# Patient Record
Sex: FEMALE | Race: WHITE | Employment: OTHER | URBAN - METROPOLITAN AREA
[De-identification: names, ages, dates, MRNs, and addresses within clinical notes are randomized per-mention and may not be internally consistent; named-entity substitution may affect disease eponyms.]

---

## 2018-04-03 ENCOUNTER — OFFICE VISIT (OUTPATIENT)
Dept: INTERNAL MEDICINE | Age: 73
End: 2018-04-03
Payer: MEDICARE

## 2018-04-03 VITALS
WEIGHT: 93.8 LBS | SYSTOLIC BLOOD PRESSURE: 128 MMHG | HEART RATE: 88 BPM | RESPIRATION RATE: 16 BRPM | HEIGHT: 60 IN | TEMPERATURE: 98.6 F | BODY MASS INDEX: 18.42 KG/M2 | DIASTOLIC BLOOD PRESSURE: 60 MMHG

## 2018-04-03 DIAGNOSIS — J44.1 CHRONIC OBSTRUCTIVE PULMONARY DISEASE WITH ACUTE EXACERBATION (HCC): ICD-10-CM

## 2018-04-03 DIAGNOSIS — Z12.39 BREAST CANCER SCREENING: Primary | ICD-10-CM

## 2018-04-03 PROCEDURE — 1123F ACP DISCUSS/DSCN MKR DOCD: CPT | Performed by: INTERNAL MEDICINE

## 2018-04-03 PROCEDURE — 99213 OFFICE O/P EST LOW 20 MIN: CPT | Performed by: INTERNAL MEDICINE

## 2018-04-03 PROCEDURE — G8419 CALC BMI OUT NRM PARAM NOF/U: HCPCS | Performed by: INTERNAL MEDICINE

## 2018-04-03 PROCEDURE — 3023F SPIROM DOC REV: CPT | Performed by: INTERNAL MEDICINE

## 2018-04-03 PROCEDURE — G8427 DOCREV CUR MEDS BY ELIG CLIN: HCPCS | Performed by: INTERNAL MEDICINE

## 2018-04-03 PROCEDURE — G8926 SPIRO NO PERF OR DOC: HCPCS | Performed by: INTERNAL MEDICINE

## 2018-04-03 PROCEDURE — 3014F SCREEN MAMMO DOC REV: CPT | Performed by: INTERNAL MEDICINE

## 2018-04-03 PROCEDURE — 1090F PRES/ABSN URINE INCON ASSESS: CPT | Performed by: INTERNAL MEDICINE

## 2018-04-03 PROCEDURE — 3017F COLORECTAL CA SCREEN DOC REV: CPT | Performed by: INTERNAL MEDICINE

## 2018-04-03 PROCEDURE — G8399 PT W/DXA RESULTS DOCUMENT: HCPCS | Performed by: INTERNAL MEDICINE

## 2018-04-03 PROCEDURE — 4040F PNEUMOC VAC/ADMIN/RCVD: CPT | Performed by: INTERNAL MEDICINE

## 2018-04-03 PROCEDURE — 4004F PT TOBACCO SCREEN RCVD TLK: CPT | Performed by: INTERNAL MEDICINE

## 2018-04-03 RX ORDER — ALBUTEROL SULFATE 90 UG/1
2 AEROSOL, METERED RESPIRATORY (INHALATION) EVERY 6 HOURS PRN
Qty: 1 INHALER | Refills: 5 | Status: ON HOLD | OUTPATIENT
Start: 2018-04-03 | End: 2018-06-20 | Stop reason: HOSPADM

## 2018-04-03 RX ORDER — OMEPRAZOLE 20 MG/1
20 CAPSULE, DELAYED RELEASE ORAL 2 TIMES DAILY
Qty: 60 CAPSULE | Refills: 3 | Status: ON HOLD | OUTPATIENT
Start: 2018-04-03 | End: 2018-06-20 | Stop reason: HOSPADM

## 2018-04-03 RX ORDER — BUDESONIDE AND FORMOTEROL FUMARATE DIHYDRATE 160; 4.5 UG/1; UG/1
2 AEROSOL RESPIRATORY (INHALATION) 2 TIMES DAILY
Qty: 1 INHALER | Refills: 5 | Status: ON HOLD | OUTPATIENT
Start: 2018-04-03 | End: 2018-06-20 | Stop reason: HOSPADM

## 2018-04-03 ASSESSMENT — ENCOUNTER SYMPTOMS
COUGH: 0
SHORTNESS OF BREATH: 0
DIARRHEA: 0
NAUSEA: 0
SORE THROAT: 0
ABDOMINAL PAIN: 1
VOMITING: 0
WHEEZING: 0
BACK PAIN: 0

## 2018-04-16 ENCOUNTER — HOSPITAL ENCOUNTER (OUTPATIENT)
Dept: GENERAL RADIOLOGY | Age: 73
Discharge: HOME | End: 2018-04-18
Payer: MEDICARE

## 2018-04-16 DIAGNOSIS — Z12.39 BREAST CANCER SCREENING: ICD-10-CM

## 2018-04-16 PROCEDURE — 77063 BREAST TOMOSYNTHESIS BI: CPT

## 2018-04-17 ENCOUNTER — TELEPHONE (OUTPATIENT)
Dept: GENERAL RADIOLOGY | Age: 73
End: 2018-04-17

## 2018-04-17 ENCOUNTER — TELEPHONE (OUTPATIENT)
Dept: INTERNAL MEDICINE | Age: 73
End: 2018-04-17

## 2018-04-17 DIAGNOSIS — R92.8 MAMMOGRAM ABNORMAL: Primary | ICD-10-CM

## 2018-04-19 ENCOUNTER — TELEPHONE (OUTPATIENT)
Dept: INTERNAL MEDICINE | Age: 73
End: 2018-04-19

## 2018-04-23 ENCOUNTER — HOSPITAL ENCOUNTER (OUTPATIENT)
Dept: GENERAL RADIOLOGY | Age: 73
Discharge: HOME | End: 2018-04-25
Payer: MEDICARE

## 2018-04-23 DIAGNOSIS — R92.8 MAMMOGRAM ABNORMAL: ICD-10-CM

## 2018-04-23 PROCEDURE — G0279 TOMOSYNTHESIS, MAMMO: HCPCS

## 2018-05-17 ENCOUNTER — OFFICE VISIT (OUTPATIENT)
Dept: PULMONOLOGY | Age: 73
End: 2018-05-17
Payer: MEDICARE

## 2018-05-17 VITALS
OXYGEN SATURATION: 95 % | HEIGHT: 62 IN | WEIGHT: 90.8 LBS | SYSTOLIC BLOOD PRESSURE: 122 MMHG | DIASTOLIC BLOOD PRESSURE: 56 MMHG | TEMPERATURE: 97.4 F | RESPIRATION RATE: 22 BRPM | BODY MASS INDEX: 16.71 KG/M2 | HEART RATE: 80 BPM

## 2018-05-17 DIAGNOSIS — J42 CHRONIC BRONCHITIS, UNSPECIFIED CHRONIC BRONCHITIS TYPE (HCC): Primary | Chronic | ICD-10-CM

## 2018-05-17 DIAGNOSIS — J96.01 ACUTE RESPIRATORY FAILURE WITH HYPOXIA (HCC): ICD-10-CM

## 2018-05-17 LAB
DLCO %PRED: NORMAL
DLCO PRE: NORMAL
FEF 25-75%-POST: 0.48
FEF 25-75%-PRE: 0.44
FEV1-POST: 1.03
FEV1-PRE: 1.02
FEV1/FVC-POST: 53
FEV1/FVC-PRE: 56
FVC-POST: 1.94
FVC-PRE: 1.81
MEP: NORMAL
MIP: NORMAL
TLC %PRED: NORMAL
TLC PRE: NORMAL

## 2018-05-17 PROCEDURE — G8418 CALC BMI BLW LOW PARAM F/U: HCPCS | Performed by: INTERNAL MEDICINE

## 2018-05-17 PROCEDURE — G8427 DOCREV CUR MEDS BY ELIG CLIN: HCPCS | Performed by: INTERNAL MEDICINE

## 2018-05-17 PROCEDURE — 4040F PNEUMOC VAC/ADMIN/RCVD: CPT | Performed by: INTERNAL MEDICINE

## 2018-05-17 PROCEDURE — 4004F PT TOBACCO SCREEN RCVD TLK: CPT | Performed by: INTERNAL MEDICINE

## 2018-05-17 PROCEDURE — 99205 OFFICE O/P NEW HI 60 MIN: CPT | Performed by: INTERNAL MEDICINE

## 2018-05-17 PROCEDURE — 99203 OFFICE O/P NEW LOW 30 MIN: CPT | Performed by: INTERNAL MEDICINE

## 2018-05-17 PROCEDURE — 1090F PRES/ABSN URINE INCON ASSESS: CPT | Performed by: INTERNAL MEDICINE

## 2018-05-17 PROCEDURE — 94060 EVALUATION OF WHEEZING: CPT | Performed by: INTERNAL MEDICINE

## 2018-05-17 PROCEDURE — 1123F ACP DISCUSS/DSCN MKR DOCD: CPT | Performed by: INTERNAL MEDICINE

## 2018-05-17 PROCEDURE — G8399 PT W/DXA RESULTS DOCUMENT: HCPCS | Performed by: INTERNAL MEDICINE

## 2018-05-17 PROCEDURE — 3017F COLORECTAL CA SCREEN DOC REV: CPT | Performed by: INTERNAL MEDICINE

## 2018-05-17 PROCEDURE — 3023F SPIROM DOC REV: CPT | Performed by: INTERNAL MEDICINE

## 2018-05-17 PROCEDURE — G8925 SPIR FEV1/FVC>=60% & NO COPD: HCPCS | Performed by: INTERNAL MEDICINE

## 2018-05-17 ASSESSMENT — PULMONARY FUNCTION TESTS
FEV1_PRE: 1.02
FEV1/FVC_POST: 53
FEV1/FVC_PRE: 56
FEV1_POST: 1.03
FVC_PRE: 1.81
FVC_POST: 1.94

## 2018-06-07 ENCOUNTER — APPOINTMENT (OUTPATIENT)
Dept: GENERAL RADIOLOGY | Age: 73
DRG: 190 | End: 2018-06-07
Payer: MEDICARE

## 2018-06-07 ENCOUNTER — HOSPITAL ENCOUNTER (EMERGENCY)
Age: 73
Discharge: HOME | DRG: 190 | End: 2018-06-07
Attending: EMERGENCY MEDICINE
Payer: MEDICARE

## 2018-06-07 VITALS
HEART RATE: 87 BPM | SYSTOLIC BLOOD PRESSURE: 105 MMHG | RESPIRATION RATE: 17 BRPM | DIASTOLIC BLOOD PRESSURE: 54 MMHG | HEIGHT: 60 IN | WEIGHT: 90 LBS | OXYGEN SATURATION: 94 % | TEMPERATURE: 97.8 F | BODY MASS INDEX: 17.67 KG/M2

## 2018-06-07 DIAGNOSIS — J44.1 COPD EXACERBATION (HCC): Primary | ICD-10-CM

## 2018-06-07 LAB
ALBUMIN SERPL-MCNC: 4.3 G/DL (ref 3.5–5.2)
ALP BLD-CCNC: 53 U/L (ref 35–104)
ALT SERPL-CCNC: 10 U/L (ref 0–32)
ANION GAP SERPL CALCULATED.3IONS-SCNC: 11 MMOL/L (ref 7–16)
AST SERPL-CCNC: 19 U/L (ref 0–31)
BASOPHILS ABSOLUTE: 0.03 E9/L (ref 0–0.2)
BASOPHILS RELATIVE PERCENT: 0.6 % (ref 0–2)
BILIRUB SERPL-MCNC: 0.3 MG/DL (ref 0–1.2)
BUN BLDV-MCNC: 13 MG/DL (ref 8–23)
CALCIUM SERPL-MCNC: 8.8 MG/DL (ref 8.6–10.2)
CHLORIDE BLD-SCNC: 98 MMOL/L (ref 98–107)
CO2: 28 MMOL/L (ref 22–29)
CREAT SERPL-MCNC: 0.7 MG/DL (ref 0.5–1)
EOSINOPHILS ABSOLUTE: 0.03 E9/L (ref 0.05–0.5)
EOSINOPHILS RELATIVE PERCENT: 0.6 % (ref 0–6)
FILM ARRAY ADENOVIRUS: ABNORMAL
FILM ARRAY BORDETELLA PERTUSSIS: ABNORMAL
FILM ARRAY CHLAMYDOPHILIA PNEUMONIAE: ABNORMAL
FILM ARRAY CORONAVIRUS 229E: ABNORMAL
FILM ARRAY CORONAVIRUS HKU1: ABNORMAL
FILM ARRAY CORONAVIRUS NL63: ABNORMAL
FILM ARRAY CORONAVIRUS OC43: ABNORMAL
FILM ARRAY INFLUENZA A VIRUS 09H1: ABNORMAL
FILM ARRAY INFLUENZA A VIRUS H1: ABNORMAL
FILM ARRAY INFLUENZA A VIRUS H3: ABNORMAL
FILM ARRAY INFLUENZA A VIRUS: ABNORMAL
FILM ARRAY INFLUENZA B: ABNORMAL
FILM ARRAY METAPNEUMOVIRUS: ABNORMAL
FILM ARRAY MYCOPLASMA PNEUMONIAE: ABNORMAL
FILM ARRAY PARAINFLUENZA VIRUS 1: ABNORMAL
FILM ARRAY PARAINFLUENZA VIRUS 2: ABNORMAL
FILM ARRAY PARAINFLUENZA VIRUS 4: ABNORMAL
FILM ARRAY RESPIRATORY SYNCITIAL VIRUS: ABNORMAL
FILM ARRAY RHINOVIRUS/ENTEROVIRUS: ABNORMAL
GFR AFRICAN AMERICAN: >60
GFR NON-AFRICAN AMERICAN: >60 ML/MIN/1.73
GLUCOSE BLD-MCNC: 96 MG/DL (ref 74–109)
HCT VFR BLD CALC: 38.8 % (ref 34–48)
HEMOGLOBIN: 12.6 G/DL (ref 11.5–15.5)
IMMATURE GRANULOCYTES #: 0.02 E9/L
IMMATURE GRANULOCYTES %: 0.4 % (ref 0–5)
LACTIC ACID: 1 MMOL/L (ref 0.5–2.2)
LYMPHOCYTES ABSOLUTE: 1.2 E9/L (ref 1.5–4)
LYMPHOCYTES RELATIVE PERCENT: 23.4 % (ref 20–42)
MCH RBC QN AUTO: 30.9 PG (ref 26–35)
MCHC RBC AUTO-ENTMCNC: 32.5 % (ref 32–34.5)
MCV RBC AUTO: 95.1 FL (ref 80–99.9)
MONOCYTES ABSOLUTE: 0.65 E9/L (ref 0.1–0.95)
MONOCYTES RELATIVE PERCENT: 12.7 % (ref 2–12)
NEUTROPHILS ABSOLUTE: 3.19 E9/L (ref 1.8–7.3)
NEUTROPHILS RELATIVE PERCENT: 62.3 % (ref 43–80)
ORGANISM: ABNORMAL
PDW BLD-RTO: 13.6 FL (ref 11.5–15)
PLATELET # BLD: 201 E9/L (ref 130–450)
PMV BLD AUTO: 8.9 FL (ref 7–12)
POTASSIUM SERPL-SCNC: 4.4 MMOL/L (ref 3.5–5)
PRO-BNP: 323 PG/ML (ref 0–125)
RBC # BLD: 4.08 E12/L (ref 3.5–5.5)
SODIUM BLD-SCNC: 137 MMOL/L (ref 132–146)
TOTAL PROTEIN: 6.9 G/DL (ref 6.4–8.3)
TROPONIN: <0.01 NG/ML (ref 0–0.03)
WBC # BLD: 5.1 E9/L (ref 4.5–11.5)

## 2018-06-07 PROCEDURE — 80053 COMPREHEN METABOLIC PANEL: CPT

## 2018-06-07 PROCEDURE — 87502 INFLUENZA DNA AMP PROBE: CPT

## 2018-06-07 PROCEDURE — 87486 CHLMYD PNEUM DNA AMP PROBE: CPT

## 2018-06-07 PROCEDURE — 87040 BLOOD CULTURE FOR BACTERIA: CPT

## 2018-06-07 PROCEDURE — 6360000002 HC RX W HCPCS: Performed by: EMERGENCY MEDICINE

## 2018-06-07 PROCEDURE — 6370000000 HC RX 637 (ALT 250 FOR IP): Performed by: EMERGENCY MEDICINE

## 2018-06-07 PROCEDURE — 84484 ASSAY OF TROPONIN QUANT: CPT

## 2018-06-07 PROCEDURE — 2580000003 HC RX 258: Performed by: EMERGENCY MEDICINE

## 2018-06-07 PROCEDURE — 71045 X-RAY EXAM CHEST 1 VIEW: CPT

## 2018-06-07 PROCEDURE — 94664 DEMO&/EVAL PT USE INHALER: CPT

## 2018-06-07 PROCEDURE — 87503 INFLUENZA DNA AMP PROB ADDL: CPT

## 2018-06-07 PROCEDURE — 96374 THER/PROPH/DIAG INJ IV PUSH: CPT

## 2018-06-07 PROCEDURE — 85025 COMPLETE CBC W/AUTO DIFF WBC: CPT

## 2018-06-07 PROCEDURE — 87581 M.PNEUMON DNA AMP PROBE: CPT

## 2018-06-07 PROCEDURE — 99285 EMERGENCY DEPT VISIT HI MDM: CPT

## 2018-06-07 PROCEDURE — 87798 DETECT AGENT NOS DNA AMP: CPT

## 2018-06-07 PROCEDURE — 36415 COLL VENOUS BLD VENIPUNCTURE: CPT

## 2018-06-07 PROCEDURE — 83605 ASSAY OF LACTIC ACID: CPT

## 2018-06-07 PROCEDURE — 93005 ELECTROCARDIOGRAM TRACING: CPT | Performed by: EMERGENCY MEDICINE

## 2018-06-07 PROCEDURE — 94640 AIRWAY INHALATION TREATMENT: CPT

## 2018-06-07 PROCEDURE — 83880 ASSAY OF NATRIURETIC PEPTIDE: CPT

## 2018-06-07 RX ORDER — DOXYCYCLINE 100 MG/1
100 CAPSULE ORAL 2 TIMES DAILY
Qty: 14 CAPSULE | Refills: 0 | Status: ON HOLD | OUTPATIENT
Start: 2018-06-07 | End: 2018-06-20 | Stop reason: HOSPADM

## 2018-06-07 RX ORDER — 0.9 % SODIUM CHLORIDE 0.9 %
1000 INTRAVENOUS SOLUTION INTRAVENOUS ONCE
Status: COMPLETED | OUTPATIENT
Start: 2018-06-07 | End: 2018-06-07

## 2018-06-07 RX ORDER — PREDNISONE 20 MG/1
40 TABLET ORAL DAILY
Qty: 10 TABLET | Refills: 0 | Status: ON HOLD | OUTPATIENT
Start: 2018-06-07 | End: 2018-06-20 | Stop reason: HOSPADM

## 2018-06-07 RX ORDER — METHYLPREDNISOLONE SODIUM SUCCINATE 125 MG/2ML
125 INJECTION, POWDER, LYOPHILIZED, FOR SOLUTION INTRAMUSCULAR; INTRAVENOUS ONCE
Status: COMPLETED | OUTPATIENT
Start: 2018-06-07 | End: 2018-06-07

## 2018-06-07 RX ORDER — IPRATROPIUM BROMIDE AND ALBUTEROL SULFATE 2.5; .5 MG/3ML; MG/3ML
1 SOLUTION RESPIRATORY (INHALATION)
Status: COMPLETED | OUTPATIENT
Start: 2018-06-07 | End: 2018-06-07

## 2018-06-07 RX ADMIN — METHYLPREDNISOLONE SODIUM SUCCINATE 125 MG: 125 INJECTION, POWDER, FOR SOLUTION INTRAMUSCULAR; INTRAVENOUS at 08:22

## 2018-06-07 RX ADMIN — IPRATROPIUM BROMIDE AND ALBUTEROL SULFATE 1 AMPULE: 2.5; .5 SOLUTION RESPIRATORY (INHALATION) at 08:45

## 2018-06-07 RX ADMIN — IPRATROPIUM BROMIDE AND ALBUTEROL SULFATE 1 AMPULE: 2.5; .5 SOLUTION RESPIRATORY (INHALATION) at 08:35

## 2018-06-07 RX ADMIN — SODIUM CHLORIDE 1000 ML: 9 INJECTION, SOLUTION INTRAVENOUS at 08:22

## 2018-06-07 RX ADMIN — IPRATROPIUM BROMIDE AND ALBUTEROL SULFATE 1 AMPULE: 2.5; .5 SOLUTION RESPIRATORY (INHALATION) at 09:00

## 2018-06-07 ASSESSMENT — PAIN DESCRIPTION - PROGRESSION: CLINICAL_PROGRESSION: GRADUALLY IMPROVING

## 2018-06-07 ASSESSMENT — PAIN DESCRIPTION - FREQUENCY: FREQUENCY: INTERMITTENT

## 2018-06-07 ASSESSMENT — PAIN SCALES - GENERAL: PAINLEVEL_OUTOF10: 1

## 2018-06-07 ASSESSMENT — PAIN DESCRIPTION - PAIN TYPE: TYPE: ACUTE PAIN

## 2018-06-07 ASSESSMENT — PAIN DESCRIPTION - DESCRIPTORS: DESCRIPTORS: ACHING

## 2018-06-07 ASSESSMENT — PAIN DESCRIPTION - LOCATION: LOCATION: GENERALIZED

## 2018-06-07 NOTE — ED PROVIDER NOTES
High Blood Pressure in her mother. The patients home medications have been reviewed. Allergies: Patient has no known allergies.     -------------------------------------------------- RESULTS -------------------------------------------------  All laboratory and radiology results have been personally reviewed by myself   LABS:  Results for orders placed or performed during the hospital encounter of 06/07/18   Respiratory Panel, Film Array   Result Value Ref Range    Film Array Adenovirus       Result: Not Detected  *  *  Normal Range: Not Detected      Film Array Coronavirus HKU1       Result: Not Detected  *  *  Normal Range: Not Detected      Film Array Conoravirus NL63       Result: Not Detected  *  *  Normal Range: Not Detected      Film Array Coronavirus 229E       Result: Not Detected  *  *  Normal Range: Not Detected      Film Array Coronavirus OC43       Result: Not Detected  *  *  Normal Range: Not Detected      Film Array Influenza A Virus       Result: Not Detected  *  *  Normal Range: Not Detected      Film Array Influenza A Virus H1       Result: Not Detected  *  *  Normal Range: Not Detected      Film Array Influenza A Virus 09H1       Result: Not Detected  *  *  Normal Range: Not Detected      Film Array Influenza A Virus H3       Result: Not Detected  *  *  Normal Range: Not Detected      Film Array Influenza B       Result: Not Detected  *  *  Normal Range: Not Detected      Film Array Metapneumovirus       Result: Not Detected  *  *  Normal Range: Not Detected      Film Array Parainfluenza Virus 1       Result: Not Detected  *  *  Normal Range: Not Detected      Film Array Parainfluenza Virus 2       Result: Not Detected  *  *  Normal Range: Not Detected      Film Array Parainfluenza Virus 4       Result: Not Detected  *  *  Normal Range: Not Detected      Film Array Respiratory Syncitial Virus       Result: Not Detected  *  *  Normal Range: Not Detected      Film Array Rhinovirus/Enterovirus DECISION MAKING----------------------  Medications   0.9 % sodium chloride bolus (0 mLs Intravenous Stopped 6/7/18 0926)   methylPREDNISolone sodium (SOLU-MEDROL) injection 125 mg (125 mg Intravenous Given 6/7/18 0822)   ipratropium-albuterol (DUONEB) nebulizer solution 1 ampule (1 ampule Inhalation Given 6/7/18 0900)       Medical Decision Making:    Patient presented the ED for shortness of breath, congestion and rhinorrhea with a history of COPD. Labs and imaging showed no evidence of pneumonia or leukocytosis, respiratory. Does show she is positive for influenza virus. Reevaluation, patient improved with breathing treatment and Solu-Medrol in the ED. Patient arrived to the ED with SPO2 of 84% on room air and during reevaluation, patient was adamant about going home today. Discussed the risks and benefits of being admitted to the hospital, with patient able to ambulate in the ED without difficulty, SPO2 being 94% on room air during ambulation, I am comfortable discharging the patient to follow up with her PCP noted that she does have home oxygen at home if needed. Patient is agreeable with plan to be discharged with prescription for doxycycline and prednisone to follow up with her PCP this week, is aware of the signs and symptoms would return the ED for evaluation. Re-Evaluation:  Time: 1120  Re-evaluation. Patients symptoms are improving with breathing treatments in the ED  Repeat physical examination is significantly improved    Counseling: The emergency provider has spoken with the patient and daughter and discussed todays results, in addition to providing specific details for the plan of care and counseling regarding the diagnosis and prognosis.   Questions are answered at this time and they are agreeable with the plan.      --------------------------------- IMPRESSION AND DISPOSITION ---------------------------------    IMPRESSION  1. COPD exacerbation ([facility]        DISPOSITION  Disposition: Discharge

## 2018-06-08 LAB
EKG ATRIAL RATE: 87 BPM
EKG P AXIS: 86 DEGREES
EKG P-R INTERVAL: 154 MS
EKG Q-T INTERVAL: 364 MS
EKG QRS DURATION: 74 MS
EKG QTC CALCULATION (BAZETT): 438 MS
EKG R AXIS: -61 DEGREES
EKG T AXIS: 54 DEGREES
EKG VENTRICULAR RATE: 87 BPM

## 2018-06-10 ENCOUNTER — HOSPITAL ENCOUNTER (INPATIENT)
Age: 73
LOS: 10 days | Discharge: NURSING, REHAB OR LONG-TERM CARE | DRG: 190 | End: 2018-06-20
Attending: EMERGENCY MEDICINE | Admitting: INTERNAL MEDICINE
Payer: MEDICARE

## 2018-06-10 ENCOUNTER — APPOINTMENT (OUTPATIENT)
Dept: GENERAL RADIOLOGY | Age: 73
DRG: 190 | End: 2018-06-10
Payer: MEDICARE

## 2018-06-10 DIAGNOSIS — J44.1 ACUTE EXACERBATION OF CHRONIC OBSTRUCTIVE PULMONARY DISEASE (COPD) (HCC): Primary | ICD-10-CM

## 2018-06-10 DIAGNOSIS — E43 SEVERE PROTEIN-CALORIE MALNUTRITION (HCC): ICD-10-CM

## 2018-06-10 LAB
AADO2: 168.3 MMHG
AADO2: 194.3 MMHG
ALBUMIN SERPL-MCNC: 4.3 G/DL (ref 3.5–5.2)
ALP BLD-CCNC: 61 U/L (ref 35–104)
ALT SERPL-CCNC: 16 U/L (ref 0–32)
ANION GAP SERPL CALCULATED.3IONS-SCNC: 11 MMOL/L (ref 7–16)
AST SERPL-CCNC: 23 U/L (ref 0–31)
B.E.: 1.3 MMOL/L (ref -3–3)
B.E.: 3.6 MMOL/L (ref -3–3)
BASOPHILS ABSOLUTE: 0.04 E9/L (ref 0–0.2)
BASOPHILS RELATIVE PERCENT: 0.3 % (ref 0–2)
BILIRUB SERPL-MCNC: 0.6 MG/DL (ref 0–1.2)
BUN BLDV-MCNC: 16 MG/DL (ref 8–23)
CALCIUM SERPL-MCNC: 9.4 MG/DL (ref 8.6–10.2)
CHLORIDE BLD-SCNC: 96 MMOL/L (ref 98–107)
CO2: 33 MMOL/L (ref 22–29)
COHB: 0.1 % (ref 0–1.5)
COHB: 0.3 % (ref 0–1.5)
COMMENT: ABNORMAL
CREAT SERPL-MCNC: 0.4 MG/DL (ref 0.5–1)
CRITICAL: ABNORMAL
CRITICAL: ABNORMAL
DATE ANALYZED: ABNORMAL
DATE ANALYZED: ABNORMAL
DATE OF COLLECTION: ABNORMAL
DATE OF COLLECTION: ABNORMAL
EOSINOPHILS ABSOLUTE: 0 E9/L (ref 0.05–0.5)
EOSINOPHILS RELATIVE PERCENT: 0 % (ref 0–6)
FIO2: 50 %
FIO2: 50 %
GFR AFRICAN AMERICAN: >60
GFR NON-AFRICAN AMERICAN: >60 ML/MIN/1.73
GLUCOSE BLD-MCNC: 155 MG/DL (ref 74–109)
HCO3: 27.9 MMOL/L (ref 22–26)
HCO3: 31.3 MMOL/L (ref 22–26)
HCT VFR BLD CALC: 39.2 % (ref 34–48)
HEMOGLOBIN: 13.1 G/DL (ref 11.5–15.5)
HHB: 2.4 % (ref 0–5)
HHB: 3.2 % (ref 0–5)
IMMATURE GRANULOCYTES #: 0.06 E9/L
IMMATURE GRANULOCYTES %: 0.4 % (ref 0–5)
LAB: 9558
LAB: ABNORMAL
LACTIC ACID: 1.6 MMOL/L (ref 0.5–2.2)
LYMPHOCYTES ABSOLUTE: 4.4 E9/L (ref 1.5–4)
LYMPHOCYTES RELATIVE PERCENT: 31.5 % (ref 20–42)
Lab: 1528
Lab: 1748
MAGNESIUM: 2.1 MG/DL (ref 1.6–2.6)
MCH RBC QN AUTO: 31.6 PG (ref 26–35)
MCHC RBC AUTO-ENTMCNC: 33.4 % (ref 32–34.5)
MCV RBC AUTO: 94.5 FL (ref 80–99.9)
METHB: 0.3 % (ref 0–1.5)
METHB: 0.3 % (ref 0–1.5)
MODE: ABNORMAL
MODE: ABNORMAL
MONOCYTES ABSOLUTE: 1.97 E9/L (ref 0.1–0.95)
MONOCYTES RELATIVE PERCENT: 14.1 % (ref 2–12)
NEUTROPHILS ABSOLUTE: 7.49 E9/L (ref 1.8–7.3)
NEUTROPHILS RELATIVE PERCENT: 53.7 % (ref 43–80)
O2 CONTENT: 17.8 ML/DL
O2 CONTENT: 18.4 ML/DL
O2 SATURATION: 96.8 % (ref 92–98.5)
O2 SATURATION: 97.6 % (ref 92–98.5)
O2HB: 96.4 % (ref 94–97)
O2HB: 97 % (ref 94–97)
OPERATOR ID: 2593
OPERATOR ID: 7874
PATIENT TEMP: 37 C
PATIENT TEMP: 37 C
PCO2: 52.7 MMHG (ref 35–45)
PCO2: 62 MMHG (ref 35–45)
PDW BLD-RTO: 13.1 FL (ref 11.5–15)
PFO2: 1.81 MMHG/%
PFO2: 2.12 MMHG/%
PH BLOOD GAS: 7.32 (ref 7.35–7.45)
PH BLOOD GAS: 7.34 (ref 7.35–7.45)
PLATELET # BLD: 253 E9/L (ref 130–450)
PMV BLD AUTO: 8.8 FL (ref 7–12)
PO2: 106 MMHG (ref 60–100)
PO2: 90.4 MMHG (ref 60–100)
POTASSIUM SERPL-SCNC: 3.9 MMOL/L (ref 3.5–5)
PRO-BNP: 270 PG/ML (ref 0–125)
PROCALCITONIN: 0.06 NG/ML (ref 0–0.08)
RBC # BLD: 4.15 E12/L (ref 3.5–5.5)
RBC # BLD: NORMAL 10*6/UL
RI(T): 159 %
RI(T): 2.15
SODIUM BLD-SCNC: 140 MMOL/L (ref 132–146)
SOURCE, BLOOD GAS: ABNORMAL
SOURCE, BLOOD GAS: ABNORMAL
THB: 13.1 G/DL (ref 11.5–16.5)
THB: 13.4 G/DL (ref 11.5–16.5)
TIME ANALYZED: 1529
TIME ANALYZED: 1753
TOTAL PROTEIN: 7.4 G/DL (ref 6.4–8.3)
WBC # BLD: 14 E9/L (ref 4.5–11.5)

## 2018-06-10 PROCEDURE — 71045 X-RAY EXAM CHEST 1 VIEW: CPT

## 2018-06-10 PROCEDURE — 82805 BLOOD GASES W/O2 SATURATION: CPT

## 2018-06-10 PROCEDURE — 83735 ASSAY OF MAGNESIUM: CPT

## 2018-06-10 PROCEDURE — 94660 CPAP INITIATION&MGMT: CPT

## 2018-06-10 PROCEDURE — 6370000000 HC RX 637 (ALT 250 FOR IP): Performed by: STUDENT IN AN ORGANIZED HEALTH CARE EDUCATION/TRAINING PROGRAM

## 2018-06-10 PROCEDURE — 83605 ASSAY OF LACTIC ACID: CPT

## 2018-06-10 PROCEDURE — 6360000002 HC RX W HCPCS: Performed by: STUDENT IN AN ORGANIZED HEALTH CARE EDUCATION/TRAINING PROGRAM

## 2018-06-10 PROCEDURE — 83880 ASSAY OF NATRIURETIC PEPTIDE: CPT

## 2018-06-10 PROCEDURE — 87581 M.PNEUMON DNA AMP PROBE: CPT

## 2018-06-10 PROCEDURE — 87798 DETECT AGENT NOS DNA AMP: CPT

## 2018-06-10 PROCEDURE — 36415 COLL VENOUS BLD VENIPUNCTURE: CPT

## 2018-06-10 PROCEDURE — 80053 COMPREHEN METABOLIC PANEL: CPT

## 2018-06-10 PROCEDURE — 6370000000 HC RX 637 (ALT 250 FOR IP): Performed by: EMERGENCY MEDICINE

## 2018-06-10 PROCEDURE — 85025 COMPLETE CBC W/AUTO DIFF WBC: CPT

## 2018-06-10 PROCEDURE — 87503 INFLUENZA DNA AMP PROB ADDL: CPT

## 2018-06-10 PROCEDURE — 2580000003 HC RX 258: Performed by: STUDENT IN AN ORGANIZED HEALTH CARE EDUCATION/TRAINING PROGRAM

## 2018-06-10 PROCEDURE — 87486 CHLMYD PNEUM DNA AMP PROBE: CPT

## 2018-06-10 PROCEDURE — 2500000003 HC RX 250 WO HCPCS: Performed by: STUDENT IN AN ORGANIZED HEALTH CARE EDUCATION/TRAINING PROGRAM

## 2018-06-10 PROCEDURE — 94664 DEMO&/EVAL PT USE INHALER: CPT

## 2018-06-10 PROCEDURE — 84145 PROCALCITONIN (PCT): CPT

## 2018-06-10 PROCEDURE — 94640 AIRWAY INHALATION TREATMENT: CPT

## 2018-06-10 PROCEDURE — 87502 INFLUENZA DNA AMP PROBE: CPT

## 2018-06-10 PROCEDURE — 99285 EMERGENCY DEPT VISIT HI MDM: CPT

## 2018-06-10 PROCEDURE — 93005 ELECTROCARDIOGRAM TRACING: CPT | Performed by: EMERGENCY MEDICINE

## 2018-06-10 PROCEDURE — 2060000000 HC ICU INTERMEDIATE R&B

## 2018-06-10 RX ORDER — NICOTINE 21 MG/24HR
1 PATCH, TRANSDERMAL 24 HOURS TRANSDERMAL DAILY
Status: DISCONTINUED | OUTPATIENT
Start: 2018-06-10 | End: 2018-06-20 | Stop reason: HOSPADM

## 2018-06-10 RX ORDER — IPRATROPIUM BROMIDE AND ALBUTEROL SULFATE 2.5; .5 MG/3ML; MG/3ML
1 SOLUTION RESPIRATORY (INHALATION)
Status: DISCONTINUED | OUTPATIENT
Start: 2018-06-10 | End: 2018-06-20 | Stop reason: HOSPADM

## 2018-06-10 RX ORDER — METHYLPREDNISOLONE SODIUM SUCCINATE 40 MG/ML
40 INJECTION, POWDER, LYOPHILIZED, FOR SOLUTION INTRAMUSCULAR; INTRAVENOUS EVERY 12 HOURS
Status: DISCONTINUED | OUTPATIENT
Start: 2018-06-10 | End: 2018-06-10 | Stop reason: SDUPTHER

## 2018-06-10 RX ORDER — IPRATROPIUM BROMIDE AND ALBUTEROL SULFATE 2.5; .5 MG/3ML; MG/3ML
3 SOLUTION RESPIRATORY (INHALATION) ONCE
Status: COMPLETED | OUTPATIENT
Start: 2018-06-10 | End: 2018-06-10

## 2018-06-10 RX ORDER — BUDESONIDE 0.5 MG/2ML
500 INHALANT ORAL 2 TIMES DAILY
Status: DISCONTINUED | OUTPATIENT
Start: 2018-06-10 | End: 2018-06-20 | Stop reason: HOSPADM

## 2018-06-10 RX ORDER — METHYLPREDNISOLONE SODIUM SUCCINATE 40 MG/ML
40 INJECTION, POWDER, LYOPHILIZED, FOR SOLUTION INTRAMUSCULAR; INTRAVENOUS DAILY
Status: DISCONTINUED | OUTPATIENT
Start: 2018-06-10 | End: 2018-06-10

## 2018-06-10 RX ORDER — FORMOTEROL FUMARATE 20 UG/2ML
20 SOLUTION RESPIRATORY (INHALATION) 2 TIMES DAILY
Status: DISCONTINUED | OUTPATIENT
Start: 2018-06-10 | End: 2018-06-20 | Stop reason: HOSPADM

## 2018-06-10 RX ORDER — METHYLPREDNISOLONE SODIUM SUCCINATE 40 MG/ML
40 INJECTION, POWDER, LYOPHILIZED, FOR SOLUTION INTRAMUSCULAR; INTRAVENOUS EVERY 12 HOURS
Status: DISCONTINUED | OUTPATIENT
Start: 2018-06-11 | End: 2018-06-12

## 2018-06-10 RX ADMIN — DOXYCYCLINE 100 MG: 100 INJECTION, POWDER, LYOPHILIZED, FOR SOLUTION INTRAVENOUS at 20:01

## 2018-06-10 RX ADMIN — BUDESONIDE 500 MCG: 0.5 SUSPENSION RESPIRATORY (INHALATION) at 21:24

## 2018-06-10 RX ADMIN — IPRATROPIUM BROMIDE AND ALBUTEROL SULFATE 3 AMPULE: 2.5; .5 SOLUTION RESPIRATORY (INHALATION) at 13:20

## 2018-06-10 RX ADMIN — ENOXAPARIN SODIUM 40 MG: 40 INJECTION SUBCUTANEOUS at 19:37

## 2018-06-10 RX ADMIN — FORMOTEROL FUMARATE DIHYDRATE 20 MCG: 20 SOLUTION RESPIRATORY (INHALATION) at 21:24

## 2018-06-10 RX ADMIN — Medication 40 MG: at 17:30

## 2018-06-10 ASSESSMENT — PAIN SCALES - GENERAL: PAINLEVEL_OUTOF10: 0

## 2018-06-10 NOTE — H&P
[address] [facility]  Department of Internal Medicine  Internal Medicine Residency Program  Resident Progress  Note      Patient:  Gerardo Peraza 81 y.o. female MRN: [id number]     Date of Service: 6/10/2018    Allergy: Patient has no known allergies. CC: F/u: SOB  Subjective       72F with PMH sig for COPD who recently went to the ED on 6/7/18 for evaluation of COPD exacerbation who arrives again for the same complaint. She was not admitted but directly discharged to home from the ED with doxycycline and prednisone. On 6/8/18, one day later, she reports SOB that has worsened over the past few days, and alerted her to come to the ED again. She states she was taking  Upon arrival, she was afebrile and hypertensive she was placed on Bipap and has been ranging 93-99% sat. She was afebrile with leukocytosis and elevated CO2. pH of 7.3, PCO2 of 62, PO2 of 106, and HCO3 of 31.3. CXRAY neg for consolidation. ROS: no CP, N,V,F,C  Objective     Physical Exam:  · Vitals: /60   Pulse 101   Temp 97.4 °F (36.3 °C) (Axillary)   Resp 20   Ht 5' (1.524 m)   Wt 90 lb (40.8 kg)   SpO2 93%   BMI 17.58 kg/m²     · I & O - 24hr: No intake/output data recorded. · General Appearance: alert, appears stated age and cooperative  · HEENT:  Head: Normocephalic, no lesions, without obvious abnormality. · Neck: no carotid bruit and no JVD  · Lung: clear to auscultation bilaterally  · Heart: regular rate and rhythm, S1, S2 normal, no murmur, click, rub or gallop  · Abdomen: soft, non-tender; bowel sounds normal; no masses,  no organomegaly  · Extremities:  extremities normal, atraumatic, no cyanosis or edema  · Musculokeletal: No joint swelling, no muscle tenderness. ROM normal in all joints of extremities.    · Neurologic: Mental status: Alert, oriented, thought content appropriate    Pertinent/ New Labs and Imaging Studies     CBC:   Lab Results   Component Value Date    WBC 14.0 06/10/2018    RBC 4.15 06/10/2018

## 2018-06-10 NOTE — PROGRESS NOTES
Date: 6/10/2018    Time: 6:55 PM    Patient Placed On BIPAP/CPAP/ Non-Invasive Ventilation? No    If no must comment. Facial area red/color change? No           If YES are Blister/Lesion present? No   If yes must notify nursing staff  BIPAP/CPAP skin barrier?   Yes    Skin barrier type:mepilex       Comments:        Patience Burger

## 2018-06-11 LAB
AADO2: 128.3 MMHG
AADO2: 161.7 MMHG
ALBUMIN SERPL-MCNC: 3.8 G/DL (ref 3.5–5.2)
ALP BLD-CCNC: 61 U/L (ref 35–104)
ALT SERPL-CCNC: 18 U/L (ref 0–32)
ANION GAP SERPL CALCULATED.3IONS-SCNC: 12 MMOL/L (ref 7–16)
AST SERPL-CCNC: 19 U/L (ref 0–31)
B.E.: 4.1 MMOL/L (ref -3–3)
B.E.: 4.2 MMOL/L (ref -3–3)
BASOPHILS ABSOLUTE: 0.01 E9/L (ref 0–0.2)
BASOPHILS RELATIVE PERCENT: 0.1 % (ref 0–2)
BILIRUB SERPL-MCNC: 0.4 MG/DL (ref 0–1.2)
BUN BLDV-MCNC: 22 MG/DL (ref 8–23)
CALCIUM SERPL-MCNC: 9 MG/DL (ref 8.6–10.2)
CHLORIDE BLD-SCNC: 93 MMOL/L (ref 98–107)
CO2: 32 MMOL/L (ref 22–29)
COHB: 0.3 % (ref 0–1.5)
COHB: 0.3 % (ref 0–1.5)
CREAT SERPL-MCNC: 0.4 MG/DL (ref 0.5–1)
CRITICAL: ABNORMAL
CRITICAL: ABNORMAL
DATE ANALYZED: ABNORMAL
DATE ANALYZED: ABNORMAL
DATE OF COLLECTION: ABNORMAL
DATE OF COLLECTION: ABNORMAL
EOSINOPHILS ABSOLUTE: 0 E9/L (ref 0.05–0.5)
EOSINOPHILS RELATIVE PERCENT: 0 % (ref 0–6)
FILM ARRAY ADENOVIRUS: ABNORMAL
FILM ARRAY BORDETELLA PERTUSSIS: ABNORMAL
FILM ARRAY CHLAMYDOPHILIA PNEUMONIAE: ABNORMAL
FILM ARRAY CORONAVIRUS 229E: ABNORMAL
FILM ARRAY CORONAVIRUS HKU1: ABNORMAL
FILM ARRAY CORONAVIRUS NL63: ABNORMAL
FILM ARRAY CORONAVIRUS OC43: ABNORMAL
FILM ARRAY INFLUENZA A VIRUS 09H1: ABNORMAL
FILM ARRAY INFLUENZA A VIRUS H1: ABNORMAL
FILM ARRAY INFLUENZA A VIRUS H3: ABNORMAL
FILM ARRAY INFLUENZA A VIRUS: ABNORMAL
FILM ARRAY INFLUENZA B: ABNORMAL
FILM ARRAY METAPNEUMOVIRUS: ABNORMAL
FILM ARRAY MYCOPLASMA PNEUMONIAE: ABNORMAL
FILM ARRAY PARAINFLUENZA VIRUS 1: ABNORMAL
FILM ARRAY PARAINFLUENZA VIRUS 2: ABNORMAL
FILM ARRAY PARAINFLUENZA VIRUS 4: ABNORMAL
FILM ARRAY RESPIRATORY SYNCITIAL VIRUS: ABNORMAL
FILM ARRAY RHINOVIRUS/ENTEROVIRUS: ABNORMAL
FIO2: 50 %
FIO2: 50 %
GFR AFRICAN AMERICAN: >60
GFR NON-AFRICAN AMERICAN: >60 ML/MIN/1.73
GLUCOSE BLD-MCNC: 124 MG/DL (ref 74–109)
HCO3: 30.3 MMOL/L (ref 22–26)
HCO3: 31.2 MMOL/L (ref 22–26)
HCT VFR BLD CALC: 36.9 % (ref 34–48)
HEMOGLOBIN: 12.1 G/DL (ref 11.5–15.5)
HHB: 0.8 % (ref 0–5)
HHB: 1.4 % (ref 0–5)
IMMATURE GRANULOCYTES #: 0.03 E9/L
IMMATURE GRANULOCYTES %: 0.3 % (ref 0–5)
LAB: 9558
LAB: 9558
LYMPHOCYTES ABSOLUTE: 1.13 E9/L (ref 1.5–4)
LYMPHOCYTES RELATIVE PERCENT: 10.7 % (ref 20–42)
Lab: 117
Lab: 511
MAGNESIUM: 2.2 MG/DL (ref 1.6–2.6)
MCH RBC QN AUTO: 30.8 PG (ref 26–35)
MCHC RBC AUTO-ENTMCNC: 32.8 % (ref 32–34.5)
MCV RBC AUTO: 93.9 FL (ref 80–99.9)
METHB: 0.3 % (ref 0–1.5)
METHB: 0.3 % (ref 0–1.5)
MODE: ABNORMAL
MODE: ABNORMAL
MONOCYTES ABSOLUTE: 0.55 E9/L (ref 0.1–0.95)
MONOCYTES RELATIVE PERCENT: 5.2 % (ref 2–12)
NEUTROPHILS ABSOLUTE: 8.81 E9/L (ref 1.8–7.3)
NEUTROPHILS RELATIVE PERCENT: 83.7 % (ref 43–80)
O2 CONTENT: 17.6 ML/DL
O2 CONTENT: 19.3 ML/DL
O2 SATURATION: 98.6 % (ref 92–98.5)
O2 SATURATION: 99.2 % (ref 92–98.5)
O2HB: 98 % (ref 94–97)
O2HB: 98.6 % (ref 94–97)
OPERATOR ID: ABNORMAL
OPERATOR ID: ABNORMAL
ORGANISM: ABNORMAL
PATIENT TEMP: 37 C
PATIENT TEMP: 37 C
PCO2: 52 MMHG (ref 35–45)
PCO2: 57.6 MMHG (ref 35–45)
PDW BLD-RTO: 12.9 FL (ref 11.5–15)
PEEP/CPAP: 6 CMH?O
PEEP/CPAP: 6 CMH?O
PFO2: 2.48 MMHG/%
PFO2: 3.02 MMHG/%
PH BLOOD GAS: 7.35 (ref 7.35–7.45)
PH BLOOD GAS: 7.38 (ref 7.35–7.45)
PIP: 12 CMH?O
PIP: 12 CMH?O
PLATELET # BLD: 240 E9/L (ref 130–450)
PMV BLD AUTO: 9.1 FL (ref 7–12)
PO2: 123.8 MMHG (ref 60–100)
PO2: 150.9 MMHG (ref 60–100)
POTASSIUM REFLEX MAGNESIUM: 4.5 MMOL/L (ref 3.5–5)
RBC # BLD: 3.93 E12/L (ref 3.5–5.5)
RI(T): 0.85
RI(T): 1.31
SODIUM BLD-SCNC: 137 MMOL/L (ref 132–146)
SOURCE, BLOOD GAS: ABNORMAL
SOURCE, BLOOD GAS: ABNORMAL
THB: 12.6 G/DL (ref 11.5–16.5)
THB: 13.7 G/DL (ref 11.5–16.5)
TIME ANALYZED: 117
TIME ANALYZED: 512
TOTAL PROTEIN: 6.4 G/DL (ref 6.4–8.3)
WBC # BLD: 10.5 E9/L (ref 4.5–11.5)

## 2018-06-11 PROCEDURE — 36415 COLL VENOUS BLD VENIPUNCTURE: CPT

## 2018-06-11 PROCEDURE — 2700000000 HC OXYGEN THERAPY PER DAY

## 2018-06-11 PROCEDURE — 94660 CPAP INITIATION&MGMT: CPT

## 2018-06-11 PROCEDURE — 2060000000 HC ICU INTERMEDIATE R&B

## 2018-06-11 PROCEDURE — 6370000000 HC RX 637 (ALT 250 FOR IP): Performed by: INTERNAL MEDICINE

## 2018-06-11 PROCEDURE — 2500000003 HC RX 250 WO HCPCS: Performed by: STUDENT IN AN ORGANIZED HEALTH CARE EDUCATION/TRAINING PROGRAM

## 2018-06-11 PROCEDURE — 94640 AIRWAY INHALATION TREATMENT: CPT

## 2018-06-11 PROCEDURE — 83735 ASSAY OF MAGNESIUM: CPT

## 2018-06-11 PROCEDURE — 6360000002 HC RX W HCPCS: Performed by: STUDENT IN AN ORGANIZED HEALTH CARE EDUCATION/TRAINING PROGRAM

## 2018-06-11 PROCEDURE — 99222 1ST HOSP IP/OBS MODERATE 55: CPT | Performed by: INTERNAL MEDICINE

## 2018-06-11 PROCEDURE — 80053 COMPREHEN METABOLIC PANEL: CPT

## 2018-06-11 PROCEDURE — 85025 COMPLETE CBC W/AUTO DIFF WBC: CPT

## 2018-06-11 PROCEDURE — 82805 BLOOD GASES W/O2 SATURATION: CPT

## 2018-06-11 PROCEDURE — 2580000003 HC RX 258: Performed by: STUDENT IN AN ORGANIZED HEALTH CARE EDUCATION/TRAINING PROGRAM

## 2018-06-11 PROCEDURE — 6370000000 HC RX 637 (ALT 250 FOR IP): Performed by: STUDENT IN AN ORGANIZED HEALTH CARE EDUCATION/TRAINING PROGRAM

## 2018-06-11 RX ORDER — GUAIFENESIN 400 MG/1
400 TABLET ORAL 4 TIMES DAILY PRN
Status: DISCONTINUED | OUTPATIENT
Start: 2018-06-11 | End: 2018-06-12

## 2018-06-11 RX ADMIN — IPRATROPIUM BROMIDE AND ALBUTEROL SULFATE 1 AMPULE: 2.5; .5 SOLUTION RESPIRATORY (INHALATION) at 16:28

## 2018-06-11 RX ADMIN — IPRATROPIUM BROMIDE AND ALBUTEROL SULFATE 1 AMPULE: 2.5; .5 SOLUTION RESPIRATORY (INHALATION) at 11:54

## 2018-06-11 RX ADMIN — DOXYCYCLINE 100 MG: 100 INJECTION, POWDER, LYOPHILIZED, FOR SOLUTION INTRAVENOUS at 11:39

## 2018-06-11 RX ADMIN — BUDESONIDE 500 MCG: 0.5 SUSPENSION RESPIRATORY (INHALATION) at 21:01

## 2018-06-11 RX ADMIN — METHYLPREDNISOLONE SODIUM SUCCINATE 40 MG: 40 INJECTION, POWDER, FOR SOLUTION INTRAMUSCULAR; INTRAVENOUS at 17:32

## 2018-06-11 RX ADMIN — BUDESONIDE 500 MCG: 0.5 SUSPENSION RESPIRATORY (INHALATION) at 11:54

## 2018-06-11 RX ADMIN — FORMOTEROL FUMARATE DIHYDRATE 20 MCG: 20 SOLUTION RESPIRATORY (INHALATION) at 21:01

## 2018-06-11 RX ADMIN — ENOXAPARIN SODIUM 40 MG: 40 INJECTION SUBCUTANEOUS at 09:00

## 2018-06-11 RX ADMIN — METHYLPREDNISOLONE SODIUM SUCCINATE 40 MG: 40 INJECTION, POWDER, FOR SOLUTION INTRAMUSCULAR; INTRAVENOUS at 05:11

## 2018-06-11 ASSESSMENT — PAIN SCALES - GENERAL
PAINLEVEL_OUTOF10: 0

## 2018-06-11 NOTE — PROGRESS NOTES
[facility]  Internal Medicine Residency Program  Progress Note - House Team 2    Patient:  Raeann Curry 81 y.o. female MRN: [id number]     Date of Service: 6/11/2018     CC: SOB  Overnight events: none    Subjective     Pt seen and examined at bedside, last night on BiPap, today on NC saturating fine, but unable to complete full sentences, it is her baseline. mucinex added for her cough    Objective     Physical Exam:  · Vitals: /64   Pulse 110   Temp 97.7 °F (36.5 °C) (Temporal)   Resp 23   Ht 5' (1.524 m)   Wt 91 lb 6 oz (41.4 kg)   SpO2 95%   BMI 17.85 kg/m²     · I & O - 24hr: No intake/output data recorded. · General Appearance: alert, appears stated age and cooperative  · HEENT:  Head: Normocephalic, no lesions, without obvious abnormality. · Neck: no adenopathy, no carotid bruit, no JVD, supple, symmetrical, trachea midline and thyroid not enlarged, symmetric, no tenderness/mass/nodules  · Lung: clear to auscultation bilaterally and wheezes bilaterally  · Heart: regular rate and rhythm, S1, S2 normal, no murmur, click, rub or gallop  · Abdomen: soft, non-tender; bowel sounds normal; no masses,  no organomegaly  · Extremities:  extremities normal, atraumatic, no cyanosis or edema  · Musculokeletal: No joint swelling, no muscle tenderness. ROM normal in all joints of extremities.    · Neurologic: Mental status: Alert, oriented, thought content appropriate  Subject  Pertinent Labs & Imaging Studies   ena  CBC:   Lab Results   Component Value Date    WBC 10.5 06/11/2018    RBC 3.93 06/11/2018    HGB 12.1 06/11/2018    HCT 36.9 06/11/2018    MCV 93.9 06/11/2018    MCH 30.8 06/11/2018    MCHC 32.8 06/11/2018    RDW 12.9 06/11/2018     06/11/2018    MPV 9.1 06/11/2018     CBC with Differential:    Lab Results   Component Value Date    WBC 10.5 06/11/2018    RBC 3.93 06/11/2018    HGB 12.1 06/11/2018    HCT 36.9 06/11/2018     06/11/2018    MCV 93.9 06/11/2018    MCH 04/29/2012     Calcium:    Lab Results   Component Value Date    CALCIUM 9.0 06/11/2018     Ionized Calcium:  No results found for: IONCA  Magnesium:    Lab Results   Component Value Date    MG 2.2 06/11/2018       Resident's Assessment and Plan     COPD exacerbation, in setting of severe stage      -FVC 1.81,  68% of predicted;  FEV1 1.02, 49% of predicted  -continue bipap and NC  -cont breathing treatments, solu-medrol  -dc doxycycline  - start mucinex  -Monitor SPO2     Smoking cessation  -nicotine patch  -smoking cessation     PT/OT evaluation:  DVT prophylaxis/ GI prophylaxis:   Disposition: home +/- home health / Lena Zaldivar / Dominic Long / Karyle Greet MD, PGY-1  Attending physician: Dr. Court Rascon

## 2018-06-11 NOTE — PROGRESS NOTES
06/11/2018    SEGSPCT 72 04/30/2012    LYMPHOPCT 10.7 06/11/2018    MONOPCT 5.2 06/11/2018    BASOPCT 0.1 06/11/2018    MONOSABS 0.55 06/11/2018    LYMPHSABS 1.13 06/11/2018    EOSABS 0.00 06/11/2018    BASOSABS 0.01 06/11/2018     CMP:    Lab Results   Component Value Date     06/11/2018    K 4.5 06/11/2018    CL 93 06/11/2018    CO2 32 06/11/2018    BUN 22 06/11/2018    CREATININE 0.4 06/11/2018    GFRAA >60 06/11/2018    LABGLOM >60 06/11/2018    GLUCOSE 124 06/11/2018    GLUCOSE 172 04/30/2012    PROT 6.4 06/11/2018    LABALBU 3.8 06/11/2018    LABALBU 4.5 04/29/2012    CALCIUM 9.0 06/11/2018    BILITOT 0.4 06/11/2018    ALKPHOS 61 06/11/2018    AST 19 06/11/2018    ALT 18 06/11/2018     Albumin:    Lab Results   Component Value Date    LABALBU 3.8 06/11/2018    LABALBU 4.5 04/29/2012     Calcium:    Lab Results   Component Value Date    CALCIUM 9.0 06/11/2018     Magnesium:    Lab Results   Component Value Date    MG 2.2 06/11/2018     LDH:  No results found for: LDH  Uric Acid:  No results found for: LABURIC, URICACID  Troponin:    Lab Results   Component Value Date    TROPONINI <0.01 06/07/2018     HgBA1c:  No results found for: LABA1C  Microalbumen/Creatinine ratio:  No components found for: RUCREAT  TSH:  No results found for: TSH  TIBC:    Lab Results   Component Value Date    TIBC 323 10/23/2012     AMYLASE:    Lab Results   Component Value Date    AMYLASE 25 04/29/2012     LIPASE:    Lab Results   Component Value Date    LIPASE 27 04/29/2012     Fibrinogen Level:  No components found for: FIB  Urine Toxicology:  No components found for: IAMMENTA, IBARBIT, IBENZO, ICOCAINE, IMARTHC, IOPIATES, IPHENCYC  24 Hour Urine for Protein:  No components found for: Dorethea Shaper, [id number], UTV3    Resident's Assessment and Plan     Deandre Perea is a 81 y.o. female    COPD exacerbation, in setting of severe stage      -FVC 1.81,  68% of predicted;  FEV1 1.02, 49% of predicted  - continue bipap   -Continue

## 2018-06-12 LAB
BLOOD CULTURE, ROUTINE: NORMAL
CULTURE, BLOOD 2: NORMAL

## 2018-06-12 PROCEDURE — 94660 CPAP INITIATION&MGMT: CPT

## 2018-06-12 PROCEDURE — 97165 OT EVAL LOW COMPLEX 30 MIN: CPT

## 2018-06-12 PROCEDURE — 2700000000 HC OXYGEN THERAPY PER DAY

## 2018-06-12 PROCEDURE — 6360000002 HC RX W HCPCS: Performed by: STUDENT IN AN ORGANIZED HEALTH CARE EDUCATION/TRAINING PROGRAM

## 2018-06-12 PROCEDURE — 6360000002 HC RX W HCPCS: Performed by: INTERNAL MEDICINE

## 2018-06-12 PROCEDURE — 97530 THERAPEUTIC ACTIVITIES: CPT

## 2018-06-12 PROCEDURE — G8979 MOBILITY GOAL STATUS: HCPCS

## 2018-06-12 PROCEDURE — 2580000003 HC RX 258: Performed by: INTERNAL MEDICINE

## 2018-06-12 PROCEDURE — G8988 SELF CARE GOAL STATUS: HCPCS

## 2018-06-12 PROCEDURE — 2060000000 HC ICU INTERMEDIATE R&B

## 2018-06-12 PROCEDURE — 6370000000 HC RX 637 (ALT 250 FOR IP): Performed by: INTERNAL MEDICINE

## 2018-06-12 PROCEDURE — G8987 SELF CARE CURRENT STATUS: HCPCS

## 2018-06-12 PROCEDURE — G8978 MOBILITY CURRENT STATUS: HCPCS

## 2018-06-12 PROCEDURE — 6370000000 HC RX 637 (ALT 250 FOR IP): Performed by: STUDENT IN AN ORGANIZED HEALTH CARE EDUCATION/TRAINING PROGRAM

## 2018-06-12 PROCEDURE — 99232 SBSQ HOSP IP/OBS MODERATE 35: CPT | Performed by: INTERNAL MEDICINE

## 2018-06-12 PROCEDURE — 97162 PT EVAL MOD COMPLEX 30 MIN: CPT

## 2018-06-12 PROCEDURE — 94640 AIRWAY INHALATION TREATMENT: CPT

## 2018-06-12 RX ORDER — METHYLPREDNISOLONE SODIUM SUCCINATE 40 MG/ML
40 INJECTION, POWDER, LYOPHILIZED, FOR SOLUTION INTRAMUSCULAR; INTRAVENOUS EVERY 8 HOURS
Status: DISCONTINUED | OUTPATIENT
Start: 2018-06-12 | End: 2018-06-13

## 2018-06-12 RX ORDER — SODIUM CHLORIDE 0.9 % (FLUSH) 0.9 %
10 SYRINGE (ML) INJECTION PRN
Status: DISCONTINUED | OUTPATIENT
Start: 2018-06-12 | End: 2018-06-20 | Stop reason: HOSPADM

## 2018-06-12 RX ORDER — SODIUM CHLORIDE 0.9 % (FLUSH) 0.9 %
10 SYRINGE (ML) INJECTION EVERY 12 HOURS SCHEDULED
Status: DISCONTINUED | OUTPATIENT
Start: 2018-06-12 | End: 2018-06-20 | Stop reason: HOSPADM

## 2018-06-12 RX ORDER — BUMETANIDE 0.25 MG/ML
1 INJECTION, SOLUTION INTRAMUSCULAR; INTRAVENOUS ONCE
Status: DISCONTINUED | OUTPATIENT
Start: 2018-06-12 | End: 2018-06-12

## 2018-06-12 RX ORDER — GUAIFENESIN 400 MG/1
400 TABLET ORAL 2 TIMES DAILY
Status: DISCONTINUED | OUTPATIENT
Start: 2018-06-12 | End: 2018-06-20

## 2018-06-12 RX ADMIN — Medication 40 MG: at 21:30

## 2018-06-12 RX ADMIN — BUDESONIDE 500 MCG: 0.5 SUSPENSION RESPIRATORY (INHALATION) at 11:55

## 2018-06-12 RX ADMIN — FORMOTEROL FUMARATE DIHYDRATE 20 MCG: 20 SOLUTION RESPIRATORY (INHALATION) at 11:54

## 2018-06-12 RX ADMIN — Medication 40 MG: at 14:00

## 2018-06-12 RX ADMIN — Medication 10 ML: at 21:31

## 2018-06-12 RX ADMIN — IPRATROPIUM BROMIDE AND ALBUTEROL SULFATE 1 AMPULE: 2.5; .5 SOLUTION RESPIRATORY (INHALATION) at 16:28

## 2018-06-12 RX ADMIN — ENOXAPARIN SODIUM 40 MG: 40 INJECTION SUBCUTANEOUS at 08:33

## 2018-06-12 RX ADMIN — IPRATROPIUM BROMIDE AND ALBUTEROL SULFATE 1 AMPULE: 2.5; .5 SOLUTION RESPIRATORY (INHALATION) at 11:55

## 2018-06-12 RX ADMIN — BUDESONIDE 500 MCG: 0.5 SUSPENSION RESPIRATORY (INHALATION) at 20:34

## 2018-06-12 RX ADMIN — METHYLPREDNISOLONE SODIUM SUCCINATE 40 MG: 40 INJECTION, POWDER, FOR SOLUTION INTRAMUSCULAR; INTRAVENOUS at 04:56

## 2018-06-12 RX ADMIN — GUAIFENESIN 400 MG: 400 TABLET ORAL at 21:31

## 2018-06-12 RX ADMIN — FORMOTEROL FUMARATE DIHYDRATE 20 MCG: 20 SOLUTION RESPIRATORY (INHALATION) at 20:34

## 2018-06-12 RX ADMIN — GUAIFENESIN 400 MG: 400 TABLET ORAL at 10:29

## 2018-06-12 ASSESSMENT — PAIN SCALES - GENERAL
PAINLEVEL_OUTOF10: 0

## 2018-06-12 NOTE — PROGRESS NOTES
during ADL tasks     LUE 4-/5 WFL poor  and WFL FMC/dexterity noted during ADL tasks   Sensation: intact  Tone: WFL  Edema:none noted    Functional Assessment:   Initial Eval Status  Comments   Feeding  setup    Grooming  Setup sitting with increased ;time    Upper Body Dressing SBA     Lower Body Dressing SBA sock donning  Mod A     Bathing n/t    Toileting  Min A     Bed Mobility  Supine to Sit: min A  Sit to Supine:SBA    Functional Transfers Sit to stand Min A hand held    Functional Mobility Due to poor endurance scooted to [facility] would not walk      Sit balance: SBA  Stand balance: min A  Endurance/Activity tolerance: poor 6L O2 95% with SOB noted after min exertion                              Comments: Upon arrival pt supine in bed. At end of session pt supine with HOB elevated and  with all devices within reach, all lines and tubes intact. Nursing notified. Treatment: OT evaluation, education on benefits of mobility and need to sit upright for increased opportunity for lung expansion, bed mobility, LE dressing task, sit to stand task x1 and declined to perform functional mobility, OT for functional assessment of  ADL, Functional Transfer/Mobility Training, Equipment Needs, Energy Conservation Techniques, Pt/Family Education., OT role and POC reviewed. Patient  demo fair+ understanding of functional limitations and safety.       Assessment of current deficits   Functional mobility [x]  ADLs [x] Strength [x]  Cognition []  Functional transfers  [x] IADLs [x] Safety Awareness [x]  Endurance [x]  Fine Motor Coordination [] Balance [x] Vision/perception [] Sensation []   Gross Motor Coordination [x] ROM []       Eval Complexity: low  Profile and History- low  Assessment of Occupational Performance and Identification of Deficits- mod  Clinical Decision Making- low    Treatment frequency: OT 2-4x     Plan of Care:  ADL retraining [x]   Equipment needs [x]   Neuromuscular re-education []  Energy Conservation

## 2018-06-12 NOTE — PLAN OF CARE
Problem: Falls - Risk of:  Goal: Will remain free from falls  Will remain free from falls   Outcome: Met This Shift    Goal: Absence of physical injury  Absence of physical injury   Outcome: Met This Shift      Problem: Breathing Pattern - Ineffective:  Goal: Ability to achieve and maintain a regular respiratory rate will improve  Ability to achieve and maintain a regular respiratory rate will improve  Outcome: Met This Shift

## 2018-06-12 NOTE — PROGRESS NOTES
Physical Therapy  Initial Assessment     Name: Cordelia Mann  :   MRN: [id number]    Date of Service: 2018    Evaluating PT:  Celestina Clark, PT [id number]    Room #:  3090/2269-B  Diagnosis:  Acute respiratory failure  PMHx:        Diagnosis Date    Abnormal Pap smear     Patient states she had laser surgery    COPD (chronic obstructive pulmonary disease) (HCC)     Emphysema of lung ([address]     GERD (gastroesophageal reflux disease)     History of stomach ulcers     Hyperlipidemia     DIET    Osteoarthritis     Renal calculi     APX 40 YEARS AGO     Precautions:  Falls, O2, debilitated, skin integrity  Equipment Needs: To be determined    Pt lives with daughter in a [place] style home with 4 stairs to enter and 1 rail. Bed is on 1 floor and bath is on 1 floor. Pt ambulated with Ind PTA no device. Equipment owned: none     Initial Evaluation  Date: 18 Treatment Short Term/ Long Term   Goals   AM-PAC 6 Clicks 84/66     Was pt agreeable to Eval/treatment? yes     Does pt have pain? no     Bed Mobility  Rolling: Min A  Supine to sit: Min A  Sit to supine: SBA  Scooting: Min A  Rolling: Ind  Supine to sit: Ind  Sit to supine: Ind  Scooting: Ind   Transfers Sit to stand: Min A  Stand to sit: Min A  Stand pivot: NT  Sit to stand: SBA  Stand to sit: SBA  Stand pivot: SBA   Ambulation    NT patient unwilling to attempt stating she was to fatigued. 25 feet with  fww if needed SBA   Stair negotiation: ascended and descended  NT   4 steps with Min A rail 1   ROM BUE:  See OT note  BLE:  WFL     Strength BUE:  See OT note  BLE:  4-/5  4/5   Balance Sitting EOB:  SBA  Dynamic Standing:  Min A  Sitting EOB:  Ind  Dynamic Standing:  SBA     Pt is A & O x 3  Sensation:  Pt denies numbness and tingling to extremities  Edema:  none    Patient education  Pt educated on need for mobility and to sit upright.      Patient response to education:   Pt verbalized understanding Pt demonstrated skill Pt requires

## 2018-06-13 PROBLEM — E43 SEVERE PROTEIN-CALORIE MALNUTRITION (HCC): Status: ACTIVE | Noted: 2018-06-13

## 2018-06-13 LAB
EKG ATRIAL RATE: 94 BPM
EKG P AXIS: 88 DEGREES
EKG P-R INTERVAL: 170 MS
EKG Q-T INTERVAL: 326 MS
EKG QRS DURATION: 82 MS
EKG QTC CALCULATION (BAZETT): 407 MS
EKG R AXIS: -42 DEGREES
EKG T AXIS: 73 DEGREES
EKG VENTRICULAR RATE: 94 BPM

## 2018-06-13 PROCEDURE — 2580000003 HC RX 258: Performed by: INTERNAL MEDICINE

## 2018-06-13 PROCEDURE — 6370000000 HC RX 637 (ALT 250 FOR IP): Performed by: STUDENT IN AN ORGANIZED HEALTH CARE EDUCATION/TRAINING PROGRAM

## 2018-06-13 PROCEDURE — 94660 CPAP INITIATION&MGMT: CPT

## 2018-06-13 PROCEDURE — 6360000002 HC RX W HCPCS: Performed by: INTERNAL MEDICINE

## 2018-06-13 PROCEDURE — 92610 EVALUATE SWALLOWING FUNCTION: CPT

## 2018-06-13 PROCEDURE — 2700000000 HC OXYGEN THERAPY PER DAY

## 2018-06-13 PROCEDURE — 94640 AIRWAY INHALATION TREATMENT: CPT

## 2018-06-13 PROCEDURE — 6370000000 HC RX 637 (ALT 250 FOR IP): Performed by: INTERNAL MEDICINE

## 2018-06-13 PROCEDURE — 99232 SBSQ HOSP IP/OBS MODERATE 35: CPT | Performed by: INTERNAL MEDICINE

## 2018-06-13 PROCEDURE — 2060000000 HC ICU INTERMEDIATE R&B

## 2018-06-13 PROCEDURE — 6360000002 HC RX W HCPCS: Performed by: STUDENT IN AN ORGANIZED HEALTH CARE EDUCATION/TRAINING PROGRAM

## 2018-06-13 RX ORDER — KETOTIFEN FUMARATE 0.35 MG/ML
1 SOLUTION/ DROPS OPHTHALMIC 2 TIMES DAILY
Status: DISCONTINUED | OUTPATIENT
Start: 2018-06-13 | End: 2018-06-20

## 2018-06-13 RX ORDER — METHYLPREDNISOLONE SODIUM SUCCINATE 40 MG/ML
40 INJECTION, POWDER, LYOPHILIZED, FOR SOLUTION INTRAMUSCULAR; INTRAVENOUS EVERY 12 HOURS
Status: DISCONTINUED | OUTPATIENT
Start: 2018-06-14 | End: 2018-06-14

## 2018-06-13 RX ADMIN — BUDESONIDE 500 MCG: 0.5 SUSPENSION RESPIRATORY (INHALATION) at 11:48

## 2018-06-13 RX ADMIN — GUAIFENESIN 400 MG: 400 TABLET ORAL at 09:47

## 2018-06-13 RX ADMIN — Medication 10 ML: at 09:48

## 2018-06-13 RX ADMIN — GUAIFENESIN 400 MG: 400 TABLET ORAL at 22:03

## 2018-06-13 RX ADMIN — KETOTIFEN FUMARATE 1 DROP: 0.25 SOLUTION OPHTHALMIC at 22:03

## 2018-06-13 RX ADMIN — Medication 10 ML: at 22:04

## 2018-06-13 RX ADMIN — Medication 40 MG: at 05:21

## 2018-06-13 RX ADMIN — Medication 40 MG: at 13:37

## 2018-06-13 RX ADMIN — ENOXAPARIN SODIUM 40 MG: 40 INJECTION SUBCUTANEOUS at 09:47

## 2018-06-13 RX ADMIN — Medication 10 ML: at 05:22

## 2018-06-13 RX ADMIN — FORMOTEROL FUMARATE DIHYDRATE 20 MCG: 20 SOLUTION RESPIRATORY (INHALATION) at 11:45

## 2018-06-13 ASSESSMENT — ENCOUNTER SYMPTOMS
ABDOMINAL DISTENTION: 0
EYE PAIN: 0
EYE REDNESS: 0
SHORTNESS OF BREATH: 1
WHEEZING: 0
EYE DISCHARGE: 0
DIARRHEA: 0
COUGH: 1
HEMOPTYSIS: 0
SINUS PRESSURE: 0
NAUSEA: 0
SORE THROAT: 0
VOMITING: 0
BACK PAIN: 0

## 2018-06-13 ASSESSMENT — PAIN SCALES - GENERAL
PAINLEVEL_OUTOF10: 0

## 2018-06-13 NOTE — PROGRESS NOTES
dentition  []  decreased lingual control  []  vertical munch  []  other)     []  Delayed A-P transit due to ([]  decreased initiation   [] reduced lingual strength   []  cognitive function )    []  Oral residuals []  right buccal cavity  []  left buccal cavity []  sub lingually   []  on tongue base   []  throughout oral cavity     []  on superior tongue       []  on palate               []  on velum              []  anterior sulcus    Comments:      Pharyngeal Stage:  []  Normal   [x]  Functional      []  Abnormal     [x]  No signs of aspiration were noted during this evaluation however, silent aspiration cannot be ruled out at bedside. If silent aspiration is suspected, a Videofluoroscopic Study of Swallowing (MBS) is recommended and requires a physician order.    []  Throat clearing present after presentation of ([]  thin  []  nectar  []  honey  []  pureed  []  solid)    []  Immediate wet cough was noted after presentation of ([]  thin  []  nectar  []  honey  []  pureed  []  Solid)    []  Latent wet cough was noted after presentation of ([]  thin  []  nectar  []  honey  []  pureed  []  solid)    []  Wet respirations were noted after presentation of ([]  thin  []  nectar  []  honey  []  pureed  []  solid)    []  Wet/gurgly vocal quality was noted after presentation of ([]  thin  []  nectar  []  honey  []  pureed  []  Solid)    [] Multiple swallows were noted after presentation of ([]  thin  []  nectar  []  honey  []  pureed  [] solid)    []  Consistent O2  desaturation after the swallow    []  Eye watering/flushing of skin    []  Congested cough throughout evaluation    [] Delayed initiation of the pharyngeal swallow noted    []  Absent swallow                    [] The Speech Language Pathologist (SLP) completed education with the patient regarding results of evaluation.  Explained that Speech Pathology intervention is not warranted at this time      []  Prognosis for improvements is   []  This plan will be re-evaluated and revised in 1 week  if warranted. []  Patient stated goals:   []  Treatment goals discussed with []  patient/  []  family. []  The []  patient/ []  family understand the diagnosis, prognosis and plan of care. [x]The admitting diagnosis and active problem list, as listed below have been reviewed prior to initiation of this evaluation.      ADMITTING DIAGNOSIS: Acute respiratory failure with hypoxia ([facility] [J96.01]     ACTIVE PROBLEM LIST:   Patient Active Problem List   Diagnosis    Acute respiratory failure with hypoxia (HCC)    COPD exacerbation (HCC)    Dyslipidemia, goal LDL below 130    Heartburn    Dysphagia    At risk for colon cancer    Parainfluenza    Severe protein-calorie malnutrition ([facility]

## 2018-06-13 NOTE — PROGRESS NOTES
[facility]  Internal Medicine Residency Program  Progress Note - House Team 2    Patient:  Ubaldo Jacobs 81 y.o. female MRN: [id number]     Date of Service: 6/13/2018     CC: COPD exacerbation   Overnight events: no acute events overnight     Subjective     Patient breathing better this morning, off of BiPAP on 8L nasal cannula. Doing well. Able to eat. Says she has problems swallowing food and drinking her water. This has been chronic complaint that started with food and now includes fluids. She does better with warm liquids. Complains of eye pain. Objective     Physical Exam:  · Vitals: /60   Pulse 86   Temp 97.6 °F (36.4 °C) (Temporal)   Resp 14   Ht 5' (1.524 m)   Wt 80 lb 1.6 oz (36.3 kg)   SpO2 99%   BMI 15.64 kg/m²     · I & O - 24hr: No intake/output data recorded. · General Appearance: alert, appears stated age, cooperative and Patient on BiPAP   · HEENT:  Head: Normocephalic, no lesions, without obvious abnormality. · Head: Normal, normocephalic, atraumatic. · Neck: no adenopathy, no carotid bruit, no JVD, supple, symmetrical, trachea midline and thyroid not enlarged, symmetric, no tenderness/mass/nodules  · Lung: Wheezing noted on exam, Right posterior chest in the lower lung fields. · Heart: regular rate and rhythm, S1, S2 normal, no murmur, click, rub or gallop  · Abdomen: soft, non-tender; bowel sounds normal; no masses,  no organomegaly  · Extremities:  extremities normal, atraumatic, no cyanosis or edema  · Musculokeletal: No joint swelling, no muscle tenderness. ROM normal in all joints of extremities.    Neurologic: Mental status: Alert, oriented, thought content appropriateSubject  Pertinent Labs & Imaging Studies   ena  CBC with Differential:    Lab Results   Component Value Date    WBC 10.5 06/11/2018    RBC 3.93 06/11/2018    HGB 12.1 06/11/2018    HCT 36.9 06/11/2018     06/11/2018    MCV 93.9 06/11/2018    MCH 30.8 06/11/2018    MCHC 32.8 06/11/2018    RDW 12.9 06/11/2018    SEGSPCT 72 04/30/2012    LYMPHOPCT 10.7 06/11/2018    MONOPCT 5.2 06/11/2018    BASOPCT 0.1 06/11/2018    MONOSABS 0.55 06/11/2018    LYMPHSABS 1.13 06/11/2018    EOSABS 0.00 06/11/2018    BASOSABS 0.01 06/11/2018     CMP:    Lab Results   Component Value Date     06/11/2018    K 4.5 06/11/2018    CL 93 06/11/2018    CO2 32 06/11/2018    BUN 22 06/11/2018    CREATININE 0.4 06/11/2018    GFRAA >60 06/11/2018    LABGLOM >60 06/11/2018    GLUCOSE 124 06/11/2018    GLUCOSE 172 04/30/2012    PROT 6.4 06/11/2018    LABALBU 3.8 06/11/2018    LABALBU 4.5 04/29/2012    CALCIUM 9.0 06/11/2018    BILITOT 0.4 06/11/2018    ALKPHOS 61 06/11/2018    AST 19 06/11/2018    ALT 18 06/11/2018     Albumin:    Lab Results   Component Value Date    LABALBU 3.8 06/11/2018    LABALBU 4.5 04/29/2012     Calcium:    Lab Results   Component Value Date    CALCIUM 9.0 06/11/2018     Magnesium:    Lab Results   Component Value Date    MG 2.2 06/11/2018     LDH:  No results found for: LDH  Uric Acid:  No results found for: LABURIC, URICACID  Troponin:    Lab Results   Component Value Date    TROPONINI <0.01 06/07/2018     HgBA1c:  No results found for: LABA1C  Microalbumen/Creatinine ratio:  No components found for: RUCREAT  TSH:  No results found for: TSH  TIBC:    Lab Results   Component Value Date    TIBC 323 10/23/2012     AMYLASE:    Lab Results   Component Value Date    AMYLASE 25 04/29/2012     LIPASE:    Lab Results   Component Value Date    LIPASE 27 04/29/2012     Fibrinogen Level:  No components found for: FIB  Urine Toxicology:  No components found for: IAMMENTA, IBARBIT, IBENZO, ICOCAINE, IMARTHC, IOPIATES, IPHENCYC  24 Hour Urine for Protein:  No components found for: Floyde Dills, [id number], UTV3    Resident's Assessment and Plan     Lanae Bosworth is a 81 y.o. female    COPD exacerbation  - 2/2 Parainfluenza virus   - Off BiPAP, On 8L Nasal Cannula   - Continue breathing treatments (pulmicort, formoterol, douneb)   - Solu-medrol 40 mg q8h bc patient breath sounds were worse on exam   - Guaifenesin 400mg BID   - Monitor SPO2    Dysphagia  - EGD/Colonoscopy 10/17/2017 by Dr. Cheryl Carballo (gastritis, hiatal hernia)         Smoking cessation  -nicotine patch  -smoking cessation     PT/OT evaluation:  DVT prophylaxis/ GI prophylaxis:   Disposition: home +/- home health / SNF / Primo Ferrara / Clarence Russell x Rosie Rivas MS3  Attending physician: Dr. szymanski

## 2018-06-13 NOTE — PROGRESS NOTES
Imaging studies:     Radiology  Date  Result    CXR  6/10  Hyperinflated lungs without gross focal consolidation.                            Resident's Assessment and Plan     Acute on chronic hypoxic respiratory failure  - 2/2 COPD exacerbation due to parainfluenza infection  - Cont BiPAP prn and NC  - Tapering Solumedrol 40 mg to q12hr from q8hr  - Cont breathing treatment with Duoneb, Pulmicort, Peforomist  - Mucinex      Tobacco abuse  - 25 pack year history  - Nicotin patch  - Smoking cessation      Malnutrition  - Consulted dietitian   - Started Ensure BID    PT/OT evaluation: [facility] 16/24 ([facility])  DVT prophylaxis/ GI prophylaxis: Lovenox/diet  Disposition: Cont current care    Mireya Mcmanus MD , PGY-1  Attending physician: Dr. Martin Agrawal

## 2018-06-14 PROCEDURE — 6370000000 HC RX 637 (ALT 250 FOR IP): Performed by: STUDENT IN AN ORGANIZED HEALTH CARE EDUCATION/TRAINING PROGRAM

## 2018-06-14 PROCEDURE — 6370000000 HC RX 637 (ALT 250 FOR IP): Performed by: INTERNAL MEDICINE

## 2018-06-14 PROCEDURE — 94660 CPAP INITIATION&MGMT: CPT

## 2018-06-14 PROCEDURE — 2700000000 HC OXYGEN THERAPY PER DAY

## 2018-06-14 PROCEDURE — 94640 AIRWAY INHALATION TREATMENT: CPT

## 2018-06-14 PROCEDURE — 6360000002 HC RX W HCPCS: Performed by: STUDENT IN AN ORGANIZED HEALTH CARE EDUCATION/TRAINING PROGRAM

## 2018-06-14 PROCEDURE — 6360000002 HC RX W HCPCS: Performed by: INTERNAL MEDICINE

## 2018-06-14 PROCEDURE — 2580000003 HC RX 258: Performed by: INTERNAL MEDICINE

## 2018-06-14 PROCEDURE — 99232 SBSQ HOSP IP/OBS MODERATE 35: CPT | Performed by: INTERNAL MEDICINE

## 2018-06-14 PROCEDURE — 2060000000 HC ICU INTERMEDIATE R&B

## 2018-06-14 RX ORDER — PREDNISONE 20 MG/1
40 TABLET ORAL
Status: DISCONTINUED | OUTPATIENT
Start: 2018-06-14 | End: 2018-06-15

## 2018-06-14 RX ORDER — LACTOSE-REDUCED FOOD
1 LIQUID (ML) ORAL
Status: DISCONTINUED | OUTPATIENT
Start: 2018-06-14 | End: 2018-06-14 | Stop reason: CLARIF

## 2018-06-14 RX ORDER — METHYLPREDNISOLONE SODIUM SUCCINATE 40 MG/ML
40 INJECTION, POWDER, LYOPHILIZED, FOR SOLUTION INTRAMUSCULAR; INTRAVENOUS DAILY
Status: DISCONTINUED | OUTPATIENT
Start: 2018-06-15 | End: 2018-06-14

## 2018-06-14 RX ADMIN — BUDESONIDE 500 MCG: 0.5 SUSPENSION RESPIRATORY (INHALATION) at 20:56

## 2018-06-14 RX ADMIN — PREDNISONE 40 MG: 20 TABLET ORAL at 11:38

## 2018-06-14 RX ADMIN — ENOXAPARIN SODIUM 40 MG: 40 INJECTION SUBCUTANEOUS at 09:28

## 2018-06-14 RX ADMIN — Medication 10 ML: at 21:23

## 2018-06-14 RX ADMIN — FORMOTEROL FUMARATE DIHYDRATE 20 MCG: 20 SOLUTION RESPIRATORY (INHALATION) at 20:56

## 2018-06-14 RX ADMIN — METHYLPREDNISOLONE SODIUM SUCCINATE 40 MG: 40 INJECTION, POWDER, FOR SOLUTION INTRAMUSCULAR; INTRAVENOUS at 01:21

## 2018-06-14 RX ADMIN — FORMOTEROL FUMARATE DIHYDRATE 20 MCG: 20 SOLUTION RESPIRATORY (INHALATION) at 09:09

## 2018-06-14 RX ADMIN — Medication 10 ML: at 09:28

## 2018-06-14 RX ADMIN — KETOTIFEN FUMARATE 1 DROP: 0.25 SOLUTION OPHTHALMIC at 21:23

## 2018-06-14 RX ADMIN — BUDESONIDE 500 MCG: 0.5 SUSPENSION RESPIRATORY (INHALATION) at 09:09

## 2018-06-14 RX ADMIN — IPRATROPIUM BROMIDE AND ALBUTEROL SULFATE 1 AMPULE: 2.5; .5 SOLUTION RESPIRATORY (INHALATION) at 13:31

## 2018-06-14 RX ADMIN — GUAIFENESIN 400 MG: 400 TABLET ORAL at 21:23

## 2018-06-14 RX ADMIN — GUAIFENESIN 400 MG: 400 TABLET ORAL at 09:27

## 2018-06-14 RX ADMIN — KETOTIFEN FUMARATE 1 DROP: 0.25 SOLUTION OPHTHALMIC at 09:28

## 2018-06-14 RX ADMIN — IPRATROPIUM BROMIDE AND ALBUTEROL SULFATE 1 AMPULE: 2.5; .5 SOLUTION RESPIRATORY (INHALATION) at 16:50

## 2018-06-14 RX ADMIN — Medication 10 ML: at 01:21

## 2018-06-14 ASSESSMENT — PAIN SCALES - GENERAL
PAINLEVEL_OUTOF10: 0

## 2018-06-14 NOTE — ED PROVIDER NOTES
74-year-old female patient with COPD exacerbation that is worse over the last several days. She was seen several days or same complaint and recommended to be admitted to the hospital but she refused that time. Split on steroid taper and sent home. States her last several days or he is calm worse. She called EMS today because she felt she is unable to breathe. She was placed on CPAP and given 125 mg Solu-Medrol and also one DuoNeb. On presentation, the patient there was intact. She is breathing with difficulty in his conversational dyspnea to 5 words between breaths. She is audibly wheezing. The history is provided by the patient. Shortness of Breath   Severity:  Severe  Onset quality:  Gradual  Duration:  2 days  Timing:  Constant  Progression:  Worsening  Chronicity:  Recurrent  Relieved by:  Nothing  Worsened by:  Exertion and movement  Ineffective treatments:  Oxygen and rest  Associated symptoms: cough    Associated symptoms: no chest pain, no ear pain, no fever, no headaches, no hemoptysis, no rash, no sore throat, no vomiting and no wheezing    Risk factors comment:  H/o COPD      Review of Systems   Constitutional: Negative for chills and fever. HENT: Negative for ear pain, sinus pressure and sore throat. Eyes: Negative for pain, discharge and redness. Respiratory: Positive for cough and shortness of breath. Negative for hemoptysis and wheezing. Cardiovascular: Negative for chest pain. Gastrointestinal: Negative for abdominal distention, diarrhea, nausea and vomiting. Genitourinary: Negative for dysuria and frequency. Musculoskeletal: Negative for arthralgias and back pain. Skin: Negative for rash and wound. Neurological: Negative for weakness and headaches. Hematological: Negative for adenopathy. All other systems reviewed and are negative. Physical Exam   Constitutional: She is oriented to person, place, and time. She appears well-developed and well-nourished.    HENT: Syncitial Virus       Result: Not Detected  *  *  Normal Range: Not Detected      Film Array Rhinovirus/Enterovirus       Result: Not Detected  *  *  Normal Range: Not Detected      Film Array Bordetella Pertusis       Result: Not Detected  *  *  Normal Range: Not Detected      Film Array Chlamydophilia Pneumoniae       Result: Not Detected  *  *  Normal Range: Not Detected      Film Array Mycoplasma Pneumoniae       Result: Not Detected  *  *  Normal Range: Not Detected      Organism FILM ARR ParaInfluenza 3 Virus Detected (A)    CBC Auto Differential   Result Value Ref Range    WBC 14.0 (H) 4.5 - 11.5 E9/L    RBC 4.15 3.50 - 5.50 E12/L    Hemoglobin 13.1 11.5 - 15.5 g/dL    Hematocrit 39.2 34.0 - 48.0 %    MCV 94.5 80.0 - 99.9 fL    MCH 31.6 26.0 - 35.0 pg    MCHC 33.4 32.0 - 34.5 %    RDW 13.1 11.5 - 15.0 fL    Platelets 743 572 - 377 E9/L    MPV 8.8 7.0 - 12.0 fL    Neutrophils % 53.7 43.0 - 80.0 %    Immature Granulocytes % 0.4 0.0 - 5.0 %    Lymphocytes % 31.5 20.0 - 42.0 %    Monocytes % 14.1 (H) 2.0 - 12.0 %    Eosinophils % 0.0 0.0 - 6.0 %    Basophils % 0.3 0.0 - 2.0 %    Neutrophils # 7.49 (H) 1.80 - 7.30 E9/L    Immature Granulocytes # 0.06 E9/L    Lymphocytes # 4.40 (H) 1.50 - 4.00 E9/L    Monocytes # 1.97 (H) 0.10 - 0.95 E9/L    Eosinophils # 0.00 (L) 0.05 - 0.50 E9/L    Basophils # 0.04 0.00 - 0.20 E9/L    RBC Morphology Normal    Comprehensive Metabolic Panel   Result Value Ref Range    Sodium 140 132 - 146 mmol/L    Potassium 3.9 3.5 - 5.0 mmol/L    Chloride 96 (L) 98 - 107 mmol/L    CO2 33 (H) 22 - 29 mmol/L    Anion Gap 11 7 - 16 mmol/L    Glucose 155 (H) 74 - 109 mg/dL    BUN 16 8 - 23 mg/dL    CREATININE 0.4 (L) 0.5 - 1.0 mg/dL    GFR Non-African American >60 >=60 mL/min/1.73    GFR African American >60     Calcium 9.4 8.6 - 10.2 mg/dL    Total Protein 7.4 6.4 - 8.3 g/dL    Alb 4.3 3.5 - 5.2 g/dL    Total Bilirubin 0.6 0.0 - 1.2 mg/dL    Alkaline Phosphatase 61 35 - 104 U/L    ALT 16 0 - 32 U/L AST 23 0 - 31 U/L   Lactic Acid, Plasma   Result Value Ref Range    Lactic Acid 1.6 0.5 - 2.2 mmol/L   BLOOD GAS, ARTERIAL   Result Value Ref Range    Date Analyzed [id number]     Time Analyzed 1529     Source: Blood Arterial     pH, Blood Gas 7.321 (L) 7.350 - 7.450    PCO2 62.0 (H) 35.0 - 45.0 mmHg    PO2 106.0 (H) 60.0 - 100.0 mmHg    HCO3 31.3 (H) 22.0 - 26.0 mmol/L    B.E. 3.6 (H) -3.0 - 3.0 mmol/L    O2 Sat 97.6 92.0 - 98.5 %    PO2/FIO2 2.12 mmHg/%    AaDO2 168.3 mmHg    RI(T) 159 %    O2Hb 97.0 94.0 - 97.0 %    COHb 0.3 0.0 - 1.5 %    MetHb 0.3 0.0 - 1.5 %    O2 Content 18.4 mL/dL    HHb 2.4 0.0 - 5.0 %    tHb (est) 13.4 11.5 - 16.5 g/dL    Mode BIPAP     FIO2 50.0 %    Comment bipap 12/6 50%     Date Of Collection [id number]     Time Collected 1528     Pt Temp 37.0 C     ID [id number]     Lab [id number]     Critical(s) Notified . No Critical Values    Brain Natriuretic Peptide   Result Value Ref Range    Pro- (H) 0 - 125 pg/mL   Procalcitonin   Result Value Ref Range    Procalcitonin 0.06 0.00 - 0.08 ng/mL   Magnesium   Result Value Ref Range    Magnesium 2.1 1.6 - 2.6 mg/dL   Blood Gas, Arterial   Result Value Ref Range    Date Analyzed [id number]     Time Analyzed 1753     Source: Blood Arterial     pH, Blood Gas 7.342 (L) 7.350 - 7.450    PCO2 52.7 (H) 35.0 - 45.0 mmHg    PO2 90.4 60.0 - 100.0 mmHg    HCO3 27.9 (H) 22.0 - 26.0 mmol/L    B.E. 1.3 -3.0 - 3.0 mmol/L    O2 Sat 96.8 92.0 - 98.5 %    PO2/FIO2 1.81 mmHg/%    AaDO2 194.3 mmHg    RI(T) 2.15     O2Hb 96.4 94.0 - 97.0 %    COHb 0.1 0.0 - 1.5 %    MetHb 0.3 0.0 - 1.5 %    O2 Content 17.8 mL/dL    HHb 3.2 0.0 - 5.0 %    tHb (est) 13.1 11.5 - 16.5 g/dL    Mode BIPAP 12/6     FIO2 50.0 %    Date Of Collection [id number]     Time Collected 1748     Pt Temp 37.0 C     ID [id number]     Lab [id number]     Critical(s) Notified .  No Critical Values    Comprehensive Metabolic Panel w/ Reflex to MG   Result Value Ref Range    Sodium 137 132 - 146 mmol/L

## 2018-06-14 NOTE — PLAN OF CARE
Problem: Falls - Risk of:  Goal: Will remain free from falls  Will remain free from falls    Outcome: Met This Shift    Goal: Absence of physical injury  Absence of physical injury    Outcome: Met This Shift

## 2018-06-14 NOTE — PROGRESS NOTES
[facility]  Internal Medicine Residency Program  Progress Note - House Team 2    Patient:  Kathie Jones 72 y.o. female MRN: [id number]     Date of Service: 6/14/2018     CC: COPD exacerbation   Overnight events: no acute events overnight     Subjective     Patient breathing well this morning, off of BiPAP on 8L nasal cannula. Doing well. Able to eat. Says she has problems swallowing food and drinking her water. She has ho of dysphagia for which she sees dr. Amor Irby. No N/V, abdominal pain, chest pain, SOB, eye pain, or rash per patient. Ready for DC. Objective     Physical Exam:  · Vitals: /60   Pulse 82   Temp 98.2 °F (36.8 °C) (Temporal)   Resp 16   Ht 5' (1.524 m)   Wt 85 lb 6.4 oz (38.7 kg)   SpO2 98%   BMI 16.68 kg/m²     I & O - 24hr: I/O this shift:  In: -   · Out: 200 [Urine:200]   · General Appearance: alert, appears stated age, cooperative and Patient on BiPAP   · HEENT:  Head: Normocephalic, no lesions, without obvious abnormality. · Head: Normal, normocephalic, atraumatic. · Neck: no adenopathy, no carotid bruit, no JVD, supple, symmetrical, trachea midline and thyroid not enlarged, symmetric, no tenderness/mass/nodules  · Lung: Improved air flow in both lungs with intermitten exp wheezing in R posterior lung  · Heart: regular rate and rhythm, S1, S2 normal, no murmur, click, rub or gallop  · Abdomen: soft, non-tender; bowel sounds normal; no masses,  no organomegaly  · Extremities:  extremities normal, atraumatic, no cyanosis or edema  · Musculokeletal: No joint swelling, no muscle tenderness. ROM normal in all joints of extremities.    Neurologic: Mental status: Alert, oriented, thought content appropriateSubject  Pertinent Labs & Imaging Studies   ena  CBC with Differential:    Lab Results   Component Value Date    WBC 10.5 06/11/2018    RBC 3.93 06/11/2018    HGB 12.1 06/11/2018    HCT 36.9 06/11/2018     06/11/2018    MCV 93.9 06/11/2018    MCH 30.8 breathing treatments (pulmicort, formoterol, douneb)   - Prednisone 40mg qd  - Guaifenesin 400mg BID   - Monitor SPO2  - DC     Viral Conjunctivitis    -  STOP Zaditor (ketotifen) opthalmic solution ggts     Dysphagia  - EGD/Colonoscopy 10/17/2017 by Dr. Ej Peter (gastritis, hiatal hernia)      Malnutrition  - Protein supplementation    Smoking cessation  -nicotine patch  -smoking cessation     PT/OT evaluation:  DVT prophylaxis/ GI prophylaxis:   Disposition: home +/- home health / SNF / Leigha [facility] / Enedina Chua [facility]  Attending physician: Dr. szymanski

## 2018-06-14 NOTE — PROGRESS NOTES
[facility]  Internal Medicine Residency Program  Progress Note  - House Team 2    Patient:  Yuko Gómez 72 y.o. female MRN: [id number]     Date of Service: 6/14/2018     CC: SOB    Subjective       Pt seen and examined at bedside, no acute event overnight, COPD improving, switched to solumedrol 40 mg daily. On NC but demand decreased to 6L. Objective     Physical Exam:  · Vitals: BP (!) 124/52   Pulse 85   Temp 97.6 °F (36.4 °C) (Temporal)   Resp 18   Ht 5' (1.524 m)   Wt 85 lb 6.4 oz (38.7 kg)   SpO2 98%   BMI 16.68 kg/m²     I & O - 24hr: I/O this shift:  In: 240 [P.O.:240]  · Out: -    General Appearance:    Alert, cooperative, tired and fragile appearing   HEENT:    NC/AT, no lesions, without obvious abnormality. Neck:   Supple, no adenopathy   Resp:     Labored breathing, diminished breath sounds b/l, mild wheezing b/l   Heart:    RRR, S1 and S2 normal, no murmur, rub or gallop. Abdomen:     Soft, non tender, BS + all four quadrants, no masses, no organomegaly   Extremities:   Atraumatic, no cyanosis or edema   Pulses:  Radial and pedal pulses are intact bilaterally   Neurologic:   AAOx3, No focal motor deficit       Pertinent Labs & Imaging Studies     CBC:   No results for input(s): WBC, HGB, HCT, MCV, PLT in the last 72 hours. BMP:    No results for input(s): NA, K, CL, CO2, BUN, CREATININE, GLUCOSE in the last 72 hours. LIVER PROFILE:   No results for input(s): AST, ALT, LIPASE, BILIDIR, BILITOT, ALKPHOS in the last 72 hours. Invalid input(s): AMYLASE,  ALB    Fasting Lipid Panel:    Lab Results   Component Value Date    CHOL 231 10/11/2013    TRIG 86 10/11/2013    HDL 81.0 10/11/2013       Cardiac Enzymes:    Lab Results   Component Value Date    TROPONINI <0.01 06/07/2018    TROPONINI <0.01 06/05/2017       Imaging studies:     Radiology  Date  Result    CXR  6/10  Hyperinflated lungs without gross focal consolidation.                            Resident's Assessment and Plan     Acute on chronic hypoxic respiratory failure  - 2/2 COPD exacerbation due to parainfluenza infection  - Cont BiPAP prn and NC  - Tapering Solumedrol to 40mg oral daily  - Cont breathing treatment with Duoneb, Pulmicort, Peforomist  - Mucinex   - can be discharged to [facility]     Tobacco abuse  - Nicotine patch     Protein caloric Malnutrition  - Protein supplementation    PT/OT evaluation: [facility] 16/24 ([facility])  DVT prophylaxis/ GI prophylaxis: Lovenox/diet  Disposition: Cont current care    Ellen Rangel MD , PGY-1  Attending physician: Dr. Abhijit Bowens

## 2018-06-14 NOTE — PROGRESS NOTES
[address] [facility]  Internal Medicine Residency    Attending Physician Statement  I have discussed the case, including pertinent history and exam findings with the resident and the team.  I have seen and examined the patient and the key elements of the encounter have been performed by me. I agree with the assessment, plan and orders as documented by the resident. AP:   1. COPD exacerbation due to parainfluenza- improving: switch to prednisone 40 mg daily  2. Viral conjunctivitis  3.  Frailty, Protein caloric malnutrition- Ensure supplements    Quan Gautam MD

## 2018-06-15 ENCOUNTER — APPOINTMENT (OUTPATIENT)
Dept: GENERAL RADIOLOGY | Age: 73
DRG: 190 | End: 2018-06-15
Payer: MEDICARE

## 2018-06-15 LAB
ANION GAP SERPL CALCULATED.3IONS-SCNC: 12 MMOL/L (ref 7–16)
B.E.: 13.8 MMOL/L (ref -3–3)
BUN BLDV-MCNC: 39 MG/DL (ref 8–23)
CALCIUM SERPL-MCNC: 9.8 MG/DL (ref 8.6–10.2)
CHLORIDE BLD-SCNC: 94 MMOL/L (ref 98–107)
CO2: 40 MMOL/L (ref 22–29)
COHB: 0.3 % (ref 0–1.5)
CREAT SERPL-MCNC: 0.5 MG/DL (ref 0.5–1)
CRITICAL: ABNORMAL
DATE ANALYZED: ABNORMAL
DATE OF COLLECTION: ABNORMAL
GFR AFRICAN AMERICAN: >60
GFR NON-AFRICAN AMERICAN: >60 ML/MIN/1.73
GLUCOSE BLD-MCNC: 134 MG/DL (ref 74–109)
HCO3: 40.7 MMOL/L (ref 22–26)
HCT VFR BLD CALC: 45.1 % (ref 34–48)
HEMOGLOBIN: 14 G/DL (ref 11.5–15.5)
HHB: 8.2 % (ref 0–5)
LAB: 9558
Lab: 1029
MCH RBC QN AUTO: 30.8 PG (ref 26–35)
MCHC RBC AUTO-ENTMCNC: 31 % (ref 32–34.5)
MCV RBC AUTO: 99.1 FL (ref 80–99.9)
METHB: 0.3 % (ref 0–1.5)
MODE: ABNORMAL
O2 CONTENT: 17.8 ML/DL
O2 SATURATION: 91.8 % (ref 92–98.5)
O2HB: 91.2 % (ref 94–97)
OPERATOR ID: 1893
PATIENT TEMP: 37 C
PCO2: 60.7 MMHG (ref 35–45)
PDW BLD-RTO: 13 FL (ref 11.5–15)
PH BLOOD GAS: 7.44 (ref 7.35–7.45)
PHOSPHORUS: 2.9 MG/DL (ref 2.5–4.5)
PLATELET # BLD: 407 E9/L (ref 130–450)
PMV BLD AUTO: 9.2 FL (ref 7–12)
PO2: 58.9 MMHG (ref 60–100)
POTASSIUM SERPL-SCNC: 4.5 MMOL/L (ref 3.5–5)
PRO-BNP: 4999 PG/ML (ref 0–125)
PROCALCITONIN: 0.06 NG/ML (ref 0–0.08)
RBC # BLD: 4.55 E12/L (ref 3.5–5.5)
SODIUM BLD-SCNC: 146 MMOL/L (ref 132–146)
SOURCE, BLOOD GAS: ABNORMAL
THB: 13.9 G/DL (ref 11.5–16.5)
TIME ANALYZED: 1036
TSH SERPL DL<=0.05 MIU/L-ACNC: 1.58 UIU/ML (ref 0.27–4.2)
WBC # BLD: 16.3 E9/L (ref 4.5–11.5)

## 2018-06-15 PROCEDURE — 71045 X-RAY EXAM CHEST 1 VIEW: CPT

## 2018-06-15 PROCEDURE — 6370000000 HC RX 637 (ALT 250 FOR IP): Performed by: INTERNAL MEDICINE

## 2018-06-15 PROCEDURE — 2700000000 HC OXYGEN THERAPY PER DAY

## 2018-06-15 PROCEDURE — 36415 COLL VENOUS BLD VENIPUNCTURE: CPT

## 2018-06-15 PROCEDURE — 2580000003 HC RX 258: Performed by: INTERNAL MEDICINE

## 2018-06-15 PROCEDURE — 83880 ASSAY OF NATRIURETIC PEPTIDE: CPT

## 2018-06-15 PROCEDURE — 80048 BASIC METABOLIC PNL TOTAL CA: CPT

## 2018-06-15 PROCEDURE — 99291 CRITICAL CARE FIRST HOUR: CPT | Performed by: INTERNAL MEDICINE

## 2018-06-15 PROCEDURE — 6360000002 HC RX W HCPCS: Performed by: INTERNAL MEDICINE

## 2018-06-15 PROCEDURE — 85027 COMPLETE CBC AUTOMATED: CPT

## 2018-06-15 PROCEDURE — 94640 AIRWAY INHALATION TREATMENT: CPT

## 2018-06-15 PROCEDURE — 84100 ASSAY OF PHOSPHORUS: CPT

## 2018-06-15 PROCEDURE — 36600 WITHDRAWAL OF ARTERIAL BLOOD: CPT

## 2018-06-15 PROCEDURE — 99233 SBSQ HOSP IP/OBS HIGH 50: CPT | Performed by: INTERNAL MEDICINE

## 2018-06-15 PROCEDURE — 84443 ASSAY THYROID STIM HORMONE: CPT

## 2018-06-15 PROCEDURE — 94660 CPAP INITIATION&MGMT: CPT

## 2018-06-15 PROCEDURE — 6370000000 HC RX 637 (ALT 250 FOR IP): Performed by: STUDENT IN AN ORGANIZED HEALTH CARE EDUCATION/TRAINING PROGRAM

## 2018-06-15 PROCEDURE — 84145 PROCALCITONIN (PCT): CPT

## 2018-06-15 PROCEDURE — 2060000000 HC ICU INTERMEDIATE R&B

## 2018-06-15 PROCEDURE — 82805 BLOOD GASES W/O2 SATURATION: CPT

## 2018-06-15 RX ORDER — ACETAZOLAMIDE 250 MG/1
250 TABLET ORAL 3 TIMES DAILY
Status: DISCONTINUED | OUTPATIENT
Start: 2018-06-15 | End: 2018-06-16

## 2018-06-15 RX ORDER — METHYLPREDNISOLONE SODIUM SUCCINATE 40 MG/ML
20 INJECTION, POWDER, LYOPHILIZED, FOR SOLUTION INTRAMUSCULAR; INTRAVENOUS EVERY 6 HOURS
Status: DISCONTINUED | OUTPATIENT
Start: 2018-06-15 | End: 2018-06-16

## 2018-06-15 RX ORDER — SENNA AND DOCUSATE SODIUM 50; 8.6 MG/1; MG/1
2 TABLET, FILM COATED ORAL DAILY PRN
Status: DISCONTINUED | OUTPATIENT
Start: 2018-06-15 | End: 2018-06-20 | Stop reason: HOSPADM

## 2018-06-15 RX ORDER — METHYLPREDNISOLONE SODIUM SUCCINATE 40 MG/ML
40 INJECTION, POWDER, LYOPHILIZED, FOR SOLUTION INTRAMUSCULAR; INTRAVENOUS EVERY 12 HOURS
Status: DISCONTINUED | OUTPATIENT
Start: 2018-06-15 | End: 2018-06-15

## 2018-06-15 RX ADMIN — IPRATROPIUM BROMIDE AND ALBUTEROL SULFATE 1 AMPULE: 2.5; .5 SOLUTION RESPIRATORY (INHALATION) at 16:57

## 2018-06-15 RX ADMIN — ACETAZOLAMIDE 250 MG: 250 TABLET ORAL at 14:31

## 2018-06-15 RX ADMIN — PREDNISONE 40 MG: 20 TABLET ORAL at 12:00

## 2018-06-15 RX ADMIN — METHYLPREDNISOLONE SODIUM SUCCINATE 20 MG: 40 INJECTION, POWDER, FOR SOLUTION INTRAMUSCULAR; INTRAVENOUS at 20:00

## 2018-06-15 RX ADMIN — DOCUSATE SODIUM AND SENNOSIDES 2 TABLET: 8.6; 5 TABLET, FILM COATED ORAL at 14:15

## 2018-06-15 RX ADMIN — FORMOTEROL FUMARATE DIHYDRATE 20 MCG: 20 SOLUTION RESPIRATORY (INHALATION) at 21:00

## 2018-06-15 RX ADMIN — FORMOTEROL FUMARATE DIHYDRATE 20 MCG: 20 SOLUTION RESPIRATORY (INHALATION) at 10:32

## 2018-06-15 RX ADMIN — Medication 10 ML: at 09:45

## 2018-06-15 RX ADMIN — GUAIFENESIN 400 MG: 400 TABLET ORAL at 09:45

## 2018-06-15 RX ADMIN — BUDESONIDE 500 MCG: 0.5 SUSPENSION RESPIRATORY (INHALATION) at 21:00

## 2018-06-15 RX ADMIN — Medication 10 ML: at 20:00

## 2018-06-15 RX ADMIN — ROFLUMILAST 500 MCG: 500 TABLET ORAL at 17:04

## 2018-06-15 RX ADMIN — METHYLPREDNISOLONE SODIUM SUCCINATE 20 MG: 40 INJECTION, POWDER, FOR SOLUTION INTRAMUSCULAR; INTRAVENOUS at 14:15

## 2018-06-15 RX ADMIN — KETOTIFEN FUMARATE 1 DROP: 0.25 SOLUTION OPHTHALMIC at 20:00

## 2018-06-15 RX ADMIN — BUDESONIDE 500 MCG: 0.5 SUSPENSION RESPIRATORY (INHALATION) at 10:32

## 2018-06-15 RX ADMIN — KETOTIFEN FUMARATE 1 DROP: 0.25 SOLUTION OPHTHALMIC at 09:45

## 2018-06-15 RX ADMIN — ACETAZOLAMIDE 250 MG: 250 TABLET ORAL at 20:00

## 2018-06-15 RX ADMIN — IPRATROPIUM BROMIDE AND ALBUTEROL SULFATE 1 AMPULE: 2.5; .5 SOLUTION RESPIRATORY (INHALATION) at 14:05

## 2018-06-15 RX ADMIN — GUAIFENESIN 400 MG: 400 TABLET ORAL at 20:00

## 2018-06-15 ASSESSMENT — PAIN SCALES - GENERAL
PAINLEVEL_OUTOF10: 0

## 2018-06-15 NOTE — PROGRESS NOTES
Pulmonary [address] [facility]    Department of Internal Medicine  Division of Pulmonary, Critical Care & Sleep Medicine    SUBJECTIVE:      Dicussed with attending med team  Pursed lip breathing  Classic Netter pink puffer  HCO3 > 40  Chronic compensated Hypercapnia  No labs for days  WBC 16 K   SOB worsening  Tolerating current oxygen therapy   Personally checked the monitor & laboratory tests & studies. OBJECTIVE:  Vitals:    06/15/18 0015 06/15/18 0630 06/15/18 0824 06/15/18 1145   BP: 117/70  109/68    Pulse: 97  109    Resp: 20  20    Temp: 97.6 °F (36.4 °C)  97.6 °F (36.4 °C)    TempSrc: Temporal  Temporal    SpO2: 99%  90%    Weight:  84 lb (38.1 kg)  81 lb 6.4 oz (36.9 kg)   Height:         Constitutional: Alert,   EENT: EOMI SON. MMM. No icterus. Slight thrush. Neck: No thyromegaly. No elevated JVP. Trachea was midline. Respiratory: Symmetrical.  Bronchial. Ahn sign   Cardiovascular: Regular, RVH. Pulses:  Equal bilaterally. Abdomen: Soft without organomegaly. No rebound, rigidity. No guarding. Lymphatic: No lymphadenopathy. Musculoskeletal: Without weakness  Extremities:  No lower extremity edema. Reflexes appear adequate. Skin:  Warm and dry. No skin rashes. Neurological/Psychiatric: No acute psychosis. Cranial nerves II-XII are intact. DATA:    Monitor Strips:  Reviewed & discusses with technical team. No changes noted.     RADIOLOGY:  Films were read/reviewed/discussed with radiology shows advance COPD      CBC:   Lab Results   Component Value Date    WBC 16.3 06/15/2018    RBC 4.55 06/15/2018    HGB 14.0 06/15/2018    HCT 45.1 06/15/2018    MCV 99.1 06/15/2018    MCH 30.8 06/15/2018    MCHC 31.0 06/15/2018    RDW 13.0 06/15/2018     06/15/2018    MPV 9.2 06/15/2018     CMP:    Lab Results   Component Value Date     06/15/2018    K 4.5 06/15/2018    K 4.5 06/11/2018    CL 94 06/15/2018    CO2 40 06/15/2018    BUN 39 06/15/2018    CREATININE 0.5 06/15/2018    GFRAA >60 06/15/2018    LABGLOM >60 06/15/2018    GLUCOSE 134 06/15/2018    GLUCOSE 172 04/30/2012    PROT 6.4 06/11/2018    LABALBU 3.8 06/11/2018    LABALBU 4.5 04/29/2012    CALCIUM 9.8 06/15/2018    BILITOT 0.4 06/11/2018    ALKPHOS 61 06/11/2018    AST 19 06/11/2018    ALT 18 06/11/2018     Phosphorus:  No results found for: PHOS  TSH:  No results found for: TSH      CLINICAL ASSESMENT:  1. Advance COPD  2. Emphysema  3. Acute decompensated COPD  4. AECOPD  5. Cachexia      PLAN: If needed the case was discussed with the care team  1. Darnell Armstrong, why did she decompensate in hospital -should always consider new infection and RH failure. Also worsening HCO3 leading to increased WOB. 2. No echo done - would consider may need theodur  3. Add Daliresp  4. Check procalcitionin and CXR rule out HCAP  5. Change to IV steroids  6. Stop the Ensure at it is loaded with Carbs and increase RQ   7.  Check Phosphate and TSH       Dominique Hoffmann DO, MPH, [place] HernandezGos[place]  Professor of Medicine

## 2018-06-15 NOTE — PROGRESS NOTES
[facility]  Internal Medicine Residency Program  Progress Note  - House Team 2    Patient:  Jenny Tolbert 72 y.o. female MRN: [id number]     Date of Service: 6/15/2018     CC: SOB    Subjective       Pt seen and examined at bedside, seems to have increased work of breathing compared to yesterday. Was switched back to IV steroids, pulmonary consulted. Objective     Physical Exam:  · Vitals: /68   Pulse 109   Temp 97.6 °F (36.4 °C) (Temporal)   Resp 20   Ht 5' (1.524 m)   Wt 81 lb 6.4 oz (36.9 kg)   SpO2 90%   BMI 15.90 kg/m²     · I & O - 24hr: No intake/output data recorded. General Appearance:    Alert, cooperative, tired and fragile appearing   HEENT:    NC/AT, no lesions, without obvious abnormality. Neck:   Supple, no adenopathy   Resp:     Labored breathing, diminished breath sounds b/l, mild wheezing b/l   Heart:    RRR, S1 and S2 normal, no murmur, rub or gallop. Abdomen:     Soft, non tender, BS + all four quadrants, no masses, no organomegaly   Extremities:   Atraumatic, no cyanosis or edema   Pulses:  Radial and pedal pulses are intact bilaterally   Neurologic:   AAOx3, No focal motor deficit       Pertinent Labs & Imaging Studies     CBC:   Recent Labs      06/15/18   1054   WBC  16.3*   HGB  14.0   HCT  45.1   MCV  99.1   PLT  407       BMP:    Recent Labs      06/15/18   1054   NA  146   K  4.5   CL  94*   CO2  40*   BUN  39*   CREATININE  0.5   GLUCOSE  134*       LIVER PROFILE:   No results for input(s): AST, ALT, LIPASE, BILIDIR, BILITOT, ALKPHOS in the last 72 hours. Invalid input(s):   AMYLASE,  ALB    Fasting Lipid Panel:    Lab Results   Component Value Date    CHOL 231 10/11/2013    TRIG 86 10/11/2013    HDL 81.0 10/11/2013       Cardiac Enzymes:    Lab Results   Component Value Date    TROPONINI <0.01 06/07/2018    TROPONINI <0.01 06/05/2017       Imaging studies:     Radiology  Date  Result    CXR  6/10  Hyperinflated lungs without gross focal consolidation.                            Resident's Assessment and Plan     Acute on chronic hypoxic respiratory failure  - 2/2 COPD exacerbation due to parainfluenza infection  - Cont BiPAP prn and NC  Per pulm:  - Add Daliresp  - Check procalcitionin and CXR rule out HCAP  - Change to IV steroids  - Check Phosphate and TSH      Tobacco abuse  - Nicotine patch     Protein caloric Malnutrition  - Protein supplementation    PT/OT evaluation: [facility] 16/24 ([facility])  DVT prophylaxis/ GI prophylaxis: Lovenox/diet  Disposition: Cont current care    Ellen Rangel MD , PGY-1  Attending physician: Dr. Harmony Marquis

## 2018-06-15 NOTE — PROCEDURES
ABG drawn x 2 from Left Radial and then Left Brachial. Patient had Normal Kris's Test.  Patient was on 8 liters/min via high flow nasal cannula  at time of puncture. Pressure held for 3. No bleeding or bruising noted at puncture site. Patient tolerated procedure well.       Performed by Evelia Damian

## 2018-06-15 NOTE — PROGRESS NOTES
Case management: dileep Mares would like to speak with you regarding the discharge plan for Thao Conway, [phone].

## 2018-06-15 NOTE — PROGRESS NOTES
06/11/2018    MCH 30.8 06/11/2018    MCHC 32.8 06/11/2018    RDW 12.9 06/11/2018    SEGSPCT 72 04/30/2012    LYMPHOPCT 10.7 06/11/2018    MONOPCT 5.2 06/11/2018    BASOPCT 0.1 06/11/2018    MONOSABS 0.55 06/11/2018    LYMPHSABS 1.13 06/11/2018    EOSABS 0.00 06/11/2018    BASOSABS 0.01 06/11/2018     CMP:    Lab Results   Component Value Date     06/11/2018    K 4.5 06/11/2018    CL 93 06/11/2018    CO2 32 06/11/2018    BUN 22 06/11/2018    CREATININE 0.4 06/11/2018    GFRAA >60 06/11/2018    LABGLOM >60 06/11/2018    GLUCOSE 124 06/11/2018    GLUCOSE 172 04/30/2012    PROT 6.4 06/11/2018    LABALBU 3.8 06/11/2018    LABALBU 4.5 04/29/2012    CALCIUM 9.0 06/11/2018    BILITOT 0.4 06/11/2018    ALKPHOS 61 06/11/2018    AST 19 06/11/2018    ALT 18 06/11/2018     Albumin:    Lab Results   Component Value Date    LABALBU 3.8 06/11/2018    LABALBU 4.5 04/29/2012     Calcium:    Lab Results   Component Value Date    CALCIUM 9.0 06/11/2018     Magnesium:    Lab Results   Component Value Date    MG 2.2 06/11/2018     LDH:  No results found for: LDH  Uric Acid:  No results found for: LABURIC, URICACID  Troponin:    Lab Results   Component Value Date    TROPONINI <0.01 06/07/2018     HgBA1c:  No results found for: LABA1C  Microalbumen/Creatinine ratio:  No components found for: RUCREAT  TSH:  No results found for: TSH  TIBC:    Lab Results   Component Value Date    TIBC 323 10/23/2012     AMYLASE:    Lab Results   Component Value Date    AMYLASE 25 04/29/2012     LIPASE:    Lab Results   Component Value Date    LIPASE 27 04/29/2012     Fibrinogen Level:  No components found for: FIB  Urine Toxicology:  No components found for: IAMMENTA, IBARBIT, IBENZO, ICOCAINE, IMARTHC, IOPIATES, IPHENCYC  24 Hour Urine for Protein:  No components found for: Jadyn Coleman, [id number], UTV3    Resident's Assessment and Plan     Gene Feliz is a 72 y.o. female    COPD exacerbation  - 2/2 Parainfluenza virus   - PRN BiPAP, 8L Nasal

## 2018-06-15 NOTE — PROGRESS NOTES
Nutrition Assessment    Type and Reason for Visit: Reassess    Nutrition Recommendations: Continue current diet/ONS, Encourage PO intake as able    Malnutrition Assessment:  · Malnutrition Status: Meets the criteria for severe malnutrition  · Context: Chronic illness  · Findings of the 6 clinical characteristics of malnutrition (Minimum of 2 out of 6 clinical characteristics is required to make the diagnosis of moderate or severe Protein Calorie Malnutrition based on AND/ASPEN Guidelines):  1. Energy Intake-Less than or equal to 75%, greater than or equal to 1 month    2. Weight Loss-10% loss or greater,  (in 2 months)  3. Fat Loss-Moderate subcutaneous fat loss, Orbital  4. Muscle Loss-Moderate muscle mass loss, Clavicles (pectoralis and deltoids), Temples (temporalis muscle)  5. Fluid Accumulation-No significant fluid accumulation    6.   Strength-Not measured    Nutrition Diagnosis:   · Problem: Severe malnutrition, in context of chronic illness  · Etiology: related to Insufficient energy/nutrient consumption     Signs and symptoms:  as evidenced by Moderate loss of subcutaneous fat, Moderate muscle loss, Intake 25-50%, Diet history of poor intake, Weight loss (13% wt loss x2 months)    Nutrition Assessment:  · Nutrition-Focused Physical Findings: pt sleepy, abd WDL, no noted edema, fluids WNL, frail  · Wound Type: None  · Current Nutrition Therapies:  · Oral Diet Orders: General   · Oral Diet intake: 26-50% (average per doc flow)  · Oral Nutrition Supplement (ONS) Orders: Standard High Calorie Oral Supplement (BID)  · ONS intake: % (per pt)  · Anthropometric Measures:  · Ht: 5' (152.4 cm)   · Current Body Wt: 81 lb (36.7 kg) (bed scale 6/15)  · Admission Body Wt: 91 lb 6 oz (41.4 kg) (6/11 question accuracy)  · Usual Body Wt: 93 lb 12.8 oz (42.5 kg) (4/3/18 per EMR office visit)  · % Weight Change: wt stable since last assessment, 13% wt loss x2 months PTA    · Ideal Body Wt: 100 lb (45.4 kg), %

## 2018-06-16 PROCEDURE — 6360000002 HC RX W HCPCS: Performed by: STUDENT IN AN ORGANIZED HEALTH CARE EDUCATION/TRAINING PROGRAM

## 2018-06-16 PROCEDURE — 6370000000 HC RX 637 (ALT 250 FOR IP): Performed by: INTERNAL MEDICINE

## 2018-06-16 PROCEDURE — 2060000000 HC ICU INTERMEDIATE R&B

## 2018-06-16 PROCEDURE — 2700000000 HC OXYGEN THERAPY PER DAY

## 2018-06-16 PROCEDURE — 94640 AIRWAY INHALATION TREATMENT: CPT

## 2018-06-16 PROCEDURE — 6360000002 HC RX W HCPCS: Performed by: INTERNAL MEDICINE

## 2018-06-16 PROCEDURE — 2580000003 HC RX 258: Performed by: INTERNAL MEDICINE

## 2018-06-16 PROCEDURE — 6370000000 HC RX 637 (ALT 250 FOR IP): Performed by: STUDENT IN AN ORGANIZED HEALTH CARE EDUCATION/TRAINING PROGRAM

## 2018-06-16 PROCEDURE — 94660 CPAP INITIATION&MGMT: CPT

## 2018-06-16 PROCEDURE — 99231 SBSQ HOSP IP/OBS SF/LOW 25: CPT | Performed by: INTERNAL MEDICINE

## 2018-06-16 PROCEDURE — 99233 SBSQ HOSP IP/OBS HIGH 50: CPT | Performed by: INTERNAL MEDICINE

## 2018-06-16 RX ORDER — DRONABINOL 2.5 MG/1
2.5 CAPSULE ORAL 2 TIMES DAILY
Status: DISCONTINUED | OUTPATIENT
Start: 2018-06-16 | End: 2018-06-20 | Stop reason: HOSPADM

## 2018-06-16 RX ORDER — METHYLPREDNISOLONE SODIUM SUCCINATE 40 MG/ML
20 INJECTION, POWDER, LYOPHILIZED, FOR SOLUTION INTRAMUSCULAR; INTRAVENOUS EVERY 8 HOURS
Status: DISCONTINUED | OUTPATIENT
Start: 2018-06-16 | End: 2018-06-17

## 2018-06-16 RX ORDER — PANTOPRAZOLE SODIUM 40 MG/1
40 TABLET, DELAYED RELEASE ORAL
Status: DISCONTINUED | OUTPATIENT
Start: 2018-06-17 | End: 2018-06-20 | Stop reason: HOSPADM

## 2018-06-16 RX ORDER — BISACODYL 10 MG
10 SUPPOSITORY, RECTAL RECTAL DAILY PRN
Status: DISCONTINUED | OUTPATIENT
Start: 2018-06-16 | End: 2018-06-20 | Stop reason: HOSPADM

## 2018-06-16 RX ORDER — ACETAZOLAMIDE 250 MG/1
250 TABLET ORAL 4 TIMES DAILY
Status: DISPENSED | OUTPATIENT
Start: 2018-06-16 | End: 2018-06-18

## 2018-06-16 RX ADMIN — KETOTIFEN FUMARATE 1 DROP: 0.25 SOLUTION OPHTHALMIC at 09:31

## 2018-06-16 RX ADMIN — KETOTIFEN FUMARATE 1 DROP: 0.25 SOLUTION OPHTHALMIC at 21:38

## 2018-06-16 RX ADMIN — ACETAZOLAMIDE 250 MG: 250 TABLET ORAL at 09:30

## 2018-06-16 RX ADMIN — ENOXAPARIN SODIUM 40 MG: 40 INJECTION SUBCUTANEOUS at 09:31

## 2018-06-16 RX ADMIN — FORMOTEROL FUMARATE DIHYDRATE 20 MCG: 20 SOLUTION RESPIRATORY (INHALATION) at 19:30

## 2018-06-16 RX ADMIN — METHYLPREDNISOLONE SODIUM SUCCINATE 20 MG: 40 INJECTION, POWDER, FOR SOLUTION INTRAMUSCULAR; INTRAVENOUS at 09:30

## 2018-06-16 RX ADMIN — ROFLUMILAST 500 MCG: 500 TABLET ORAL at 09:30

## 2018-06-16 RX ADMIN — IPRATROPIUM BROMIDE AND ALBUTEROL SULFATE 1 AMPULE: 2.5; .5 SOLUTION RESPIRATORY (INHALATION) at 13:29

## 2018-06-16 RX ADMIN — DRONABINOL 2.5 MG: 2.5 CAPSULE ORAL at 21:38

## 2018-06-16 RX ADMIN — Medication 10 ML: at 13:15

## 2018-06-16 RX ADMIN — GUAIFENESIN 400 MG: 400 TABLET ORAL at 21:38

## 2018-06-16 RX ADMIN — METHYLPREDNISOLONE SODIUM SUCCINATE 20 MG: 40 INJECTION, POWDER, FOR SOLUTION INTRAMUSCULAR; INTRAVENOUS at 02:12

## 2018-06-16 RX ADMIN — BUDESONIDE 500 MCG: 0.5 SUSPENSION RESPIRATORY (INHALATION) at 09:11

## 2018-06-16 RX ADMIN — GUAIFENESIN 400 MG: 400 TABLET ORAL at 09:30

## 2018-06-16 RX ADMIN — BUDESONIDE 500 MCG: 0.5 SUSPENSION RESPIRATORY (INHALATION) at 19:34

## 2018-06-16 RX ADMIN — METHYLPREDNISOLONE SODIUM SUCCINATE 20 MG: 40 INJECTION, POWDER, FOR SOLUTION INTRAMUSCULAR; INTRAVENOUS at 13:14

## 2018-06-16 RX ADMIN — ACETAZOLAMIDE 250 MG: 250 TABLET ORAL at 13:15

## 2018-06-16 RX ADMIN — Medication 10 ML: at 09:31

## 2018-06-16 RX ADMIN — Medication 10 ML: at 21:38

## 2018-06-16 RX ADMIN — ACETAZOLAMIDE 250 MG: 250 TABLET ORAL at 21:38

## 2018-06-16 RX ADMIN — FORMOTEROL FUMARATE DIHYDRATE 20 MCG: 20 SOLUTION RESPIRATORY (INHALATION) at 09:11

## 2018-06-16 RX ADMIN — METHYLPREDNISOLONE SODIUM SUCCINATE 20 MG: 40 INJECTION, POWDER, FOR SOLUTION INTRAMUSCULAR; INTRAVENOUS at 21:38

## 2018-06-16 RX ADMIN — BISACODYL 10 MG: 10 SUPPOSITORY RECTAL at 06:04

## 2018-06-16 ASSESSMENT — PAIN SCALES - GENERAL
PAINLEVEL_OUTOF10: 0

## 2018-06-16 NOTE — PROGRESS NOTES
Patient is refusing bipap tonight - attempted to educate patient and she became frustrated and tearful.

## 2018-06-16 NOTE — PROGRESS NOTES
Resident's Assessment and Plan     Acute on chronic hypoxic respiratory failure  - Initially 2/2 COPD exacerbation due to parainfluenza infection, was improving and then worsening respiratory status, possibly 2/2 decompensated right HF from COPD  - CXR showed mild vascular congestion with interstitial edema with minimal infiltrates  - Elevated ProBNP 5000 from 270  - Pt refused BiPAP, currently on 8 L  - Pulmonary following added diamox 250 mg q6hr    - Tapering steroid  - Cont roflumilast  - F/u echo to r/o underlying right HF     Tobacco abuse  - Nicotine patch    Odynophagia  - Likely from dry mouth but possible thrush from steroid use  - Empirical nystatin     Marasmus  - Likely COPD cachexia   - Protein supplementation  - Dietician following, recommend cont general diet  - Added appetite stimulant, dronabinol    PT/OT evaluation: [facility]  DVT prophylaxis/ GI prophylaxis: Lovenox/Protonix  Disposition: Cont current care    Angie Rice MD , PGY-1  Attending physician: Dr. Daisha Chávez

## 2018-06-16 NOTE — PROGRESS NOTES
Pulmonary [address] [facility]    Department of Internal Medicine  Division of Pulmonary, Critical Care & Sleep Medicine    SUBJECTIVE:      Dicussed with med team  HCO3 > 40  Chronic compensated Hypercapnia  WBC 16 K stable but procal is negative  Tolerating current oxygen therapy   Personally checked the monitor & laboratory tests & studies. OBJECTIVE:  Vitals:    06/16/18 0345 06/16/18 0502 06/16/18 0730 06/16/18 1142   BP: 118/60  90/60 109/67   Pulse: 100  94 94   Resp: 22  20 20   Temp: 98.2 °F (36.8 °C)  97.2 °F (36.2 °C) 98.1 °F (36.7 °C)   TempSrc: Temporal  Temporal Oral   SpO2:   98%    Weight:  76 lb 2 oz (34.5 kg)     Height:         Constitutional: Alert,   EENT: EOMI SON. MMM. No icterus. Slight thrush. Neck: No thyromegaly. No elevated JVP. Trachea was midline. Respiratory: Symmetrical.  Bronchial. Ahn sign   Cardiovascular: Regular, RVH. Pulses:  Equal bilaterally. Abdomen: Soft without organomegaly. No rebound, rigidity. No guarding. Lymphatic: No lymphadenopathy. Musculoskeletal: Without weakness  Extremities:  No lower extremity edema. Reflexes appear adequate. Skin:  Warm and dry. No skin rashes. Neurological/Psychiatric: No acute psychosis. Cranial nerves II-XII are intact. DATA:    Monitor Strips:  Reviewed & discusses with technical team. No changes noted.     RADIOLOGY:  Films were read/reviewed/discussed with radiology shows advance COPD      CBC:   Lab Results   Component Value Date    WBC 16.3 06/15/2018    RBC 4.55 06/15/2018    HGB 14.0 06/15/2018    HCT 45.1 06/15/2018    MCV 99.1 06/15/2018    MCH 30.8 06/15/2018    MCHC 31.0 06/15/2018    RDW 13.0 06/15/2018     06/15/2018    MPV 9.2 06/15/2018     CMP:    Lab Results   Component Value Date     06/15/2018    K 4.5 06/15/2018    K 4.5 06/11/2018    CL 94 06/15/2018    CO2 40 06/15/2018    BUN 39 06/15/2018    CREATININE 0.5 06/15/2018    GFRAA >60 06/15/2018    LABGLOM >60 06/15/2018    GLUCOSE 134 06/15/2018    GLUCOSE 172 04/30/2012    PROT 6.4 06/11/2018    LABALBU 3.8 06/11/2018    LABALBU 4.5 04/29/2012    CALCIUM 9.8 06/15/2018    BILITOT 0.4 06/11/2018    ALKPHOS 61 06/11/2018    AST 19 06/11/2018    ALT 18 06/11/2018     Phosphorus:    Lab Results   Component Value Date    PHOS 2.9 06/15/2018     TSH:    Lab Results   Component Value Date    TSH 1.580 06/15/2018         CLINICAL ASSESMENT:  1. Advance COPD  2. RHF noted with decompensation  3. Emphysema  4. Acute decompensated COPD  5. AECOPD  6. Cachexia      PLAN: If needed the case was discussed with the care team  1. Check echo done - would consider may need theodur  2. Add Daliresp  3. Check procalcitionin and CXR rule out HCAP - stop antibitoics  4. Change to IV steroids wean slowly   5. LTAC needed  6. Stop the Ensure at it is loaded with Carbs and increase RQ   7. Check Phosphate and TSH = normal  8.  NIV on and off      James Nuñez DO, MPH, Karly Beckman  Professor of Medicine

## 2018-06-17 PROCEDURE — 6360000002 HC RX W HCPCS: Performed by: STUDENT IN AN ORGANIZED HEALTH CARE EDUCATION/TRAINING PROGRAM

## 2018-06-17 PROCEDURE — 2060000000 HC ICU INTERMEDIATE R&B

## 2018-06-17 PROCEDURE — 6360000002 HC RX W HCPCS: Performed by: INTERNAL MEDICINE

## 2018-06-17 PROCEDURE — 6370000000 HC RX 637 (ALT 250 FOR IP): Performed by: INTERNAL MEDICINE

## 2018-06-17 PROCEDURE — 94660 CPAP INITIATION&MGMT: CPT

## 2018-06-17 PROCEDURE — 2580000003 HC RX 258: Performed by: INTERNAL MEDICINE

## 2018-06-17 PROCEDURE — 2700000000 HC OXYGEN THERAPY PER DAY

## 2018-06-17 PROCEDURE — 6370000000 HC RX 637 (ALT 250 FOR IP): Performed by: STUDENT IN AN ORGANIZED HEALTH CARE EDUCATION/TRAINING PROGRAM

## 2018-06-17 PROCEDURE — 99232 SBSQ HOSP IP/OBS MODERATE 35: CPT | Performed by: INTERNAL MEDICINE

## 2018-06-17 PROCEDURE — 94640 AIRWAY INHALATION TREATMENT: CPT

## 2018-06-17 RX ORDER — METHYLPREDNISOLONE SODIUM SUCCINATE 40 MG/ML
20 INJECTION, POWDER, LYOPHILIZED, FOR SOLUTION INTRAMUSCULAR; INTRAVENOUS EVERY 12 HOURS
Status: DISCONTINUED | OUTPATIENT
Start: 2018-06-18 | End: 2018-06-20

## 2018-06-17 RX ADMIN — Medication 10 ML: at 20:00

## 2018-06-17 RX ADMIN — ROFLUMILAST 500 MCG: 500 TABLET ORAL at 08:36

## 2018-06-17 RX ADMIN — BUDESONIDE 500 MCG: 0.5 SUSPENSION RESPIRATORY (INHALATION) at 22:51

## 2018-06-17 RX ADMIN — FORMOTEROL FUMARATE DIHYDRATE 20 MCG: 20 SOLUTION RESPIRATORY (INHALATION) at 22:51

## 2018-06-17 RX ADMIN — GUAIFENESIN 400 MG: 400 TABLET ORAL at 08:35

## 2018-06-17 RX ADMIN — BUDESONIDE 500 MCG: 0.5 SUSPENSION RESPIRATORY (INHALATION) at 11:08

## 2018-06-17 RX ADMIN — ACETAZOLAMIDE 250 MG: 250 TABLET ORAL at 20:00

## 2018-06-17 RX ADMIN — IPRATROPIUM BROMIDE AND ALBUTEROL SULFATE 1 AMPULE: 2.5; .5 SOLUTION RESPIRATORY (INHALATION) at 11:08

## 2018-06-17 RX ADMIN — IPRATROPIUM BROMIDE AND ALBUTEROL SULFATE 1 AMPULE: 2.5; .5 SOLUTION RESPIRATORY (INHALATION) at 16:16

## 2018-06-17 RX ADMIN — ACETAZOLAMIDE 250 MG: 250 TABLET ORAL at 12:05

## 2018-06-17 RX ADMIN — NYSTATIN 500000 UNITS: 100000 SUSPENSION ORAL at 19:59

## 2018-06-17 RX ADMIN — ENOXAPARIN SODIUM 40 MG: 40 INJECTION SUBCUTANEOUS at 08:35

## 2018-06-17 RX ADMIN — PANTOPRAZOLE SODIUM 40 MG: 40 TABLET, DELAYED RELEASE ORAL at 06:17

## 2018-06-17 RX ADMIN — DRONABINOL 2.5 MG: 2.5 CAPSULE ORAL at 08:36

## 2018-06-17 RX ADMIN — METHYLPREDNISOLONE SODIUM SUCCINATE 20 MG: 40 INJECTION, POWDER, FOR SOLUTION INTRAMUSCULAR; INTRAVENOUS at 06:17

## 2018-06-17 RX ADMIN — GUAIFENESIN 400 MG: 400 TABLET ORAL at 20:00

## 2018-06-17 RX ADMIN — NYSTATIN 500000 UNITS: 100000 SUSPENSION ORAL at 12:05

## 2018-06-17 RX ADMIN — METHYLPREDNISOLONE SODIUM SUCCINATE 20 MG: 40 INJECTION, POWDER, FOR SOLUTION INTRAMUSCULAR; INTRAVENOUS at 12:05

## 2018-06-17 RX ADMIN — ACETAZOLAMIDE 250 MG: 250 TABLET ORAL at 08:36

## 2018-06-17 RX ADMIN — Medication 10 ML: at 08:35

## 2018-06-17 RX ADMIN — KETOTIFEN FUMARATE 1 DROP: 0.25 SOLUTION OPHTHALMIC at 20:00

## 2018-06-17 RX ADMIN — KETOTIFEN FUMARATE 1 DROP: 0.25 SOLUTION OPHTHALMIC at 08:35

## 2018-06-17 RX ADMIN — IPRATROPIUM BROMIDE AND ALBUTEROL SULFATE 1 AMPULE: 2.5; .5 SOLUTION RESPIRATORY (INHALATION) at 22:51

## 2018-06-17 RX ADMIN — DRONABINOL 2.5 MG: 2.5 CAPSULE ORAL at 20:00

## 2018-06-17 ASSESSMENT — PAIN SCALES - GENERAL
PAINLEVEL_OUTOF10: 0

## 2018-06-17 ASSESSMENT — PAIN DESCRIPTION - PROGRESSION: CLINICAL_PROGRESSION: GRADUALLY IMPROVING

## 2018-06-17 NOTE — PROGRESS NOTES
GLUCOSE 134 06/15/2018    GLUCOSE 172 04/30/2012    PROT 6.4 06/11/2018    LABALBU 3.8 06/11/2018    LABALBU 4.5 04/29/2012    CALCIUM 9.8 06/15/2018    BILITOT 0.4 06/11/2018    ALKPHOS 61 06/11/2018    AST 19 06/11/2018    ALT 18 06/11/2018     Phosphorus:    Lab Results   Component Value Date    PHOS 2.9 06/15/2018     TSH:    Lab Results   Component Value Date    TSH 1.580 06/15/2018         CLINICAL ASSESMENT:  1. Advance COPD  2. RHF noted with decompensation  3. Emphysema  4. Acute decompensated COPD  5. AECOPD  6. Cachexia      PLAN: If needed the case was discussed with the care team  1. Check echo done - would consider may need theodur  2. Continue daliresp  3. Check procalcitionin and CXR rule out HCAP - stop antibitoics  4. Change to IV steroids wean slowly Q12  5. LTAC needed  6. Stop the Ensure at it is loaded with Carbs and increase RQ   7. Check Phosphate and TSH = normal  8. NIV on and off  9.  PT Rory Shields DO, MPH, Meseret Catalan  Professor of Medicine

## 2018-06-17 NOTE — PROGRESS NOTES
Patient refusing to wear bipap any longer. Stating she cannot sleep and it makes her anxious. 8L high flow reapplied.  Patient sat in high 90s

## 2018-06-18 ENCOUNTER — APPOINTMENT (OUTPATIENT)
Dept: ULTRASOUND IMAGING | Age: 73
DRG: 190 | End: 2018-06-18
Payer: MEDICARE

## 2018-06-18 LAB
ANION GAP SERPL CALCULATED.3IONS-SCNC: 15 MMOL/L (ref 7–16)
BUN BLDV-MCNC: 61 MG/DL (ref 8–23)
CALCIUM SERPL-MCNC: 10.3 MG/DL (ref 8.6–10.2)
CHLORIDE BLD-SCNC: 108 MMOL/L (ref 98–107)
CO2: 29 MMOL/L (ref 22–29)
CREAT SERPL-MCNC: 0.6 MG/DL (ref 0.5–1)
GFR AFRICAN AMERICAN: >60
GFR NON-AFRICAN AMERICAN: >60 ML/MIN/1.73
GLUCOSE BLD-MCNC: 147 MG/DL (ref 74–109)
LV EF: 53 %
LVEF MODALITY: NORMAL
POTASSIUM SERPL-SCNC: 3.3 MMOL/L (ref 3.5–5)
SODIUM BLD-SCNC: 152 MMOL/L (ref 132–146)

## 2018-06-18 PROCEDURE — 36415 COLL VENOUS BLD VENIPUNCTURE: CPT

## 2018-06-18 PROCEDURE — 94660 CPAP INITIATION&MGMT: CPT

## 2018-06-18 PROCEDURE — 6370000000 HC RX 637 (ALT 250 FOR IP): Performed by: INTERNAL MEDICINE

## 2018-06-18 PROCEDURE — 2580000003 HC RX 258: Performed by: INTERNAL MEDICINE

## 2018-06-18 PROCEDURE — 93306 TTE W/DOPPLER COMPLETE: CPT

## 2018-06-18 PROCEDURE — 94640 AIRWAY INHALATION TREATMENT: CPT

## 2018-06-18 PROCEDURE — 6360000002 HC RX W HCPCS: Performed by: INTERNAL MEDICINE

## 2018-06-18 PROCEDURE — 6370000000 HC RX 637 (ALT 250 FOR IP): Performed by: STUDENT IN AN ORGANIZED HEALTH CARE EDUCATION/TRAINING PROGRAM

## 2018-06-18 PROCEDURE — 99232 SBSQ HOSP IP/OBS MODERATE 35: CPT | Performed by: INTERNAL MEDICINE

## 2018-06-18 PROCEDURE — 76775 US EXAM ABDO BACK WALL LIM: CPT

## 2018-06-18 PROCEDURE — 6360000002 HC RX W HCPCS: Performed by: STUDENT IN AN ORGANIZED HEALTH CARE EDUCATION/TRAINING PROGRAM

## 2018-06-18 PROCEDURE — 2060000000 HC ICU INTERMEDIATE R&B

## 2018-06-18 PROCEDURE — 2700000000 HC OXYGEN THERAPY PER DAY

## 2018-06-18 PROCEDURE — 80048 BASIC METABOLIC PNL TOTAL CA: CPT

## 2018-06-18 RX ORDER — POTASSIUM CHLORIDE 20 MEQ/1
40 TABLET, EXTENDED RELEASE ORAL ONCE
Status: COMPLETED | OUTPATIENT
Start: 2018-06-18 | End: 2018-06-18

## 2018-06-18 RX ORDER — SODIUM CHLORIDE 450 MG/100ML
INJECTION, SOLUTION INTRAVENOUS CONTINUOUS
Status: DISCONTINUED | OUTPATIENT
Start: 2018-06-18 | End: 2018-06-19

## 2018-06-18 RX ORDER — FUROSEMIDE 10 MG/ML
10 INJECTION INTRAMUSCULAR; INTRAVENOUS ONCE
Status: COMPLETED | OUTPATIENT
Start: 2018-06-18 | End: 2018-06-18

## 2018-06-18 RX ADMIN — Medication 20 MG: at 00:15

## 2018-06-18 RX ADMIN — DRONABINOL 2.5 MG: 2.5 CAPSULE ORAL at 22:25

## 2018-06-18 RX ADMIN — Medication 10 ML: at 23:41

## 2018-06-18 RX ADMIN — PANTOPRAZOLE SODIUM 40 MG: 40 TABLET, DELAYED RELEASE ORAL at 05:23

## 2018-06-18 RX ADMIN — FUROSEMIDE 10 MG: 10 INJECTION, SOLUTION INTRAVENOUS at 17:01

## 2018-06-18 RX ADMIN — Medication 10 ML: at 22:25

## 2018-06-18 RX ADMIN — KETOTIFEN FUMARATE 1 DROP: 0.25 SOLUTION OPHTHALMIC at 08:12

## 2018-06-18 RX ADMIN — ENOXAPARIN SODIUM 40 MG: 40 INJECTION SUBCUTANEOUS at 08:13

## 2018-06-18 RX ADMIN — Medication 10 ML: at 08:12

## 2018-06-18 RX ADMIN — POTASSIUM CHLORIDE 40 MEQ: 20 TABLET, EXTENDED RELEASE ORAL at 22:24

## 2018-06-18 RX ADMIN — IPRATROPIUM BROMIDE AND ALBUTEROL SULFATE 1 AMPULE: 2.5; .5 SOLUTION RESPIRATORY (INHALATION) at 13:36

## 2018-06-18 RX ADMIN — SODIUM CHLORIDE: 4.5 INJECTION, SOLUTION INTRAVENOUS at 23:41

## 2018-06-18 RX ADMIN — GUAIFENESIN 400 MG: 400 TABLET ORAL at 08:11

## 2018-06-18 RX ADMIN — ACETAZOLAMIDE 250 MG: 250 TABLET ORAL at 08:11

## 2018-06-18 RX ADMIN — FORMOTEROL FUMARATE DIHYDRATE 20 MCG: 20 SOLUTION RESPIRATORY (INHALATION) at 08:52

## 2018-06-18 RX ADMIN — BUDESONIDE 500 MCG: 0.5 SUSPENSION RESPIRATORY (INHALATION) at 08:52

## 2018-06-18 RX ADMIN — KETOTIFEN FUMARATE 1 DROP: 0.25 SOLUTION OPHTHALMIC at 22:25

## 2018-06-18 RX ADMIN — ROFLUMILAST 500 MCG: 500 TABLET ORAL at 08:11

## 2018-06-18 RX ADMIN — NYSTATIN 500000 UNITS: 100000 SUSPENSION ORAL at 08:13

## 2018-06-18 RX ADMIN — DRONABINOL 2.5 MG: 2.5 CAPSULE ORAL at 08:11

## 2018-06-18 RX ADMIN — NYSTATIN 500000 UNITS: 100000 SUSPENSION ORAL at 22:26

## 2018-06-18 RX ADMIN — Medication 20 MG: at 12:56

## 2018-06-18 RX ADMIN — GUAIFENESIN 400 MG: 400 TABLET ORAL at 22:24

## 2018-06-18 ASSESSMENT — PAIN DESCRIPTION - PROGRESSION
CLINICAL_PROGRESSION: GRADUALLY IMPROVING
CLINICAL_PROGRESSION: GRADUALLY IMPROVING

## 2018-06-18 ASSESSMENT — PAIN SCALES - GENERAL
PAINLEVEL_OUTOF10: 0

## 2018-06-18 NOTE — PROGRESS NOTES
[facility]  Internal Medicine Residency Program  Progress Note  - House Team 2    Patient:  Yecenia Blount 72 y.o. female MRN: [id number]     Date of Service: 6/18/2018     CC: SOB    Subjective       Patient was seen and examined in AM. Patient appeared obtunded and sleepy, but answered questions appropriately. AAO X3.     Objective     Physical Exam:  · Vitals: /78   Pulse 118   Temp 98.7 °F (37.1 °C) (Temporal)   Resp 16   Ht 5' (1.524 m)   Wt 74 lb 5 oz (33.7 kg)   SpO2 97%   BMI 14.51 kg/m²     · I & O - 24hr: No intake/output data recorded. General Appearance:    Alert, cooperative, fragile, cachetic   HEENT:    NC/AT, no lesions, without obvious abnormality. Neck:   Supple, no adenopathy, no JVD   Resp:     Labored breathing, diminished breath sounds b/l   Heart:    RRR, S1 and S2 normal, no murmur, rub or gallop. Abdomen:     Soft, non tender, BS + all four quadrants, no masses, no organomegaly   Extremities:   Atraumatic, no cyanosis or edema   Pulses:  Radial and pedal pulses are intact bilaterally   Neurologic:   AAOx3, No focal motor deficit       Pertinent Labs & Imaging Studies     CBC:   No results for input(s): WBC, HGB, HCT, MCV, PLT in the last 72 hours.     BMP:    Recent Labs      06/18/18   1522   NA  152*   K  3.3*   CL  108*   CO2  29   BUN  61*   CREATININE  0.6   GLUCOSE  147*     Fasting Lipid Panel:    Lab Results   Component Value Date    CHOL 231 10/11/2013    TRIG 86 10/11/2013    HDL 81.0 10/11/2013       Cardiac Enzymes:    Lab Results   Component Value Date    TROPONINI <0.01 06/07/2018    TROPONINI <0.01 06/05/2017       TSH:    Lab Results   Component Value Date    TSH 1.580 06/15/2018       Imaging studies:     Radiology  Date  Result    CXR  6/15  mild vascular congestion with interstitial edema with minimal infiltrates                           Resident's Assessment and Plan     Acute on chronic hypoxic respiratory failure  - Initially 2/2 COPD exacerbation due to parainfluenza infection, was improving and then worsening respiratory status, possibly 2/2 decompensated right HF from COPD  - CXR showed mild vascular congestion with interstitial edema with minimal infiltrates  - Elevated ProBNP 5000 from 270  - Pt refused BiPAP, currently on 8 L  - Pulmonary following added diamox 250 mg q6hr    - Tapering steroid  - Cont roflumilast  - F/u echo to r/o underlying right HF  - 10 mg lasix today, evaluate if respond to diuretics     Tobacco abuse  - Nicotine patch    Odynophagia  - Likely from dry mouth but possible thrush from steroid use  - Empirical nystatin     Marasmus  - Likely COPD cachexia   - Protein supplementation  - Dietician following, recommend cont general diet  - Added appetite stimulant, dronabinol      PT/OT evaluation: DIMA  DVT prophylaxis/ GI prophylaxis: Lovenox/Protonix  Disposition: Cont current care    Code status: full code, patient appeared end stage COPD. I discussed with patient regarding code status, she refused discussing right now. We will meet with patient and family together once family present.      Chiquita Giordano MD , PGY-1  Attending physician: Dr. Frieda Mares

## 2018-06-18 NOTE — PROGRESS NOTES
Paged [address] regarding patients US and being NPO. Stated they would take her in a bit due to STATS in front of her.

## 2018-06-18 NOTE — PROGRESS NOTES
[facility]  Internal Medicine Residency    Attending Physician Statement  I have discussed the case, including pertinent history and exam findings with the resident and the team.  I have seen and examined the patient and the key elements of the encounter have been performed by me. I agree with the assessment, plan and orders as documented by the resident. S: States she is feeling fatigued. Still short of breath. O: Blood pressure 124/82, pulse 110, temperature 98.8 °F (37.1 °C), temperature source Oral, resp. rate 18, height 5' (1.524 m), weight 74 lb 5 oz (33.7 kg), SpO2 97 %. AP:  Frequency of looser stools: monitor closely  Acute hypoxic and hypercarbic respiratory failure due to Severe COPD with exacerbation due to parainfluenza- improved then worsened again likely due to fluid overload: off steroids;  Lasix 10mg x 1; echo pending    Joanna Martínez MD

## 2018-06-19 LAB
ANION GAP SERPL CALCULATED.3IONS-SCNC: 10 MMOL/L (ref 7–16)
ANION GAP SERPL CALCULATED.3IONS-SCNC: 15 MMOL/L (ref 7–16)
BUN BLDV-MCNC: 50 MG/DL (ref 8–23)
BUN BLDV-MCNC: 70 MG/DL (ref 8–23)
C DIFFICILE TOXIN, EIA: NORMAL
CALCIUM SERPL-MCNC: 10.3 MG/DL (ref 8.6–10.2)
CALCIUM SERPL-MCNC: 9.3 MG/DL (ref 8.6–10.2)
CHLORIDE BLD-SCNC: 107 MMOL/L (ref 98–107)
CHLORIDE BLD-SCNC: 108 MMOL/L (ref 98–107)
CO2: 29 MMOL/L (ref 22–29)
CO2: 30 MMOL/L (ref 22–29)
CREAT SERPL-MCNC: 0.5 MG/DL (ref 0.5–1)
CREAT SERPL-MCNC: 0.6 MG/DL (ref 0.5–1)
GFR AFRICAN AMERICAN: >60
GFR AFRICAN AMERICAN: >60
GFR NON-AFRICAN AMERICAN: >60 ML/MIN/1.73
GFR NON-AFRICAN AMERICAN: >60 ML/MIN/1.73
GLUCOSE BLD-MCNC: 146 MG/DL (ref 74–109)
GLUCOSE BLD-MCNC: 186 MG/DL (ref 74–109)
HCT VFR BLD CALC: 46 % (ref 34–48)
HEMOGLOBIN: 15.1 G/DL (ref 11.5–15.5)
MAGNESIUM: 2.6 MG/DL (ref 1.6–2.6)
MCH RBC QN AUTO: 30.3 PG (ref 26–35)
MCHC RBC AUTO-ENTMCNC: 32.8 % (ref 32–34.5)
MCV RBC AUTO: 92.4 FL (ref 80–99.9)
PDW BLD-RTO: 13.2 FL (ref 11.5–15)
PHOSPHORUS: 3.4 MG/DL (ref 2.5–4.5)
PLATELET # BLD: 423 E9/L (ref 130–450)
PMV BLD AUTO: 9.8 FL (ref 7–12)
POTASSIUM SERPL-SCNC: 3.5 MMOL/L (ref 3.5–5)
POTASSIUM SERPL-SCNC: 3.6 MMOL/L (ref 3.5–5)
PROCALCITONIN: 0.07 NG/ML (ref 0–0.08)
RBC # BLD: 4.98 E12/L (ref 3.5–5.5)
SODIUM BLD-SCNC: 148 MMOL/L (ref 132–146)
SODIUM BLD-SCNC: 151 MMOL/L (ref 132–146)
WBC # BLD: 25.6 E9/L (ref 4.5–11.5)

## 2018-06-19 PROCEDURE — 84145 PROCALCITONIN (PCT): CPT

## 2018-06-19 PROCEDURE — 2060000000 HC ICU INTERMEDIATE R&B

## 2018-06-19 PROCEDURE — 84100 ASSAY OF PHOSPHORUS: CPT

## 2018-06-19 PROCEDURE — 6370000000 HC RX 637 (ALT 250 FOR IP): Performed by: INTERNAL MEDICINE

## 2018-06-19 PROCEDURE — 6360000002 HC RX W HCPCS: Performed by: STUDENT IN AN ORGANIZED HEALTH CARE EDUCATION/TRAINING PROGRAM

## 2018-06-19 PROCEDURE — 6360000002 HC RX W HCPCS: Performed by: INTERNAL MEDICINE

## 2018-06-19 PROCEDURE — 83735 ASSAY OF MAGNESIUM: CPT

## 2018-06-19 PROCEDURE — 87324 CLOSTRIDIUM AG IA: CPT

## 2018-06-19 PROCEDURE — 6370000000 HC RX 637 (ALT 250 FOR IP): Performed by: STUDENT IN AN ORGANIZED HEALTH CARE EDUCATION/TRAINING PROGRAM

## 2018-06-19 PROCEDURE — 94660 CPAP INITIATION&MGMT: CPT

## 2018-06-19 PROCEDURE — 2580000003 HC RX 258: Performed by: INTERNAL MEDICINE

## 2018-06-19 PROCEDURE — 94761 N-INVAS EAR/PLS OXIMETRY MLT: CPT

## 2018-06-19 PROCEDURE — 99233 SBSQ HOSP IP/OBS HIGH 50: CPT | Performed by: INTERNAL MEDICINE

## 2018-06-19 PROCEDURE — 99223 1ST HOSP IP/OBS HIGH 75: CPT | Performed by: CLINICAL NURSE SPECIALIST

## 2018-06-19 PROCEDURE — 2700000000 HC OXYGEN THERAPY PER DAY

## 2018-06-19 PROCEDURE — 36415 COLL VENOUS BLD VENIPUNCTURE: CPT

## 2018-06-19 PROCEDURE — 80048 BASIC METABOLIC PNL TOTAL CA: CPT

## 2018-06-19 PROCEDURE — 94640 AIRWAY INHALATION TREATMENT: CPT

## 2018-06-19 PROCEDURE — 85027 COMPLETE CBC AUTOMATED: CPT

## 2018-06-19 RX ORDER — DEXTROSE AND SODIUM CHLORIDE 5; .45 G/100ML; G/100ML
INJECTION, SOLUTION INTRAVENOUS CONTINUOUS
Status: ACTIVE | OUTPATIENT
Start: 2018-06-19 | End: 2018-06-19

## 2018-06-19 RX ORDER — SODIUM CHLORIDE 450 MG/100ML
INJECTION, SOLUTION INTRAVENOUS CONTINUOUS
Status: ACTIVE | OUTPATIENT
Start: 2018-06-19 | End: 2018-06-19

## 2018-06-19 RX ORDER — BACITRACIN 500 [USP'U]/G
OINTMENT TOPICAL 4 TIMES DAILY PRN
Status: DISCONTINUED | OUTPATIENT
Start: 2018-06-19 | End: 2018-06-20 | Stop reason: HOSPADM

## 2018-06-19 RX ORDER — DEXTROSE AND SODIUM CHLORIDE 5; .45 G/100ML; G/100ML
INJECTION, SOLUTION INTRAVENOUS CONTINUOUS
Status: ACTIVE | OUTPATIENT
Start: 2018-06-19 | End: 2018-06-20

## 2018-06-19 RX ADMIN — FORMOTEROL FUMARATE DIHYDRATE 20 MCG: 20 SOLUTION RESPIRATORY (INHALATION) at 13:17

## 2018-06-19 RX ADMIN — BUDESONIDE 500 MCG: 0.5 SUSPENSION RESPIRATORY (INHALATION) at 20:43

## 2018-06-19 RX ADMIN — GUAIFENESIN 400 MG: 400 TABLET ORAL at 22:40

## 2018-06-19 RX ADMIN — BUDESONIDE 500 MCG: 0.5 SUSPENSION RESPIRATORY (INHALATION) at 13:18

## 2018-06-19 RX ADMIN — ROFLUMILAST 500 MCG: 500 TABLET ORAL at 09:27

## 2018-06-19 RX ADMIN — DRONABINOL 2.5 MG: 2.5 CAPSULE ORAL at 09:28

## 2018-06-19 RX ADMIN — Medication 20 MG: at 12:16

## 2018-06-19 RX ADMIN — Medication 20 MG: at 03:42

## 2018-06-19 RX ADMIN — GUAIFENESIN 400 MG: 400 TABLET ORAL at 09:28

## 2018-06-19 RX ADMIN — DRONABINOL 2.5 MG: 2.5 CAPSULE ORAL at 21:38

## 2018-06-19 RX ADMIN — KETOTIFEN FUMARATE 1 DROP: 0.25 SOLUTION OPHTHALMIC at 09:36

## 2018-06-19 RX ADMIN — ENOXAPARIN SODIUM 40 MG: 40 INJECTION SUBCUTANEOUS at 09:28

## 2018-06-19 RX ADMIN — KETOTIFEN FUMARATE 1 DROP: 0.25 SOLUTION OPHTHALMIC at 21:38

## 2018-06-19 RX ADMIN — Medication 10 ML: at 21:38

## 2018-06-19 RX ADMIN — DEXTROSE AND SODIUM CHLORIDE: 5; 450 INJECTION, SOLUTION INTRAVENOUS at 21:52

## 2018-06-19 RX ADMIN — DEXTROSE AND SODIUM CHLORIDE: 5; 450 INJECTION, SOLUTION INTRAVENOUS at 10:58

## 2018-06-19 RX ADMIN — IPRATROPIUM BROMIDE AND ALBUTEROL SULFATE 1 AMPULE: 2.5; .5 SOLUTION RESPIRATORY (INHALATION) at 13:18

## 2018-06-19 RX ADMIN — FORMOTEROL FUMARATE DIHYDRATE 20 MCG: 20 SOLUTION RESPIRATORY (INHALATION) at 20:43

## 2018-06-19 RX ADMIN — IPRATROPIUM BROMIDE AND ALBUTEROL SULFATE 1 AMPULE: 2.5; .5 SOLUTION RESPIRATORY (INHALATION) at 16:49

## 2018-06-19 ASSESSMENT — PAIN SCALES - GENERAL
PAINLEVEL_OUTOF10: 0

## 2018-06-19 NOTE — PLAN OF CARE
Met with family and patient in patient room this pm. I explained patient's current diseases and prognosis. I also explained the options of code status and what they mean in details. Family stated that they will think about this and discuss this between family members. Patient remains on Full code at this moment. Patient's POA, her grandson Dale Ortiz ([phone]) stated that he would like patient to be discharged to [facility]. All questions were answered.      Radha Stringer MD

## 2018-06-19 NOTE — PROGRESS NOTES
Change: continued wt loss during adm, CBW reflects 6% wt loss x4 days     · Ideal Body Wt: 100 lb (45.4 kg), % Ideal Body 76%  · BMI Classification: BMI <18.5 Underweight  · Comparative Standards (Estimated Nutrition Needs):  · Estimated Daily Total Kcal:  ( x 1.4 SF)  · Estimated Daily Protein (g): 50-60    Estimated Intake vs Estimated Needs: Intake Less Than Needs    Nutrition Risk Level: High    Nutrition Interventions:   Continued Inpatient Monitoring, Education Initiated, Coordination of Care (Reviewed ONS options/preferences)    Nutrition Evaluation:   · Evaluation: Goals set   · Goals: Consume >50% meals/ONS    · Monitoring: Meal Intake, Supplement Intake, Diet Tolerance, Mental Status/Confusion, Weight, Comparative Standards, Pertinent Labs    See Adult Nutrition Doc Flowsheet for more detail.      Electronically signed by Clayton Wayne RD, JOYCELYN on 6/19/18 at 10:05 AM    Contact Number: [phone]

## 2018-06-19 NOTE — CARE COORDINATION
Social Discharge Planning:  SW followed-up with patient's grandson [place] on [facility] choice, prefers [place]. SW made referral to John adkins from [place], patient accepted,SW will await room and time, bedside nurse informed.    Electronically signed by CHAYA Hawley on 6/19/2018 at 9:21 AM

## 2018-06-19 NOTE — PROGRESS NOTES
[facility]  Internal Medicine Residency Program  Progress Note  - House Team 2    Patient:  Hank Card 72 y.o. female MRN: [id number]     Date of Service: 6/19/2018     CC: SOB    Subjective       Patient was seen and examined in AM. Patient appeared obtunded and sleepy, but answered questions appropriately. She remained on 8 liters O2. Objective     Physical Exam:  · Vitals: BP (!) 122/101   Pulse 122   Temp 97.8 °F (36.6 °C) (Axillary)   Resp 18   Ht 5' (1.524 m)   Wt 76 lb 4.8 oz (34.6 kg)   SpO2 96%   BMI 14.90 kg/m²     · I & O - 24hr: No intake/output data recorded. General Appearance:    Alert, cooperative, fragile, cachetic   HEENT:    NC/AT, no lesions, without obvious abnormality. Neck:   Supple, no adenopathy, no JVD   Resp:     Labored breathing, diminished breath sounds b/l   Heart:    RRR, S1 and S2 normal, no murmur, rub or gallop.     Abdomen:     Soft, non tender, BS + all four quadrants, no masses, no organomegaly   Extremities:   Atraumatic, no cyanosis or edema   Pulses:  Radial and pedal pulses are intact bilaterally   Neurologic:   AAOx3, No focal motor deficit       Pertinent Labs & Imaging Studies     CBC:   Recent Labs      06/19/18   0747   WBC  25.6*   HGB  15.1   HCT  46.0   MCV  92.4   PLT  423       BMP:    Recent Labs      06/18/18   1522  06/19/18   0747   NA  152*  151*   K  3.3*  3.6   CL  108*  107   CO2  29  29   BUN  61*  70*   CREATININE  0.6  0.6   GLUCOSE  147*  146*     Fasting Lipid Panel:    Lab Results   Component Value Date    CHOL 231 10/11/2013    TRIG 86 10/11/2013    HDL 81.0 10/11/2013       Cardiac Enzymes:    Lab Results   Component Value Date    TROPONINI <0.01 06/07/2018    TROPONINI <0.01 06/05/2017       TSH:    Lab Results   Component Value Date    TSH 1.580 06/15/2018       Imaging studies:     Radiology  Date  Result    CXR  6/15  mild vascular congestion with interstitial edema with minimal infiltrates

## 2018-06-19 NOTE — PROGRESS NOTES
Patient has been tachycardiac, heart rate went up to 166 and sustained for 5-10 min, currently 130. Physician notified. Awaiting response.

## 2018-06-19 NOTE — PROGRESS NOTES
Date: 6/18/2018    Time: 10:45 PM    Patient Placed On BIPAP/CPAP/ Non-Invasive Ventilation? No    If no must comment. Facial area red/color change? No           If YES are Blister/Lesion present? No   If yes must notify nursing staff  BIPAP/CPAP skin barrier?   No    Skin barrier type:none       Comments: Patient refuses bipap this pm        Mary Hernandez

## 2018-06-19 NOTE — CONSULTS
Palliative Medicine   Consult Note    Provider: Maria Victoria Villegas RN, CNS, [facility] Day: 10    Referring Provider: Dr. Asuncion Solano    Reason for Consult:  ____Advanced Care Planning  _x___Assist with goals of care  _x___Psychosocial support  ____Symptom Management    Recommendations:  1. Psychosocial support of patient and family: no family present; daughter Steven Medina [phone]; Samantha Alaniz @ [phone]  2. Assist with goals of care: continue current care; nebulizers; steroids  3. Advance Care Planning: none known; offered to assist with AD; pt states \"what are trying to do kill me?\"; would want daughter to make decisions if needed  4. Anticipatory Guidance: Pt with hx of COPD; now with worsening SOB; + parainfluenza; emaciated-on Marinol still with poor appetite  5. Code Status: full (refused to discuss)  The PM outpatient clinic for symptom management was discussed. An attempt to coordinate an outpatient visit after discharge will be made, if the patient is agreeable. Chief complaint: COPD exacerbation    HPI:   Paco Avalos is a 72 y.o. female with significant past medical history of COPD who presented with   COPD exacerbation that has worsened over the last several days. She was seen several days or same complaint and recommended to be admitted to the hospital but she refused that time; was placed on steroid taper and sent home. Pt called EMS because she felt she is unable to breathe. She was placed on CPAP and given 125 mg Solu-Medrol and also one DuoNeb.  Workup noted parainfluenza; was started on mucinex. ProBNP 4999. Pulmonology was consulted. Last seen in pulmonology clinic on 5/17/18 by Dr. Cristopher David. Palliative Medicine has been consulted to assist pt/family with establishing goals of care; code status discussion; family support. Currently Ms Amor Storm is on oxygen per n/c 8L; SPO2 96%. BUN/creat 70/0.6.  Pt continues on inhalers; nebulizers and is tapering down on steroids; takes Marinol

## 2018-06-19 NOTE — PLAN OF CARE
Problem: Falls - Risk of:  Goal: Absence of physical injury  Absence of physical injury    Outcome: Met This Shift

## 2018-06-20 VITALS
BODY MASS INDEX: 15.79 KG/M2 | SYSTOLIC BLOOD PRESSURE: 99 MMHG | HEART RATE: 121 BPM | OXYGEN SATURATION: 98 % | RESPIRATION RATE: 20 BRPM | TEMPERATURE: 97.5 F | WEIGHT: 80.44 LBS | DIASTOLIC BLOOD PRESSURE: 58 MMHG | HEIGHT: 60 IN

## 2018-06-20 LAB
ANION GAP SERPL CALCULATED.3IONS-SCNC: 10 MMOL/L (ref 7–16)
ANION GAP SERPL CALCULATED.3IONS-SCNC: 15 MMOL/L (ref 7–16)
BASOPHILS ABSOLUTE: 0.04 E9/L (ref 0–0.2)
BASOPHILS RELATIVE PERCENT: 0.2 % (ref 0–2)
BUN BLDV-MCNC: 39 MG/DL (ref 8–23)
BUN BLDV-MCNC: 44 MG/DL (ref 8–23)
CALCIUM SERPL-MCNC: 9.3 MG/DL (ref 8.6–10.2)
CALCIUM SERPL-MCNC: 9.6 MG/DL (ref 8.6–10.2)
CHLORIDE BLD-SCNC: 106 MMOL/L (ref 98–107)
CHLORIDE BLD-SCNC: 108 MMOL/L (ref 98–107)
CO2: 26 MMOL/L (ref 22–29)
CO2: 30 MMOL/L (ref 22–29)
CREAT SERPL-MCNC: 0.5 MG/DL (ref 0.5–1)
CREAT SERPL-MCNC: 0.5 MG/DL (ref 0.5–1)
EOSINOPHILS ABSOLUTE: 0 E9/L (ref 0.05–0.5)
EOSINOPHILS RELATIVE PERCENT: 0 % (ref 0–6)
GFR AFRICAN AMERICAN: >60
GFR AFRICAN AMERICAN: >60
GFR NON-AFRICAN AMERICAN: >60 ML/MIN/1.73
GFR NON-AFRICAN AMERICAN: >60 ML/MIN/1.73
GLUCOSE BLD-MCNC: 144 MG/DL (ref 74–109)
GLUCOSE BLD-MCNC: 232 MG/DL (ref 74–109)
HCT VFR BLD CALC: 40.9 % (ref 34–48)
HEMOGLOBIN: 13.6 G/DL (ref 11.5–15.5)
IMMATURE GRANULOCYTES #: 0.23 E9/L
IMMATURE GRANULOCYTES %: 1 % (ref 0–5)
LYMPHOCYTES ABSOLUTE: 0.67 E9/L (ref 1.5–4)
LYMPHOCYTES RELATIVE PERCENT: 2.9 % (ref 20–42)
MCH RBC QN AUTO: 30.6 PG (ref 26–35)
MCHC RBC AUTO-ENTMCNC: 33.3 % (ref 32–34.5)
MCV RBC AUTO: 91.9 FL (ref 80–99.9)
MONOCYTES ABSOLUTE: 0.83 E9/L (ref 0.1–0.95)
MONOCYTES RELATIVE PERCENT: 3.6 % (ref 2–12)
NEUTROPHILS ABSOLUTE: 21.61 E9/L (ref 1.8–7.3)
NEUTROPHILS RELATIVE PERCENT: 92.3 % (ref 43–80)
PDW BLD-RTO: 13.1 FL (ref 11.5–15)
PLATELET # BLD: 369 E9/L (ref 130–450)
PMV BLD AUTO: 9.6 FL (ref 7–12)
POTASSIUM SERPL-SCNC: 4 MMOL/L (ref 3.5–5)
POTASSIUM SERPL-SCNC: 4.1 MMOL/L (ref 3.5–5)
RBC # BLD: 4.45 E12/L (ref 3.5–5.5)
SODIUM BLD-SCNC: 146 MMOL/L (ref 132–146)
SODIUM BLD-SCNC: 149 MMOL/L (ref 132–146)
WBC # BLD: 23.4 E9/L (ref 4.5–11.5)

## 2018-06-20 PROCEDURE — 97530 THERAPEUTIC ACTIVITIES: CPT

## 2018-06-20 PROCEDURE — 6370000000 HC RX 637 (ALT 250 FOR IP): Performed by: INTERNAL MEDICINE

## 2018-06-20 PROCEDURE — 2700000000 HC OXYGEN THERAPY PER DAY

## 2018-06-20 PROCEDURE — 6360000002 HC RX W HCPCS: Performed by: INTERNAL MEDICINE

## 2018-06-20 PROCEDURE — 99233 SBSQ HOSP IP/OBS HIGH 50: CPT | Performed by: INTERNAL MEDICINE

## 2018-06-20 PROCEDURE — 99232 SBSQ HOSP IP/OBS MODERATE 35: CPT | Performed by: CLINICAL NURSE SPECIALIST

## 2018-06-20 PROCEDURE — 36415 COLL VENOUS BLD VENIPUNCTURE: CPT

## 2018-06-20 PROCEDURE — 85025 COMPLETE CBC W/AUTO DIFF WBC: CPT

## 2018-06-20 PROCEDURE — 94660 CPAP INITIATION&MGMT: CPT

## 2018-06-20 PROCEDURE — 97110 THERAPEUTIC EXERCISES: CPT

## 2018-06-20 PROCEDURE — 6370000000 HC RX 637 (ALT 250 FOR IP): Performed by: STUDENT IN AN ORGANIZED HEALTH CARE EDUCATION/TRAINING PROGRAM

## 2018-06-20 PROCEDURE — 80048 BASIC METABOLIC PNL TOTAL CA: CPT

## 2018-06-20 PROCEDURE — 6360000002 HC RX W HCPCS: Performed by: STUDENT IN AN ORGANIZED HEALTH CARE EDUCATION/TRAINING PROGRAM

## 2018-06-20 PROCEDURE — 2580000003 HC RX 258: Performed by: INTERNAL MEDICINE

## 2018-06-20 PROCEDURE — 94640 AIRWAY INHALATION TREATMENT: CPT

## 2018-06-20 RX ORDER — PREDNISONE 20 MG/1
40 TABLET ORAL DAILY
Qty: 7 TABLET | Refills: 0 | Status: ON HOLD | OUTPATIENT
Start: 2018-06-21 | End: 2018-07-25 | Stop reason: HOSPADM

## 2018-06-20 RX ORDER — DEXTROSE AND SODIUM CHLORIDE 5; .45 G/100ML; G/100ML
INJECTION, SOLUTION INTRAVENOUS CONTINUOUS
Status: DISCONTINUED | OUTPATIENT
Start: 2018-06-20 | End: 2018-06-20 | Stop reason: HOSPADM

## 2018-06-20 RX ORDER — NICOTINE 21 MG/24HR
1 PATCH, TRANSDERMAL 24 HOURS TRANSDERMAL DAILY
Qty: 30 PATCH | Refills: 0 | Status: ON HOLD | OUTPATIENT
Start: 2018-06-21 | End: 2022-10-27 | Stop reason: HOSPADM

## 2018-06-20 RX ORDER — FORMOTEROL FUMARATE 20 UG/2ML
20 SOLUTION RESPIRATORY (INHALATION) 2 TIMES DAILY
Qty: 20 ML | Refills: 0 | Status: ON HOLD | OUTPATIENT
Start: 2018-06-20 | End: 2018-07-25 | Stop reason: HOSPADM

## 2018-06-20 RX ORDER — IPRATROPIUM BROMIDE AND ALBUTEROL SULFATE 2.5; .5 MG/3ML; MG/3ML
3 SOLUTION RESPIRATORY (INHALATION)
Qty: 360 ML | Refills: 0 | Status: ON HOLD | OUTPATIENT
Start: 2018-06-20 | End: 2018-07-25 | Stop reason: HOSPADM

## 2018-06-20 RX ORDER — PANTOPRAZOLE SODIUM 40 MG/1
40 TABLET, DELAYED RELEASE ORAL
Qty: 30 TABLET | Refills: 0 | Status: ON HOLD | OUTPATIENT
Start: 2018-06-21 | End: 2018-07-25 | Stop reason: HOSPADM

## 2018-06-20 RX ORDER — PREDNISONE 20 MG/1
40 TABLET ORAL DAILY
Status: DISCONTINUED | OUTPATIENT
Start: 2018-06-21 | End: 2018-06-20 | Stop reason: HOSPADM

## 2018-06-20 RX ORDER — PREDNISONE 20 MG/1
40 TABLET ORAL DAILY
Status: DISCONTINUED | OUTPATIENT
Start: 2018-06-20 | End: 2018-06-20

## 2018-06-20 RX ORDER — BACITRACIN 500 [USP'U]/G
OINTMENT TOPICAL
Qty: 1 TUBE | Refills: 0 | Status: ON HOLD | OUTPATIENT
Start: 2018-06-20 | End: 2018-07-25 | Stop reason: HOSPADM

## 2018-06-20 RX ORDER — BUDESONIDE 0.5 MG/2ML
500 INHALANT ORAL 2 TIMES DAILY
Qty: 60 AMPULE | Refills: 0 | Status: ON HOLD | OUTPATIENT
Start: 2018-06-20 | End: 2018-07-25 | Stop reason: HOSPADM

## 2018-06-20 RX ORDER — PREDNISONE 10 MG/1
10 TABLET ORAL DAILY
Qty: 3 TABLET | Refills: 0 | Status: SHIPPED | OUTPATIENT
Start: 2018-06-26 | End: 2018-06-29

## 2018-06-20 RX ORDER — DRONABINOL 2.5 MG/1
2.5 CAPSULE ORAL 2 TIMES DAILY
Qty: 60 CAPSULE | Refills: 0 | Status: ON HOLD | OUTPATIENT
Start: 2018-06-20 | End: 2018-07-25 | Stop reason: HOSPADM

## 2018-06-20 RX ADMIN — BUDESONIDE 500 MCG: 0.5 SUSPENSION RESPIRATORY (INHALATION) at 08:56

## 2018-06-20 RX ADMIN — FORMOTEROL FUMARATE DIHYDRATE 20 MCG: 20 SOLUTION RESPIRATORY (INHALATION) at 08:56

## 2018-06-20 RX ADMIN — Medication 20 MG: at 12:12

## 2018-06-20 RX ADMIN — ENOXAPARIN SODIUM 40 MG: 40 INJECTION SUBCUTANEOUS at 09:15

## 2018-06-20 RX ADMIN — DRONABINOL 2.5 MG: 2.5 CAPSULE ORAL at 09:15

## 2018-06-20 RX ADMIN — ROFLUMILAST 500 MCG: 500 TABLET ORAL at 09:15

## 2018-06-20 RX ADMIN — Medication 20 MG: at 01:00

## 2018-06-20 RX ADMIN — DEXTROSE AND SODIUM CHLORIDE: 5; 450 INJECTION, SOLUTION INTRAVENOUS at 12:12

## 2018-06-20 RX ADMIN — IPRATROPIUM BROMIDE AND ALBUTEROL SULFATE 1 AMPULE: 2.5; .5 SOLUTION RESPIRATORY (INHALATION) at 13:06

## 2018-06-20 RX ADMIN — GUAIFENESIN 400 MG: 400 TABLET ORAL at 09:15

## 2018-06-20 RX ADMIN — IPRATROPIUM BROMIDE AND ALBUTEROL SULFATE 1 AMPULE: 2.5; .5 SOLUTION RESPIRATORY (INHALATION) at 17:44

## 2018-06-20 RX ADMIN — KETOTIFEN FUMARATE 1 DROP: 0.25 SOLUTION OPHTHALMIC at 09:24

## 2018-06-20 RX ADMIN — Medication 10 ML: at 09:16

## 2018-06-20 ASSESSMENT — PAIN SCALES - GENERAL: PAINLEVEL_OUTOF10: 0

## 2018-06-20 NOTE — PROGRESS NOTES
problem solving to good during ADL's  STG #7   Increase functional activity tolerance to fair with good follow through of EC and pacing  in ADL tasks     Functional Assessment:    Initial Eval Status  6/20 tx   Feeding  setup Set-up    Grooming  Setup sitting with increased ;time SBA   Simulated grooming task at EOB    Upper Body Dressing SBA  SBA     Lower Body Dressing SBA sock donning  Mod A  Max A (socks)    Bathing n/t Mod A    Toileting  Min A   dependent   Bed Mobility  Supine to Sit: min A  Sit to Supine:SBA Min A (rolling)   Mod A (supine<>sit)  Cuing on technique, posture and safety. Functional Transfers Sit to stand Refused (min A for Sit to stand during evaluation)   Functional Mobility Due to poor endurance scooted to [facility] would not walk NT       Sit balance: SBA  Stand balance: NT  Endurance/Activity tolerance: poor    Comments: Upon arrival pt supine in bed and agreeable to OT Session. Therapist facilitated bed mobility (rolling, supine<>sit) and sitting tolerance task at EOB (10 minutes; addressing posture, balance and safety - incorporating light reaching) - skilled monitoring of O2 and HR (continuous pulse ox) - cuing on sequencing, posture and safety. Therapist facilitated simulated grooming and dressing task - cuing on technique and posture. At end of session supine in bed, all lines and tubes intact, call light within reach. · Pt has made fair progress towards set goals.    · Continue with current plan of care    Time in: 1545  Time out:1403  Total Tx Time: 18 minutes    Clem Del Cid OTR/L #[id number]

## 2018-06-20 NOTE — PROGRESS NOTES
We also talked about consider coming to PM outpatient clinic. She comes for Pulmonology clinic. Follow up call will be made to pt if offer after discharge. Physical Exam:  Vitals:    BP (!) 99/58   Pulse 121   Temp 97.5 °F (36.4 °C) (Oral)   Resp 20   Ht 5' (1.524 m)   Wt 80 lb 7 oz (36.5 kg)   SpO2 95%   BMI 15.71 kg/m²      Gen: emaciated; alert, awake, following commands, less SOB today  HEENT: Pupils equal and round; reactive to light  Neck: Supple  Lungs: CTA bilaterally but diminished;  no audible rhonchi or wheezes noted; hs oxygen on at present  Heart: NSR;  RRR, no murmur, rub, or gallop noted during exam  Abd: Soft, non tender, non distended, BS+; + diarrhea  Ext: Moving all extremities, no edema, pulses present  Skin:  Warm and dry  Neuro: Alert, oriented x 3; following commands       ALLERGIES:Patient has no known allergies.      nystatin  5 mL Oral 4x Daily    methylPREDNISolone  20 mg Intravenous Q12H    pantoprazole  40 mg Oral QAM AC    dronabinol  2.5 mg Oral BID    Roflumilast  500 mcg Oral Daily    ketotifen  1 drop Both Eyes BID    guaiFENesin  400 mg Oral BID    sodium chloride flush  10 mL Intravenous 2 times per day    ipratropium-albuterol  1 ampule Inhalation Q4H WA    budesonide  500 mcg Nebulization BID    formoterol  20 mcg Nebulization BID    enoxaparin  40 mg Subcutaneous Daily    nicotine  1 patch Transdermal Daily       zinc oxide, bisacodyl, sennosides-docusate sodium, sodium chloride flush     dextrose 5 % and 0.45 % NaCl 125 mL/hr at 06/20/18 1212         Labs     CBC:   Lab Results   Component Value Date    WBC 23.4 06/20/2018    RBC 4.45 06/20/2018    HGB 13.6 06/20/2018    HCT 40.9 06/20/2018     06/20/2018    MCV 91.9 06/20/2018     BMP:    Lab Results   Component Value Date     06/20/2018    K 4.0 06/20/2018    K 4.5 06/11/2018     06/20/2018    CO2 26 06/20/2018    BUN 44 06/20/2018    CREATININE 0.5 06/20/2018    GLUCOSE 144 06/20/2018    GLUCOSE 172 04/30/2012    CALCIUM 9.6 06/20/2018     PT/INR:    Lab Results   Component Value Date    PROTIME 11.6 04/29/2012    INR 1.2 04/29/2012       Notable Cultures:     Specimen: Rectum from Stool 3 Updated: 06/19/18 1357    C difficile Toxin, EIA --    Result: C Difficile Toxins A and B not detected   *   *   Normal Range: Not detected    Narrative:           Impression:  Active Problems:    Acute respiratory failure with hypoxia (HCC)    Parainfluenza    Severe protein-calorie malnutrition (HCC)    Encounter for palliative care  Resolved Problems:    * No resolved hospital problems. *        Assessment/Plan :   Phoebe Gasca a 72 y. o.female with significant past medical history of COPD  who presented with SOB; COPD exacerbation. Workup noted + parainfluenza; elevated white count and elevated ProBNP of 4,999. Pt was started on steroids; mucinex;   Continue full code - would not discuss  No advance directives- would not discuss  Plan for [facility]. The [facility] outpatient clinic for symptom management was discussed. An attempt to coordinate an outpatient visit after discharge will be made, if the patient is agreeable. 6/20 Feeling better; still with diarrhea; C diff negative. Plan for d/c later today to [facility]. Wants to maintain full code. Wants grand son as [de-identified] - does not have AD in place. Will attempt to assist. Pt willing to f/u with PM outpatient clinic. Larry Love  MSN, APRN-CNS, [facility] 6/20/2018 12:42 PM    Time in minutes spent: 25 min    More than 50% of this interaction was related to counseling or information given r/t disease process; plan of care; and code status. Thank you for allowing us to work with you in the care of this patient.       Patient assessment and plan discussed with Palliative Medicine Physician-Dr. Benson

## 2018-06-20 NOTE — CARE COORDINATION
Spoke with John adkins from [facility]. Patient can be admitted at 15:30 to bed number [id number]. Charge nurse Ibrahima Alatorre notified.      Quinton Guerrier.

## 2018-06-20 NOTE — PROGRESS NOTES
[facility]  Internal Medicine Residency Program  Progress Note  - House Team 2    Patient:  Eliu Blanc 72 y.o. female MRN: [id number]     Date of Service: 6/20/2018     CC: SOB    Subjective       Patient was seen and examined in AM. Patient appeared more alert and energetic. She still had diarrhea, but mentally she significantly improved. She was on 6 liters O2 this morning with SaO2 95%. Updated H&P:   Patient is 82 yo F with PMH of  COPD on 6 liters O2, tobacco abuse, malnutrition presented to ER with cc of SOB on 6/10/18. She was admitted for management of acute on chronic hypoxic hypercapnic respiratory failure due to COPD exacerbation. She was later found + for parainfluenza. She was managed by steroid, breathing treatment and Roflumilast. Pulmonologist was consulted. Given her proBNP > 5 k, ECHO was done, which showed normal hear function with EF 50-55%, without pulmonary hypertension. Dietician was consulted to manage her nutrition. She developed diarrhea during hospitalization, c diff was negative, fiber was given to stablize her bowel movement. She also had hypernatremia which was mostly likely due to dehydration, D5 1/2 NS was given to correct her sodium level. On hospital days 10, her mentation improved significantly, O2 was weaning down to 6 liter with SaO2 95%. She will be discharged to [facility]. Objective     Physical Exam:  · Vitals: BP (!) 99/58   Pulse 121   Temp 97.5 °F (36.4 °C) (Oral)   Resp 20   Ht 5' (1.524 m)   Wt 80 lb 7 oz (36.5 kg)   SpO2 95%   BMI 15.71 kg/m²     · I & O - 24hr: No intake/output data recorded. General Appearance:    Alert, cooperative, appears more awake, more interactive   HEENT:    NC/AT, no lesions, without obvious abnormality. Neck:   Supple, no adenopathy, no JVD   Resp:     Labored breathing, diminished breath sounds b/l   Heart:    RRR, S1 and S2 normal, no murmur, rub or gallop.     Abdomen:     Soft, non tender, BS + all four quadrants, no masses, no organomegaly   Extremities:   Atraumatic, no cyanosis or edema   Pulses:  Radial and pedal pulses are intact bilaterally   Neurologic:   AAOx3, No focal motor deficit       Pertinent Labs & Imaging Studies     CBC:   Recent Labs      06/19/18   0747  06/20/18   0512   WBC  25.6*  23.4*   HGB  15.1  13.6   HCT  46.0  40.9   MCV  92.4  91.9   PLT  423  369       BMP:    Recent Labs      06/19/18   0747  06/19/18   1703  06/20/18   0422   NA  151*  148*  149*   K  3.6  3.5  4.0   CL  107  108*  108*   CO2  29  30*  26   BUN  70*  50*  44*   CREATININE  0.6  0.5  0.5   GLUCOSE  146*  186*  144*     Fasting Lipid Panel:    Lab Results   Component Value Date    CHOL 231 10/11/2013    TRIG 86 10/11/2013    HDL 81.0 10/11/2013       Cardiac Enzymes:    Lab Results   Component Value Date    TROPONINI <0.01 06/07/2018    TROPONINI <0.01 06/05/2017       TSH:    Lab Results   Component Value Date    TSH 1.580 06/15/2018       Imaging studies:     Radiology  Date  Result    CXR  6/15  mild vascular congestion with interstitial edema with minimal infiltrates                           ECHO, 6/18/2018  Summary   Ejection fraction is visually estimated at 50-55%.   No regional wall motion abnormalities seen.   Normal right ventricle structure and function.   Physiologic and/or trace mitral regurgitation is present.   Physiologic and/or trace tricuspid regurgitation.     Resident's Assessment and Plan     Acute on chronic hypoxic respiratory failure  - Initially 2/2 COPD exacerbation due to parainfluenza infection, was improving and then worsening respiratory status, possibly 2/2 decompensated right HF from COPD  - CXR showed mild vascular congestion with interstitial edema with minimal infiltrates  - Elevated ProBNP 5000 from 270, ECHO was unremarkable with EF 50-55%   - Pt refused BiPAP, currently on 8 L, weaning down with goal of SaO2 88-92%  - Pulmonary following added diamox 250 mg q6hr    - Tapering steroid  - Cont roflumilast    Diarrhea  - C diff negative  - Monitor vital signs    Hypernatremia  -  Likely from dehydration  -  D5 1/2  cc/hr for 8 hrs, check BMP afterwards    Leukocytosis  -   procaclitonin negative, no signs of infection, no c diff, no fever  -   Monitor off antibiotics for now, possible from steroid    Tobacco abuse  - Nicotine patch    Odynophagia  - Likely from dry mouth but possible thrush from steroid use  - Empirical nystatin, EGD was done in the past, showed hiatal hernia    Malnutrition  - Likely COPD cachexia   - Protein supplementation  - Dietician following, recommend cont general diet  - Added appetite stimulant, dronabinol      PT/OT evaluation: DIMA  DVT prophylaxis/ GI prophylaxis: Lovenox/Protonix  Disposition: Cont current care    Patient is improving. Will check BMP this pm, if improves, patient will be discharged to [facility].      Amy Gil MD , PGY-1  Attending physician: Dr. Armando Irwin

## 2018-06-20 NOTE — PROGRESS NOTES
Physical Therapy  Treatment Note    Name: Raeann Curry  :   MRN: [id number]    Date of Service: 2018    Evaluating PT:  Елена Quinn, PT [id number]    Room #:  9652/6659-D  Diagnosis:  Acute respiratory failure  PMHx:        Diagnosis Date    Abnormal Pap smear     Patient states she had laser surgery    COPD (chronic obstructive pulmonary disease) (HCC)     Emphysema of lung ([address]     GERD (gastroesophageal reflux disease)     History of stomach ulcers     Hyperlipidemia     DIET    Osteoarthritis     Renal calculi     APX 40 YEARS AGO     Precautions:  Falls, O2, debilitated, skin integrity, contact isolation  Equipment Needs: To be determined    Pt lives with daughter in a townhouse style home with 4 stairs to enter and 1 rail. Bed is on 1 floor and bath is on 1 floor. Pt ambulated with Ind PTA no device. Equipment owned: none     Initial Evaluation  Date: 18 Treatment  Date: 18 Short Term/ Long Term   Goals   AM-PAC 6 Clicks 10/10 80/69    Was pt agreeable to Eval/treatment? yes Yes    Does pt have pain? no No c/o pain    Bed Mobility  Rolling: Min A  Supine to sit: Min A  Sit to supine: SBA  Scooting: Min A Rolling: Min A  Supine to sit: Mod A  Sit to supine: SBA  Scooting: Min A Rolling: Ind  Supine to sit: Ind  Sit to supine: Ind  Scooting: Ind   Transfers Sit to stand: Min A  Stand to sit: Min A  Stand pivot: NT NT, pt was unwilling citing fatigue Sit to stand: SBA  Stand to sit: SBA  Stand pivot: SBA   Ambulation    NT patient unwilling to attempt stating she was to fatigued.   NT 25 feet with  fww if needed SBA   Stair negotiation: ascended and descended  NT  NT 4 steps with Min A rail 1   ROM BUE:  See OT note  BLE:  WFL BLE: WNL    Strength BUE:  See OT note  BLE:  4-/5 BLE: 4-/5 4/5   Balance Sitting EOB:  SBA  Dynamic Standing:  Min A Sitting EOB: SBA  Dynamic standing: NT Sitting EOB:  Ind  Dynamic Standing:  SBA     Pt is alert and oriented x4    Pt performed

## 2018-06-22 LAB
EKG ATRIAL RATE: 97 BPM
EKG P AXIS: 80 DEGREES
EKG P-R INTERVAL: 134 MS
EKG Q-T INTERVAL: 346 MS
EKG QRS DURATION: 80 MS
EKG QTC CALCULATION (BAZETT): 434 MS
EKG R AXIS: -65 DEGREES
EKG T AXIS: 68 DEGREES
EKG VENTRICULAR RATE: 95 BPM

## 2018-06-27 ENCOUNTER — TELEPHONE (OUTPATIENT)
Dept: PALLATIVE CARE | Age: 73
End: 2018-06-27

## 2018-07-06 LAB
EKG ATRIAL RATE: 92 BPM
EKG P AXIS: 96 DEGREES
EKG P-R INTERVAL: 150 MS
EKG Q-T INTERVAL: 364 MS
EKG QRS DURATION: 80 MS
EKG QTC CALCULATION (BAZETT): 450 MS
EKG R AXIS: -55 DEGREES
EKG T AXIS: 78 DEGREES
EKG VENTRICULAR RATE: 92 BPM

## 2018-07-12 ENCOUNTER — HOSPITAL ENCOUNTER (INPATIENT)
Age: 73
LOS: 13 days | Discharge: HOME | DRG: 073 | End: 2018-07-25
Attending: PHYSICAL MEDICINE & REHABILITATION | Admitting: PHYSICAL MEDICINE & REHABILITATION
Payer: MEDICARE

## 2018-07-12 PROBLEM — G62.81 CRITICAL ILLNESS POLYNEUROPATHY (HCC): Status: ACTIVE | Noted: 2018-07-12

## 2018-07-12 PROCEDURE — 6370000000 HC RX 637 (ALT 250 FOR IP)

## 2018-07-12 PROCEDURE — 1280000000 HC REHAB R&B

## 2018-07-12 PROCEDURE — 6370000000 HC RX 637 (ALT 250 FOR IP): Performed by: PHYSICAL MEDICINE & REHABILITATION

## 2018-07-12 RX ORDER — GUAIFENESIN 400 MG/1
400 TABLET ORAL 3 TIMES DAILY
Status: DISCONTINUED | OUTPATIENT
Start: 2018-07-12 | End: 2018-07-12 | Stop reason: ALTCHOICE

## 2018-07-12 RX ORDER — ACETAMINOPHEN 325 MG/1
650 TABLET ORAL EVERY 6 HOURS PRN
Status: DISCONTINUED | OUTPATIENT
Start: 2018-07-12 | End: 2018-07-12 | Stop reason: SDUPTHER

## 2018-07-12 RX ORDER — GUAIFENESIN 400 MG/1
400 TABLET ORAL 2 TIMES DAILY
Status: DISCONTINUED | OUTPATIENT
Start: 2018-07-12 | End: 2018-07-25 | Stop reason: HOSPADM

## 2018-07-12 RX ORDER — PREDNISONE 20 MG/1
40 TABLET ORAL DAILY
Status: COMPLETED | OUTPATIENT
Start: 2018-07-15 | End: 2018-07-17

## 2018-07-12 RX ORDER — SENNA PLUS 8.6 MG/1
1 TABLET ORAL 2 TIMES DAILY
Status: DISCONTINUED | OUTPATIENT
Start: 2018-07-12 | End: 2018-07-16

## 2018-07-12 RX ORDER — PETROLATUM 42 G/100G
OINTMENT TOPICAL 2 TIMES DAILY
Status: DISCONTINUED | OUTPATIENT
Start: 2018-07-12 | End: 2018-07-25 | Stop reason: HOSPADM

## 2018-07-12 RX ORDER — PREDNISONE 20 MG/1
20 TABLET ORAL DAILY
Status: COMPLETED | OUTPATIENT
Start: 2018-07-21 | End: 2018-07-23

## 2018-07-12 RX ORDER — PREDNISONE 10 MG/1
10 TABLET ORAL DAILY
Status: DISCONTINUED | OUTPATIENT
Start: 2018-07-24 | End: 2018-07-25 | Stop reason: HOSPADM

## 2018-07-12 RX ORDER — PANTOPRAZOLE SODIUM 40 MG/1
40 TABLET, DELAYED RELEASE ORAL
Status: DISCONTINUED | OUTPATIENT
Start: 2018-07-13 | End: 2018-07-25 | Stop reason: HOSPADM

## 2018-07-12 RX ORDER — ACETAMINOPHEN 325 MG/1
650 TABLET ORAL EVERY 4 HOURS PRN
Status: DISCONTINUED | OUTPATIENT
Start: 2018-07-12 | End: 2018-07-25 | Stop reason: HOSPADM

## 2018-07-12 RX ORDER — NICOTINE 21 MG/24HR
1 PATCH, TRANSDERMAL 24 HOURS TRANSDERMAL DAILY
Status: DISCONTINUED | OUTPATIENT
Start: 2018-07-12 | End: 2018-07-25 | Stop reason: HOSPADM

## 2018-07-12 RX ORDER — KETOTIFEN FUMARATE 0.35 MG/ML
1 SOLUTION/ DROPS OPHTHALMIC 2 TIMES DAILY PRN
Status: DISCONTINUED | OUTPATIENT
Start: 2018-07-12 | End: 2018-07-25 | Stop reason: HOSPADM

## 2018-07-12 RX ORDER — PREDNISONE 20 MG/1
60 TABLET ORAL DAILY
Status: COMPLETED | OUTPATIENT
Start: 2018-07-12 | End: 2018-07-14

## 2018-07-12 RX ORDER — DRONABINOL 2.5 MG/1
2.5 CAPSULE ORAL 2 TIMES DAILY WITH MEALS
Status: DISCONTINUED | OUTPATIENT
Start: 2018-07-12 | End: 2018-07-25 | Stop reason: HOSPADM

## 2018-07-12 RX ADMIN — GUAIFENESIN 400 MG: 400 TABLET ORAL at 20:52

## 2018-07-12 RX ADMIN — PETROLATUM: 42 OINTMENT TOPICAL at 20:54

## 2018-07-12 RX ADMIN — PREDNISONE 60 MG: 20 TABLET ORAL at 19:29

## 2018-07-12 RX ADMIN — MOMETASONE FUROATE AND FORMOTEROL FUMARATE DIHYDRATE 2 PUFF: 200; 5 AEROSOL RESPIRATORY (INHALATION) at 20:51

## 2018-07-12 ASSESSMENT — PAIN SCALES - GENERAL
PAINLEVEL_OUTOF10: 0

## 2018-07-12 NOTE — LETTER
DATE: 7/18/2018        Art Xiao available in your area  [address] [facility]  [place], [address]  [phone]  Activities include: ceramics, aerobics, walkers club, and fitness center. [address]  [place], [address] [place] [address] Rd  [phone] opportunities, programs and prescription assistance   [facility]  [address], [facility] [address]  [phone], line dancing, chair yoga,   dance exercise classes, painting, gardening groups and more. [facility]  [address] [facility]  [facility], [place]  [phone]  Socialization opportunities, exercise and wellness classes, crafts, and computer classes. [facility]  [address], [address]  [phone]  Senior group meets on Mondays and Wednesdays from [facility]. Activities include: discussions, crafts, guest speakers, cards and films. [address] [place] [address]  [facility], [place]  [phone]  Activities such as cards, bowling and occasional bus trips. [facility]  [address] [facility], [address]  [phone]  Weekly programs including painting, dancing, exercise, computer classes,   crafts and theater. [address] [facility] 30 [facility],[address], [address]  [phone]  Meetings 9AM-2PM on Thursdays to socialize and play cards. [facility]  [address] [facility]  [phone]  Exercise equipment, water exercise classes,  indoor and outdoor pool. [employer] [facility]  [address] [facility] [address]  [phone]  Activities include: bus trips, fitness programs, arts, crafts, dinners and card games. [facility], [address]  [address]  [place], [address]  [phone] Water exercise programs, different exercise equipment, indoor pool. [address] at the . [address] [facility]. [address]  [phone]  Free health screenings, health education, health information on community resources, fitness classes & fitness center. [address] [facility]  [address]  [phone]  8AM-4PM daily with noon meal.  Activities include arts, crafts, knitting and yann [facility]  [address]  [phone]  Low level fitness classes and warm water arthritis classes. [facility]  [address], [address] [facility]  [phone]  Group meets at 12PM on Mondays, Wednesdays and Fridays. Activities include bingo, lunch and special programs.

## 2018-07-13 LAB
ANION GAP SERPL CALCULATED.3IONS-SCNC: 12 MMOL/L (ref 7–16)
BASOPHILS ABSOLUTE: 0.01 E9/L (ref 0–0.2)
BASOPHILS RELATIVE PERCENT: 0.1 % (ref 0–2)
BUN BLDV-MCNC: 21 MG/DL (ref 8–23)
CALCIUM SERPL-MCNC: 8.6 MG/DL (ref 8.6–10.2)
CHLORIDE BLD-SCNC: 99 MMOL/L (ref 98–107)
CO2: 28 MMOL/L (ref 22–29)
CREAT SERPL-MCNC: 0.4 MG/DL (ref 0.5–1)
EOSINOPHILS ABSOLUTE: 0 E9/L (ref 0.05–0.5)
EOSINOPHILS RELATIVE PERCENT: 0 % (ref 0–6)
GFR AFRICAN AMERICAN: >60
GFR NON-AFRICAN AMERICAN: >60 ML/MIN/1.73
GLUCOSE BLD-MCNC: 124 MG/DL (ref 74–109)
HCT VFR BLD CALC: 29.8 % (ref 34–48)
HEMOGLOBIN: 9.9 G/DL (ref 11.5–15.5)
IMMATURE GRANULOCYTES #: 0.11 E9/L
IMMATURE GRANULOCYTES %: 1.1 % (ref 0–5)
LYMPHOCYTES ABSOLUTE: 0.93 E9/L (ref 1.5–4)
LYMPHOCYTES RELATIVE PERCENT: 9.5 % (ref 20–42)
MCH RBC QN AUTO: 31.1 PG (ref 26–35)
MCHC RBC AUTO-ENTMCNC: 33.2 % (ref 32–34.5)
MCV RBC AUTO: 93.7 FL (ref 80–99.9)
MONOCYTES ABSOLUTE: 0.33 E9/L (ref 0.1–0.95)
MONOCYTES RELATIVE PERCENT: 3.4 % (ref 2–12)
NEUTROPHILS ABSOLUTE: 8.43 E9/L (ref 1.8–7.3)
NEUTROPHILS RELATIVE PERCENT: 85.9 % (ref 43–80)
PDW BLD-RTO: 14.4 FL (ref 11.5–15)
PLATELET # BLD: 341 E9/L (ref 130–450)
PMV BLD AUTO: 8.7 FL (ref 7–12)
POTASSIUM REFLEX MAGNESIUM: 4.9 MMOL/L (ref 3.5–5)
RBC # BLD: 3.18 E12/L (ref 3.5–5.5)
SODIUM BLD-SCNC: 139 MMOL/L (ref 132–146)
WBC # BLD: 9.8 E9/L (ref 4.5–11.5)

## 2018-07-13 PROCEDURE — 97166 OT EVAL MOD COMPLEX 45 MIN: CPT

## 2018-07-13 PROCEDURE — 92523 SPEECH SOUND LANG COMPREHEN: CPT

## 2018-07-13 PROCEDURE — 97162 PT EVAL MOD COMPLEX 30 MIN: CPT

## 2018-07-13 PROCEDURE — 97530 THERAPEUTIC ACTIVITIES: CPT

## 2018-07-13 PROCEDURE — 2700000000 HC OXYGEN THERAPY PER DAY

## 2018-07-13 PROCEDURE — 85025 COMPLETE CBC W/AUTO DIFF WBC: CPT

## 2018-07-13 PROCEDURE — 97110 THERAPEUTIC EXERCISES: CPT

## 2018-07-13 PROCEDURE — 6370000000 HC RX 637 (ALT 250 FOR IP): Performed by: PHYSICAL MEDICINE & REHABILITATION

## 2018-07-13 PROCEDURE — 97535 SELF CARE MNGMENT TRAINING: CPT

## 2018-07-13 PROCEDURE — 97112 NEUROMUSCULAR REEDUCATION: CPT

## 2018-07-13 PROCEDURE — 1280000000 HC REHAB R&B

## 2018-07-13 PROCEDURE — 80048 BASIC METABOLIC PNL TOTAL CA: CPT

## 2018-07-13 PROCEDURE — 36415 COLL VENOUS BLD VENIPUNCTURE: CPT

## 2018-07-13 RX ADMIN — GUAIFENESIN 400 MG: 400 TABLET ORAL at 21:48

## 2018-07-13 RX ADMIN — ACETAMINOPHEN 650 MG: 325 TABLET, FILM COATED ORAL at 21:48

## 2018-07-13 RX ADMIN — PREDNISONE 60 MG: 20 TABLET ORAL at 08:19

## 2018-07-13 RX ADMIN — MOMETASONE FUROATE AND FORMOTEROL FUMARATE DIHYDRATE 2 PUFF: 200; 5 AEROSOL RESPIRATORY (INHALATION) at 08:20

## 2018-07-13 RX ADMIN — PETROLATUM: 42 OINTMENT TOPICAL at 08:30

## 2018-07-13 RX ADMIN — MOMETASONE FUROATE AND FORMOTEROL FUMARATE DIHYDRATE 2 PUFF: 200; 5 AEROSOL RESPIRATORY (INHALATION) at 21:49

## 2018-07-13 RX ADMIN — GUAIFENESIN 400 MG: 400 TABLET ORAL at 08:19

## 2018-07-13 RX ADMIN — PANTOPRAZOLE SODIUM 40 MG: 40 TABLET, DELAYED RELEASE ORAL at 06:59

## 2018-07-13 RX ADMIN — PETROLATUM: 42 OINTMENT TOPICAL at 21:00

## 2018-07-13 ASSESSMENT — PAIN SCALES - GENERAL
PAINLEVEL_OUTOF10: 5
PAINLEVEL_OUTOF10: 0
PAINLEVEL_OUTOF10: 0

## 2018-07-13 ASSESSMENT — PAIN DESCRIPTION - PAIN TYPE: TYPE: CHRONIC PAIN

## 2018-07-13 ASSESSMENT — PAIN DESCRIPTION - LOCATION: LOCATION: HEAD;FACE

## 2018-07-13 ASSESSMENT — PAIN DESCRIPTION - FREQUENCY: FREQUENCY: INTERMITTENT

## 2018-07-13 ASSESSMENT — PAIN DESCRIPTION - DESCRIPTORS: DESCRIPTORS: ACHING;DULL;HEADACHE;DISCOMFORT

## 2018-07-13 ASSESSMENT — PAIN DESCRIPTION - ORIENTATION: ORIENTATION: ANTERIOR

## 2018-07-13 NOTE — PROGRESS NOTES
Occupational Therapy   Occupational Therapy Initial Assessment  Date: 2018   Patient Name: Alba Ma  MRN: [id number]     : 1945    Date of Service: 2018    Discharge Recommendations:  Home with assist PRN         Patient Diagnosis(es): critical illness polyneuropathy    has a past medical history of Abnormal Pap smear; COPD (chronic obstructive pulmonary disease) ([facility] Emphysema of lung ([facility] Emphysema/COPD ([facility] GERD (gastroesophageal reflux disease); History of stomach ulcers; Hyperlipidemia; Osteoarthritis; and Renal calculi.   has a past surgical history that includes Endometrial biopsy; Tonsillectomy; Upper gastrointestinal endoscopy (10/17/2017); Colonoscopy (10/17/2017); Appendectomy; Cataract removal with implant (Bilateral, dec 2017, 2018); and Wrist surgery (Left, ).            Restrictions  Restrictions/Precautions  Restrictions/Precautions: Fall Risk    Subjective   General  Chart Reviewed: Yes  Family / Caregiver Present: No  Referring Practitioner: Dr Julius Coleman  Diagnosis: critical illness polyneuropathy   Pain Assessment  Patient Currently in Pain: Denies  Pain Assessment: Respiratory Rate >10  Pain Level: 0     Social/Functional History  Social/Functional History  Lives With: Family (Dtr & 2 grandkids (25 & 23 yrs old))  Type of Home: House  Home Layout: Two level, Laundry in basement, Bed/Bath upstairs (pt reports family will do laundry )  Home Access: Stairs to enter with rails  Bathroom Shower/Tub: Tub/Shower unit (pt reports she takes tub baths )  Bathroom Toilet: Standard  ADL Assistance: Independent  Homemaking Assistance: Independent  Ambulation Assistance: Independent  Transfer Assistance: Independent  Active : Yes  Mode of Transportation: Car  Occupation: Retired  Additional Comments: pt reports her dtr had been assisting another son with his young children; plan at discharge is for dtr to stay home and care for pt as needed        Objective   Vision: [address], [place], [address]

## 2018-07-13 NOTE — PROGRESS NOTES
Physical Therapy    Facility/Department: [address] REHAB  Treatment Note     NAME: Dennis Martinez  : 1945  MRN: [id number]    Date of Service: 2018    Evaluating Therapist: Quincy Samuels DPT    ROOM: [id number]-  DIAGNOSIS: Critical Illness Polyneuropathy   PMH: To ED on 7/3/18 for SOB. Dx with Acute COPD exacerbation. Respiratory panel positive for parainfluenza virus. PMH includes COPD, GERD, Viral conjunctivitis, hx. Stomach ulcers, OA, HLD, renal calculi, protein caloric malnutrition, dysphagia  PRECAUTIONS: falls, monitor o2,      Pt lives with daughter in a townhouse style home with 4 stairs to enter and 1 rail (per chart). Per pt, 6 steps rose mary HR in back vs 1 step No HR in front to enter. 12 steps 1 HR to second floor however  Bed is on 1 floor and bath is on 1 floor if needed. Pt ambulated with Ind PTA no device. Equipment owned: none. Home o2 3 Liters HS. Initial Evaluation  18 AM      PM Short Term Goals Long Term Goals    Was pt agreeable to Eval/treatment? yes See eval Yes      Does pt have pain?  No pain  None      Bed Mobility  Rolling: sba  Supine to sit: sba  Sit to supine: sba  Scooting: sba  Rolling SBA  Sit to supine SBA  Supine to sit SBA    Scooting to edge of bed SBA sup Independent      Transfers Sit to stand: cgA  Stand to sit: cgA  Stand pivot: Nadir with ww  Sit to stand CGA  Stand to sit CGA sba Mod I with AAD vs no AD   Ambulation    75 feet with sba with ww  75 feet x2 with ww with  feet with sup with AAD >300 feet with mod I with  AAD vs no AD   Walking 10 feet on uneven surface 10' feet with ww with sba       Wheel Chair Mobility NA       Car Transfers Nadir   sup Mod I   Stair negotiation: ascended and descended  4 steps with rose mary rail with Nadir   8 steps with one rail with sba >12 steps with one rail with mod I   Curb Step:   ascended and descended 7.5 inch step with ww and Nadir   7.5 inch step with AAD and sba 7.5 inch step with AAD vs NO AD and mod I   Picking up

## 2018-07-13 NOTE — PLAN OF CARE
Problem: Nutrition  Goal: Optimal nutrition therapy  Outcome: Ongoing  Nutrition Problem: Severe malnutrition, in context of chronic illness  Intervention: Food and/or Nutrient Delivery: Continue current diet, Start ONS  Nutritional Goals: consume 50% or more of most meals/ONS

## 2018-07-13 NOTE — PROGRESS NOTES
Speech Language Pathology   ASSESSMENT OF SPEECH, LANGUAGE, & COGNITION    [x] The admitting diagnosis and active problem list as listed below have been reviewed prior to the initiation of this evaluation. Critical illness polyneuropathy ([facility] [G62.81]     Patient Active Problem List   Diagnosis    Acute respiratory failure with hypoxia (HCC)    Acute exacerbation of chronic obstructive pulmonary disease (COPD) ([facility]    Dyslipidemia, goal LDL below 130    Heartburn    Dysphagia    At risk for colon cancer    Parainfluenza    [facility]-cal[facility]    Encounter for palliative care    Critical illness polyneuropathy ([facility]       SPEECH PATHOLOGY DIAGNOSIS:    Functional cognitive linguistic skills for all assessed areas. Self-awareness of cognitive linguistic deficits:   [] No awareness   [] Limited awareness (minimal appreciation without specificity)   [] Situational awareness (recognition of problem in context, in real time)   [x] Predictive awareness (able to predict problem, impact of implications)    THERAPY RECOMMENDATIONS:     Speech Pathology intervention is not recommended at this time.                      OBJECTIVE ASSESSMENT:     Mental status:      Alert          x Responsive          x Cooperative          x   Uncooperative               Aphasic               Confused        Impulsive       Unresponsive              EVALUATION  RESULTS SHORT TERM   FIM GOAL LONG TERM   FIM GOAL     RECEPTIVE   LANGUAGE     AUDITORY/SPOKEN LANGUAGE COMPREHENSION   INTACT       INDEPENDENT     Reliable response to   yes/no questions  Abstract in nature      Accurate response to   Wh- questions Complex queries      Follow commands   3 step      Single object identification   Intact      Understand conversational speech Yes      WRITTEN COMPREHENSION   DNT      Identify simple written symbols/letters       Understand written   language       Functional reading             EXPRESSIVE

## 2018-07-13 NOTE — H&P
oriented. Sensation is intact. NEUROMUSCULAR:  4/5 strength throughout. PROBLEM LIST:  1. Critical illness polyneuropathy. 2.  Pulmonary debility. 3.  Severe COPD. 4.  Nicotine addiction. 5.  Malnutrition. Now the individualized overall plan of care, I think the patient is a good  candidate for further rehab. She should be able to progress well with her  functioning. Reconsult Dr. Valdo Hoang and Dr. Syeda Hernández to follow her. She is  a little bit anxious. There may be some component of steroid myopathy as  well, but we were on a gradual wean of the steroids. PT will be working on ambulation transfers and advanced transfers an hour  and a half, five to seven days a week with goals of modified independent. OT will be working on upper extremity strengthening, functioning, ADL  skills and basic homemaking an hour and a half, five to seven days a week  with goals of modified independent. She will have 24-hour-a-day,  seven-day-a-week rehab nursing to address bowel and bladder issues,  carryover from therapy and safety. Overall length of stay will probably be about two weeks with the patient  returning home at the end of that time.         Britany Madrigal MD    D: 07/13/2018 8:17:09       T: 07/13/2018 8:18:05     [email or web address]  Job#: [id number]     Doc#: [id number]    CC:

## 2018-07-13 NOTE — PROGRESS NOTES
Date Family Teach Completed TBA       Is additional Family Teaching Needed? Y or N Y       Hindering Progress endurance       PT recommended ELOS 10-14 days       Team's Discharge Plan        Therapist at Team Meeting          Pt is alert and Oriented x4  Sensation: Intact to LT, however reporting tingling/numbness to bilateral feet distally  Edema: none noted  Coordination: Intact. Slight tremor noted which pt reports is baseline. Endurance: limited, 3 rest breaks during mobility for pursed lip breathing  Posture: kyphotic    Skin was inspected: bruising bilateral UE noted. Chair alarm: n/a     ASSESSMENT  Pt displays functional ability as noted in the objective portion of this evaluation. Comments:  Pt supine in bed, agreeable to session. Pt pleasant and cooperative throughout session. Pt reporting fatigue from not sleeping and prolonged bedrest during hospitalization. SpO2 on 2 liters at rest 93 percent HR 88 with /40. No complaints of lightheadedness throughout session. During mobility, pt requiring rest breaks for pursed lip breathing and reporting SOB at times. Pt initially requiring Nadir for sts transfers with cues for hand placement, however after initial cueing, pt carryover hand placement and progressing to cg/sba. Pt ambulated with forward flexed posture and NBOS using ww for energy conservation. During ambulation, pt requiring 3 rest breaks for pursed lip breathing. SpO2 on 2 liters with ambulation 95 percent HR 91 with c/o SOB. During stair negotiation, recip pattern using bilateral HR with SpO2 91 percent HR 92 on 2 liters. Pt very motivated to progress with Independent level and return home with family. Pt presents with decreased LE strength limiting transfers, requiring increased assistance for transfers/ambulation/stair negotiation, and deficits with balance requiring ww for balance and energy conservation at this time.  Pt will benefit from skilled PT to increase above deficits and progress to LRAD vs NO AD pending improvement and return home at mod I/Independent level with family. Patient education  Pt educated on role of PT, hand placement during transfers, pursed lip breathing, call bell, safety with mobility     Patient response to education:   Pt verbalized understanding Pt demonstrated skill Pt requires further education in this area   yes yes Reinforcement      Rehab potential is Good for reaching above PT goals. Pts/ family goals   1. To get stronger and back to Independent     Patient and or family understand(s) diagnosis, prognosis, and plan of care. PLAN  PT care will be provided in accordance with the objectives noted above. Whenever appropriate, clear delegation orders will be provided for nursing staff. Exercises and functional mobility practice will be used as well as appropriate assistive devices or modalities to obtain goals. Patient and family education will also be administered as needed. Frequency of treatments will be 2x/day M-F and 1x/day Sat or Sun x  10-14 days.     Time in: 0830  Time out: [address], DPJUAN J   [id number]

## 2018-07-13 NOTE — CARE COORDINATION
Social Work Assessment Summary    PCP: Dr. Drew Barron     Patient Resides: Pt lives with Summer Urena, daughter (39), 2 grandchildren ages 25 and 23. Pt has 4 cats    Home Architecture : Pt lives in a [address] [place] [address] [place]. The main front entrance has 1 step, 0 rails. The main back entrance has 6-7 steps and 2 rails. 2nd floor has 1 + full flight, 1 rail. Basement has full flight and 1 rail. Bedroom/bath is on 2nd floor with a tub/shower with doors. Will you return to your own home? yes        Primary Caregiver: Summer Urena, daughter (39) does not work outside of the home. Gee is both healthy and drives. dileep Baptitse (25) work at [employer] full time. He is both healthy and drives. Level of Function PTA : Pt was independent in all areas before admission. Employment: None, homemaker    Receives SSD No Reason: n/a     DME Pta  : Alvemela Hartman provides Oxygen Liter Flow. tyrone Flynn is unsure of liter flow but \"think it's 4-5. \"     Community Agency Involvement PTA : None    Do they have a medical profile: No    Family Teaching: TBS    Strength: Family    Preference: \"Get strong so I can go home and take care of myself. \"      NAME RELATION HOME # WORK # CELL #   Summer Urena Daughter [phone]     Pam solanoson   [phone]            Height: 5'  Weight: 82 lbs    Laura Barnhart  7/13/2018

## 2018-07-13 NOTE — PROGRESS NOTES
Nutrition Therapies:  · Oral Diet Orders: General   · Oral Diet intake: 26-50% (per patient report- no intakes documented this admit d/t recent transfer to unit )  · Oral Nutrition Supplement (ONS) Orders: None  · Anthropometric Measures:  · Ht: 5' (152.4 cm)   · Current Body Wt: 82 lb (37.2 kg) (bedscale 7/12 )  · Admission Body Wt: 82 lb (37.2 kg) (7/12 first actual taken )  · Usual Body Wt: 93 lb (42.2 kg) (4/3/2018 @ OV per EMR )  · % Weight Change: 11.8% x 3 months per EMR ,     · Ideal Body Wt: 100 lb (45.4 kg), % Ideal Body 82%   · BMI Classification: BMI <18.5 Underweight  · Comparative Standards (Estimated Nutrition Needs):  · Estimated Daily Total Kcal: 6641-0004 (846 x 1.3 )  · Estimated Daily Protein (g): 55-65    Estimated Intake vs Estimated Needs: Intake Less Than Needs    Nutrition Risk Level: High    Nutrition Interventions:   Continue current diet, Start ONS  Education Completed, Continued Inpatient Monitoring, Coordination of Care (reviewed importance of protein needs for healing, weight gain and ONS options )    Nutrition Evaluation:   · Evaluation: Goals set   · Goals: consume 50% or more of most meals/ONS     · Monitoring: Meal Intake, Supplement Intake, Diet Tolerance, Skin Integrity, Fluid Balance, Weight, Comparative Standards, Pertinent Labs, Chewing/Swallowing, Patient/Family Education    See Adult Nutrition Doc Flowsheet for more detail.      Electronically signed by Kavin Simmonds, RD, JOYCELYN on 7/13/18 at 12:05 PM    Contact Number: [phone]

## 2018-07-14 PROCEDURE — 97530 THERAPEUTIC ACTIVITIES: CPT

## 2018-07-14 PROCEDURE — 1280000000 HC REHAB R&B

## 2018-07-14 PROCEDURE — 97535 SELF CARE MNGMENT TRAINING: CPT

## 2018-07-14 PROCEDURE — 6370000000 HC RX 637 (ALT 250 FOR IP): Performed by: PHYSICAL MEDICINE & REHABILITATION

## 2018-07-14 PROCEDURE — 2700000000 HC OXYGEN THERAPY PER DAY

## 2018-07-14 RX ADMIN — GUAIFENESIN 400 MG: 400 TABLET ORAL at 22:09

## 2018-07-14 RX ADMIN — SENNOSIDES 8.6 MG: 8.6 TABLET, FILM COATED ORAL at 22:12

## 2018-07-14 RX ADMIN — MOMETASONE FUROATE AND FORMOTEROL FUMARATE DIHYDRATE 2 PUFF: 200; 5 AEROSOL RESPIRATORY (INHALATION) at 08:57

## 2018-07-14 RX ADMIN — MOMETASONE FUROATE AND FORMOTEROL FUMARATE DIHYDRATE 2 PUFF: 200; 5 AEROSOL RESPIRATORY (INHALATION) at 22:10

## 2018-07-14 RX ADMIN — PETROLATUM: 42 OINTMENT TOPICAL at 09:00

## 2018-07-14 RX ADMIN — PANTOPRAZOLE SODIUM 40 MG: 40 TABLET, DELAYED RELEASE ORAL at 06:45

## 2018-07-14 RX ADMIN — PETROLATUM: 42 OINTMENT TOPICAL at 22:11

## 2018-07-14 RX ADMIN — GUAIFENESIN 400 MG: 400 TABLET ORAL at 08:58

## 2018-07-14 RX ADMIN — PREDNISONE 60 MG: 20 TABLET ORAL at 08:58

## 2018-07-14 NOTE — PROGRESS NOTES
Occupational Therapy  OCCUPATIONAL THERAPY DAILY NOTE    Date:2018  Patient Name: Paco Avalos  MRN: [id number]  : 1945  Room: [address] [place] [address]-A     Patient Active Problem List   Diagnosis    Acute respiratory failure with hypoxia (HCC)    [facility] exacerbation of [facility] pulmonary disease (COPD) [facility] [address] [facility]    Dyslipidemia, goal LDL below 130    Heartburn    Dysphagia    At risk for colon cancer    Parainfluenza    Severe protein-calorie malnutrition ([facility] [address] [facility]    Encounter for palliative care    Critical illness polyneuropathy ([facility] [address]       Precautions: falls    Functional Assessment:   Date Status AE  Comments   Feeding 18  indep     Grooming 18 Supervision      Bathing 18 Minimal Assist      UB Dressing 18 Supervision      LB Dressing 18 SBA  To don pants while seated EOB   Homemaking 18         Functional Transfers / Balance:   Date Status DME  Comments   Sit Balance 18 Supervision   EOB, extended tub bench   Stand Balance 18 SBA  w/w   [x] Tub  [] Shower   Transfer 18  CGA Grab bar, shower chair    Commode   Transfer 18 Min A Grab bar    Functional   Mobility 18 SBA w/w In therapy apartment including over carpet. Other: bed    Recliners and sofa 18  SBA    SBA  Supine to sit <>/  SBA     Functional Exercises / Activity:       Sensory / Neuromuscular Re-Education:      Cognitive Skills:   Status Comments   Problem   Solving fair     Memory fair     Sequencing fair     Safety fair       Visual Perception:    Education:    [] Family teach completed on:    Pain Level: 0/10    Additional Notes: Pt's O2 remained over 94% throughout session on 2L. HR ranged from 76 to 138 with min activity, BP 93/62      Patient has made fair  progress during treatment sessions toward set goals. Therapy emphasis to obtain goals:to increase indep and safety to return home with her daughter and family. [x] Continue with current OT Plan of care.   []

## 2018-07-15 PROCEDURE — 6370000000 HC RX 637 (ALT 250 FOR IP): Performed by: PHYSICAL MEDICINE & REHABILITATION

## 2018-07-15 PROCEDURE — 97530 THERAPEUTIC ACTIVITIES: CPT

## 2018-07-15 PROCEDURE — 6360000002 HC RX W HCPCS: Performed by: PHYSICAL MEDICINE & REHABILITATION

## 2018-07-15 PROCEDURE — 2700000000 HC OXYGEN THERAPY PER DAY

## 2018-07-15 PROCEDURE — 1280000000 HC REHAB R&B

## 2018-07-15 RX ADMIN — DRONABINOL 2.5 MG: 2.5 CAPSULE ORAL at 16:45

## 2018-07-15 RX ADMIN — SENNOSIDES 8.6 MG: 8.6 TABLET, FILM COATED ORAL at 21:18

## 2018-07-15 RX ADMIN — PREDNISONE 40 MG: 20 TABLET ORAL at 10:21

## 2018-07-15 RX ADMIN — PETROLATUM: 42 OINTMENT TOPICAL at 10:23

## 2018-07-15 RX ADMIN — GUAIFENESIN 400 MG: 400 TABLET ORAL at 10:21

## 2018-07-15 RX ADMIN — PETROLATUM: 42 OINTMENT TOPICAL at 21:18

## 2018-07-15 RX ADMIN — MOMETASONE FUROATE AND FORMOTEROL FUMARATE DIHYDRATE 2 PUFF: 200; 5 AEROSOL RESPIRATORY (INHALATION) at 10:22

## 2018-07-15 RX ADMIN — PANTOPRAZOLE SODIUM 40 MG: 40 TABLET, DELAYED RELEASE ORAL at 06:08

## 2018-07-15 RX ADMIN — GUAIFENESIN 400 MG: 400 TABLET ORAL at 21:18

## 2018-07-15 RX ADMIN — MOMETASONE FUROATE AND FORMOTEROL FUMARATE DIHYDRATE 2 PUFF: 200; 5 AEROSOL RESPIRATORY (INHALATION) at 21:18

## 2018-07-15 RX ADMIN — SENNOSIDES 8.6 MG: 8.6 TABLET, FILM COATED ORAL at 10:21

## 2018-07-15 ASSESSMENT — PAIN SCALES - GENERAL: PAINLEVEL_OUTOF10: 0

## 2018-07-15 NOTE — PROGRESS NOTES
inch step with ww and Nadir NT    7.5 inch step with AAD and sba 7.5 inch step with AAD vs NO AD and mod I   Picking up object off the floor NT (fearful of losing balance) NT          BLE ROM WNL           BLE Strength 3+/5 bilateral LE           Balance  Sitting: sup  Standing: Nadir with ww           Date Family Teach Completed TBA           Is additional Family Teaching Needed? Y or N Y           Hindering Progress endurance           PT recommended ELOS 10-14 days           Team's Discharge Plan             Therapist at Team Meeting                 Additional Comments: pt mildly unsteady w/ pivot transfers this am, cued for safety. Pt rested frequently d/t increased SOB w / exertion. Pt on 2 L/min O2, SPO2 fluctuated between 88-93 % during session. Pt is aware of proper breathing technique and performs as needed. Time in: 855  Time out: 925      Continue with physical therapy current plan of care.     Joshua Hard PTA [id number]

## 2018-07-16 PROCEDURE — 97535 SELF CARE MNGMENT TRAINING: CPT

## 2018-07-16 PROCEDURE — 97530 THERAPEUTIC ACTIVITIES: CPT

## 2018-07-16 PROCEDURE — 6360000002 HC RX W HCPCS: Performed by: PHYSICAL MEDICINE & REHABILITATION

## 2018-07-16 PROCEDURE — 6370000000 HC RX 637 (ALT 250 FOR IP): Performed by: PHYSICAL MEDICINE & REHABILITATION

## 2018-07-16 PROCEDURE — 1280000000 HC REHAB R&B

## 2018-07-16 PROCEDURE — 97110 THERAPEUTIC EXERCISES: CPT

## 2018-07-16 RX ADMIN — PANTOPRAZOLE SODIUM 40 MG: 40 TABLET, DELAYED RELEASE ORAL at 06:42

## 2018-07-16 RX ADMIN — DRONABINOL 2.5 MG: 2.5 CAPSULE ORAL at 08:36

## 2018-07-16 RX ADMIN — MOMETASONE FUROATE AND FORMOTEROL FUMARATE DIHYDRATE 2 PUFF: 200; 5 AEROSOL RESPIRATORY (INHALATION) at 22:07

## 2018-07-16 RX ADMIN — GUAIFENESIN 400 MG: 400 TABLET ORAL at 08:30

## 2018-07-16 RX ADMIN — GUAIFENESIN 400 MG: 400 TABLET ORAL at 22:07

## 2018-07-16 RX ADMIN — PREDNISONE 40 MG: 20 TABLET ORAL at 08:30

## 2018-07-16 RX ADMIN — MOMETASONE FUROATE AND FORMOTEROL FUMARATE DIHYDRATE 2 PUFF: 200; 5 AEROSOL RESPIRATORY (INHALATION) at 08:30

## 2018-07-16 RX ADMIN — PETROLATUM: 42 OINTMENT TOPICAL at 22:07

## 2018-07-16 RX ADMIN — DRONABINOL 2.5 MG: 2.5 CAPSULE ORAL at 17:24

## 2018-07-16 ASSESSMENT — PAIN SCALES - GENERAL
PAINLEVEL_OUTOF10: 0
PAINLEVEL_OUTOF10: 0

## 2018-07-16 NOTE — PATIENT CARE CONFERENCE
[address] [place] [address]  ACUTE REHABILITATION  TEAM CONFERENCE NOTE/PATIENT PLAN OF CARE    Date: 2018  Admission date: 2018  Patient Name: Gualberto Orellana        MRN: [id number]    : 1945  (77 y.o.)  Gender: female   Rehab diagnosis/surgery with date:  Critical illness polyneuropathy  Impairment Group Code:  3.3      MEDICAL/FUNCTIONAL HISTORY/STATUS:  Admitted from [facility] following a bout of respiratory failure with parainfluenza infection. She had been on the ventilator. Getting strength and endurance back. Consultations/Labs/X-rays: 18 H&H 9.9/29.8      MEDICATION UPDATE:  Deltasone continues to be decreased in steps, now at 40mg daily.       NURSING FIMS:    Bowel:   Current level: Supervision   Short term bowel goal:  Independent   Long term bowel goal: Independent     Bladder:   Current level: Modified Independent   Short term bladder goal: Modified Independent   Long term bladder goal: Independent     Toilet Hygiene:   Current level : Supervision   Short term Toilet hygiene goal: Modified Independent   Long term toilet hygiene goal:  Modified Independent     Skin integrity: bruising on abomen, coccyx redness, scab on nose  Pain: head and facial pain ranges 5-0, tylenol    NUTRITION    Diet  General diet taking % of trays,   Liquid consistency   thin    SOCIAL INFORMATION:  Lives with: daughter, Zackary Black and 2 teenaged grandchildren, 4 cats  Prior community services:  none  Home Architecture:  2 story townhouse with 1 entry step and no rails, bed and bath 2nd floor full flight  Prior Level of function:  independent  DME:  O2 provided by Virgilio Mcburney / PATIENT EDUCATION:   To be scheduled    PHYSICAL THERAPY    Bed mobility:   Current level: Stand by Assist   Short term bed mobility goal: Supervision   Long term bed mobility goal: Independent     Chair/bed transfers:  Current level: Stand by Assist   Short term Chair/bed transfers goal: Stand by Assist Long term Chair/bed transfers goal: Modified Independent       Ambulation:   Current level: 150' wheeled walker Supervision   Short term ambulation goal: 80' Appropriate assistive device Supervision   Long term ambulation goal: >300' Appropriate assistive device Modified Independent     Car transfers:   Current level: Stand by Assist   Short term car transfers goal: Supervision   Long term car transfers goal:Modified Independent     Stairs:   Current level : 4 steps with 2 rails Contact Guard assistance   Short term stairs goal: 8 steps with 1 rail Stand by Assist   Long term stairs goal: 12 steps with 1 rail Modified Independent     OCCUPATIONAL THERAPY:    Tub/shower:   Current level: Minimum assistance   Short term tub/shower goal: Contact Guard assistance   Long term tub/shower goal: Stand by Assist       Feeding:  Current level: Modified Independent   Short term feeding goal: Modified Independent   Long term feeding goal: Modified Independent     Grooming:   Current level: Modified Independent   Short term grooming goal: Modified Independent   Long term grooming goal: Modified Independent     Bathing:  Current level: Supervision   Short term bathing goal: Modified Independent   Long term bathing goal: /Modified Independent     Homemaking:   Current level: Supervision   Short term homemaking goal: Modified Independent   Long term homemaking goal: Modified Independent     Upper body dressing:  Current level: Supervision   Short term upper body dressing goal: Independent   Long term upper body dressing goal: Independent     Lower body dressing:  Current level: Supervision   Short term lower body dressing goal: Modified Independent   Long term lower body dressing goal: Modified Independent     Toilet transfer:   Current level: Contact Guard assistance   Short term toilet transfer goal: Contact Guard assistance   Long term toilet transfer goal: Modified Independent     Other comments: O2 dropped with gait to 80's during therapy yesterday, O2 applied. Social interaction:  Independent     Safety awareness: good    Patient/family's personal goals: get strong to take care of self  Factors supporting goal achievement:  Good gains, motivated  Factors hindering goal achievement:  Fear of falling and shortness of breath    Discharge Plan   Estimated Length of Stay: 7/25/18            Destination: home  Services at Discharge: to be determined  Equipment at Discharge: to be determined      INTERDISCIPLINARY TEAM/PHYSICIAN RECOMMENDATION AND/OR REVISIONS OF PLAN OF CARE:  Improve patient's functional independence with mobility, transfers, ADL\"s. I approve the established interdisciplinary plan of care as documented within the medical record of Alba Ma.  Please see team conference signature page for those in attendance.     Electronically signed by Adina Steiner RN on 7/16/2018 at 2:40 PM

## 2018-07-16 NOTE — PLAN OF CARE
Problem: Falls - Risk of:  Goal: Will remain free from falls  Will remain free from falls   Outcome: Met This Shift    Goal: Absence of physical injury  Absence of physical injury   Outcome: Met This Shift      Problem: Infection:  Goal: Will remain free from infection  Will remain free from infection   Outcome: Met This Shift      Problem: Safety:  Goal: Free from accidental physical injury  Free from accidental physical injury   Outcome: Met This Shift    Goal: Free from intentional harm  Free from intentional harm   Outcome: Met This Shift      Problem: Daily Care:  Goal: Daily care needs are met  Daily care needs are met   Outcome: Met This Shift      Problem: Pain:  Goal: Patient's pain/discomfort is manageable  Patient's pain/discomfort is manageable   Outcome: Met This Shift    Goal: Pain level will decrease  Pain level will decrease   Outcome: Met This Shift    Goal: Control of acute pain  Control of acute pain   Outcome: Met This Shift    Goal: Control of chronic pain  Control of chronic pain   Outcome: Met This Shift      Problem: Skin Integrity:  Goal: Skin integrity will stabilize  Skin integrity will stabilize   Outcome: Met This Shift      Problem: Discharge Planning:  Goal: Patients continuum of care needs are met  Patients continuum of care needs are met   Outcome: Met This Shift      Problem: Skin Integrity:  Goal: Will show no infection signs and symptoms  Will show no infection signs and symptoms   Outcome: Met This Shift    Goal: Absence of new skin breakdown  Absence of new skin breakdown   Outcome: Met This Shift

## 2018-07-16 NOTE — PROGRESS NOTES
°C), temperature source Temporal, resp. rate 18, height 5' (1.524 m), weight 82 lb 8 oz (37.4 kg), SpO2 99 %. Subjective:  Symptoms:  Stable. She reports weakness. Diet:  Adequate intake. Activity level: Impaired due to weakness. Pain:  She reports no pain. Objective:  General Appearance: In no acute distress. Vital signs: (most recent): Blood pressure (!) 109/49, pulse 65, temperature 98.8 °F (37.1 °C), temperature source Temporal, resp. rate 18, height 5' (1.524 m), weight 82 lb 8 oz (37.4 kg), SpO2 99 %. Vital signs are normal.    Output: Producing urine and producing stool. Lungs:  Normal effort and normal respiratory rate. There are rales and decreased breath sounds. Heart: Normal rate. Regular rhythm. S1 normal and S2 normal.    Abdomen: Abdomen is soft. Bowel sounds are normal.   There is no abdominal tenderness. Extremities: Normal range of motion. Neurological: Patient is alert. (4/5 str).   Initial Evaluation  7/13/18 AM      PM Short Term Goals Long Term Goals    Was pt agreeable to Eval/treatment? yes yes NT       Does pt have pain?  No pain  no c/o pain          Bed Mobility  Rolling: sba  Supine to sit: sba  Sit to supine: sba  Scooting: sba Rolling: sba  Supine to sit: sba  Sit to supine: NT  Scooting: sba   sup Independent       Transfers Sit to stand: cgA  Stand to sit: cgA  Stand pivot: Nadir with ww Sit to stand: cgA  Stand to sit: cgA  Stand pivot: CGA/min  with ww for safety    sba Mod I with AAD vs no AD   Ambulation    75 feet with sba with ww  150 ft, 40 ft , 60 ft w/ww SBA   150 feet with sup with AAD >300 feet with mod I with  AAD vs no AD   Walking 10 feet on uneven surface 10' feet with ww with sba NT          Wheel Chair Mobility NA  NT         Car Transfers Nadir SBA into car  Min A out of car    sup Mod I   Stair negotiation: ascended and descended  4 steps with rose mary rail with Nadir  4 steps w/ 2 rails CGA   8 steps with one rail with sba

## 2018-07-17 PROCEDURE — 6370000000 HC RX 637 (ALT 250 FOR IP): Performed by: PHYSICAL MEDICINE & REHABILITATION

## 2018-07-17 PROCEDURE — 97530 THERAPEUTIC ACTIVITIES: CPT

## 2018-07-17 PROCEDURE — 97110 THERAPEUTIC EXERCISES: CPT

## 2018-07-17 PROCEDURE — 1280000000 HC REHAB R&B

## 2018-07-17 PROCEDURE — 6360000002 HC RX W HCPCS: Performed by: PHYSICAL MEDICINE & REHABILITATION

## 2018-07-17 PROCEDURE — 97112 NEUROMUSCULAR REEDUCATION: CPT

## 2018-07-17 PROCEDURE — 2700000000 HC OXYGEN THERAPY PER DAY

## 2018-07-17 PROCEDURE — 97535 SELF CARE MNGMENT TRAINING: CPT

## 2018-07-17 RX ORDER — SENNA PLUS 8.6 MG/1
1 TABLET ORAL 2 TIMES DAILY
Status: DISCONTINUED | OUTPATIENT
Start: 2018-07-17 | End: 2018-07-25 | Stop reason: HOSPADM

## 2018-07-17 RX ADMIN — ACETAMINOPHEN 650 MG: 325 TABLET, FILM COATED ORAL at 08:15

## 2018-07-17 RX ADMIN — PETROLATUM: 42 OINTMENT TOPICAL at 08:18

## 2018-07-17 RX ADMIN — GUAIFENESIN 400 MG: 400 TABLET ORAL at 08:17

## 2018-07-17 RX ADMIN — SENNOSIDES 8.6 MG: 8.6 TABLET, FILM COATED ORAL at 21:06

## 2018-07-17 RX ADMIN — GUAIFENESIN 400 MG: 400 TABLET ORAL at 21:06

## 2018-07-17 RX ADMIN — DRONABINOL 2.5 MG: 2.5 CAPSULE ORAL at 08:15

## 2018-07-17 RX ADMIN — DRONABINOL 2.5 MG: 2.5 CAPSULE ORAL at 16:52

## 2018-07-17 RX ADMIN — MOMETASONE FUROATE AND FORMOTEROL FUMARATE DIHYDRATE 2 PUFF: 200; 5 AEROSOL RESPIRATORY (INHALATION) at 08:15

## 2018-07-17 RX ADMIN — PETROLATUM: 42 OINTMENT TOPICAL at 21:07

## 2018-07-17 RX ADMIN — PANTOPRAZOLE SODIUM 40 MG: 40 TABLET, DELAYED RELEASE ORAL at 07:11

## 2018-07-17 RX ADMIN — MOMETASONE FUROATE AND FORMOTEROL FUMARATE DIHYDRATE 2 PUFF: 200; 5 AEROSOL RESPIRATORY (INHALATION) at 21:06

## 2018-07-17 RX ADMIN — PREDNISONE 40 MG: 20 TABLET ORAL at 08:17

## 2018-07-17 ASSESSMENT — PAIN DESCRIPTION - FREQUENCY: FREQUENCY: INTERMITTENT

## 2018-07-17 ASSESSMENT — PAIN SCALES - GENERAL
PAINLEVEL_OUTOF10: 5
PAINLEVEL_OUTOF10: 0
PAINLEVEL_OUTOF10: 0
PAINLEVEL_OUTOF10: 2

## 2018-07-17 ASSESSMENT — PAIN DESCRIPTION - DESCRIPTORS: DESCRIPTORS: ACHING;DISCOMFORT

## 2018-07-17 ASSESSMENT — PAIN DESCRIPTION - LOCATION: LOCATION: HEAD;LEG

## 2018-07-17 ASSESSMENT — PAIN DESCRIPTION - PAIN TYPE: TYPE: CHRONIC PAIN

## 2018-07-17 NOTE — PROGRESS NOTES
Physical Therapy  Facility/Department: [address] REHAB  Daily Treatment Note  NAME: Paco Avalos  : 1945  MRN: [id number]    Date of Service: 2018  Evaluating Therapist: To Lion DPT     ROOM: [id number]  DIAGNOSIS: Critical Illness Polyneuropathy   PMH: To ED on 7/3/18 for SOB. Dx with Acute COPD exacerbation. Respiratory panel positive for parainfluenza virus. PMH includes COPD, GERD, Viral conjunctivitis, hx. Stomach ulcers, OA, HLD, renal calculi, protein caloric malnutrition, dysphagia  PRECAUTIONS: falls, monitor o2,       Pt lives with daughter in a townhouse style home with 4 stairs to enter and 1 rail (per chart). Per pt, 6 steps rose mary HR in back vs 1 step No HR in front to enter. 12 steps 1 HR to second floor however  Bed is on 1 floor and bath is on 1 floor if needed.  Pt ambulated with Ind PTA no device.  Equipment owned: none. Home o2 3 Liters HS.       Initial Evaluation  18 AM      PM Short Term Goals Long Term Goals    Was pt agreeable to Eval/treatment? yes yes yes       Does pt have pain?  No pain  pt initially c/o B leg pain /weakness No c/o       Bed Mobility  Rolling: sba  Supine to sit: sba  Sit to supine: sba  Scooting: sba Rolling: sba  Supine to sit: supervision  Sit to supine: Supervision  Scooting: sba Supine to sit Supervision  Sit to supine Sup sup Independent       Transfers Sit to stand: cgA  Stand to sit: cgA  Stand pivot: Nadir with ww Sit to stand: SBA  Stand to sit: SBA  Stand pivot: SBA Sit to stand SBA  Stand to sit SBA  Stand pivot SBA sba Mod I with AAD vs no AD   Ambulation    75 feet with sba with ww  75 feet x2 reps with ww SBA 75 feet x1 and 150 feet x1 with ww SBA and 2 liters O2 150 feet with sup with AAD >300 feet with mod I with  AAD vs no AD   Walking 10 feet on uneven surface 10' feet with ww with sba NT  N/T        Wheel Chair Mobility NA  NT         Car Transfers Nadir SBA    N/T sup Mod I   Stair negotiation: ascended and descended  4 steps with rose mary

## 2018-07-17 NOTE — PLAN OF CARE
Problem: Falls - Risk of:  Goal: Will remain free from falls  Will remain free from falls   Outcome: Met This Shift      Problem: Infection:  Goal: Will remain free from infection  Will remain free from infection   Outcome: Met This Shift      Problem: Daily Care:  Goal: Daily care needs are met  Daily care needs are met   Outcome: Met This Shift      Problem: Pain:  Goal: Patient's pain/discomfort is manageable  Patient's pain/discomfort is manageable   Outcome: Met This Shift      Problem: Skin Integrity:  Goal: Skin integrity will stabilize  Skin integrity will stabilize   Outcome: Met This Shift      Problem: Skin Integrity:  Goal: Will show no infection signs and symptoms  Will show no infection signs and symptoms   Outcome: Met This Shift

## 2018-07-17 NOTE — PROGRESS NOTES
Occupational Therapy  OCCUPATIONAL THERAPY DAILY NOTE    Date:2018  Patient Name: Gee Gauthier  MRN: [id number]  : 1945  Room: [address] [place], [place] [address]-A     Patient Active Problem List   Diagnosis    Acute respiratory failure with hypoxia (HCC)    Acute exacerbation of chronic obstructive pulmonary disease (COPD) ([facility] [address]    Dyslipidemia, goal LDL below 130    Heartburn    Dysphagia    At risk for colon cancer    Parainfluenza    Severe protein-calorie malnutrition ([facility] [address]    Encounter for palliative care    Critical illness polyneuropathy ([facility] [address]       Precautions: falls    Functional Assessment:   Date Status AE  Comments   Feeding 18  indep     Grooming 18 Mod I      Bathing 18 SBA HH shower, shower bench    UB Dressing 18 s/u     LB Dressing 18 SBA     Homemaking 1718 Remote sup w/w Pt stands supported at the table folding clothes with remote sup - 5mins and 6mins. PM:  Pt instructed in safe adaptive techs using the w/w for item ret/transport to make toast/coffee and to wash dishes - SBA. Functional Transfers / Balance:   Date Status DME  Comments   Sit Balance 18 Mod I   All surfaces   Stand Balance 18 SBA  w/w   [x] Tub  [] Shower   Transfer 18 SBA Grab bar, shower chair On/off shower bench with SBA  Pt steps in/out of the tub using safety rails with close sup. Commode   Transfer 18 Min A Grab bar Min A sit to stand from lower surface   Functional   Mobility 18 SBA w/w Household distances   Other: bed,kitchen chair, sofa     18 SBA      Cues to pace act     Functional Exercises / Activity:   BUE strengthening and ROM exercises complete using 2#dowel for biceps/triceps,  miguel bar 60 reps and standing to complete graded clothes pin act with good bal and david.       Sensory / Neuromuscular Re-Education:      Cognitive Skills:   Status Comments   Problem   Solving good During ADL tasks, transfers and amb   Memory good \"   Sequencing good \"   Safety good

## 2018-07-17 NOTE — CARE COORDINATION
Per team meeting- plan d/c on 7/25. Updated patient and daughter, Alex Rubalcava. LTG- Mod I/ Independent. All goals set for Mod I/ I. Patient is motivated and making gains. Patient also has oxygen at home PTA.     DME & Aftercare- TBD at team meeting next week    FT- 7/20PM with daughter, Zahida Davidsonsean [place] Intern  Saji Lo

## 2018-07-18 PROCEDURE — 6360000002 HC RX W HCPCS: Performed by: PHYSICAL MEDICINE & REHABILITATION

## 2018-07-18 PROCEDURE — 2700000000 HC OXYGEN THERAPY PER DAY

## 2018-07-18 PROCEDURE — 97110 THERAPEUTIC EXERCISES: CPT

## 2018-07-18 PROCEDURE — 97535 SELF CARE MNGMENT TRAINING: CPT

## 2018-07-18 PROCEDURE — 1280000000 HC REHAB R&B

## 2018-07-18 PROCEDURE — 97530 THERAPEUTIC ACTIVITIES: CPT

## 2018-07-18 PROCEDURE — 6370000000 HC RX 637 (ALT 250 FOR IP): Performed by: PHYSICAL MEDICINE & REHABILITATION

## 2018-07-18 RX ADMIN — SENNOSIDES 8.6 MG: 8.6 TABLET, FILM COATED ORAL at 09:14

## 2018-07-18 RX ADMIN — PREDNISONE 30 MG: 10 TABLET ORAL at 09:16

## 2018-07-18 RX ADMIN — MOMETASONE FUROATE AND FORMOTEROL FUMARATE DIHYDRATE 2 PUFF: 200; 5 AEROSOL RESPIRATORY (INHALATION) at 22:03

## 2018-07-18 RX ADMIN — ACETAMINOPHEN 650 MG: 325 TABLET, FILM COATED ORAL at 09:14

## 2018-07-18 RX ADMIN — GUAIFENESIN 400 MG: 400 TABLET ORAL at 09:13

## 2018-07-18 RX ADMIN — PETROLATUM: 42 OINTMENT TOPICAL at 22:04

## 2018-07-18 RX ADMIN — DRONABINOL 2.5 MG: 2.5 CAPSULE ORAL at 16:50

## 2018-07-18 RX ADMIN — PANTOPRAZOLE SODIUM 40 MG: 40 TABLET, DELAYED RELEASE ORAL at 06:25

## 2018-07-18 RX ADMIN — DRONABINOL 2.5 MG: 2.5 CAPSULE ORAL at 09:14

## 2018-07-18 RX ADMIN — SENNOSIDES 8.6 MG: 8.6 TABLET, FILM COATED ORAL at 22:04

## 2018-07-18 RX ADMIN — MOMETASONE FUROATE AND FORMOTEROL FUMARATE DIHYDRATE 2 PUFF: 200; 5 AEROSOL RESPIRATORY (INHALATION) at 09:15

## 2018-07-18 RX ADMIN — PETROLATUM: 42 OINTMENT TOPICAL at 09:15

## 2018-07-18 RX ADMIN — GUAIFENESIN 400 MG: 400 TABLET ORAL at 22:04

## 2018-07-18 ASSESSMENT — PAIN SCALES - GENERAL
PAINLEVEL_OUTOF10: 0
PAINLEVEL_OUTOF10: 5
PAINLEVEL_OUTOF10: 0
PAINLEVEL_OUTOF10: 0

## 2018-07-18 ASSESSMENT — PAIN DESCRIPTION - DESCRIPTORS: DESCRIPTORS: ACHING;DISCOMFORT

## 2018-07-18 ASSESSMENT — PAIN DESCRIPTION - PAIN TYPE: TYPE: ACUTE PAIN

## 2018-07-18 ASSESSMENT — PAIN DESCRIPTION - LOCATION: LOCATION: BACK

## 2018-07-18 ASSESSMENT — PAIN DESCRIPTION - FREQUENCY: FREQUENCY: INTERMITTENT

## 2018-07-18 NOTE — PROGRESS NOTES
with one rail with sba >12 steps with one rail with mod I   Curb Step:   ascended and descended 7.5 inch step with ww and Nadir NT   N/T 7.5 inch step with AAD and sba 7.5 inch step with AAD vs NO AD and mod I   Picking up object off the floor NT (fearful of losing balance) NT   N/T       BLE ROM WNL           BLE Strength 3+/5 bilateral LE           Balance  Sitting: sup  Standing: Nadir with ww Sitting Ind  Standing with ww SBA   sitting Ind  Standing with ww SBA       Date Family Teach Completed TBA N/T  N/T        Is additional Family Teaching Needed? Y or N Y yes   yes       Hindering Progress endurance Endurance decreased strength          PT recommended ELOS 10-14 days           Team's Discharge Plan             Therapist at Team Meeting               Therapeutic ex: sit <>stand x4 reps, knee bends 10x,hip flexion 2.5 # B LEs 2x10  P.m. Side stepping x4 reps, standing marching 10x, amb without A.D. 20 feet x2 reps Nadri. Additional Comments: Improved tolerance with ex and functional activities requiring less rest breaks. Pt's O2 sat on 2 liters during functional activities both a.m. And p.m. 94%. Noted mild SOB at times. Cues to keep inside ww. Time in: 8:30  Time out: 915    Time in ; 1430  Time OUt: 1515      Continue with physical therapy current plan of care.     Beny Jones [id number]

## 2018-07-18 NOTE — PROGRESS NOTES
Temporal, resp. rate 18, height 5' (1.524 m), weight 82 lb 8 oz (37.4 kg), SpO2 99 %. Subjective:  Symptoms:  Stable. She reports weakness. Diet:  Adequate intake. Activity level: Impaired due to weakness. Pain:  She reports no pain. Objective:  General Appearance: In no acute distress. Vital signs: (most recent): Blood pressure 113/63, pulse 76, temperature 97.8 °F (36.6 °C), temperature source Temporal, resp. rate 18, height 5' (1.524 m), weight 82 lb 8 oz (37.4 kg), SpO2 99 %. Vital signs are normal.    Output: Producing urine and producing stool. Lungs:  Normal effort and normal respiratory rate. There are rales and decreased breath sounds. Heart: Normal rate. Regular rhythm. S1 normal and S2 normal.    Abdomen: Abdomen is soft. Bowel sounds are normal.   There is no abdominal tenderness. Extremities: Normal range of motion. Neurological: Patient is alert. (4/5 str).   Initial Evaluation  7/13/18 AM      PM Short Term Goals Long Term Goals    Was pt agreeable to Eval/treatment? yes yes yes       Does pt have pain?  No pain  pt initially c/o B leg pain /weakness No c/o       Bed Mobility  Rolling: sba  Supine to sit: sba  Sit to supine: sba  Scooting: sba Rolling: sba  Supine to sit: supervision  Sit to supine: Supervision  Scooting: sba Supine to sit Supervision  Sit to supine Sup sup Independent       Transfers Sit to stand: cgA  Stand to sit: cgA  Stand pivot: Nadir with ww Sit to stand: SBA  Stand to sit: SBA  Stand pivot: SBA Sit to stand SBA  Stand to sit SBA  Stand pivot SBA sba Mod I with AAD vs no AD   Ambulation    75 feet with sba with ww  75 feet x2 reps with ww SBA 75 feet x1 and 150 feet x1 with ww SBA and 2 liters O2 150 feet with sup with AAD >300 feet with mod I with  AAD vs no AD   Walking 10 feet on uneven surface 10' feet with ww with sba NT  N/T        Wheel Chair Mobility NA  NT         Car Transfers Nadir SBA     N/T sup Mod I   Stair negotiation: ascended and descended  1 steps with rose mary rail with Nadir  N/T  4 steps 2 rails CGA 8 steps with one rail with sba >12 steps with one rail with mod I   Curb Step:   ascended and descended 7.5 inch step with ww and Nadir NT  N/T  7.5 inch step with AAD and sba 7.5 inch step with AAD vs NO AD and mod I   Picking up object off the floor NT (fearful of losing balance) NT  N/T        BLE ROM WNL           BLE Strength 3+/5 bilateral LE           Balance  Sitting: sup  Standing: Nadir with ww Sitting Sup  Standing with ww SBA  Sitting Sup  Standing with ww SBA        Date Family Teach Completed TBA  N/T  N/T       Is additional Family Teaching Needed?  Y or N Y yes   yes       Hindering Progress endurance Endurance weakness  weakness        PT recommended ELOS 10-14 days           Team's Discharge Plan             Therapist at Team Meeting                Therapeutic ex: sit <>stand x4 reps,standing marching 10x, LAQs 2.5# 2x10  P.m. N/T  Additional Comments:c/o B swelling in feet. No noted swelling though. Pt reports that she thinks she did too much yesterday. Encouragement given. Pt's O2 during functional activities on 2 liters 93% in a.m. And 96%    Assessment:    Condition: In stable condition. Improving. (Pulmonary debility). Plan:   Encourage ambulation. (Improving in therapy  Intermittent hypoxia  Improves with O2  Making good gains in strength).        Kajal Jennings MD  7/18/2018

## 2018-07-18 NOTE — PROGRESS NOTES
Occupational Therapy  OCCUPATIONAL THERAPY DAILY NOTE    Date:2018  Patient Name: Ubaldo Jacobs  MRN: [id number]  : 1945  Room: [address] [place] [address]A     Patient Active Problem List   Diagnosis    Acute respiratory failure with hypoxia (HCC)    Acute exacerbation of chronic obstructive pulmonary disease (COPD) ([facility] [address]    Dyslipidemia, goal LDL below 130    Heartburn    Dysphagia    At risk for colon cancer    Parainfluenza    Severe protein-calorie malnutrition ([facility] [address]    Encounter for palliative care    Critical illness polyneuropathy ([facility] [address]       Precautions: falls    Functional Assessment:   Date Status AE  Comments   Feeding 18  indep     Grooming 18 Mod I      Bathing 18 SBA HH shower, shower bench    UB Dressing 18 s/u     LB Dressing 18 SBA     Homemaking 1818 Remote sup w/w The pt obtains a cup of coffee and transports using adaptive techs with Sup. PM:  Pt makes cookies / puts them in/out of the oven, washes dishes using w/w safely and managing her 02 tubing. Functional Transfers / Balance:   Date Status DME  Comments   Sit Balance 1818 Mod I   All surfaces   Stand Balance 1818 SBA  w/w   [x] Tub  [] Shower   Transfer 18 SBA Grab bar, shower chair On/off shower bench with SBA  Pt steps in/out of the tub using safety rails with close sup. Commode   Transfer 18 Min A Grab bar Min A sit to stand from lower surface   Functional   Mobility 1818 SBA w/w Household distances   Other: bed,kitchen chair, sofa     1818 SBA      Cues to pace act     Functional Exercises / Activity:   BUE strengthening and ROM exercises complete using 2#dowel for biceps/triceps,  miguel bar 60 reps and overhead pulleys for 7mins.     Sensory / Neuromuscular Re-Education:      Cognitive Skills:   Status Comments   Problem   Solving good During ADL tasks, transfers and amb   Memory good \"   Sequencing good \"   Safety good \"     Visual Perception:    Education:  Pt was educated on safe

## 2018-07-19 PROCEDURE — 6360000002 HC RX W HCPCS: Performed by: PHYSICAL MEDICINE & REHABILITATION

## 2018-07-19 PROCEDURE — 6370000000 HC RX 637 (ALT 250 FOR IP): Performed by: PHYSICAL MEDICINE & REHABILITATION

## 2018-07-19 PROCEDURE — 97535 SELF CARE MNGMENT TRAINING: CPT

## 2018-07-19 PROCEDURE — 97530 THERAPEUTIC ACTIVITIES: CPT

## 2018-07-19 PROCEDURE — 97110 THERAPEUTIC EXERCISES: CPT

## 2018-07-19 PROCEDURE — 1280000000 HC REHAB R&B

## 2018-07-19 RX ADMIN — GUAIFENESIN 400 MG: 400 TABLET ORAL at 08:27

## 2018-07-19 RX ADMIN — PREDNISONE 30 MG: 10 TABLET ORAL at 08:27

## 2018-07-19 RX ADMIN — SENNOSIDES 8.6 MG: 8.6 TABLET, FILM COATED ORAL at 21:07

## 2018-07-19 RX ADMIN — DRONABINOL 2.5 MG: 2.5 CAPSULE ORAL at 16:41

## 2018-07-19 RX ADMIN — PETROLATUM: 42 OINTMENT TOPICAL at 21:10

## 2018-07-19 RX ADMIN — MOMETASONE FUROATE AND FORMOTEROL FUMARATE DIHYDRATE 2 PUFF: 200; 5 AEROSOL RESPIRATORY (INHALATION) at 08:24

## 2018-07-19 RX ADMIN — GUAIFENESIN 400 MG: 400 TABLET ORAL at 21:07

## 2018-07-19 RX ADMIN — PANTOPRAZOLE SODIUM 40 MG: 40 TABLET, DELAYED RELEASE ORAL at 06:10

## 2018-07-19 RX ADMIN — MOMETASONE FUROATE AND FORMOTEROL FUMARATE DIHYDRATE 2 PUFF: 200; 5 AEROSOL RESPIRATORY (INHALATION) at 21:07

## 2018-07-19 RX ADMIN — ACETAMINOPHEN 650 MG: 325 TABLET, FILM COATED ORAL at 08:27

## 2018-07-19 RX ADMIN — SENNOSIDES 8.6 MG: 8.6 TABLET, FILM COATED ORAL at 08:27

## 2018-07-19 ASSESSMENT — PAIN DESCRIPTION - PROGRESSION: CLINICAL_PROGRESSION: NOT CHANGED

## 2018-07-19 ASSESSMENT — PAIN DESCRIPTION - LOCATION: LOCATION: HEAD

## 2018-07-19 ASSESSMENT — PAIN SCALES - GENERAL
PAINLEVEL_OUTOF10: 7
PAINLEVEL_OUTOF10: 0
PAINLEVEL_OUTOF10: 0
PAINLEVEL_OUTOF10: 3
PAINLEVEL_OUTOF10: 0

## 2018-07-19 ASSESSMENT — PAIN DESCRIPTION - FREQUENCY: FREQUENCY: INTERMITTENT

## 2018-07-19 ASSESSMENT — PAIN DESCRIPTION - PAIN TYPE: TYPE: CHRONIC PAIN

## 2018-07-19 ASSESSMENT — PAIN DESCRIPTION - ONSET: ONSET: ON-GOING

## 2018-07-19 NOTE — PROGRESS NOTES
Nutrition Assessment    Type and Reason for Visit: Reassess    Nutrition Recommendations: Continue current diet and ONS as ordered. No new recommendations at this time. Will continue to monitor patient. Malnutrition Assessment:  · Malnutrition Status: Meets the criteria for severe malnutrition  · Context: Chronic illness  · Findings of the 6 clinical characteristics of malnutrition (Minimum of 2 out of 6 clinical characteristics is required to make the diagnosis of moderate or severe Protein Calorie Malnutrition based on AND/ASPEN Guidelines):  1. Energy Intake-Less than or equal to 75%, greater than or equal to 1 month    2. Weight Loss-10% loss or greater, in 3 months  3. Fat Loss-Severe subcutaneous fat loss, Orbital, Triceps  4. Muscle Loss-Severe muscle mass loss, Temples (temporalis muscle), Clavicles (pectoralis and deltoids), Thigh (quadriceps), Interosseous  5. Fluid Accumulation-No significant fluid accumulation,    6.   Strength-Not measured    Nutrition Diagnosis:   · Problem: Severe malnutrition, in context of chronic illness  · Etiology: related to Insufficient energy/nutrient consumption     Signs and symptoms:  as evidenced by Diet history of poor intake, Weight loss greater than or equal to 7.5% in 3 months, Severe loss of subcutaneous fat, Severe muscle loss    Nutrition Assessment:  · Nutrition-Focused Physical Findings: +I/O, soft abd, active bs, no edema noted   · Wound Type: None  · Current Nutrition Therapies:  · Oral Diet Orders: General   · Oral Diet intake: %  · Oral Nutrition Supplement (ONS) Orders: Standard High Calorie Oral Supplement (HS sncak )  · Anthropometric Measures:  · Ht: 5' (152.4 cm)   · Current Body Wt: 82 lb (37.2 kg) ([facility] 7/12, [facility] d/t pt oob during visit )  · Admission Body Wt: 82 lb (37.2 kg) (7/12 first actual taken )  · Usual Body Wt: 93 lb (42.2 kg) (4/3/2018 @ OV per EMR )  · % Weight Change: 11.8% x 3 months per EMR ,     · Ideal Body

## 2018-07-19 NOTE — PLAN OF CARE
Problem: Nutrition  Goal: Optimal nutrition therapy  Outcome: Ongoing  Nutrition Problem: Severe malnutrition, in context of chronic illness  Intervention: Food and/or Nutrient Delivery: Continue current diet, Continue current ONS  Nutritional Goals: consume 75% or more of most meals/ONS

## 2018-07-19 NOTE — PROGRESS NOTES
Zahida Bauman is a 72 y.o. female patient. Current Facility-Administered Medications   Medication Dose Route Frequency Provider Last Rate Last Dose    senna (SENOKOT) tablet 8.6 mg  1 tablet Oral BID Michael Mcrae MD   8.6 mg at 07/19/18 0827    mometasone-formoterol (DULERA) 200-5 MCG/ACT inhaler 2 puff  2 puff Inhalation BID Amarilys Chacon MD   2 puff at 07/19/18 0824    hydrocortisone (ANUSOL-HC) 2.5 % rectal cream   Rectal BID PRN Amarilys Chacon MD        nicotine (NICODERM CQ) 14 MG/24HR 1 patch  1 patch Transdermal Daily Amarilys Chacon MD   1 patch at 07/19/18 0827    ketotifen (ZADITOR) 0.025 % ophthalmic solution 1 drop  1 drop Both Eyes BID PRN Amarilys Chacon MD        enoxaparin (LOVENOX) injection 40 mg  40 mg Subcutaneous Daily Michael Mcrae MD        dronabinol (MARINOL) capsule 2.5 mg  2.5 mg Oral BID WC Michael A MD Clementina   2.5 mg at 07/18/18 1650    pantoprazole (PROTONIX) tablet 40 mg  40 mg Oral QAM AC Michaeltawanna Mcrae MD   40 mg at 07/19/18 0610    guaiFENesin tablet 400 mg  400 mg Oral BID Amarilys Chacon MD   400 mg at 07/19/18 0827    acetaminophen (TYLENOL) tablet 650 mg  650 mg Oral Q4H PRN Amarilys Chacon MD   650 mg at 07/19/18 0827    magnesium hydroxide (MILK OF MAGNESIA) 400 MG/5ML suspension 30 mL  30 mL Oral Daily PRN Amarilys Chacon MD        predniSONE (DELTASONE) tablet 30 mg  30 mg Oral Daily Michael Mcrae MD   30 mg at 07/19/18 0827    [START ON 7/21/2018] predniSONE (DELTASONE) tablet 20 mg  20 mg Oral Daily Amarilys Chacon MD        [START ON 7/24/2018] predniSONE (DELTASONE) tablet 10 mg  10 mg Oral Daily Amarilys Chacon MD        mineral oil-hydrophilic petrolatum (HYDROPHOR) ointment   Topical BID Welby Nageotte Schrecengost, RN         No Known Allergies  Active Problems:    [facility]  Resolved Problems:    * No resolved hospital problems.  *    Blood pressure 113/66, pulse 77, temperature 97.9 °F (36.6 °C),

## 2018-07-19 NOTE — PROGRESS NOTES
Physical Therapy  Facility/Department: [facility] [place] 5SE REHAB  Daily Treatment Note  NAME: Jenny Tolbert  : 1945  MRN: [id number]    Date of Service: 2018  Evaluating Therapist: Celestina Nixon DPT     ROOM: 61/5330-Y  DIAGNOSIS: Critical Illness Polyneuropathy   PMH: To ED on 7/3/18 for SOB. Dx with Acute COPD exacerbation. Respiratory panel positive for parainfluenza virus. PMH includes COPD, GERD, Viral conjunctivitis, hx. Stomach ulcers, OA, HLD, renal calculi, protein caloric malnutrition, dysphagia  PRECAUTIONS: falls, monitor o2,       Pt lives with daughter in a townhouse style home with 4 stairs to enter and 1 rail (per chart). Per pt, 6 steps rose mary HR in back vs 1 step No HR in front to enter. 12 steps 1 HR to second floor however  Bed is on 1 floor and bath is on 1 floor if needed.  Pt ambulated with Ind PTA no device.  Equipment owned: none. Home o2 3 Liters HS.       Initial Evaluation  18 AM      PM Short Term Goals Long Term Goals    Was pt agreeable to Eval/treatment? yes yes yes       Does pt have pain?  No pain  no c/o pain  No c/o       Bed Mobility  Rolling: sba  Supine to sit: sba  Sit to supine: sba  Scooting: sba Supine to sit Supervision  Scooting Supervision Supine to sit Supervision sup Independent       Transfers Sit to stand: cgA  Stand to sit: cgA  Stand pivot: Nadir with ww Sit to stand: Supervision  Stand to sit: Supervision  Stand pivot:SBA Sit to stand Supervision  Stand to sit Supervision sba Mod I with AAD vs no AD   Ambulation    75 feet with sba with ww  150 ft, and 75 feet x1 w/ww Supervision 150 feet x2  with ww Supervision 150 feet with sup with AAD >300 feet with mod I with  AAD vs no AD   Walking 10 feet on uneven surface 10' feet with ww with sba 10 feet x2 reps with ww Supervision 10 feet x1 rep with ww SBA        Wheel Chair Mobility NA  NT         Car Transfers Nadir SBA      sup Mod I   Stair negotiation: ascended and descended  4 steps with rose mary rail with Nadir  4 steps 2 rails CGA  N/T 8 steps with one rail with sba >12 steps with one rail with mod I   Curb Step:   ascended and descended 7.5 inch step with ww and Nadir NT   N/T 7.5 inch step with AAD and sba 7.5 inch step with AAD vs NO AD and mod I   Picking up object off the floor NT (fearful of losing balance) NT  2# CGA due to pt fearful       BLE ROM WNL           BLE Strength 3+/5 bilateral LE           Balance  Sitting: sup  Standing: Nadir with ww Sitting Ind  Standing with ww Supervision  sitting Ind  Standing with ww Supervision       Date Family Teach Completed TBA N/T  N/T        Is additional Family Teaching Needed? Y or N Y yes   yes       Hindering Progress endurance Endurance decreased strength  Endurance , decreased strength        PT recommended ELOS 10-14 days           Team's Discharge Plan             Therapist at Team Meeting               Therapeutic ex: sit <>stand x4 reps, knee bends 10x,hip flexion 2.5 # B LEs 2x10, step ups x4 reps. P.m. Side stepping x4 reps, standing marching 10x, amb without A.D. 25 feet x2 reps CGA. Pt amb with ww weave in/out of canes SB/Supervision. 2.5 # sitting hip flexion 2x10. Additional Comments: Improved tolerance with ex and functional activities requiring less rest breaks. Pt's O2 sat on 2 liters dropped to 86% after performing steps but after resting 1 minute pt's O2 increased to 93% on 2 liters. Cues to keep inside ww. P.m. Pt's O2 sat on 2 liters stayed at 96 to 97% during functional activities. Cues still to keep inside ww. Pt fearful of falling when picking up object off floor. Pt returned to bed after p.m. Tx.     Time in: 8:30  Time out: 915    Time in ; 1430  Time OUt: 1515      Continue with physical therapy current plan of care.     Mague Fuentes [id number]

## 2018-07-20 PROCEDURE — 1280000000 HC REHAB R&B

## 2018-07-20 PROCEDURE — 6370000000 HC RX 637 (ALT 250 FOR IP): Performed by: PHYSICAL MEDICINE & REHABILITATION

## 2018-07-20 PROCEDURE — 97112 NEUROMUSCULAR REEDUCATION: CPT

## 2018-07-20 PROCEDURE — 97530 THERAPEUTIC ACTIVITIES: CPT

## 2018-07-20 PROCEDURE — 6360000002 HC RX W HCPCS: Performed by: PHYSICAL MEDICINE & REHABILITATION

## 2018-07-20 RX ADMIN — PREDNISONE 30 MG: 10 TABLET ORAL at 08:26

## 2018-07-20 RX ADMIN — SENNOSIDES 8.6 MG: 8.6 TABLET, FILM COATED ORAL at 21:12

## 2018-07-20 RX ADMIN — SENNOSIDES 8.6 MG: 8.6 TABLET, FILM COATED ORAL at 08:26

## 2018-07-20 RX ADMIN — GUAIFENESIN 400 MG: 400 TABLET ORAL at 08:26

## 2018-07-20 RX ADMIN — PETROLATUM: 42 OINTMENT TOPICAL at 21:11

## 2018-07-20 RX ADMIN — ACETAMINOPHEN 650 MG: 325 TABLET, FILM COATED ORAL at 06:25

## 2018-07-20 RX ADMIN — PETROLATUM: 42 OINTMENT TOPICAL at 08:10

## 2018-07-20 RX ADMIN — GUAIFENESIN 400 MG: 400 TABLET ORAL at 21:12

## 2018-07-20 RX ADMIN — MOMETASONE FUROATE AND FORMOTEROL FUMARATE DIHYDRATE 2 PUFF: 200; 5 AEROSOL RESPIRATORY (INHALATION) at 21:11

## 2018-07-20 RX ADMIN — DRONABINOL 2.5 MG: 2.5 CAPSULE ORAL at 16:47

## 2018-07-20 RX ADMIN — MOMETASONE FUROATE AND FORMOTEROL FUMARATE DIHYDRATE 2 PUFF: 200; 5 AEROSOL RESPIRATORY (INHALATION) at 08:25

## 2018-07-20 RX ADMIN — DRONABINOL 2.5 MG: 2.5 CAPSULE ORAL at 08:26

## 2018-07-20 RX ADMIN — PANTOPRAZOLE SODIUM 40 MG: 40 TABLET, DELAYED RELEASE ORAL at 06:25

## 2018-07-20 ASSESSMENT — PAIN SCALES - GENERAL
PAINLEVEL_OUTOF10: 8
PAINLEVEL_OUTOF10: 0
PAINLEVEL_OUTOF10: 0

## 2018-07-20 NOTE — PROGRESS NOTES
Occupational Therapy  OCCUPATIONAL THERAPY DAILY NOTE    Date:2018  Patient Name: Deandre Perea  MRN: [id number]  : 1945  [facility]: [facility]-A     Patient Active Problem List   Diagnosis    Acute respiratory failure with hypoxia (HCC)    Acute exacerbation of chronic obstructive pulmonary disease (COPD) ([facility]    Dyslipidemia, goal LDL below 130    Heartburn    Dysphagia    At risk for colon cancer    Parainfluenza    Severe protein-calorie malnutrition ([facility]    Encounter for palliative care    [facility]       Precautions: falls    Functional Assessment:   Date Status AE  Comments   Feeding 18  indep     Grooming 18 Mod I      Bathing 18 Remote sup HH shower, shower bench Full shower - seated   UB Dressing 18 Mod I  Obtains own s/u at EOB   LB Dressing 18 Mod I  Clothing / socks   Homemaking 18 Remote sup w/w      Functional Transfers / Balance:   Date Status DME  Comments   Sit Balance 18 Mod I   All surfaces   Stand Balance 18 SBA  w/w   [x] Tub  [] Shower   Transfer 18 CGA Grab bar, shower chair Pt steps in/out of the tub stabilizng herself with the side wall. The pt refuses to allow her daughter to touch her for any transfers. Commode   Transfer 18 SBA 3in1    Functional   Mobility 18 SBA w/w Household distances   Other: bed,kitchen chair, sofa     18 SBA      Cues to pace act     Functional Exercises / Activity:  Pt participates in BUE strengthening/ROM to increase indep with ADL tasks, transfers and amb: Alt sit/stand - supported at the table the pt completes BUE strengthening using the 2#dowel and sh ladder.   2# ball toss   B - hand strengthening complete with hand gripper 30 reps each  Sensory / Neuromuscular Re-Education:      Cognitive Skills:   Status Comments   Problem   Solving good During ADL tasks, transfers and amb   Memory good \"   Sequencing good \"   Safety good \"     Visual

## 2018-07-20 NOTE — PROGRESS NOTES
Hank Card is a 72 y.o. female patient. Current Facility-Administered Medications   Medication Dose Route Frequency Provider Last Rate Last Dose    senna (SENOKOT) tablet 8.6 mg  1 tablet Oral BID Ha Mcgovern MD   8.6 mg at 07/19/18 2107    mometasone-formoterol (DULERA) 200-5 MCG/ACT inhaler 2 puff  2 puff Inhalation BID Ha Mcgovern MD   2 puff at 07/19/18 2107    hydrocortisone (ANUSOL-HC) 2.5 % rectal cream   Rectal BID PRN Ha Mcgovern MD        nicotine (NICODERM CQ) 14 MG/24HR 1 patch  1 patch Transdermal Daily Ha Mcgovern MD   1 patch at 07/19/18 0827    ketotifen (ZADITOR) 0.025 % ophthalmic solution 1 drop  1 drop Both Eyes BID PRN Ha Mcgovern MD        enoxaparin (LOVENOX) injection 40 mg  40 mg Subcutaneous Daily Michael SAMARIA Mcrae MD        dronabinol (MARINOL) capsule 2.5 mg  2.5 mg Oral BID WC Michael SAMARIA Mcrae MD   2.5 mg at 07/19/18 1641    pantoprazole (PROTONIX) tablet 40 mg  40 mg Oral QAM AC Michael SAMARIA Mcrae MD   40 mg at 07/20/18 5156    guaiFENesin tablet 400 mg  400 mg Oral BID Ha Mcgovern MD   400 mg at 07/19/18 2107    acetaminophen (TYLENOL) tablet 650 mg  650 mg Oral Q4H PRN Ha Mcgovern MD   650 mg at 07/20/18 8088    magnesium hydroxide (MILK OF MAGNESIA) 400 MG/5ML suspension 30 mL  30 mL Oral Daily PRN Ha Mcgovern MD        predniSONE (DELTASONE) tablet 30 mg  30 mg Oral Daily Michael A MD Clementina   30 mg at 07/19/18 0827    [START ON 7/21/2018] predniSONE (DELTASONE) tablet 20 mg  20 mg Oral Daily Ha Mcgovern MD        [START ON 7/24/2018] predniSONE (DELTASONE) tablet 10 mg  10 mg Oral Daily Ha Mcgovern MD        mineral oil-hydrophilic petrolatum (HYDROPHOR) ointment   Topical BID Fatemeh Ruiz RN         No Known Allergies  Active Problems:    Critical illness polyneuropathy ([address]  Resolved Problems:    * No resolved hospital problems.  *    Blood pressure (!) 114/54, pulse 68, temperature 98.6 °F (37 °C),

## 2018-07-20 NOTE — PLAN OF CARE
Problem: Falls - Risk of:  Goal: Will remain free from falls  Will remain free from falls   Outcome: Met This Shift      Problem: Skin Integrity:  Goal: Will show no infection signs and symptoms  Will show no infection signs and symptoms   Outcome: Met This Shift

## 2018-07-20 NOTE — PROGRESS NOTES
Physical Therapy  Facility/Department: [address] REHAB  Daily Treatment Note  NAME: Lanae Bosworth  : 1945  MRN: [id number]    Date of Service: 2018    Evaluating Therapist: Abhijit Blount DPT     YLXW: [id number]  DIAGNOSIS: Critical Illness Polyneuropathy   PMH: To ED on 7/3/18 for SOB. Dx with Acute COPD exacerbation. Respiratory panel positive for parainfluenza virus. PMH includes COPD, GERD, Viral conjunctivitis, hx. Stomach ulcers, OA, HLD, renal calculi, protein caloric malnutrition, dysphagia  PRECAUTIONS: falls, monitor o2,       Pt lives with daughter in a townhouse style home with 4 stairs to enter and 1 rail (per chart). Per pt, 6 steps rose mary HR in back vs 1 step No HR in front to enter. 12 steps 1 HR to second floor however  Bed is on 1 floor and bath is on 1 floor if needed.  Pt ambulated with Ind PTA no device.  Equipment owned: none. Home o2 3 Liters HS.       Initial Evaluation  18 AM      PM Short Term Goals Long Term Goals    Was pt agreeable to Eval/treatment? yes yes yes       Does pt have pain?  No pain  no c/o pain  No c/o       Bed Mobility  Rolling: sba  Supine to sit: sba  Sit to supine: sba  Scooting: sba Independent Supine to sit Supervision sup Independent       Transfers Sit to stand: cgA  Stand to sit: cgA  Stand pivot: Nadir with ww Sit to stand: Supervision  Stand to sit: Supervision  Stand pivot:Supervision with [employer] Sit to stand Supervision  Stand to sit Supervision sba Mod I with AAD vs no AD   Ambulation    75 feet with sba with ww  257 feet x 1 rep with [employer] with Supervision 300 feet x 1 rep with ww Supervision 150 feet with sup with AAD >300 feet with mod I with  AAD vs no AD   Walking 10 feet on uneven surface 10' feet with ww with sba NT NT       Wheel Chair Mobility NA  NT         Car Transfers Nadir SBA     NT  sup Mod I   Stair negotiation: ascended and descended  4 steps with rose mary rail with Nadir  12 steps with R HR x 1 rep and 6 steps with R HR x 1 rep     (ascending FW leading with RLE [BUE on R HR], descending laterally with R HR)  N/T 8 steps with one rail with sba >12 steps with one rail with mod I   Curb Step:   ascended and descended 7.5 inch step with ww and Nadir NT   NT 7.5 inch step with AAD and sba 7.5 inch step with AAD vs NO AD and mod I   Picking up object off the floor NT (fearful of losing balance) NT  NT       BLE ROM WNL           BLE Strength 3+/5 bilateral LE           Balance  Sitting: sup  Standing: Nadir with ww Sitting Ind  Standing with ww Supervision  sitting Ind  Standing with ww Supervision       Date Family Teach Completed TBA N/T  N/T        Is additional Family Teaching Needed?  Y or N Y yes   yes       Hindering Progress endurance Endurance decreased strength  Endurance , decreased strength        PT recommended ELOS 10-14 days           Team's Discharge Plan             Therapist at Team Meeting               Therapeutic Exercise:   AM: 7.5 inch platform step x 1 rep with [facility] with SBA  Ambulation without  feet x 1 rep with SBA  PM:  Step ups onto 6 inch step with BUE support x 5 reps L and R  Semi-tandem walking length of parallel bars x 4 reps without UE support with SBA  Ambulation without  feet x 1 rep with SBA/CGA  Ambulation FW/BW length of parallel bars without UE support x 3 reps with SBA  Toe taps onto blue airex pad without UE support x 15 reps L and R with SBA    Patient education  Pt educated on safe approach to chair with WW to minimize distance backing up    Patient response to education:   Pt verbalized understanding Pt demonstrated skill Pt requires further education in this area   yes partial yes     Additional Comments: Pt exhibits improving activity tolerance, able to ambulate further distances without significant SOB. O2 saturation remained at or above 90% on 2L O2 with all activity. Single HR stair training initiated to simulate home environment.  Pt exhibits difficulty ascending with LLE due to reported knee pain,

## 2018-07-21 PROCEDURE — 97535 SELF CARE MNGMENT TRAINING: CPT

## 2018-07-21 PROCEDURE — 6370000000 HC RX 637 (ALT 250 FOR IP): Performed by: PHYSICAL MEDICINE & REHABILITATION

## 2018-07-21 PROCEDURE — 99232 SBSQ HOSP IP/OBS MODERATE 35: CPT | Performed by: PHYSICAL MEDICINE & REHABILITATION

## 2018-07-21 PROCEDURE — 6360000002 HC RX W HCPCS: Performed by: PHYSICAL MEDICINE & REHABILITATION

## 2018-07-21 PROCEDURE — 1280000000 HC REHAB R&B

## 2018-07-21 PROCEDURE — 97110 THERAPEUTIC EXERCISES: CPT

## 2018-07-21 RX ADMIN — SENNOSIDES 8.6 MG: 8.6 TABLET, FILM COATED ORAL at 21:00

## 2018-07-21 RX ADMIN — GUAIFENESIN 400 MG: 400 TABLET ORAL at 09:21

## 2018-07-21 RX ADMIN — MOMETASONE FUROATE AND FORMOTEROL FUMARATE DIHYDRATE 2 PUFF: 200; 5 AEROSOL RESPIRATORY (INHALATION) at 09:21

## 2018-07-21 RX ADMIN — PANTOPRAZOLE SODIUM 40 MG: 40 TABLET, DELAYED RELEASE ORAL at 07:45

## 2018-07-21 RX ADMIN — DRONABINOL 2.5 MG: 2.5 CAPSULE ORAL at 17:03

## 2018-07-21 RX ADMIN — ACETAMINOPHEN 650 MG: 325 TABLET, FILM COATED ORAL at 08:15

## 2018-07-21 RX ADMIN — PETROLATUM: 42 OINTMENT TOPICAL at 08:25

## 2018-07-21 RX ADMIN — MOMETASONE FUROATE AND FORMOTEROL FUMARATE DIHYDRATE 2 PUFF: 200; 5 AEROSOL RESPIRATORY (INHALATION) at 20:59

## 2018-07-21 RX ADMIN — PETROLATUM: 42 OINTMENT TOPICAL at 21:01

## 2018-07-21 RX ADMIN — SENNOSIDES 8.6 MG: 8.6 TABLET, FILM COATED ORAL at 09:21

## 2018-07-21 RX ADMIN — PREDNISONE 20 MG: 20 TABLET ORAL at 09:22

## 2018-07-21 RX ADMIN — DRONABINOL 2.5 MG: 2.5 CAPSULE ORAL at 09:21

## 2018-07-21 RX ADMIN — GUAIFENESIN 400 MG: 400 TABLET ORAL at 20:59

## 2018-07-21 ASSESSMENT — PAIN DESCRIPTION - FREQUENCY
FREQUENCY: INTERMITTENT
FREQUENCY: INTERMITTENT

## 2018-07-21 ASSESSMENT — PAIN DESCRIPTION - LOCATION
LOCATION: BACK
LOCATION: BACK

## 2018-07-21 ASSESSMENT — PAIN SCALES - GENERAL
PAINLEVEL_OUTOF10: 0
PAINLEVEL_OUTOF10: 8
PAINLEVEL_OUTOF10: 0
PAINLEVEL_OUTOF10: 6
PAINLEVEL_OUTOF10: 8
PAINLEVEL_OUTOF10: 4

## 2018-07-21 ASSESSMENT — PAIN DESCRIPTION - DESCRIPTORS
DESCRIPTORS: ACHING;DISCOMFORT
DESCRIPTORS: ACHING;DISCOMFORT

## 2018-07-21 ASSESSMENT — PAIN DESCRIPTION - ORIENTATION: ORIENTATION: MID

## 2018-07-21 ASSESSMENT — PAIN DESCRIPTION - PAIN TYPE
TYPE: CHRONIC PAIN
TYPE: CHRONIC PAIN

## 2018-07-21 ASSESSMENT — PAIN DESCRIPTION - PROGRESSION: CLINICAL_PROGRESSION: NOT CHANGED

## 2018-07-21 ASSESSMENT — PAIN DESCRIPTION - ONSET: ONSET: ON-GOING

## 2018-07-22 PROCEDURE — 2700000000 HC OXYGEN THERAPY PER DAY

## 2018-07-22 PROCEDURE — 6370000000 HC RX 637 (ALT 250 FOR IP): Performed by: PHYSICAL MEDICINE & REHABILITATION

## 2018-07-22 PROCEDURE — 1280000000 HC REHAB R&B

## 2018-07-22 PROCEDURE — 6360000002 HC RX W HCPCS: Performed by: PHYSICAL MEDICINE & REHABILITATION

## 2018-07-22 PROCEDURE — 97530 THERAPEUTIC ACTIVITIES: CPT

## 2018-07-22 RX ADMIN — PETROLATUM: 42 OINTMENT TOPICAL at 21:00

## 2018-07-22 RX ADMIN — PETROLATUM: 42 OINTMENT TOPICAL at 08:33

## 2018-07-22 RX ADMIN — DRONABINOL 2.5 MG: 2.5 CAPSULE ORAL at 08:15

## 2018-07-22 RX ADMIN — PANTOPRAZOLE SODIUM 40 MG: 40 TABLET, DELAYED RELEASE ORAL at 07:26

## 2018-07-22 RX ADMIN — PREDNISONE 20 MG: 20 TABLET ORAL at 08:31

## 2018-07-22 RX ADMIN — MOMETASONE FUROATE AND FORMOTEROL FUMARATE DIHYDRATE 2 PUFF: 200; 5 AEROSOL RESPIRATORY (INHALATION) at 20:59

## 2018-07-22 RX ADMIN — DRONABINOL 2.5 MG: 2.5 CAPSULE ORAL at 16:48

## 2018-07-22 RX ADMIN — GUAIFENESIN 400 MG: 400 TABLET ORAL at 20:59

## 2018-07-22 RX ADMIN — MOMETASONE FUROATE AND FORMOTEROL FUMARATE DIHYDRATE 2 PUFF: 200; 5 AEROSOL RESPIRATORY (INHALATION) at 08:33

## 2018-07-22 RX ADMIN — GUAIFENESIN 400 MG: 400 TABLET ORAL at 08:31

## 2018-07-22 RX ADMIN — ACETAMINOPHEN 650 MG: 325 TABLET, FILM COATED ORAL at 08:31

## 2018-07-22 ASSESSMENT — PAIN DESCRIPTION - ORIENTATION: ORIENTATION: RIGHT;LEFT

## 2018-07-22 ASSESSMENT — PAIN SCALES - GENERAL
PAINLEVEL_OUTOF10: 0
PAINLEVEL_OUTOF10: 0
PAINLEVEL_OUTOF10: 6
PAINLEVEL_OUTOF10: 0
PAINLEVEL_OUTOF10: 0

## 2018-07-22 ASSESSMENT — PAIN DESCRIPTION - DESCRIPTORS: DESCRIPTORS: ACHING

## 2018-07-22 ASSESSMENT — PAIN DESCRIPTION - LOCATION: LOCATION: EYE;LEG

## 2018-07-22 ASSESSMENT — PAIN DESCRIPTION - PAIN TYPE: TYPE: ACUTE PAIN

## 2018-07-22 NOTE — PROGRESS NOTES
Physical Therapy  Facility/Department: [address] REHAB  Daily Treatment Note  NAME: Rafael Gong  : 1945  MRN: [id number]    Date of Service: 2018  ROOM: [facility]  DIAGNOSIS: Critical Illness Polyneuropathy   PMH: To ED on 7/3/18 for SOB. Dx with Acute COPD exacerbation. Respiratory panel positive for parainfluenza virus. PMH includes COPD, GERD, Viral conjunctivitis, hx. Stomach ulcers, OA, HLD, renal calculi, protein caloric malnutrition, dysphagia  PRECAUTIONS: falls, monitor o2,       [place] with 4 stairs to enter and 1 rail (per chart). Per pt, 6 steps rose mary HR in back vs 1 step No HR in front to enter. 12 steps 1 HR to second floor however  Bed is on 1 floor and bath is on 1 floor if needed.  Pt ambulated with Ind PTA no device.  Equipment owned: none. Home o2 3 Liters HS.       Initial Evaluation  18 AM      PM Short Term Goals Long Term Goals    Was pt agreeable to Eval/treatment? yes yes        Does pt have pain? No pain  no c/o pain         Bed Mobility  Rolling: sba  Supine to sit: sba  Sit to supine: sba  Scooting: sba Independent all aspects (hospital bed)  sup Independent       Transfers Sit to stand: cgA  Stand to sit: cgA  Stand pivot: Nadir with ww Sit to stand: Independent  Stand to sit:  Independent  Stand pivot: Modified Independent with [employer]  sba Mod I with AAD vs no AD   Ambulation    75 feet with sba with ww  400 feet x 1 rep with modified independent with [employer]  150 feet with sup with AAD >300 feet with mod I with  AAD vs no AD   Walking 10 feet on uneven surface 10' feet with ww with sba NT        Wheel Chair Mobility NA  NT        Car Transfers Nadir Modified Independent     sup Mod I   Stair negotiation: ascended and descended  4 steps with rose mary rail with Nadir  12 steps with R HR x 1 rep with SBA (lateral ascent/descent)  N/T 8 steps with one rail with sba >12 steps with one rail with mod I   Curb Step:   ascended and descended 7.5 inch step

## 2018-07-23 PROCEDURE — 97110 THERAPEUTIC EXERCISES: CPT

## 2018-07-23 PROCEDURE — 97530 THERAPEUTIC ACTIVITIES: CPT

## 2018-07-23 PROCEDURE — 97112 NEUROMUSCULAR REEDUCATION: CPT

## 2018-07-23 PROCEDURE — 2700000000 HC OXYGEN THERAPY PER DAY

## 2018-07-23 PROCEDURE — 97535 SELF CARE MNGMENT TRAINING: CPT

## 2018-07-23 PROCEDURE — 6370000000 HC RX 637 (ALT 250 FOR IP): Performed by: PHYSICAL MEDICINE & REHABILITATION

## 2018-07-23 PROCEDURE — 1280000000 HC REHAB R&B

## 2018-07-23 PROCEDURE — 6360000002 HC RX W HCPCS: Performed by: PHYSICAL MEDICINE & REHABILITATION

## 2018-07-23 RX ADMIN — PANTOPRAZOLE SODIUM 40 MG: 40 TABLET, DELAYED RELEASE ORAL at 07:13

## 2018-07-23 RX ADMIN — SENNOSIDES 8.6 MG: 8.6 TABLET, FILM COATED ORAL at 20:32

## 2018-07-23 RX ADMIN — MOMETASONE FUROATE AND FORMOTEROL FUMARATE DIHYDRATE 2 PUFF: 200; 5 AEROSOL RESPIRATORY (INHALATION) at 08:36

## 2018-07-23 RX ADMIN — PETROLATUM: 42 OINTMENT TOPICAL at 20:32

## 2018-07-23 RX ADMIN — PREDNISONE 20 MG: 20 TABLET ORAL at 08:34

## 2018-07-23 RX ADMIN — DRONABINOL 2.5 MG: 2.5 CAPSULE ORAL at 08:34

## 2018-07-23 RX ADMIN — MOMETASONE FUROATE AND FORMOTEROL FUMARATE DIHYDRATE 2 PUFF: 200; 5 AEROSOL RESPIRATORY (INHALATION) at 20:32

## 2018-07-23 RX ADMIN — ACETAMINOPHEN 650 MG: 325 TABLET, FILM COATED ORAL at 08:34

## 2018-07-23 RX ADMIN — PETROLATUM: 42 OINTMENT TOPICAL at 08:36

## 2018-07-23 RX ADMIN — DRONABINOL 2.5 MG: 2.5 CAPSULE ORAL at 17:14

## 2018-07-23 RX ADMIN — ACETAMINOPHEN 650 MG: 325 TABLET, FILM COATED ORAL at 20:32

## 2018-07-23 RX ADMIN — GUAIFENESIN 400 MG: 400 TABLET ORAL at 08:34

## 2018-07-23 RX ADMIN — GUAIFENESIN 400 MG: 400 TABLET ORAL at 20:32

## 2018-07-23 ASSESSMENT — PAIN DESCRIPTION - PAIN TYPE
TYPE: CHRONIC PAIN
TYPE: CHRONIC PAIN

## 2018-07-23 ASSESSMENT — PAIN SCALES - GENERAL
PAINLEVEL_OUTOF10: 2
PAINLEVEL_OUTOF10: 0
PAINLEVEL_OUTOF10: 6
PAINLEVEL_OUTOF10: 0
PAINLEVEL_OUTOF10: 4

## 2018-07-23 ASSESSMENT — PAIN DESCRIPTION - DESCRIPTORS
DESCRIPTORS: ACHING;PRESSURE;OTHER (COMMENT)
DESCRIPTORS: ACHING;DISCOMFORT

## 2018-07-23 ASSESSMENT — PAIN DESCRIPTION - LOCATION
LOCATION: HEAD
LOCATION: BACK

## 2018-07-23 ASSESSMENT — PAIN DESCRIPTION - FREQUENCY: FREQUENCY: INTERMITTENT

## 2018-07-23 ASSESSMENT — PAIN DESCRIPTION - ORIENTATION: ORIENTATION: ANTERIOR

## 2018-07-23 NOTE — PROGRESS NOTES
Time out Tx Time Breakdown  Variance:   First Session  [phone] [x] Individual Tx- mins30 Concurrent Tx -  [] Co-Tx -   [] Group Tx -   [] Time Missed -     Second Session [id number] [] Individual Tx-   [x] Concurrent Tx -45  [] Co-Tx -   [] Group Tx -   [] Time Missed -     Third Session  [id number] [] Individual Tx-   [x] Concurrent Tx -15  -[]Co-Tx -   [] Group Tx -   [] Time Missed -         Total Mins: [address] [facility] ARMENDARIZ/L [id number]      I have read & agree with the above status.     Peter Osuna OTR/L [id number]

## 2018-07-23 NOTE — PROGRESS NOTES
Physical Therapy  Facility/Department: [facility]SE REHAB  Weekly Team Note  NAME: Forrest Blank  : 1945  MRN: [id number]    Date of Service: 2018  Evaluating Therapist: Sidney Hall DPT     ROOM: [address] [place] [address]  DIAGNOSIS: Critical Illness Polyneuropathy   PMH: To ED on 7/3/18 for SOB. Dx with Acute COPD exacerbation. Respiratory panel positive for parainfluenza virus. PMH includes COPD, GERD, Viral conjunctivitis, hx. Stomach ulcers, OA, HLD, renal calculi, protein caloric malnutrition, dysphagia  PRECAUTIONS: falls, monitor o2,       Pt lives with daughter in a townhouse style home with 4 stairs to enter and 1 rail (per chart). Per pt, 6 steps rose mary HR in back vs 1 step No HR in front to enter. 12 steps 1 HR to second floor however  Bed is on 1 floor and bath is on 1 floor if needed.  Pt ambulated with Ind PTA no device.  Equipment owned: none. Home o2 3 Liters HS.       Initial Evaluation  18 Short Term Goals Long Term Goals    Was pt agreeable to Eval/treatment? yes yes yes       Does pt have pain?  No pain  no c/o pain  No c/o pain       Bed Mobility  Rolling: sba  Supine to sit: sba  Sit to supine: sba  Scooting: sba Rolling: sba  Supine to sit: sba  Sit to supine: SBA  Scooting: sba Rolling Ind  Supine to sit Mod Ind  Sit to supine Mod Ind  Scooting Mod ind sup Independent       Transfers Sit to stand: cgA  Stand to sit: cgA  Stand pivot: Nadir with ww Sit to stand:SBA  Stand to sit: SBA  Stand pivot: SBA  Sit to stand Mod Ind  Stand to sit Mod ind  Stand pivot mod ind sba Mod I with AAD vs no AD   Ambulation    75 feet with sba with ww  150 ft with ww  feet x1 with ww Mod ind 150 feet with sup with AAD >300 feet with mod I with  AAD vs no AD              Wheel Chair Mobility NA  NT         Car Transfers Nadir SBA    Mod ind sup Mod I   Stair negotiation: ascended and descended  4 steps with rose mary rail with Nadir  4 steps w/ 2 rails CGA  12 steps 1 rail sideways approach SBA 8 steps

## 2018-07-23 NOTE — PROGRESS NOTES
Physical Therapy  Facility/Department: [address] REHAB  Daily Treatment Note  NAME: Praful Garcia  : 1945  MRN: [id number]    Date of Service: 2018  GAWV: [id number]  DIAGNOSIS: Critical Illness Polyneuropathy   PMH: To ED on 7/3/18 for SOB. Dx with Acute COPD exacerbation. Respiratory panel positive for parainfluenza virus. PMH includes COPD, GERD, Viral conjunctivitis, hx. Stomach ulcers, OA, HLD, renal calculi, protein caloric malnutrition, dysphagia  PRECAUTIONS: falls, monitor o2,       Pt lives with daughter in a townhouse style home with 4 stairs to enter and 1 rail (per chart). Per pt, 6 steps rose mary HR in back vs 1 step No HR in front to enter. 12 steps 1 HR to second floor however  Bed is on 1 floor and bath is on 1 floor if needed.  Pt ambulated with Ind PTA no device.  Equipment owned: none. Home o2 3 Liters HS.       Initial Evaluation  18 AM      PM Short Term Goals Long Term Goals    Was pt agreeable to Eval/treatment? yes yes yes       Does pt have pain? No pain  no c/o pain  No c/o pain       Bed Mobility  Rolling: sba  Supine to sit: sba  Sit to supine: sba  Scooting: sba Independent all aspects (hospital bed) Ind sup Independent       Transfers Sit to stand: cgA  Stand to sit: cgA  Stand pivot: Nadir with ww Sit to stand: Independent  Stand to sit:  Independent  Stand pivot: Modified Independent with [employer] Sit to stand Ind  Stand to sit Ind  Stand pivot Ind sba Mod I with AAD vs no AD   Ambulation    75 feet with sba with ww  300 feet x 2 rep with modified independent with [employer] N/T due to pt c/o lightheadedness and decreased  feet with sup with AAD >300 feet with mod I with  AAD vs no AD   Walking 10 feet on uneven surface 10' feet with ww with sba NT N/T       Wheel Chair Mobility NA  NT N/T       Car Transfers Nadir Modified Independent    N/T sup Mod I   Stair negotiation: ascended and descended  4 steps with rose mary rail with Nadir  12 steps with R HR x 1 rep with SBA (lateral ascent/descent)  N/T 8 steps with one rail with sba >12 steps with one rail with mod I   Curb Step:   ascended and descended 7.5 inch step with ww and Nadir NT   NT 7.5 inch step with AAD and sba 7.5 inch step with AAD vs NO AD and mod I   Picking up object off the floor NT (fearful of losing balance) NT  NT       BLE ROM WNL           BLE Strength 3+/5 bilateral LE           Balance  Sitting: sup  Standing: Nadir with ww Sitting Ind  Standing with ww Mod Ind  sitting Ind  Standing with ww Supervision       Date Family Teach Completed TBA N/T  N/T        Is additional Family Teaching Needed?  Y or N Y yes   yes       Hindering Progress endurance Endurance decreased strength  Endurance , decreased strength        PT recommended ELOS 10-14 days           Team's Discharge Plan             Therapist at Team Meeting               Therapeutic Exercise:   AM:knee bends 10x, step up s10x, amb without A.D. 75 feet x1,amb weave in/out of canes x2 reps  P.m. LAQs 2x15 , hip flexion 2x15 3# B LEs, hip abd BTB 2x15. Patient education  Pt educated to utilize call light for assistance from nursing staff with mobility    Patient response to education:   Pt verbalized understanding Pt demonstrated skill Pt requires further education in this area   yes partial yes     Additional Comments: Pt found sitting in w/c and agreed to tx. Pt amb with ww to bathroom. Pt amb back to w/c and brought to P.T. Gym. Pt tolerated tx well. Pt on 2 literas O2 throughout tx and pt's O2 sat 98% to 100 % during functional activities. Pt given green dot for in room. P.m. Pt reports of lightheadedness this p.m. Pt's BP checked in sitting 94/52. Pt stood and pt's BP checked 88/46. No amb this p.m. AM  Time in: 8:30  Time out: 9:15  P.m. Time In : 1430 - 1515    Pt is making good progress toward established Physical Therapy goals. Continue with physical therapy current plan of care.     Sai Hutchinson [id number]

## 2018-07-24 LAB
CREAT SERPL-MCNC: 0.6 MG/DL (ref 0.5–1)
GFR AFRICAN AMERICAN: >60
GFR NON-AFRICAN AMERICAN: >60 ML/MIN/1.73

## 2018-07-24 PROCEDURE — 97110 THERAPEUTIC EXERCISES: CPT

## 2018-07-24 PROCEDURE — 82565 ASSAY OF CREATININE: CPT

## 2018-07-24 PROCEDURE — 6370000000 HC RX 637 (ALT 250 FOR IP): Performed by: PHYSICAL MEDICINE & REHABILITATION

## 2018-07-24 PROCEDURE — 1280000000 HC REHAB R&B

## 2018-07-24 PROCEDURE — 97530 THERAPEUTIC ACTIVITIES: CPT

## 2018-07-24 PROCEDURE — 97535 SELF CARE MNGMENT TRAINING: CPT

## 2018-07-24 PROCEDURE — 6360000002 HC RX W HCPCS: Performed by: PHYSICAL MEDICINE & REHABILITATION

## 2018-07-24 PROCEDURE — 97112 NEUROMUSCULAR REEDUCATION: CPT

## 2018-07-24 PROCEDURE — 36415 COLL VENOUS BLD VENIPUNCTURE: CPT

## 2018-07-24 PROCEDURE — 2700000000 HC OXYGEN THERAPY PER DAY

## 2018-07-24 RX ADMIN — PETROLATUM: 42 OINTMENT TOPICAL at 22:07

## 2018-07-24 RX ADMIN — GUAIFENESIN 400 MG: 400 TABLET ORAL at 22:06

## 2018-07-24 RX ADMIN — GUAIFENESIN 400 MG: 400 TABLET ORAL at 08:07

## 2018-07-24 RX ADMIN — PETROLATUM: 42 OINTMENT TOPICAL at 08:07

## 2018-07-24 RX ADMIN — MOMETASONE FUROATE AND FORMOTEROL FUMARATE DIHYDRATE 2 PUFF: 200; 5 AEROSOL RESPIRATORY (INHALATION) at 22:06

## 2018-07-24 RX ADMIN — PANTOPRAZOLE SODIUM 40 MG: 40 TABLET, DELAYED RELEASE ORAL at 06:53

## 2018-07-24 RX ADMIN — DRONABINOL 2.5 MG: 2.5 CAPSULE ORAL at 16:40

## 2018-07-24 RX ADMIN — PREDNISONE 10 MG: 10 TABLET ORAL at 08:07

## 2018-07-24 RX ADMIN — SENNOSIDES 8.6 MG: 8.6 TABLET, FILM COATED ORAL at 22:06

## 2018-07-24 RX ADMIN — DRONABINOL 2.5 MG: 2.5 CAPSULE ORAL at 08:07

## 2018-07-24 RX ADMIN — MOMETASONE FUROATE AND FORMOTEROL FUMARATE DIHYDRATE 2 PUFF: 200; 5 AEROSOL RESPIRATORY (INHALATION) at 08:07

## 2018-07-24 ASSESSMENT — PAIN SCALES - GENERAL
PAINLEVEL_OUTOF10: 0

## 2018-07-24 NOTE — PROGRESS NOTES
on:7/20/18 the pt's daughter Antonette Aguilar is present for family ed. Discussed pt's current level of assist to complete ADL tasks, transfers and amb. Recommendations for 3in1 for first floor s/u Taye Murali states the stairs to the 2nd floor are very steep. )  The pt states she will sleep on the couch. When the daughter suggests a hosp bed may be appropriate on the first floor the pt becomes agitated and declares she does not want one. The pt will need an indwelling tub seat and removable safety rail to step in/out ( they have a tub shower with the glass doors) and they live in an apt. Information was provided on the [facility] and copies of a 3in1, indwelling tub seat and portable tub rail. The pt's did not want her daughters suggestions, opinions and aggressively stated this. She demanded that Antonette Aguilar wash her hands after touching the safety rail in the shower and would not allow Antonette Aguilar to touch her stating Juancarlos Maria is going to make me fall. \"  Jenakennethyahaira Johnsonmps very nervously when her mother would speak. Antonette Aguilar had to take her daughter to work and was unable to stay for Physical therapy. Odette Mchugh [facility] seth was notified of this / to reschedule Physical therapy family teach and abouth the unusual dynamics between the mother and daughter. Pain Level: 0/10    Additional Notes: Pt on 2LPM cont run NC during all act. No c/o SOB. 02 98% HR 71. Patient has made fair  progress during treatment sessions toward set goals. Therapy emphasis to obtain goals:to increase indep and safety to return home with her daughter and family. [x] Continue with current OT Plan of care.   [] Prepare for Discharge     DISCHARGE RECOMMENDATIONS  Recommended DME:  Liss Burrell for first floor s/u, indwelling tub seat with back, safety rail for shower  Post Discharge Care:   []Home Independently  []Home with 24hr Care / Supervision [x]Home with Partial Supervision []Home with Home Health OT []Home with Out Pt OT []Other: ___   Comments:         Time in Time out Tx Time

## 2018-07-24 NOTE — PROGRESS NOTES
Physical Therapy  Facility/Department: [address] REHAB  Daily Treatment Note  NAME: Sravan Blackwood  : 1945  MRN: [id number]    Date of Service: 2018  ROOM: [facility]-A  DIAGNOSIS: Critical Illness Polyneuropathy   PMH: To ED on 7/3/18 for SOB. Dx with Acute COPD exacerbation. Respiratory panel positive for parainfluenza virus. PMH includes COPD, GERD, Viral conjunctivitis, hx. Stomach ulcers, OA, HLD, renal calculi, protein caloric malnutrition, dysphagia  PRECAUTIONS: falls, monitor o2,       Pt lives with daughter in a townBath style home with 4 stairs to enter and 1 rail (per chart). Per pt, 6 steps rose mary HR in back vs 1 step No HR in front to enter. 12 steps 1 HR to second floor however  Bed is on 1 floor and bath is on 1 floor if needed.  Pt ambulated with Ind PTA no device.  Equipment owned: none. Home o2 3 Liters HS.       Initial Evaluation  18 AM      PM Short Term Goals Long Term Goals    Was pt agreeable to Eval/treatment? yes yes yes       Does pt have pain? No pain  no c/o pain  No c/o pain       Bed Mobility  Rolling: sba  Supine to sit: sba  Sit to supine: sba  Scooting: sba Independent all aspects  N/T sup Independent       Transfers Sit to stand: cgA  Stand to sit: cgA  Stand pivot: Nadir with ww Sit to stand: Independent  Stand to sit:  Independent  Stand pivot: Modified Independent with [employer] Sit to stand Ind  Stand to sit Ind  Stand pivot Mod Ind with ww sba Mod I with AAD vs no AD   Ambulation    75 feet with sba with ww  300 feet x 1 rep with modified independent with [employer] 300 feet x1 with ww Mod ind 150 feet with sup with AAD >300 feet with mod I with  AAD vs no AD   Walking 10 feet on uneven surface 10' feet with ww with sba NT 10 feet x2 reps with ww Mod Ind blue mat       Wheel Chair Mobility NA  NT N/t       Car Transfers Nadir Modified Independent    N/t sup Mod I   Stair negotiation: ascended and descended  4 steps with rose mary rail with Nadir  12 steps with R HR x 1 rep with Supervision 1430  Time Out; 1515    Pt is making good progress toward established Physical Therapy goals. Continue with physical therapy current plan of care.     Kris Nicholson [id number]

## 2018-07-24 NOTE — CARE COORDINATION
Per team meeting- patient will discharge on 7/25. Updated patient and daughter, Art Armstrong at bedside. LTG- Stand by Assist/ Independent.     CARSON- SW ordered a wheeled walker, 3-in-1 commode and extended tub bench through [address] [place] [address] made a referral to [facility] for RN, EVELYN, [facility],  Intern  Mindy Melendez  7/24/2018

## 2018-07-24 NOTE — PATIENT CARE CONFERENCE
discharge          Discharge Plan   Estimated Length of Stay: 7/25            Destination: home  Services at Discharge: homecare for OT, PT, nursing and an aide  Equipment at Discharge: wheeled walker, 3 in1 commode and extended tub bench      INTERDISCIPLINARY TEAM/PHYSICIAN RECOMMENDATION AND/OR REVISIONS OF PLAN OF CARE:  ck with pulmonary for inhaler clarification, med review prior to discharge      I approve the established interdisciplinary plan of care as documented within the medical record of Sravan Blackwood.  Please see team conference signature page for those in attendance.     Electronically signed by Thiago Wing RN  on 7/24/2018 at 8:43 AM

## 2018-07-24 NOTE — PROGRESS NOTES
weakness. Pain:  She reports no pain. Objective:  General Appearance: In no acute distress. Vital signs: (most recent): Blood pressure (!) 104/48, pulse 74, temperature 97.9 °F (36.6 °C), temperature source Oral, resp. rate 16, height 5' (1.524 m), weight 82 lb 8 oz (37.4 kg), SpO2 99 %. Vital signs are normal.    Output: Producing urine and producing stool. Lungs:  Normal effort and normal respiratory rate. There are decreased breath sounds. Heart: Normal rate. Regular rhythm. S1 normal and S2 normal.    Abdomen: Abdomen is soft. Bowel sounds are normal.   There is no abdominal tenderness. Extremities: Normal range of motion. Neurological: Patient is alert. (4/5 str). Assessment:    Condition: In stable condition. Improving. (Crit illness polyneuropathy). Plan:   Encourage ambulation. (Dinorah therapy well  Weaning steroids  Recent knee buckling  We will team today  Planning family teaching).        Radha King MD  7/24/2018

## 2018-07-25 VITALS
RESPIRATION RATE: 16 BRPM | TEMPERATURE: 97.9 F | HEIGHT: 60 IN | BODY MASS INDEX: 16.2 KG/M2 | HEART RATE: 67 BPM | WEIGHT: 82.5 LBS | SYSTOLIC BLOOD PRESSURE: 108 MMHG | OXYGEN SATURATION: 96 % | DIASTOLIC BLOOD PRESSURE: 59 MMHG

## 2018-07-25 PROCEDURE — 97530 THERAPEUTIC ACTIVITIES: CPT

## 2018-07-25 PROCEDURE — 6370000000 HC RX 637 (ALT 250 FOR IP): Performed by: PHYSICAL MEDICINE & REHABILITATION

## 2018-07-25 PROCEDURE — 2700000000 HC OXYGEN THERAPY PER DAY

## 2018-07-25 PROCEDURE — 6360000002 HC RX W HCPCS: Performed by: PHYSICAL MEDICINE & REHABILITATION

## 2018-07-25 PROCEDURE — 97535 SELF CARE MNGMENT TRAINING: CPT

## 2018-07-25 RX ORDER — PREDNISONE 10 MG/1
10 TABLET ORAL DAILY
Qty: 2 TABLET | Refills: 0 | Status: SHIPPED | OUTPATIENT
Start: 2018-07-25 | End: 2018-07-27

## 2018-07-25 RX ORDER — KETOTIFEN FUMARATE 0.35 MG/ML
1 SOLUTION/ DROPS OPHTHALMIC 2 TIMES DAILY PRN
Qty: 1 BOTTLE | Refills: 0 | Status: SHIPPED | OUTPATIENT
Start: 2018-07-25 | End: 2018-08-04

## 2018-07-25 RX ORDER — NICOTINE 21 MG/24HR
1 PATCH, TRANSDERMAL 24 HOURS TRANSDERMAL DAILY
Qty: 30 PATCH | Refills: 3 | Status: SHIPPED | OUTPATIENT
Start: 2018-07-25 | End: 2022-09-17

## 2018-07-25 RX ADMIN — GUAIFENESIN 400 MG: 400 TABLET ORAL at 08:28

## 2018-07-25 RX ADMIN — PANTOPRAZOLE SODIUM 40 MG: 40 TABLET, DELAYED RELEASE ORAL at 06:55

## 2018-07-25 RX ADMIN — SENNOSIDES 8.6 MG: 8.6 TABLET, FILM COATED ORAL at 08:29

## 2018-07-25 RX ADMIN — PREDNISONE 10 MG: 10 TABLET ORAL at 08:28

## 2018-07-25 RX ADMIN — ACETAMINOPHEN 650 MG: 325 TABLET, FILM COATED ORAL at 09:12

## 2018-07-25 RX ADMIN — DRONABINOL 2.5 MG: 2.5 CAPSULE ORAL at 08:29

## 2018-07-25 RX ADMIN — PETROLATUM: 42 OINTMENT TOPICAL at 10:03

## 2018-07-25 RX ADMIN — MOMETASONE FUROATE AND FORMOTEROL FUMARATE DIHYDRATE 2 PUFF: 200; 5 AEROSOL RESPIRATORY (INHALATION) at 08:31

## 2018-07-25 ASSESSMENT — PAIN SCALES - GENERAL
PAINLEVEL_OUTOF10: 3
PAINLEVEL_OUTOF10: 0

## 2018-07-25 ASSESSMENT — PAIN DESCRIPTION - FREQUENCY: FREQUENCY: INTERMITTENT

## 2018-07-25 ASSESSMENT — PAIN DESCRIPTION - ORIENTATION: ORIENTATION: ANTERIOR

## 2018-07-25 ASSESSMENT — PAIN DESCRIPTION - PAIN TYPE: TYPE: CHRONIC PAIN

## 2018-07-25 ASSESSMENT — PAIN DESCRIPTION - LOCATION: LOCATION: HEAD

## 2018-07-25 ASSESSMENT — PAIN DESCRIPTION - DESCRIPTORS: DESCRIPTORS: DISCOMFORT

## 2018-07-25 NOTE — PLAN OF CARE
Problem: Falls - Risk of:  Goal: Will remain free from falls  Will remain free from falls   Outcome: Met This Shift      Problem: Safety:  Goal: Free from accidental physical injury  Free from accidental physical injury   Outcome: Met This Shift    Goal: Free from intentional harm  Free from intentional harm   Outcome: Completed Date Met: 07/25/18      Problem: Daily Care:  Goal: Daily care needs are met  Daily care needs are met   Outcome: Met This Shift      Problem: Pain:  Goal: Patient's pain/discomfort is manageable  Patient's pain/discomfort is manageable   Outcome: Met This Shift      Problem: Skin Integrity:  Goal: Skin integrity will stabilize  Skin integrity will stabilize   Outcome: Met This Shift      Problem: Skin Integrity:  Goal: Absence of new skin breakdown  Absence of new skin breakdown   Outcome: Met This Shift      Problem: Gas Exchange - Impaired:  Goal: Levels of oxygenation will improve  Levels of oxygenation will improve   Outcome: Met This Shift

## 2018-07-25 NOTE — PROGRESS NOTES
Occupational Therapy  OCCUPATIONAL THERAPY DAILY NOTE /DISCHARGE SUMMARY    Date:2018  Patient Name: Dennis Martinez  MRN: [id number]  : 1945  Room: [facility]-A     Patient Active Problem List   Diagnosis    Acute respiratory failure with hypoxia (HCC)    Acute exacerbation of chronic obstructive pulmonary disease (COPD) ([facility]    Dyslipidemia, goal LDL below 130    Heartburn    Dysphagia    At risk for colon cancer    Parainfluenza    Severe protein-calorie malnutrition ([facility]    Encounter for palliative care    Critical illness polyneuropathy ([facility]     Diagnosis:acute respiratory hypoxia  Precautions: falls    Functional Assessment:   Date Status AE  Comments   Feeding 18  indep     Grooming 18 Mod I      Bathing 18 Mod I HH shower, shower bench    UB Dressing 18 Mod I     LB Dressing 18 Mod I     Homemaking 18 Remote sup w/w      Functional Transfers / Balance:   Date Status DME  Comments   Sit Balance 18 Mod I   All surfaces   Stand Balance 18 Mod I  During ADL tasks   [] Tub  [x] Shower   Transfer 18 Mod I Grab bar, shower chair To step in/ sup to step out. Commode   Transfer 18 Mod I 3in1    Functional   Mobility 18 Mod I w/w Household distances   Other: bed,kitchen chair, sofa, hosp bed, recliner     18 Mod I      All surfaces     Functional Exercises / Activity:    Sensory / Neuromuscular Re-Education:      Cognitive Skills:   Status Comments   Problem   Solving good During ADL tasks, transfers and amb   Memory good \"   Sequencing good \"   Safety good \"     Visual Perception:    Education:  Pt was educated on safety during STS from w/c    [x] Family teach completed on:18 the pt's daughter Constanza Mann is present for family ed. Discussed pt's current level of assist to complete ADL tasks, transfers and amb.   Recommendations for 3in1 for first floor s/u Simon Nixon states the stairs to the 2nd floor are very steep. )  The pt states she will sleep on the couch. When the daughter suggests a hosp bed may be appropriate on the first floor the pt becomes agitated and declares she does not want one. The pt will need an indwelling tub seat and removable safety rail to step in/out ( they have a tub shower with the glass doors) and they live in an apt. Information was provided on the [facility] and copies of a 3in1, indwelling tub seat and portable tub rail. The pt's did not want her daughters suggestions, opinions and aggressively stated this. She demanded that [facility] wash her hands after touching the safety rail in the shower and would not allow [facility] to touch her stating Camilla Johansen is going to make me fall. \"  Radha Constantinoet very nervously when her mother would speak. [facility] had to take her daughter to work and was unable to stay for Physical therapy. Odin ann was notified of this / to reschedule Physical therapy family teach and abouth the unusual dynamics between the mother and daughter. Pain Level: 0/10    Additional Notes:    Patient has made fair  progress during treatment sessions toward set goals. Therapy emphasis to obtain goals:to increase indep and safety to return home with her daughter and family. [] Continue with current OT Plan of care. [x] Prepare for Discharge  OCCUPATIONAL THERAPY DISCHARGE SUMMARY    Discontinue Occupational Therapy intervention. Pt has:   [x] met all set goals. [] made good progress toward set goals. [] has made slow progress toward goals and would benefit from rehab setting change.  [] had a medical decline and therefore was transferred off the Rehab unit.     Long term goals  Time Frame for Long term goals : 7-10 days   Long term goal 1: Perform grooming, UB drsg independently  Long term goal 2: Perform bathing/LB drsg with Mod I   Long term goal 3: Perform func trf bed, chair, toilet with mod I & DME as needed   Long term goal 4: Perform func trf to tub bottom with Sup  Long term goal 5: Perform light hmkg with Mod I   Long term goals 6: Perform B UE strengthening HEP indep with written instruction     The Patient has received education on:  [x]Transfer Safety []Compensatory tech for ADLs [x]AE training [x]DME training [x]Energy Conservation [x]Home / Kitchen Safety  [x]Fall Prevention  []Other:    Family training was completed: yes    Recommendations: home with assist as needed and DME as noted    [address] ARMENDARIZ/L      DISCHARGE RECOMMENDATIONS  Recommended DME:  Prudence Al for first floor s/u, indwelling tub seat with back, safety rail for shower  Post Discharge Care:   []Home Independently  []Home with 24hr Care / Supervision [x]Home with Partial Supervision []Home with Home Health OT []Home with Out Pt OT []Other: ___   Comments:         Time in Time out Tx Time Breakdown  Variance:   First Session  [id number] 800 [x] Individual Tx-45mins  oncurrent Tx -  [] Co-Tx -   [] Group Tx -   [] Time Missed -     Second Session   [] Individual Tx-   [] Concurrent Tx -  [] Co-Tx -   [] Group Tx -   [] Time Missed -     Third Session    [] Individual Tx-   [x] Concurrent Tx -15  -[]Co-Tx -   [] Group Tx -   [] Time Missed -         Total Mins: mins45   Ira Mendes ARMENDARIZ/L [id number]    I have read & agree with the above status.     Lizzie Kramer OTR/L [id number]

## 2018-07-25 NOTE — DISCHARGE SUMMARY
[address]                   [address] [facility],  [facility]                                 DISCHARGE SUMMARY    PATIENT NAME: Cherise Sabillon                        :        1945  MED REC NO:   [id number]                            ROOM:       5505  ACCOUNT NO:   [de-identified]                           ADMIT DATE: 2018  PROVIDER:     Anatoliy Schuler MD                  DISCHARGE DATE:      INTRODUCTION:  The patient was admitted to [facility] with respiratory  failure and was found to have a parainfluenza infection. She was  transferred to [facility] on 2018. She was on BiPAP and  gradually improved. She had high-dose steroids that were gradually weaned. She was transferred to acute rehab on 2018. HOSPITAL COURSE:  On admission, the patient was felt to be a good rehab  candidate. She had generalized weakness and some distal weakness and I  felt there was significant critical illness polyneuropathy. As we saw her  recovery, I think there might have been a steroid myopathy component as  well. She did make steady gains. She improved to the point that she was  functional at a walker level and was ready for discharge on 2018. Labs, CBC and metabolic profile were unremarkable. MEDICATIONS:  1. Marinol. 2.  [facility]. 3.  Nicoderm. 4.  Protonix. 5.  Prednisone which is being completed. DIAGNOSES:  1. Critical illness polyneuropathy. 2.  Steroid myopathy. 3.  Pulmonary debility. 4.  Severe COPD.  5.  Nicotine addiction. 6.  Malnutrition.         Marlee Neil MD    D: 2018 7:56:30       T: 2018 7:58:14     [id number]  Job#: [id number]     Doc#: [id number]

## 2018-07-25 NOTE — DISCHARGE INSTR - DIET

## 2018-07-25 NOTE — PLAN OF CARE
Problem: Falls - Risk of:  Goal: Will remain free from falls  Will remain free from falls   Outcome: Completed Date Met: 07/25/18    Goal: Absence of physical injury  Absence of physical injury   Outcome: Completed Date Met: 07/25/18      Problem: Safety:  Goal: Free from accidental physical injury  Free from accidental physical injury   Outcome: Completed Date Met: 07/25/18      Problem: Daily Care:  Goal: Daily care needs are met  Daily care needs are met   Outcome: Completed Date Met: 07/25/18      Problem: Pain:  Goal: Patient's pain/discomfort is manageable  Patient's pain/discomfort is manageable   Outcome: Completed Date Met: 07/25/18    Goal: Pain level will decrease  Pain level will decrease   Outcome: Completed Date Met: 07/25/18    Goal: Control of acute pain  Control of acute pain   Outcome: Completed Date Met: 07/25/18    Goal: Control of chronic pain  Control of chronic pain   Outcome: Completed Date Met: 07/25/18      Problem: Skin Integrity:  Goal: Skin integrity will stabilize  Skin integrity will stabilize   Outcome: Completed Date Met: 07/25/18      Problem: Discharge Planning:  Goal: Patients continuum of care needs are met  Patients continuum of care needs are met   Outcome: Completed Date Met: 07/25/18      Problem: Skin Integrity:  Goal: Will show no infection signs and symptoms  Will show no infection signs and symptoms   Outcome: Completed Date Met: 07/25/18    Goal: Absence of new skin breakdown  Absence of new skin breakdown   Outcome: Completed Date Met: 07/25/18      Problem: Gas Exchange - Impaired:  Goal: Levels of oxygenation will improve  Levels of oxygenation will improve   Outcome: Completed Date Met: 07/25/18

## 2018-07-26 ENCOUNTER — CARE COORDINATION (OUTPATIENT)
Dept: CASE MANAGEMENT | Age: 73
End: 2018-07-26

## 2018-07-26 DIAGNOSIS — J96.01 ACUTE RESPIRATORY FAILURE WITH HYPOXIA (HCC): Primary | ICD-10-CM

## 2018-07-26 PROCEDURE — 1111F DSCHRG MED/CURRENT MED MERGE: CPT | Performed by: INTERNAL MEDICINE

## 2018-07-26 NOTE — CARE COORDINATION
CTC called and spoke with Janes Hunt () at [facility] clinic (PCP) who scheduled TCM/HFU visit for patient tomorrow 7/27/18 at 1 pm which patient agrees with. States her daughter will provide transportation to f/u PCP appointment. Advised if for any reason she can;t keep appointment she is to call [facility] clinic to reschedule which she verbalizes understanding. Confirmed with patient she has [facility] clinic contact number. Denies any other complaints, concerns or needs at this time. Patient is receptive for Care Transition to continue following for Care Transition.      Follow Up  Future Appointments  Date Time Provider Miguelito Soni   7/27/2018 1:00 PM Waleska Harris MD [facility]   8/16/2018 8:30 AM Domingo Marquez MD [facility]   10/22/2018 8:00 AM Chu Schwartz MD [facility] Sean Brambila APRN

## 2018-07-30 ENCOUNTER — CARE COORDINATION (OUTPATIENT)
Dept: CASE MANAGEMENT | Age: 73
End: 2018-07-30

## 2018-07-30 NOTE — CONSULTS
Consults     7/13/18    72year old lady admitted to acute rehab  With severe COPD as well as recent exacerbation due to parainfluenza  URI and  at steady state at this time  Diffuse muscle weakness due to deconditioning, steroid myopathy as well as possible ICU polyneuropathy  A&O  VS stable  Distant heart and lung sounds  Plan: Continue same COPD meds            Will follow with uyou

## 2018-08-01 ENCOUNTER — OFFICE VISIT (OUTPATIENT)
Dept: INTERNAL MEDICINE | Age: 73
End: 2018-08-01
Payer: MEDICARE

## 2018-08-01 VITALS
RESPIRATION RATE: 20 BRPM | HEIGHT: 60 IN | OXYGEN SATURATION: 98 % | DIASTOLIC BLOOD PRESSURE: 70 MMHG | HEART RATE: 100 BPM | SYSTOLIC BLOOD PRESSURE: 100 MMHG | TEMPERATURE: 98.4 F | WEIGHT: 84.2 LBS | BODY MASS INDEX: 16.53 KG/M2

## 2018-08-01 DIAGNOSIS — Z91.81 AT HIGH RISK FOR FALLS: ICD-10-CM

## 2018-08-01 DIAGNOSIS — J44.1 ACUTE EXACERBATION OF CHRONIC OBSTRUCTIVE PULMONARY DISEASE (COPD) (HCC): Primary | Chronic | ICD-10-CM

## 2018-08-01 DIAGNOSIS — G62.81 CRITICAL ILLNESS POLYNEUROPATHY (HCC): ICD-10-CM

## 2018-08-01 PROCEDURE — 99213 OFFICE O/P EST LOW 20 MIN: CPT | Performed by: STUDENT IN AN ORGANIZED HEALTH CARE EDUCATION/TRAINING PROGRAM

## 2018-08-01 PROCEDURE — 99212 OFFICE O/P EST SF 10 MIN: CPT | Performed by: STUDENT IN AN ORGANIZED HEALTH CARE EDUCATION/TRAINING PROGRAM

## 2018-08-01 NOTE — PATIENT INSTRUCTIONS
Follow-up with Pulmonary Dr as scheduled   Return in as scheduled  with your Primary Care Dr. Inocente Bray taking medications as ordered

## 2018-08-01 NOTE — PROGRESS NOTES
Post-Discharge Transitional Care Management Services      Zahida Bauman   YOB: 1945    Date of Office Visit:  8/1/2018  Date of Hospital Admission: 7/12/18  Date of Hospital Discharge: 7/25/18  Risk of hospital readmission (high >=14%.  Medium >=10%) :Readmission Risk Score: 15    Care management risk score Rising risk (score 2-5) and Complex Care (Scores >=6): 2     Non face to face  following discharge, date last encounter closed (first attempt may have been earlier): 7/26/2018 10:49 AM    Call initiated 2 business days of discharge: Yes    Patient Active Problem List   Diagnosis    Acute respiratory failure with hypoxia ([facility]    Acute exacerbation of chronic obstructive pulmonary disease (COPD) ([facility]    Dyslipidemia, goal LDL below 130    Heartburn    Dysphagia    At risk for colon cancer    Parainfluenza    Severe protein-calorie malnutrition ([facility]    Encounter for palliative care    Critical illness polyneuropathy ([facility]       No Known Allergies    Medications listed as ordered at the time of discharge from hospital   Ric Bhatt Medication Instructions BLESSING:[id number]    Printed on:08/01/18 1105   Medication Information                      acetaminophen (TYLENOL) 325 MG tablet  Take 650 mg by mouth every 6 hours as needed for Pain             ketotifen (ZADITOR) 0.025 % ophthalmic solution  Place 1 drop into both eyes 2 times daily as needed (itching, irritation)             mometasone-formoterol (DULERA) 200-5 MCG/ACT inhaler  Inhale 2 puffs into the lungs 2 times daily             nicotine (NICODERM CQ) 14 MG/24HR  Place 1 patch onto the skin daily             nicotine (NICODERM CQ) 14 MG/24HR  Place 1 patch onto the skin daily             VENTOLIN  (90 Base) MCG/ACT inhaler  Inhale 90 mcg into the lungs as needed                   Medications marked \"taking\" at this time  Outpatient Prescriptions Marked as Taking for the 8/1/18 encounter (Office Visit) with Sofia Mendes Indra Fudge, DO   Medication Sig Dispense Refill    ketotifen (ZADITOR) 0.025 % ophthalmic solution Place 1 drop into both eyes 2 times daily as needed (itching, irritation) 1 Bottle 0    mometasone-formoterol (DULERA) 200-5 MCG/ACT inhaler Inhale 2 puffs into the lungs 2 times daily 1 Inhaler 0    nicotine (NICODERM CQ) 14 MG/24HR Place 1 patch onto the skin daily 30 patch 3    VENTOLIN  (90 Base) MCG/ACT inhaler Inhale 90 mcg into the lungs as needed      nicotine (NICODERM CQ) 14 MG/24HR Place 1 patch onto the skin daily 30 patch 0    acetaminophen (TYLENOL) 325 MG tablet Take 650 mg by mouth every 6 hours as needed for Pain          Medications patient taking as of now reconciled against medications ordered at time of hospital discharge: Yes    Chief Complaint   Patient presents with    Follow[facility] from [facility]     states had flu with complications       History of Present illness - Follow up of Hospital diagnosis(es): Ankit Le was discharged 1 week ago following hospitalization for COPD exacerbation infection along with critical illness polyneuropathy. At this point she states that she is doing quite well. She states that she is doing all of her ADLs and requires use a walker occasionally. She is using 2 L of oxygen at home. She states that she has follow-up on August 16 with her pulmonologist.    Inpatient course: Discharge summary reviewed- see chart. Interval history/Current status: Continuing to improve post discharge 1 week ago. Continues to require oxygen at home during the day. Vitals:    08/01/18 1032   BP: 100/70   Pulse: 100   Resp: 20   Temp: 98.4 °F (36.9 °C)   TempSrc: Oral   SpO2: 98%   Weight: 84 lb 3.2 oz (38.2 kg)   Height: 5' (1.524 m)     Body mass index is 16.44 kg/m².    Wt Readings from Last 3 Encounters:   08/01/18 84 lb 3.2 oz (38.2 kg)   07/12/18 82 lb 8 oz (37.4 kg)   06/20/18 80 lb 7 oz (36.5 kg)     BP Readings from Last 3 Encounters:   08/01/18 100/70   07/25/18 (!) 108/59

## 2018-08-01 NOTE — PROGRESS NOTES
Patient verbalized understanding of office instructions. She will call with questions or concerns. She was given discharge instructions, and Printed AVS  All questions were fully answered.  Daughter is with mother

## 2018-08-02 ENCOUNTER — CARE COORDINATION (OUTPATIENT)
Dept: CARE COORDINATION | Age: 73
End: 2018-08-02

## 2018-08-03 ENCOUNTER — TELEPHONE (OUTPATIENT)
Dept: INTERNAL MEDICINE | Age: 73
End: 2018-08-03

## 2018-08-09 ENCOUNTER — CARE COORDINATION (OUTPATIENT)
Dept: CARE COORDINATION | Age: 73
End: 2018-08-09

## 2018-08-16 ENCOUNTER — OFFICE VISIT (OUTPATIENT)
Dept: PULMONOLOGY | Age: 73
End: 2018-08-16
Payer: MEDICARE

## 2018-08-16 VITALS
BODY MASS INDEX: 17.43 KG/M2 | HEART RATE: 84 BPM | RESPIRATION RATE: 20 BRPM | HEIGHT: 60 IN | TEMPERATURE: 98.1 F | DIASTOLIC BLOOD PRESSURE: 58 MMHG | SYSTOLIC BLOOD PRESSURE: 124 MMHG | WEIGHT: 88.8 LBS | OXYGEN SATURATION: 96 %

## 2018-08-16 DIAGNOSIS — E43 SEVERE PROTEIN-CALORIE MALNUTRITION (HCC): ICD-10-CM

## 2018-08-16 DIAGNOSIS — J44.9 COPD, SEVERE (HCC): Primary | ICD-10-CM

## 2018-08-16 PROCEDURE — G8419 CALC BMI OUT NRM PARAM NOF/U: HCPCS | Performed by: INTERNAL MEDICINE

## 2018-08-16 PROCEDURE — G8926 SPIRO NO PERF OR DOC: HCPCS | Performed by: INTERNAL MEDICINE

## 2018-08-16 PROCEDURE — G8399 PT W/DXA RESULTS DOCUMENT: HCPCS | Performed by: INTERNAL MEDICINE

## 2018-08-16 PROCEDURE — 1036F TOBACCO NON-USER: CPT | Performed by: INTERNAL MEDICINE

## 2018-08-16 PROCEDURE — 3017F COLORECTAL CA SCREEN DOC REV: CPT | Performed by: INTERNAL MEDICINE

## 2018-08-16 PROCEDURE — 99213 OFFICE O/P EST LOW 20 MIN: CPT | Performed by: INTERNAL MEDICINE

## 2018-08-16 PROCEDURE — 1101F PT FALLS ASSESS-DOCD LE1/YR: CPT | Performed by: INTERNAL MEDICINE

## 2018-08-16 PROCEDURE — 1123F ACP DISCUSS/DSCN MKR DOCD: CPT | Performed by: INTERNAL MEDICINE

## 2018-08-16 PROCEDURE — 1111F DSCHRG MED/CURRENT MED MERGE: CPT | Performed by: INTERNAL MEDICINE

## 2018-08-16 PROCEDURE — 99214 OFFICE O/P EST MOD 30 MIN: CPT | Performed by: INTERNAL MEDICINE

## 2018-08-16 PROCEDURE — 3023F SPIROM DOC REV: CPT | Performed by: INTERNAL MEDICINE

## 2018-08-16 PROCEDURE — G8427 DOCREV CUR MEDS BY ELIG CLIN: HCPCS | Performed by: INTERNAL MEDICINE

## 2018-08-16 PROCEDURE — 1090F PRES/ABSN URINE INCON ASSESS: CPT | Performed by: INTERNAL MEDICINE

## 2018-08-16 PROCEDURE — 4040F PNEUMOC VAC/ADMIN/RCVD: CPT | Performed by: INTERNAL MEDICINE

## 2018-08-16 RX ORDER — MONTELUKAST SODIUM 10 MG/1
10 TABLET, FILM COATED ORAL NIGHTLY
Qty: 30 TABLET | Refills: 3
Start: 2018-08-16 | End: 2018-10-24 | Stop reason: ALTCHOICE

## 2018-08-16 NOTE — PROGRESS NOTES
Department of Internal Medicine  Division of Pulmonary, Critical Care & Sleep Medicine  Pulmonary [address] [facility]                                             Pulmonary Clinic Consult     I had the pleasure of seeing  Hank Card in the [facility] regarding their COPPD     Chief Complaint   Patient presents with    Follow-up     breathing improving     Shortness of Breath     using Oxygen       HISTORY OF PRESENT ILLNESS:    Hank Card is a 72y.o. year old  Who start smoking at age  18   And increase gradually ,start 1/2 pack and 2 pack was the max and now about 1 pack she smoke 53 year smoking and give her about 60 pack a year      The Patient comes in with SOB that has been going on  The last 3-5 years  Associated with  Morning cough with slight sputum and post nasal drainage ,She  states that it get worse with exercise or walking long distance and he can walk  1 mile And go 3 -4 flight of stairs before get short winded    She  has cough ,productive for slight Sputum and it is more in the morning        Has history of lung disease include,allergies ,pneumonia     She use Symbicort and albuterol and tylenol ,she Spiriva and make her weak     Sleep history :  Snoring yes  Feel tired during the day  Once in great while   Wake up fresh no   excessive daytime sleepiness,taje nap and it is refreshing   Had CT which shows emphysema     On O2 3 L at night      Today visit   She was admitted to the hospital with para-influenza and she she stayed in rehab for few weeks and then for [facility]   She is breathing much better and states that her signs has some drainage     She still smoke but cut back to few cigarettes     Use albuterol 3-4 times week     ALLERGIES:  No Known Allergies    PAST MEDICAL HISTORY:       Diagnosis Date    Abnormal Pap smear 1995    Patient states she had laser surgery    [facility]) [facility]     Emphysema of lung ,  Respiration:SOB:  ,JEFF :  Gastrointestinal:No GI bleed ,no melena  ,no hematemesis    Musculoskeletal: no joint pain ,no swelling  Neurological:no , syncope. Denies twitching, convulsions, loss of consciousness or memory. Endocrine:  . No history of goiter, exophthalmos or dryness of skin. The patient has no history of diabetes. Hematopoietic:  No history of bleeding disorders or easy bruising. Rheumatic:  No connective tissue disease history or polyarthritis/inflammatory joint disease. PHYSICAL EXAMINATION:  Vitals:    08/16/18 0836   BP: (!) 124/58   Pulse: 84   Resp: 20   Temp: 98.1 °F (36.7 °C)   SpO2: 96%     Constitutional: This is a well developed, well nourished 72y.o. year old female who is alert, oriented, cooperative and in no apparent distress. Head was normocephalic and atraumatic. EENT: Mallampati class :  Extraocular muscles intact. External canals are patent and no discharge was appreciated. Septum was midline,   mucosa was without erythema, exudates or cobblestoning. No thrush was noted. Neck: Supple without thyromegaly. No elevated JVP. Trachea was midline. No carotid bruits were auscultated. Respiratory: rhonchi    Cardiovascular: Regular without murmur, clicks, gallops or rubs. There is no left or right ventricular heave. Pulses:  Carotid, radial and femoral pulses were equally bilaterally. Abdomen: Slightly rounded and soft without organomegaly. No rebound, rigidity or guarding was appreciated. Lymphatic: No lymphadenopathy. Musculoskeletal: no edema     Extremities: no edema   Skin:  Warm and dry. Good color, turgor and pigmentation. No lesions or scars. Neurological/Psychiatric: The patient's general behavior, level of consciousness, thought content and emotional status is normal.  Cranial nerves II-XII are intact.       DATA:     FVC 1.81 which is 68 of predicted  FEV1 1.02 which is 49 of predicted    FEV1/FVC 56 which is 72 of predicted  Severe

## 2018-08-20 DIAGNOSIS — J96.01 ACUTE RESPIRATORY FAILURE WITH HYPOXIA (HCC): ICD-10-CM

## 2018-08-20 DIAGNOSIS — J44.1 ACUTE EXACERBATION OF CHRONIC OBSTRUCTIVE PULMONARY DISEASE (COPD) (HCC): Primary | Chronic | ICD-10-CM

## 2018-08-28 ENCOUNTER — TELEPHONE (OUTPATIENT)
Dept: PULMONOLOGY | Age: 73
End: 2018-08-28

## 2018-08-28 DIAGNOSIS — J44.1 ACUTE EXACERBATION OF CHRONIC OBSTRUCTIVE PULMONARY DISEASE (COPD) (HCC): Primary | Chronic | ICD-10-CM

## 2018-08-28 RX ORDER — FLUTICASONE FUROATE AND VILANTEROL 100; 25 UG/1; UG/1
1 POWDER RESPIRATORY (INHALATION) DAILY
Qty: 1 EACH | Refills: 5 | Status: SHIPPED | OUTPATIENT
Start: 2018-08-28 | End: 2018-10-24 | Stop reason: RX

## 2018-10-24 ENCOUNTER — OFFICE VISIT (OUTPATIENT)
Dept: INTERNAL MEDICINE | Age: 73
End: 2018-10-24
Payer: MEDICARE

## 2018-10-24 VITALS
DIASTOLIC BLOOD PRESSURE: 65 MMHG | TEMPERATURE: 98 F | SYSTOLIC BLOOD PRESSURE: 124 MMHG | HEIGHT: 60 IN | WEIGHT: 94 LBS | RESPIRATION RATE: 16 BRPM | HEART RATE: 79 BPM | BODY MASS INDEX: 18.46 KG/M2

## 2018-10-24 DIAGNOSIS — D64.9 ANEMIA, UNSPECIFIED TYPE: ICD-10-CM

## 2018-10-24 DIAGNOSIS — Z72.0 TOBACCO USE: ICD-10-CM

## 2018-10-24 DIAGNOSIS — R73.9 HYPERGLYCEMIA: ICD-10-CM

## 2018-10-24 DIAGNOSIS — J44.9 STAGE 2 MODERATE COPD BY GOLD CLASSIFICATION (HCC): ICD-10-CM

## 2018-10-24 DIAGNOSIS — J96.12 CHRONIC RESPIRATORY FAILURE WITH HYPOXIA AND HYPERCAPNIA (HCC): Primary | ICD-10-CM

## 2018-10-24 DIAGNOSIS — L65.9 HAIR LOSS: ICD-10-CM

## 2018-10-24 DIAGNOSIS — J96.11 CHRONIC RESPIRATORY FAILURE WITH HYPOXIA AND HYPERCAPNIA (HCC): Primary | ICD-10-CM

## 2018-10-24 PROCEDURE — 1123F ACP DISCUSS/DSCN MKR DOCD: CPT | Performed by: INTERNAL MEDICINE

## 2018-10-24 PROCEDURE — 99212 OFFICE O/P EST SF 10 MIN: CPT | Performed by: INTERNAL MEDICINE

## 2018-10-24 PROCEDURE — 1101F PT FALLS ASSESS-DOCD LE1/YR: CPT | Performed by: INTERNAL MEDICINE

## 2018-10-24 PROCEDURE — G8399 PT W/DXA RESULTS DOCUMENT: HCPCS | Performed by: INTERNAL MEDICINE

## 2018-10-24 PROCEDURE — 3023F SPIROM DOC REV: CPT | Performed by: INTERNAL MEDICINE

## 2018-10-24 PROCEDURE — 6360000002 HC RX W HCPCS

## 2018-10-24 PROCEDURE — 1036F TOBACCO NON-USER: CPT | Performed by: INTERNAL MEDICINE

## 2018-10-24 PROCEDURE — 99214 OFFICE O/P EST MOD 30 MIN: CPT | Performed by: INTERNAL MEDICINE

## 2018-10-24 PROCEDURE — 3017F COLORECTAL CA SCREEN DOC REV: CPT | Performed by: INTERNAL MEDICINE

## 2018-10-24 PROCEDURE — 90670 PCV13 VACCINE IM: CPT

## 2018-10-24 PROCEDURE — G8926 SPIRO NO PERF OR DOC: HCPCS | Performed by: INTERNAL MEDICINE

## 2018-10-24 PROCEDURE — 4040F PNEUMOC VAC/ADMIN/RCVD: CPT | Performed by: INTERNAL MEDICINE

## 2018-10-24 PROCEDURE — 1090F PRES/ABSN URINE INCON ASSESS: CPT | Performed by: INTERNAL MEDICINE

## 2018-10-24 PROCEDURE — G8484 FLU IMMUNIZE NO ADMIN: HCPCS | Performed by: INTERNAL MEDICINE

## 2018-10-24 PROCEDURE — G8419 CALC BMI OUT NRM PARAM NOF/U: HCPCS | Performed by: INTERNAL MEDICINE

## 2018-10-24 PROCEDURE — G8427 DOCREV CUR MEDS BY ELIG CLIN: HCPCS | Performed by: INTERNAL MEDICINE

## 2018-10-24 PROCEDURE — G0009 ADMIN PNEUMOCOCCAL VACCINE: HCPCS

## 2018-10-24 RX ORDER — KETOTIFEN FUMARATE 0.35 MG/ML
1 SOLUTION/ DROPS OPHTHALMIC 2 TIMES DAILY
COMMUNITY
End: 2018-10-24 | Stop reason: SDUPTHER

## 2018-10-24 RX ORDER — KETOTIFEN FUMARATE 0.35 MG/ML
1 SOLUTION/ DROPS OPHTHALMIC 2 TIMES DAILY
Qty: 1 BOTTLE | Refills: 2 | Status: SHIPPED | OUTPATIENT
Start: 2018-10-24 | End: 2022-09-17

## 2018-10-24 ASSESSMENT — ENCOUNTER SYMPTOMS
CONSTIPATION: 0
RHINORRHEA: 0
BACK PAIN: 0
SHORTNESS OF BREATH: 0
WHEEZING: 0
ABDOMINAL PAIN: 0
DIARRHEA: 0

## 2018-10-24 ASSESSMENT — PATIENT HEALTH QUESTIONNAIRE - PHQ9
SUM OF ALL RESPONSES TO PHQ QUESTIONS 1-9: 0
2. FEELING DOWN, DEPRESSED OR HOPELESS: 0
1. LITTLE INTEREST OR PLEASURE IN DOING THINGS: 0
SUM OF ALL RESPONSES TO PHQ QUESTIONS 1-9: 0
SUM OF ALL RESPONSES TO PHQ9 QUESTIONS 1 & 2: 0

## 2018-10-24 NOTE — PATIENT INSTRUCTIONS
Patient Education        Preventing Falls: Care Instructions  Your Care Instructions    Getting around your home safely can be a challenge if you have injuries or health problems that make it easy for you to fall. Loose rugs and furniture in walkways are among the dangers for many older people who have problems walking or who have poor eyesight. People who have conditions such as arthritis, osteoporosis, or dementia also have to be careful not to fall. You can make your home safer with a few simple measures. Follow-up care is a key part of your treatment and safety. Be sure to make and go to all appointments, and call your doctor if you are having problems. It's also a good idea to know your test results and keep a list of the medicines you take. How can you care for yourself at home? Taking care of yourself  · You may get dizzy if you do not drink enough water. To prevent dehydration, drink plenty of fluids, enough so that your urine is light yellow or clear like water. Choose water and other caffeine-free clear liquids. If you have kidney, heart, or liver disease and have to limit fluids, talk with your doctor before you increase the amount of fluids you drink. · Exercise regularly to improve your strength, muscle tone, and balance. Walk if you can. Swimming may be a good choice if you cannot walk easily. · Have your vision and hearing checked each year or any time you notice a change. If you have trouble seeing and hearing, you might not be able to avoid objects and could lose your balance. · Know the side effects of the medicines you take. Ask your doctor or pharmacist whether the medicines you take can affect your balance. Sleeping pills or sedatives can affect your balance. · Limit the amount of alcohol you drink. Alcohol can impair your balance and other senses. · Ask your doctor whether calluses or corns on your feet need to be removed.  If you wear loose-fitting shoes because of calluses or corns, you can lose your balance and fall. · Talk to your doctor if you have numbness in your feet. Preventing falls at home  · Remove raised doorway thresholds, throw rugs, and clutter. Repair loose carpet or raised areas in the floor. · Move furniture and electrical cords to keep them out of walking paths. · Use nonskid floor wax, and wipe up spills right away, especially on ceramic tile floors. · If you use a walker or cane, put rubber tips on it. If you use crutches, clean the bottoms of them regularly with an abrasive pad, such as steel wool. · Keep your house well lit, especially Jimmy Dash, and outside walkways. Use night-lights in areas such as hallways and bathrooms. Add extra light switches or use remote switches (such as switches that go on or off when you clap your hands) to make it easier to turn lights on if you have to get up during the night. · Install sturdy handrails on stairways. · Move items in your cabinets so that the things you use a lot are on the lower shelves (about waist level). · Keep a cordless phone and a flashlight with new batteries by your bed. If possible, put a phone in each of the main rooms of your house, or carry a cell phone in case you fall and cannot reach a phone. Or, you can wear a device around your neck or wrist. You push a button that sends a signal for help. · Wear low-heeled shoes that fit well and give your feet good support. Use footwear with nonskid soles. Check the heels and soles of your shoes for wear. Repair or replace worn heels or soles. · Do not wear socks without shoes on wood floors. · Walk on the grass when the sidewalks are slippery. If you live in an area that gets snow and ice in the winter, sprinkle salt on slippery steps and sidewalks. Preventing falls in the bath  · Install grab bars and nonskid mats inside and outside your shower or tub and near the toilet and sinks. · Use shower chairs and bath benches.   · Use a hand-held shower head that will allow you to sit while showering. · Get into a tub or shower by putting the weaker leg in first. Get out of a tub or shower with your strong side first.  · Repair loose toilet seats and consider installing a raised toilet seat to make getting on and off the toilet easier. · Keep your bathroom door unlocked while you are in the shower. Where can you learn more? Go to [email or web address] and sign in to your [employer] account. Enter [phone] in the [facility] box to learn more about \"Preventing Falls: Care Instructions. \"     If you do not have an account, please click on the \"Sign Up Now\" link. Current as of: May 12, 2017  Content Version: 11.7  © 4097-9869 [employer]. Care instructions adapted under license by [facility] ([facility]). If you have questions about a medical condition or this instruction, always ask your healthcare professional. [place] [address] any warranty or liability for your use of this information.      - cont home O2 as prescribed  - will check labs for next visit (CBC, HbgA1c)  - please keep following with Dr. Mercy Lane  - Pneumonia vaccine  - follow up in 6 months

## 2018-10-31 PROBLEM — J96.12 CHRONIC RESPIRATORY FAILURE WITH HYPOXIA AND HYPERCAPNIA (HCC): Status: ACTIVE | Noted: 2018-10-31

## 2018-10-31 PROBLEM — J44.9 STAGE 2 MODERATE COPD BY GOLD CLASSIFICATION (HCC): Status: ACTIVE | Noted: 2018-10-31

## 2018-10-31 PROBLEM — J96.11 CHRONIC RESPIRATORY FAILURE WITH HYPOXIA AND HYPERCAPNIA (HCC): Status: ACTIVE | Noted: 2018-10-31

## 2018-11-28 ENCOUNTER — TELEPHONE (OUTPATIENT)
Dept: PULMONOLOGY | Age: 73
End: 2018-11-28

## 2019-01-23 ENCOUNTER — TELEPHONE (OUTPATIENT)
Dept: INTERNAL MEDICINE | Age: 74
End: 2019-01-23

## 2019-01-25 ENCOUNTER — OFFICE VISIT (OUTPATIENT)
Dept: INTERNAL MEDICINE | Age: 74
End: 2019-01-25
Payer: MEDICARE

## 2019-01-25 VITALS
DIASTOLIC BLOOD PRESSURE: 60 MMHG | HEART RATE: 80 BPM | HEIGHT: 60 IN | RESPIRATION RATE: 16 BRPM | WEIGHT: 93 LBS | BODY MASS INDEX: 18.26 KG/M2 | TEMPERATURE: 98.1 F | SYSTOLIC BLOOD PRESSURE: 110 MMHG

## 2019-01-25 DIAGNOSIS — J96.01 ACUTE RESPIRATORY FAILURE WITH HYPOXIA (HCC): ICD-10-CM

## 2019-01-25 DIAGNOSIS — J44.1 ACUTE EXACERBATION OF CHRONIC OBSTRUCTIVE PULMONARY DISEASE (COPD) (HCC): Chronic | ICD-10-CM

## 2019-01-25 PROCEDURE — 4040F PNEUMOC VAC/ADMIN/RCVD: CPT | Performed by: INTERNAL MEDICINE

## 2019-01-25 PROCEDURE — G8399 PT W/DXA RESULTS DOCUMENT: HCPCS | Performed by: INTERNAL MEDICINE

## 2019-01-25 PROCEDURE — 1101F PT FALLS ASSESS-DOCD LE1/YR: CPT | Performed by: INTERNAL MEDICINE

## 2019-01-25 PROCEDURE — G8427 DOCREV CUR MEDS BY ELIG CLIN: HCPCS | Performed by: INTERNAL MEDICINE

## 2019-01-25 PROCEDURE — G8926 SPIRO NO PERF OR DOC: HCPCS | Performed by: INTERNAL MEDICINE

## 2019-01-25 PROCEDURE — 99212 OFFICE O/P EST SF 10 MIN: CPT | Performed by: INTERNAL MEDICINE

## 2019-01-25 PROCEDURE — 1123F ACP DISCUSS/DSCN MKR DOCD: CPT | Performed by: INTERNAL MEDICINE

## 2019-01-25 PROCEDURE — G8419 CALC BMI OUT NRM PARAM NOF/U: HCPCS | Performed by: INTERNAL MEDICINE

## 2019-01-25 PROCEDURE — 4004F PT TOBACCO SCREEN RCVD TLK: CPT | Performed by: INTERNAL MEDICINE

## 2019-01-25 PROCEDURE — G8484 FLU IMMUNIZE NO ADMIN: HCPCS | Performed by: INTERNAL MEDICINE

## 2019-01-25 PROCEDURE — 3023F SPIROM DOC REV: CPT | Performed by: INTERNAL MEDICINE

## 2019-01-25 PROCEDURE — 1090F PRES/ABSN URINE INCON ASSESS: CPT | Performed by: INTERNAL MEDICINE

## 2019-01-25 PROCEDURE — 3017F COLORECTAL CA SCREEN DOC REV: CPT | Performed by: INTERNAL MEDICINE

## 2019-01-25 RX ORDER — ALBUTEROL SULFATE 90 UG/1
0 AEROSOL, METERED RESPIRATORY (INHALATION) PRN
Qty: 1 INHALER | Refills: 1 | Status: SHIPPED | OUTPATIENT
Start: 2019-01-25 | End: 2019-02-22 | Stop reason: SDUPTHER

## 2019-02-04 ENCOUNTER — HOSPITAL ENCOUNTER (OUTPATIENT)
Dept: CT IMAGING | Age: 74
Discharge: HOME | End: 2019-02-06
Payer: MEDICARE

## 2019-02-04 ENCOUNTER — HOSPITAL ENCOUNTER (OUTPATIENT)
Age: 74
Discharge: HOME | End: 2019-02-04
Payer: MEDICARE

## 2019-02-04 DIAGNOSIS — L65.9 HAIR LOSS: ICD-10-CM

## 2019-02-04 DIAGNOSIS — D64.9 ANEMIA, UNSPECIFIED TYPE: ICD-10-CM

## 2019-02-04 DIAGNOSIS — J44.9 COPD, SEVERE (HCC): ICD-10-CM

## 2019-02-04 DIAGNOSIS — R73.9 HYPERGLYCEMIA: ICD-10-CM

## 2019-02-04 LAB
HBA1C MFR BLD: 5.5 % (ref 4–5.6)
HCT VFR BLD CALC: 38.2 % (ref 34–48)
HEMOGLOBIN: 12.3 G/DL (ref 11.5–15.5)
MCH RBC QN AUTO: 29.9 PG (ref 26–35)
MCHC RBC AUTO-ENTMCNC: 32.2 % (ref 32–34.5)
MCV RBC AUTO: 92.9 FL (ref 80–99.9)
PDW BLD-RTO: 14.4 FL (ref 11.5–15)
PLATELET # BLD: 318 E9/L (ref 130–450)
PMV BLD AUTO: 8.8 FL (ref 7–12)
RBC # BLD: 4.11 E12/L (ref 3.5–5.5)
TSH SERPL DL<=0.05 MIU/L-ACNC: 2.24 UIU/ML (ref 0.27–4.2)
WBC # BLD: 8.3 E9/L (ref 4.5–11.5)

## 2019-02-04 PROCEDURE — 83036 HEMOGLOBIN GLYCOSYLATED A1C: CPT

## 2019-02-04 PROCEDURE — 84443 ASSAY THYROID STIM HORMONE: CPT

## 2019-02-04 PROCEDURE — 36415 COLL VENOUS BLD VENIPUNCTURE: CPT

## 2019-02-04 PROCEDURE — 71250 CT THORAX DX C-: CPT

## 2019-02-04 PROCEDURE — 85027 COMPLETE CBC AUTOMATED: CPT

## 2019-02-12 ENCOUNTER — OFFICE VISIT (OUTPATIENT)
Dept: PULMONOLOGY | Age: 74
End: 2019-02-12
Payer: MEDICARE

## 2019-02-12 VITALS
DIASTOLIC BLOOD PRESSURE: 54 MMHG | BODY MASS INDEX: 19.16 KG/M2 | OXYGEN SATURATION: 93 % | RESPIRATION RATE: 18 BRPM | HEIGHT: 60 IN | TEMPERATURE: 97.9 F | HEART RATE: 89 BPM | WEIGHT: 97.6 LBS | SYSTOLIC BLOOD PRESSURE: 107 MMHG

## 2019-02-12 DIAGNOSIS — E43 SEVERE PROTEIN-CALORIE MALNUTRITION (HCC): ICD-10-CM

## 2019-02-12 DIAGNOSIS — J44.9 STAGE 2 MODERATE COPD BY GOLD CLASSIFICATION (HCC): ICD-10-CM

## 2019-02-12 PROCEDURE — G8484 FLU IMMUNIZE NO ADMIN: HCPCS | Performed by: INTERNAL MEDICINE

## 2019-02-12 PROCEDURE — 99214 OFFICE O/P EST MOD 30 MIN: CPT | Performed by: INTERNAL MEDICINE

## 2019-02-12 PROCEDURE — 1123F ACP DISCUSS/DSCN MKR DOCD: CPT | Performed by: INTERNAL MEDICINE

## 2019-02-12 PROCEDURE — 1090F PRES/ABSN URINE INCON ASSESS: CPT | Performed by: INTERNAL MEDICINE

## 2019-02-12 PROCEDURE — G8427 DOCREV CUR MEDS BY ELIG CLIN: HCPCS | Performed by: INTERNAL MEDICINE

## 2019-02-12 PROCEDURE — 99406 BEHAV CHNG SMOKING 3-10 MIN: CPT | Performed by: INTERNAL MEDICINE

## 2019-02-12 PROCEDURE — 99213 OFFICE O/P EST LOW 20 MIN: CPT | Performed by: INTERNAL MEDICINE

## 2019-02-12 PROCEDURE — 3023F SPIROM DOC REV: CPT | Performed by: INTERNAL MEDICINE

## 2019-02-12 PROCEDURE — 1101F PT FALLS ASSESS-DOCD LE1/YR: CPT | Performed by: INTERNAL MEDICINE

## 2019-02-12 PROCEDURE — G8926 SPIRO NO PERF OR DOC: HCPCS | Performed by: INTERNAL MEDICINE

## 2019-02-12 PROCEDURE — 4040F PNEUMOC VAC/ADMIN/RCVD: CPT | Performed by: INTERNAL MEDICINE

## 2019-02-12 PROCEDURE — G8399 PT W/DXA RESULTS DOCUMENT: HCPCS | Performed by: INTERNAL MEDICINE

## 2019-02-12 PROCEDURE — 4004F PT TOBACCO SCREEN RCVD TLK: CPT | Performed by: INTERNAL MEDICINE

## 2019-02-12 PROCEDURE — 3017F COLORECTAL CA SCREEN DOC REV: CPT | Performed by: INTERNAL MEDICINE

## 2019-02-12 PROCEDURE — G8420 CALC BMI NORM PARAMETERS: HCPCS | Performed by: INTERNAL MEDICINE

## 2019-02-12 RX ORDER — ALBUTEROL SULFATE 90 UG/1
2 AEROSOL, METERED RESPIRATORY (INHALATION) EVERY 4 HOURS PRN
Qty: 1 INHALER | Refills: 5 | Status: SHIPPED | OUTPATIENT
Start: 2019-02-12 | End: 2019-07-19 | Stop reason: SDUPTHER

## 2019-02-22 ENCOUNTER — OFFICE VISIT (OUTPATIENT)
Dept: INTERNAL MEDICINE | Age: 74
End: 2019-02-22
Payer: MEDICARE

## 2019-02-22 VITALS
WEIGHT: 96.6 LBS | DIASTOLIC BLOOD PRESSURE: 71 MMHG | HEIGHT: 60 IN | TEMPERATURE: 98.1 F | HEART RATE: 67 BPM | RESPIRATION RATE: 16 BRPM | OXYGEN SATURATION: 96 % | BODY MASS INDEX: 18.97 KG/M2 | SYSTOLIC BLOOD PRESSURE: 118 MMHG

## 2019-02-22 DIAGNOSIS — J96.11 CHRONIC RESPIRATORY FAILURE WITH HYPOXIA AND HYPERCAPNIA (HCC): ICD-10-CM

## 2019-02-22 DIAGNOSIS — J96.12 CHRONIC RESPIRATORY FAILURE WITH HYPOXIA AND HYPERCAPNIA (HCC): ICD-10-CM

## 2019-02-22 DIAGNOSIS — J44.9 STAGE 2 MODERATE COPD BY GOLD CLASSIFICATION (HCC): Primary | ICD-10-CM

## 2019-02-22 PROCEDURE — 3023F SPIROM DOC REV: CPT | Performed by: INTERNAL MEDICINE

## 2019-02-22 PROCEDURE — 4040F PNEUMOC VAC/ADMIN/RCVD: CPT | Performed by: INTERNAL MEDICINE

## 2019-02-22 PROCEDURE — 1101F PT FALLS ASSESS-DOCD LE1/YR: CPT | Performed by: INTERNAL MEDICINE

## 2019-02-22 PROCEDURE — G8926 SPIRO NO PERF OR DOC: HCPCS | Performed by: INTERNAL MEDICINE

## 2019-02-22 PROCEDURE — 1123F ACP DISCUSS/DSCN MKR DOCD: CPT | Performed by: INTERNAL MEDICINE

## 2019-02-22 PROCEDURE — G8420 CALC BMI NORM PARAMETERS: HCPCS | Performed by: INTERNAL MEDICINE

## 2019-02-22 PROCEDURE — G8482 FLU IMMUNIZE ORDER/ADMIN: HCPCS | Performed by: INTERNAL MEDICINE

## 2019-02-22 PROCEDURE — 3017F COLORECTAL CA SCREEN DOC REV: CPT | Performed by: INTERNAL MEDICINE

## 2019-02-22 PROCEDURE — G8399 PT W/DXA RESULTS DOCUMENT: HCPCS | Performed by: INTERNAL MEDICINE

## 2019-02-22 PROCEDURE — 99213 OFFICE O/P EST LOW 20 MIN: CPT | Performed by: INTERNAL MEDICINE

## 2019-02-22 PROCEDURE — 1090F PRES/ABSN URINE INCON ASSESS: CPT | Performed by: INTERNAL MEDICINE

## 2019-02-22 PROCEDURE — 4004F PT TOBACCO SCREEN RCVD TLK: CPT | Performed by: INTERNAL MEDICINE

## 2019-02-22 PROCEDURE — G8427 DOCREV CUR MEDS BY ELIG CLIN: HCPCS | Performed by: INTERNAL MEDICINE

## 2019-02-22 PROCEDURE — 99212 OFFICE O/P EST SF 10 MIN: CPT | Performed by: INTERNAL MEDICINE

## 2019-02-22 ASSESSMENT — ENCOUNTER SYMPTOMS
ABDOMINAL PAIN: 0
WHEEZING: 0
BACK PAIN: 0
SHORTNESS OF BREATH: 0
SORE THROAT: 0
CONSTIPATION: 0

## 2019-02-22 ASSESSMENT — PATIENT HEALTH QUESTIONNAIRE - PHQ9
SUM OF ALL RESPONSES TO PHQ QUESTIONS 1-9: 0
SUM OF ALL RESPONSES TO PHQ QUESTIONS 1-9: 0
2. FEELING DOWN, DEPRESSED OR HOPELESS: 0
1. LITTLE INTEREST OR PLEASURE IN DOING THINGS: 0
SUM OF ALL RESPONSES TO PHQ9 QUESTIONS 1 & 2: 0

## 2019-04-17 ENCOUNTER — TELEPHONE (OUTPATIENT)
Dept: PULMONOLOGY | Age: 74
End: 2019-04-17

## 2019-05-23 ENCOUNTER — OFFICE VISIT (OUTPATIENT)
Dept: INTERNAL MEDICINE | Age: 74
End: 2019-05-23
Payer: MEDICARE

## 2019-05-23 VITALS
HEIGHT: 60 IN | HEART RATE: 82 BPM | TEMPERATURE: 97.6 F | SYSTOLIC BLOOD PRESSURE: 126 MMHG | DIASTOLIC BLOOD PRESSURE: 64 MMHG | BODY MASS INDEX: 18.26 KG/M2 | RESPIRATION RATE: 12 BRPM | WEIGHT: 93 LBS

## 2019-05-23 DIAGNOSIS — R91.1 LUNG NODULE: ICD-10-CM

## 2019-05-23 DIAGNOSIS — Z00.00 ROUTINE GENERAL MEDICAL EXAMINATION AT A HEALTH CARE FACILITY: Primary | ICD-10-CM

## 2019-05-23 DIAGNOSIS — J96.12 CHRONIC RESPIRATORY FAILURE WITH HYPOXIA AND HYPERCAPNIA (HCC): ICD-10-CM

## 2019-05-23 DIAGNOSIS — Z72.0 TOBACCO USE: ICD-10-CM

## 2019-05-23 DIAGNOSIS — J96.11 CHRONIC RESPIRATORY FAILURE WITH HYPOXIA AND HYPERCAPNIA (HCC): ICD-10-CM

## 2019-05-23 PROCEDURE — 1123F ACP DISCUSS/DSCN MKR DOCD: CPT | Performed by: INTERNAL MEDICINE

## 2019-05-23 PROCEDURE — 4040F PNEUMOC VAC/ADMIN/RCVD: CPT | Performed by: INTERNAL MEDICINE

## 2019-05-23 PROCEDURE — G0438 PPPS, INITIAL VISIT: HCPCS | Performed by: INTERNAL MEDICINE

## 2019-05-23 PROCEDURE — 99212 OFFICE O/P EST SF 10 MIN: CPT | Performed by: INTERNAL MEDICINE

## 2019-05-23 PROCEDURE — 3017F COLORECTAL CA SCREEN DOC REV: CPT | Performed by: INTERNAL MEDICINE

## 2019-05-23 ASSESSMENT — LIFESTYLE VARIABLES: HOW OFTEN DO YOU HAVE A DRINK CONTAINING ALCOHOL: 0

## 2019-05-23 ASSESSMENT — ANXIETY QUESTIONNAIRES: GAD7 TOTAL SCORE: 5

## 2019-05-23 ASSESSMENT — PATIENT HEALTH QUESTIONNAIRE - PHQ9
SUM OF ALL RESPONSES TO PHQ QUESTIONS 1-9: 0
SUM OF ALL RESPONSES TO PHQ QUESTIONS 1-9: 0

## 2019-05-23 NOTE — PROGRESS NOTES
Medicare Annual Wellness Visit  Name: Carole Chavarria Date: 2019   MRN: [id number] Sex: Female   Age: 72 y.o. Ethnicity: Non-/Non    : 1945 Race: Phi Harvey is here for Medicare AWV    Screenings for behavioral, psychosocial and functional/safety risks, and cognitive dysfunction are all negative except as indicated below. These results, as well as other patient data from the [address] form, are documented in Flowsheets linked to this Encounter. No Known Allergies  Prior to Visit Medications    Medication Sig Taking?  Authorizing Provider   Umeclidinium Bromide (INCRUSE ELLIPTA) 62.5 MCG/INH AEPB Inhale 1 puff into the lungs daily Yes Susan Garduno MD   albuterol sulfate HFA (VENTOLIN HFA) 108 (90 Base) MCG/ACT inhaler Inhale 2 puffs into the lungs every 4 hours as needed for Wheezing Yes Raf Lucas MD   acetaminophen (TYLENOL) 325 MG tablet Take 650 mg by mouth every 6 hours as needed for Pain Yes Historical Provider, MD   ketotifen (ZADITOR) 0.025 % ophthalmic solution Place 1 drop into both eyes 2 times daily  Jimy Robertson MD   nicotine (NICODERM CQ) 14 MG/24HR Place 1 patch onto the skin daily  Paddy Abarca MD   nicotine (NICODERM CQ) 14 MG/24HR Place 1 patch onto the skin daily  Rashad Tuttle MD     Past Medical History:   Diagnosis Date    Abnormal Pap smear     Patient states she had laser surgery    [facility]     Emphysema of lung ([facility]     Emphysema/COPD ([facility]     GERD (gastroesophageal reflux disease)     History of stomach ulcers     Hyperlipidemia     DIET    Osteoarthritis     Renal calculi     APX 40 YEARS AGO     Past Surgical History:   Procedure Laterality Date    APPENDECTOMY      15 y.o.    CATARACT REMOVAL WITH IMPLANT Bilateral dec 2017, 2018    COLONOSCOPY  10/17/2017    normal colon--opal    ENDOMETRIAL BIOPSY      TONSILLECTOMY      UPPER GASTROINTESTINAL ENDOSCOPY have been reviewed and are found in [facility]. The following problems were reviewed today and where indicated follow up appointments were made and/or referrals ordered. Positive Risk Factor Screenings with Interventions:     Substance Abuse:  Social History     Tobacco History     Smoking Status  Current Some Day Smoker Smoking Frequency  0.1 packs/day for 51 years (5.1 pk yrs) Smoking Tobacco Type  Cigarettes    Smokeless Tobacco Use  Never Used          Alcohol History     Alcohol Use Status  No          Drug Use     Drug Use Status  No          Sexual Activity     Sexually Active  Never               Audit Questionnaire: Screen for Alcohol Misuse  How often do you have a drink containing alcohol?: Never  Substance Abuse Interventions:  · none    General Health:  General  In general, how would you say your health is?: Good  In the past 7 days, have you experienced any of the following? New or Increased Pain, New or Increased Fatigue, Loneliness, Social Isolation, Stress or Anger?: (!) Anger  Do you get the social and emotional support that you need?: (!) No  Do you have a Living Will?: (!) No  General Health Risk Interventions:  · none    Health Habits/Nutrition:  Health Habits/Nutrition  Do you exercise for at least 20 minutes 2-3 times per week?: (!) No  Have you lost any weight without trying in the past 3 months?: No  Do you eat fewer than 2 meals per day?: No  Have you seen a dentist within the past year?: (!) No  Body mass index is 18.16 kg/m².   Health Habits/Nutrition Interventions:  · none    Safety:  Safety  Do you have working smoke detectors?: Yes  Have all throw rugs been removed or fastened?: Yes  Do you have non-slip mats in all bathtubs?: (!) No  Do all of your stairways have a railing or banister?: Yes  Are your doorways, halls and stairs free of clutter?: Yes  Do you always fasten your seatbelt when you are in a car?: Yes  Safety Interventions:  · Home safety tips provided    Personalized Preventive Plan   Current Health Maintenance Status  Immunization History   Administered Date(s) Administered    Influenza Virus Vaccine 10/10/2012, 10/16/2013    Influenza, High Dose (Fluzone 65 yrs and older) 09/10/2018    Pneumococcal 13-valent Conjugate ([id number]) 10/24/2018    Pneumococcal Polysaccharide ([id number]) 05/15/2012, 06/07/2017        Health Maintenance   Topic Date Due    Flu vaccine  Completed    Pneumococcal 65+ years Vaccine  Completed     Recommendations for Preventive Services Due: see orders and patient instructions/AVS.  .   Recommended screening schedule for the next 5-10 years is provided to the patient in written form: see Patient Instructions/AVS.     ASSESSMENT/PLAN:  Sever COPD  - O2 dependent, 2 L  - Incruse and Ventolin  - follows with Dr. Minh Hernandez nodule  - high risk patient  - CT chest in 3 months as per Pulm    Tobacco abuse  - still trying to quit  - tried Nicotine products in the past   - not sure is she can afford Chantix

## 2019-05-23 NOTE — PATIENT INSTRUCTIONS
Personalized Preventive Plan for Brett Escobedo - 5/23/2019  Medicare offers a range of preventive health benefits. Some of the tests and screenings are paid in full while other may be subject to a deductible, co-insurance, and/or copay. Some of these benefits include a comprehensive review of your medical history including lifestyle, illnesses that may run in your family, and various assessments and screenings as appropriate. After reviewing your medical record and screening and assessments performed today your provider may have ordered immunizations, labs, imaging, and/or referrals for you. A list of these orders (if applicable) as well as your Preventive Care list are included within your After Visit Summary for your review. Other Preventive Recommendations:    · A preventive eye exam performed by an eye specialist is recommended every 1-2 years to screen for glaucoma; cataracts, macular degeneration, and other eye disorders. · A preventive dental visit is recommended every 6 months. · Try to get at least 150 minutes of exercise per week or 10,000 steps per day on a pedometer . · Order or download the FREE \"Exercise & Physical Activity: Your Everyday Guide\" from [employer]. Call 2-708.571.5718 or search [employer] online. · You need 3544-2053 mg of calcium and 5155-9943 IU of vitamin D per day. It is possible to meet your calcium requirement with diet alone, but a vitamin D supplement is usually necessary to meet this goal.  · When exposed to the sun, use a sunscreen that protects against both UVA and UVB radiation with an SPF of 30 or greater. Reapply every 2 to 3 hours or after sweating, drying off with a towel, or swimming. · Always wear a seat belt when traveling in a car. Always wear a helmet when riding a bicycle or motorcycle.     - will follow up in 4 months

## 2019-05-23 NOTE — PROGRESS NOTES
[facility]  Internal Medicine Clinic    Attending Physician Statement:  Lenora Nascimento. Dion Marr M.D., F.A.C.P. I have discussed the case, including pertinent history and exam findings with the resident. I have seen and examined the patient and the key elements of the encounter have been performed by me. I agree with the assessment, plan and orders as documented by the resident. Patient here for Annual Wellness Examination    No acute complaints   Unremarkable cognitive exam   No signs of COPD exacerbation on exam    Tolerating all medications   Following with pulmonary and Dr. Gianfranco Madsen for Repeat CT of chest due to JOANIE nodule and scarring with endobronchial thickening on prior CT of chest    Smoking cessation counseling completed independently and reinforced     Annual Wellness Examination   Discussed healthcare maintenance- preventative measures     Tobacco Use   Ongoing in nature   Smoking Cessation counseling continued    Patient remains pre-contemplative on additional options including re-trial of nicotine replacement therapy     Severe COPD    No signs of acute exacerbation on exam   Significantly reduced breath sounds secondary to disease     JOANIE Nodule   Repeat CT scan planned for 6/6    Remainder of medical problems as per resident note.

## 2019-06-06 ENCOUNTER — HOSPITAL ENCOUNTER (OUTPATIENT)
Dept: CT IMAGING | Age: 74
Discharge: HOME | End: 2019-06-08
Payer: MEDICARE

## 2019-06-06 DIAGNOSIS — J44.9 STAGE 2 MODERATE COPD BY GOLD CLASSIFICATION (HCC): ICD-10-CM

## 2019-06-06 PROCEDURE — 71250 CT THORAX DX C-: CPT

## 2019-06-13 ENCOUNTER — OFFICE VISIT (OUTPATIENT)
Dept: PULMONOLOGY | Age: 74
End: 2019-06-13
Payer: MEDICARE

## 2019-06-13 VITALS
HEART RATE: 85 BPM | OXYGEN SATURATION: 98 % | BODY MASS INDEX: 18.06 KG/M2 | HEIGHT: 60 IN | SYSTOLIC BLOOD PRESSURE: 113 MMHG | WEIGHT: 92 LBS | TEMPERATURE: 98.3 F | RESPIRATION RATE: 18 BRPM | DIASTOLIC BLOOD PRESSURE: 57 MMHG

## 2019-06-13 DIAGNOSIS — J44.9 STAGE 2 MODERATE COPD BY GOLD CLASSIFICATION (HCC): ICD-10-CM

## 2019-06-13 DIAGNOSIS — J96.12 CHRONIC RESPIRATORY FAILURE WITH HYPOXIA AND HYPERCAPNIA (HCC): ICD-10-CM

## 2019-06-13 DIAGNOSIS — J96.11 CHRONIC RESPIRATORY FAILURE WITH HYPOXIA AND HYPERCAPNIA (HCC): ICD-10-CM

## 2019-06-13 PROCEDURE — 99406 BEHAV CHNG SMOKING 3-10 MIN: CPT | Performed by: INTERNAL MEDICINE

## 2019-06-13 PROCEDURE — 3017F COLORECTAL CA SCREEN DOC REV: CPT | Performed by: INTERNAL MEDICINE

## 2019-06-13 PROCEDURE — 4004F PT TOBACCO SCREEN RCVD TLK: CPT | Performed by: INTERNAL MEDICINE

## 2019-06-13 PROCEDURE — 3023F SPIROM DOC REV: CPT | Performed by: INTERNAL MEDICINE

## 2019-06-13 PROCEDURE — G8926 SPIRO NO PERF OR DOC: HCPCS | Performed by: INTERNAL MEDICINE

## 2019-06-13 PROCEDURE — 99214 OFFICE O/P EST MOD 30 MIN: CPT | Performed by: INTERNAL MEDICINE

## 2019-06-13 PROCEDURE — 4040F PNEUMOC VAC/ADMIN/RCVD: CPT | Performed by: INTERNAL MEDICINE

## 2019-06-13 PROCEDURE — 1123F ACP DISCUSS/DSCN MKR DOCD: CPT | Performed by: INTERNAL MEDICINE

## 2019-06-13 PROCEDURE — G8399 PT W/DXA RESULTS DOCUMENT: HCPCS | Performed by: INTERNAL MEDICINE

## 2019-06-13 PROCEDURE — 1090F PRES/ABSN URINE INCON ASSESS: CPT | Performed by: INTERNAL MEDICINE

## 2019-06-13 PROCEDURE — G8419 CALC BMI OUT NRM PARAM NOF/U: HCPCS | Performed by: INTERNAL MEDICINE

## 2019-06-13 PROCEDURE — 99213 OFFICE O/P EST LOW 20 MIN: CPT | Performed by: INTERNAL MEDICINE

## 2019-06-13 PROCEDURE — G8427 DOCREV CUR MEDS BY ELIG CLIN: HCPCS | Performed by: INTERNAL MEDICINE

## 2019-06-13 RX ORDER — CETIRIZINE HYDROCHLORIDE 10 MG/1
10 TABLET ORAL DAILY
Qty: 30 TABLET | Refills: 0 | Status: SHIPPED | OUTPATIENT
Start: 2019-06-13 | End: 2019-07-13

## 2019-06-13 NOTE — PROGRESS NOTES
Department of Internal Medicine  Division of Pulmonary, Critical Care & Sleep Medicine  Pulmonary [address] [facility]                                             Pulmonary Clinic Consult     I had the pleasure of seeing  Chang Wood in the [address] [facility] regarding their COPPD     Chief Complaint   Patient presents with    Follow-up     4 months, no complaints    Results     CT Scan       HISTORY OF PRESENT ILLNESS:    Chang Wood is a 72y.o. year old  Who start smoking at age  18   And increase gradually ,start 1/2 pack and 2 pack was the max and now about 1 pack she smoke 53 year smoking and give her about 60 pack a year      The Patient comes in with SOB that has been going on  The last 3-5 years  Associated with  Morning cough with slight sputum and post nasal drainage ,She  states that it get worse with exercise or walking long distance and he can walk  1 mile And go 3 -4 flight of stairs before get short winded    She  has cough ,productive for slight Sputum and it is more in the morning        Has history of lung disease include,allergies ,pneumonia     She use Symbicort and albuterol and tylenol ,she Spiriva and make her weak     Sleep history :  Snoring yes  Feel tired during the day  Once in great while   Wake up fresh no   excessive daytime sleepiness,taje nap and it is refreshing   Had CT which shows emphysema     On O2 3 L at night      Today visit     She is breathing much better and states that her signs has some sinus issues   She still smoke 10 cigarettes     Use albuterol 3-4 times week  and she use Incruse   No longer Dulera   Still smoker  Use O2 most of the time  but some she does not     ALLERGIES:  No Known Allergies    PAST MEDICAL HISTORY:       Diagnosis Date    Abnormal Pap smear 1995    Patient states she had laser surgery    COPD (chronic obstructive pulmonary disease) ([address]     Emphysema of lung ([address]     Emphysema/COPD ([address]     GERD (gastroesophageal reflux disease)     History of stomach ulcers     Hyperlipidemia     DIET    Osteoarthritis     Renal calculi     APX 40 YEARS AGO       MEDICATIONS:   Current Outpatient Medications   Medication Sig Dispense Refill    Umeclidinium Bromide (INCRUSE ELLIPTA) 62.5 MCG/INH AEPB Inhale 1 puff into the lungs daily 3 each 5    albuterol sulfate HFA (VENTOLIN HFA) 108 (90 Base) MCG/ACT inhaler Inhale 2 puffs into the lungs every 4 hours as needed for Wheezing 1 Inhaler 5    acetaminophen (TYLENOL) 325 MG tablet Take 650 mg by mouth every 6 hours as needed for Pain      ketotifen (ZADITOR) 0.025 % ophthalmic solution Place 1 drop into both eyes 2 times daily 1 Bottle 2    nicotine (NICODERM CQ) 14 MG/24HR Place 1 patch onto the skin daily 30 patch 3    nicotine (NICODERM CQ) 14 MG/24HR Place 1 patch onto the skin daily 30 patch 0     No current facility-administered medications for this visit. SOCIAL AND OCCUPATIONAL HEALTH:  Social History     Tobacco Use   Smoking Status Current Some Day Smoker    Packs/day: 0.50    Years: 51.00    Pack years: 25.50    Types: Cigarettes   Smokeless Tobacco Never Used     TB :no   Pets  Cats   Industrial exposure:   Birds :    SURGICAL HISTORY:   Past Surgical History:   Procedure Laterality Date    APPENDECTOMY      15 y.o.    CATARACT REMOVAL WITH IMPLANT Bilateral dec 2017, jan 2018    COLONOSCOPY  10/17/2017    normal colon--opal    ENDOMETRIAL BIOPSY      TONSILLECTOMY      UPPER GASTROINTESTINAL ENDOSCOPY  10/17/2017    gastritis; hiatal hernia--opal    WRIST SURGERY Left 2008       FAMILY HISTORY:   Lung cancer:sister and mother   DVT or PE     REVIEW OF SYSTEMS:  Constitutional: General health is good . There has been no weight changes. No fevers, fatigue or weakness. Head: Patient denies any history of trauma, convulsive disorder or syncope. Skin:  Patient denies history of changes in pigmentation, eruptions or pruritus.   No easy bruising or bleeding. EENT: no nasal congestion   Cardiovascular ,No chest pain ,No edema ,  Respiration:SOB:  ,JEFF :  Gastrointestinal:No GI bleed ,no melena  ,no hematemesis    Musculoskeletal: no joint pain ,no swelling  Neurological:no , syncope. Denies twitching, convulsions, loss of consciousness or memory. Endocrine:  . No history of goiter, exophthalmos or dryness of skin. The patient has no history of diabetes. Hematopoietic:  No history of bleeding disorders or easy bruising. Rheumatic:  No connective tissue disease history or polyarthritis/inflammatory joint disease. PHYSICAL EXAMINATION:  Vitals:    06/13/19 0938   BP: (!) 113/57   Pulse: 85   Resp: 18   Temp: 98.3 °F (36.8 °C)   SpO2: 98%     Constitutional: This is a well developed, well nourished 72y.o. year old female who is alert, oriented, cooperative and in no apparent distress. Head was normocephalic and atraumatic. EENT: Mallampati class :  Extraocular muscles intact. External canals are patent and no discharge was appreciated. Septum was midline,   mucosa was without erythema, exudates or cobblestoning. No thrush was noted. Neck: Supple without thyromegaly. No elevated JVP. Trachea was midline. No carotid bruits were auscultated. Respiratory: rhonchi    Cardiovascular: Regular without murmur, clicks, gallops or rubs. There is no left or right ventricular heave. Pulses:  Carotid, radial and femoral pulses were equally bilaterally. Abdomen: Slightly rounded and soft without organomegaly. No rebound, rigidity or guarding was appreciated. Lymphatic: No lymphadenopathy. Musculoskeletal: no edema     Extremities: no edema   Skin:  Warm and dry. Good color, turgor and pigmentation. No lesions or scars. Neurological/Psychiatric: The patient's general behavior, level of consciousness, thought content and emotional status is normal.  Cranial nerves II-XII are intact.       DATA:     FVC 1.81 which is 76 of predicted  FEV1 1.02 which is 49 of predicted    FEV1/FVC 56 which is 72 of predicted  Severe COPD        IMPRESSION:    1-Severe COPD   2-some scaring   3-emphysema 1  4-Nocturnal hypoxia   5- para-influenza   6- Lung nodule     PLAN:     Patient is very pleasant 81year-old female with history of COPD very interestingly she was in [address] and she Could not handle it and give her general weakness and fatigue and she was not able to use it     Has some exacerbation related to weather   Continue on albuterol as needed  Continue Incruse ,  She is stopped taking Dulera   Eosinophilic count pending   IgE pending    Ct scan still shows left upper lobe nodules that was not in Ct 2017 and it is present when compare Feb 2019 to June 2019 so will get follow CT in 6 months and then if stable annual if continue to smoke      She also was advised to continue her home oxygen at 3 L/m        I spent 4-6 minutes counseling patient regarding smoking and the risk of Lung cancer and COPD and respiratory failure          Thank you for allowing me to participate in [facility]. I will keep following with you ,should you have any concerns ,please contact me at [phone]     Sincerely,        Jaime Acuña MD  Pulmonary & Critical Care Medicine     NOTE: This report was transcribed using voice recognition software. Every effort was made to ensure accuracy; however, inadvertent computerized transcription errors may be present.

## 2019-07-19 DIAGNOSIS — J44.1 CHRONIC OBSTRUCTIVE PULMONARY DISEASE WITH ACUTE EXACERBATION (HCC): Primary | ICD-10-CM

## 2019-07-19 RX ORDER — ALBUTEROL SULFATE 90 UG/1
2 AEROSOL, METERED RESPIRATORY (INHALATION) EVERY 4 HOURS PRN
Qty: 1 INHALER | Refills: 5 | Status: SHIPPED | OUTPATIENT
Start: 2019-07-19 | End: 2019-07-19 | Stop reason: CLARIF

## 2019-07-19 RX ORDER — ALBUTEROL SULFATE 90 UG/1
2 AEROSOL, METERED RESPIRATORY (INHALATION) EVERY 6 HOURS PRN
Qty: 1 INHALER | Refills: 3 | Status: SHIPPED | OUTPATIENT
Start: 2019-07-19 | End: 2020-01-08

## 2020-01-09 ENCOUNTER — TELEPHONE (OUTPATIENT)
Dept: PULMONOLOGY | Age: 75
End: 2020-01-09

## 2020-01-14 ENCOUNTER — TELEPHONE (OUTPATIENT)
Dept: PULMONOLOGY | Age: 75
End: 2020-01-14

## 2020-02-03 ENCOUNTER — HOSPITAL ENCOUNTER (OUTPATIENT)
Dept: CT IMAGING | Age: 75
Discharge: HOME | End: 2020-02-05
Payer: MEDICARE

## 2020-02-03 PROCEDURE — 71250 CT THORAX DX C-: CPT

## 2020-05-05 RX ORDER — ALBUTEROL SULFATE 90 UG/1
2 AEROSOL, METERED RESPIRATORY (INHALATION) EVERY 6 HOURS PRN
Qty: 1 INHALER | Refills: 3 | Status: SHIPPED
Start: 2020-05-05 | End: 2020-09-09

## 2020-05-20 ENCOUNTER — VIRTUAL VISIT (OUTPATIENT)
Dept: PULMONOLOGY | Age: 75
End: 2020-05-20
Payer: MEDICARE

## 2020-05-20 PROCEDURE — 99214 OFFICE O/P EST MOD 30 MIN: CPT | Performed by: INTERNAL MEDICINE

## 2020-05-20 NOTE — PROGRESS NOTES
Department of Internal Medicine  Division of Pulmonary, Critical Care & Sleep Medicine  Pulmonary [address] [facility]                                             Pulmonary Clinic Consult     I had the pleasure of seeing  Scooter Schwartz in the [address] [facility] regarding their COPPD     SOB '  Virtual visit   HISTORY OF PRESENT ILLNESS:    Scooter Schwartz is a 69y.o. year old  Who start smoking at age  18   And increase gradually ,start 1/2 pack and 2 pack was the max and now about 1 pack she smoke 53 year smoking and give her about 60 pack a year      The Patient comes in with SOB that has been going on  The last 3-5 years  Associated with  Morning cough with slight sputum and post nasal drainage ,She  states that it get worse with exercise or walking long distance and he can walk  1 mile And go 3 -4 flight of stairs before get short winded    She  has cough ,productive for slight Sputum and it is more in the morning        Has history of lung disease include,allergies ,pneumonia     She use Symbicort and albuterol and tylenol ,she Spiriva and make her weak     Sleep history :  Snoring yes  Feel tired during the day  Once in great while   Wake up fresh no   excessive daytime sleepiness,taje nap and it is refreshing   Had CT which shows emphysema     On O2 3 L at night      Today visit     She is breathing much better and states that her signs has some sinus issues   She still smoke but now once every 1-2 days about 5  cigarettes   On 2 L     Use albuterol 3-4 times week  and she use Incruse   No longer Dulera   Still smoker  Use O2 most of the time  but some she does not   On Spiriva but she is not     ALLERGIES:  No Known Allergies    PAST MEDICAL HISTORY:       Diagnosis Date    Abnormal Pap smear 1995    Patient states she had laser surgery    COPD (chronic obstructive pulmonary disease) ([address]     Emphysema of lung ([address]     Emphysema/COPD ([address]     GERD (gastroesophageal reflux disease)     History of stomach ulcers     Hyperlipidemia     DIET    Osteoarthritis     Renal calculi     APX 40 YEARS AGO       MEDICATIONS:   Current Outpatient Medications   Medication Sig Dispense Refill    albuterol sulfate HFA (VENTOLIN HFA) 108 (90 Base) MCG/ACT inhaler Inhale 2 puffs into the lungs every 6 hours as needed for Wheezing 1 Inhaler 3    tiotropium (SPIRIVA RESPIMAT) 2.5 MCG/ACT AERS inhaler Inhale 2 puffs into the lungs daily 1 Inhaler 3    VENTOLIN  (90 Base) MCG/ACT inhaler INHALE 2 PUFFS BY MOUTH EVERY 4 HOURS AS NEEDED FOR WHEEZING 1 Inhaler 3    ketotifen (ZADITOR) 0.025 % ophthalmic solution Place 1 drop into both eyes 2 times daily 1 Bottle 2    nicotine (NICODERM CQ) 14 MG/24HR Place 1 patch onto the skin daily 30 patch 3    nicotine (NICODERM CQ) 14 MG/24HR Place 1 patch onto the skin daily 30 patch 0    acetaminophen (TYLENOL) 325 MG tablet Take 650 mg by mouth every 6 hours as needed for Pain       No current facility-administered medications for this visit. SOCIAL AND OCCUPATIONAL HEALTH:  Social History     Tobacco Use   Smoking Status Current Some Day Smoker    Packs/day: 0.50    Years: 51.00    Pack years: 25.50    Types: Cigarettes   Smokeless Tobacco Never Used     TB :no   Pets  Cats   Industrial exposure:   Birds :    SURGICAL HISTORY:   Past Surgical History:   Procedure Laterality Date    APPENDECTOMY      15 y.o.    CATARACT REMOVAL WITH IMPLANT Bilateral dec 2017, jan 2018    COLONOSCOPY  10/17/2017    normal colon--opal    ENDOMETRIAL BIOPSY      TONSILLECTOMY      UPPER GASTROINTESTINAL ENDOSCOPY  10/17/2017    gastritis; hiatal hernia--opal    WRIST SURGERY Left 2008       FAMILY HISTORY:   Lung cancer:sister and mother   DVT or PE     REVIEW OF SYSTEMS:  Constitutional: General health is good . There has been no weight changes. No fevers, fatigue or weakness.    Head: Patient denies any history of trauma, convulsive disorder or syncope. Skin:  Patient denies history of changes in pigmentation, eruptions or pruritus. No easy bruising or bleeding. EENT: no nasal congestion   Cardiovascular ,No chest pain ,No edema ,  Respiration:SOB:  ,JEFF :  Gastrointestinal:No GI bleed ,no melena  ,no hematemesis    Musculoskeletal: no joint pain ,no swelling  Neurological:no , syncope. Denies twitching, convulsions, loss of consciousness or memory. Endocrine:  . No history of goiter, exophthalmos or dryness of skin. The patient has no history of diabetes. Hematopoietic:  No history of bleeding disorders or easy bruising. Rheumatic:  No connective tissue disease history or polyarthritis/inflammatory joint disease. PHYSICAL EXAMINATION:  This Virtual visit       DATA:     FVC 1.81 which is 68 of predicted  FEV1 1.02 which is 49 of predicted    FEV1/FVC 56 which is 72 of predicted  Severe COPD        IMPRESSION:    1-Severe COPD   2-some scaring   3-emphysema 1  4-Nocturnal hypoxia   5- para-influenza   6- Lung nodule     PLAN:     Patient is very pleasant 69year-old female with history of COPD very interestingly she was [place] [employer] [address] and she Could not handle it and give her general weakness and fatigue and she was not able to use it     Continue on albuterol as needed  Incruse  not covered ,will start  Spiriva   She is stopped taking Dulera   Eosinophilic count pending   IgE pending    Lung nodule seems stable as per CT Feb 2020 ,will consider another CT in next year     Ct scan still shows left upper lobe nodules that was not in Ct 2017 and it is present when compare Feb 2019 to June 2019 so will get follow CT in 6 months and then if stable annual if continue to smoke      She also was advised to continue her home oxygen at 3 L/m        I spent 4-6 minutes counseling patient regarding smoking and the risk of Lung cancer and COPD and respiratory failure          Thank you for allowing me to participate in [facility].  I

## 2020-11-19 RX ORDER — TIOTROPIUM BROMIDE INHALATION SPRAY 3.12 UG/1
SPRAY, METERED RESPIRATORY (INHALATION)
Qty: 12 G | Refills: 5 | Status: SHIPPED
Start: 2020-11-19 | End: 2021-01-07 | Stop reason: SDUPTHER

## 2021-01-07 ENCOUNTER — VIRTUAL VISIT (OUTPATIENT)
Dept: PULMONOLOGY | Age: 76
End: 2021-01-07
Payer: MEDICARE

## 2021-01-07 DIAGNOSIS — J44.1 CHRONIC OBSTRUCTIVE PULMONARY DISEASE WITH ACUTE EXACERBATION (HCC): ICD-10-CM

## 2021-01-07 DIAGNOSIS — R91.1 LUNG NODULE: Primary | ICD-10-CM

## 2021-01-07 PROCEDURE — 99442 PR PHYS/QHP TELEPHONE EVALUATION 11-20 MIN: CPT | Performed by: INTERNAL MEDICINE

## 2021-01-07 RX ORDER — MONTELUKAST SODIUM 10 MG/1
10 TABLET ORAL DAILY
Qty: 30 TABLET | Refills: 3 | Status: SHIPPED
Start: 2021-01-07 | End: 2021-05-06

## 2021-01-07 NOTE — PROGRESS NOTES
Department of Internal Medicine  Division of Pulmonary, Critical Care & Sleep Medicine  Pulmonary [address] [facility]                                             Pulmonary Clinic Consult     I had the pleasure of seeing  Darya Levy in the [facility] regarding their COPPD     SOB '  Virtual visit   HISTORY OF PRESENT ILLNESS:    Darya Levy is a 70y.o. year old  Who start smoking at age  18   And increase gradually ,start 1/2 pack and 2 pack was the max and now about 1 pack she smoke 53 year smoking and give her about 60 pack a year      The Patient comes in with SOB that has been going on  The last 3-5 years  Associated with  Morning cough with slight sputum and post nasal drainage ,She  states that it get worse with exercise or walking long distance and he can walk  1 mile And go 3 -4 flight of stairs before get short winded    She  has cough ,productive for slight Sputum and it is more in the morning        Has history of lung disease include,allergies ,pneumonia     She use Symbicort and albuterol and tylenol ,she Spiriva and make her weak     Sleep history :  Snoring yes  Feel tired during the day  Once in great while   Wake up fresh no   excessive daytime sleepiness,taje nap and it is refreshing   Had CT which shows emphysema     On O2 3 L at night      Today visit     She is breathing much better and states that her signs has some sinus issues   She still smoke but now once every 1-2 days about 5  cigarettes   Still On 2 L   Use albuterol 3-4 times week  and she use Incruse   Still smoker  Use O2 most of the time  but some she does not   On Spiriva and seems she is doing well  And has  Been active up and down stairs for laundry     ALLERGIES:  No Known Allergies    PAST MEDICAL HISTORY:       Diagnosis Date    Abnormal Pap smear 1995    Patient states she had laser surgery    [facility] ([facility] obstructive [facility] disease) [facility]     Emphysema of lung ([facility]     Emphy[facility]ma/COPD [facility]     GERD (gastroesophageal reflux disease)     History of stomach ulcers     Hyperlipidemia     DIET    Osteoarthritis     Renal calculi     APX 40 YEARS AGO       MEDICATIONS:   Current Outpatient Medications   Medication Sig Dispense Refill    SPIRIVA RESPIMAT 2.5 MCG/ACT AERS inhaler inhale 2 puffs by mouth and INTO THE LUNGS once daily 12 g 5    VENTOLIN  (90 Base) MCG/ACT inhaler inhale 2 puffs by mouth and INTO THE LUNGS every 6 hours if needed for wheezing 18 g 5    VENTOLIN  (90 Base) MCG/ACT inhaler INHALE 2 PUFFS BY MOUTH EVERY 4 HOURS AS NEEDED FOR WHEEZING 1 Inhaler 3    ketotifen (ZADITOR) 0.025 % ophthalmic solution Place 1 drop into both eyes 2 times daily 1 Bottle 2    nicotine (NICODERM CQ) 14 MG/24HR Place 1 patch onto the skin daily 30 patch 3    nicotine (NICODERM CQ) 14 MG/24HR Place 1 patch onto the skin daily 30 patch 0    acetaminophen (TYLENOL) 325 MG tablet Take 650 mg by mouth every 6 hours as needed for Pain       No current facility-administered medications for this visit. SOCIAL AND OCCUPATIONAL HEALTH:  Social History     Tobacco Use   Smoking Status Current Some Day Smoker    Packs/day: 0.50    Years: 51.00    Pack years: 25.50    Types: Cigarettes   Smokeless Tobacco Never Used     TB :no   Pets  Cats   Industrial exposure:   Birds :    SURGICAL HISTORY:   Past Surgical History:   Procedure Laterality Date    APPENDECTOMY      15 y.o.    CATARACT REMOVAL WITH IMPLANT Bilateral dec 2017, jan 2018    COLONOSCOPY  10/17/2017    normal colon--opal    ENDOMETRIAL BIOPSY      TONSILLECTOMY      UPPER GASTROINTESTINAL ENDOSCOPY  10/17/2017    gastritis; hiatal hernia--opal    WRIST SURGERY Left 2008       FAMILY HISTORY:   Lung cancer:sister and mother   DVT or PE     REVIEW OF SYSTEMS:  Constitutional: General health is good . There has been no weight changes. No fevers, fatigue or weakness.    Head: Patient denies any with you ,should you have any concerns ,please contact me at [phone]     Sincerely,        Lorena Stephens MD  Pulmonary & Critical Care Medicine     NOTE: This report was transcribed using voice recognition software. Every effort was made to ensure accuracy; however, inadvertent computerized transcription errors may be present.

## 2021-01-07 NOTE — PROGRESS NOTES
David Walter is a 70 y.o. female evaluated via telephone on 1/7/2021. Consent:  She and/or health care decision maker is aware that that she may receive a bill for this telephone service, depending on her insurance coverage, and has provided verbal consent to proceed: Yes      Documentation:  I communicated with the patient and/or health care decision maker about COPD. Details of this discussion including any medical advice provided: Discussed symptoms and management of COPD. Meds reviewed and pt to continue Spiriva, Albuterol prn and plan to have Chest CT and follow up in 6 mos. Office to arrange Chest CT and will mail out appt letter and follow up appt card for office visit. I affirm this is a Patient Initiated Episode with a Patient who has not had a related appointment within my department in the past 7 days or scheduled within the next 24 hours.     Patient identification was verified at the start of the visit: Yes    Total Time: minutes: 11-20 minutes    Note: not billable if this call serves to triage the patient into an appointment for the relevant concern      Minoo Dawson

## 2021-02-24 DIAGNOSIS — J96.12 CHRONIC RESPIRATORY FAILURE WITH HYPOXIA AND HYPERCAPNIA (HCC): ICD-10-CM

## 2021-02-24 DIAGNOSIS — J44.1 ACUTE EXACERBATION OF CHRONIC OBSTRUCTIVE PULMONARY DISEASE (COPD) (HCC): Chronic | ICD-10-CM

## 2021-02-24 DIAGNOSIS — J96.11 CHRONIC RESPIRATORY FAILURE WITH HYPOXIA AND HYPERCAPNIA (HCC): ICD-10-CM

## 2021-03-13 ENCOUNTER — IMMUNIZATION (OUTPATIENT)
Dept: PRIMARY CARE CLINIC | Age: 76
End: 2021-03-13
Payer: MEDICARE

## 2021-03-13 PROCEDURE — 91300 COVID-19, PFIZER VACCINE 30MCG/0.3ML DOSE: CPT | Performed by: INTERNAL MEDICINE

## 2021-03-13 PROCEDURE — 0001A COVID-19, PFIZER VACCINE 30MCG/0.3ML DOSE: CPT | Performed by: INTERNAL MEDICINE

## 2021-04-07 ENCOUNTER — IMMUNIZATION (OUTPATIENT)
Dept: PRIMARY CARE CLINIC | Age: 76
End: 2021-04-07
Payer: MEDICARE

## 2021-04-07 PROCEDURE — 91300 COVID-19, PFIZER VACCINE 30MCG/0.3ML DOSE: CPT | Performed by: NURSE PRACTITIONER

## 2021-04-07 PROCEDURE — 0002A COVID-19, PFIZER VACCINE 30MCG/0.3ML DOSE: CPT | Performed by: NURSE PRACTITIONER

## 2021-05-06 RX ORDER — MONTELUKAST SODIUM 10 MG/1
10 TABLET ORAL NIGHTLY
Qty: 90 TABLET | Refills: 2 | Status: SHIPPED
Start: 2021-05-06 | End: 2022-09-17

## 2021-06-08 ENCOUNTER — TELEPHONE (OUTPATIENT)
Dept: PULMONOLOGY | Age: 76
End: 2021-06-08

## 2021-06-08 NOTE — TELEPHONE ENCOUNTER
Mailed a letter to patient informing her that her CT Chest is scheduled for 7-6-21 at 11:00 am at the [facility]. She must arrive by 10:30 am. There is no prep for this test

## 2021-07-18 ENCOUNTER — HOSPITAL ENCOUNTER (OUTPATIENT)
Dept: CT IMAGING | Age: 76
Discharge: HOME | End: 2021-07-20
Payer: MEDICARE

## 2021-07-18 DIAGNOSIS — R91.1 LUNG NODULE: ICD-10-CM

## 2021-07-18 PROCEDURE — 71250 CT THORAX DX C-: CPT

## 2021-07-19 ENCOUNTER — VIRTUAL VISIT (OUTPATIENT)
Dept: PULMONOLOGY | Age: 76
End: 2021-07-19
Payer: MEDICARE

## 2021-07-19 DIAGNOSIS — R91.1 LUNG NODULE: ICD-10-CM

## 2021-07-19 DIAGNOSIS — J44.1 CHRONIC OBSTRUCTIVE PULMONARY DISEASE WITH ACUTE EXACERBATION (HCC): Primary | ICD-10-CM

## 2021-07-19 PROCEDURE — 3017F COLORECTAL CA SCREEN DOC REV: CPT | Performed by: INTERNAL MEDICINE

## 2021-07-19 PROCEDURE — G8926 SPIRO NO PERF OR DOC: HCPCS | Performed by: INTERNAL MEDICINE

## 2021-07-19 PROCEDURE — G8427 DOCREV CUR MEDS BY ELIG CLIN: HCPCS | Performed by: INTERNAL MEDICINE

## 2021-07-19 PROCEDURE — 1090F PRES/ABSN URINE INCON ASSESS: CPT | Performed by: INTERNAL MEDICINE

## 2021-07-19 PROCEDURE — G8399 PT W/DXA RESULTS DOCUMENT: HCPCS | Performed by: INTERNAL MEDICINE

## 2021-07-19 PROCEDURE — 99214 OFFICE O/P EST MOD 30 MIN: CPT | Performed by: INTERNAL MEDICINE

## 2021-07-19 PROCEDURE — 3023F SPIROM DOC REV: CPT | Performed by: INTERNAL MEDICINE

## 2021-07-19 PROCEDURE — 1123F ACP DISCUSS/DSCN MKR DOCD: CPT | Performed by: INTERNAL MEDICINE

## 2021-07-19 PROCEDURE — 4040F PNEUMOC VAC/ADMIN/RCVD: CPT | Performed by: INTERNAL MEDICINE

## 2021-07-19 PROCEDURE — G8421 BMI NOT CALCULATED: HCPCS | Performed by: INTERNAL MEDICINE

## 2021-07-19 PROCEDURE — 4004F PT TOBACCO SCREEN RCVD TLK: CPT | Performed by: INTERNAL MEDICINE

## 2021-07-19 NOTE — PROGRESS NOTES
of lung ([facility]     Emphysema/COPD ([facility]     GERD (gastroesophageal reflux disease)     History of stomach ulcers     Hyperlipidemia     DIET    Osteoarthritis     Renal calculi     APX 40 YEARS AGO       MEDICATIONS:   Current Outpatient Medications   Medication Sig Dispense Refill    montelukast (SINGULAIR) 10 MG tablet Take 1 tablet by mouth nightly 90 tablet 2    tiotropium (SPIRIVA RESPIMAT) 2.5 MCG/ACT AERS inhaler inhale 2 puffs by mouth and INTO THE LUNGS once daily 1 Inhaler 5    VENTOLIN  (90 Base) MCG/ACT inhaler INHALE 2 PUFFS BY MOUTH EVERY 4 HOURS AS NEEDED FOR WHEEZING 1 Inhaler 3    ketotifen (ZADITOR) 0.025 % ophthalmic solution Place 1 drop into both eyes 2 times daily 1 Bottle 2    acetaminophen (TYLENOL) 325 MG tablet Take 650 mg by mouth every 6 hours as needed for Pain      nicotine (NICODERM CQ) 14 MG/24HR Place 1 patch onto the skin daily 30 patch 3    nicotine (NICODERM CQ) 14 MG/24HR Place 1 patch onto the skin daily 30 patch 0     No current facility-administered medications for this visit. SOCIAL AND OCCUPATIONAL HEALTH:  Social History     Tobacco Use   Smoking Status Current Some Day Smoker    Packs/day: 0.50    Years: 51.00    Pack years: 25.50    Types: Cigarettes   Smokeless Tobacco Never Used     TB :no   Pets  Cats   Industrial exposure:   Birds :    SURGICAL HISTORY:   Past Surgical History:   Procedure Laterality Date    APPENDECTOMY      15 y.o.    CATARACT REMOVAL WITH IMPLANT Bilateral dec 2017, jan 2018    COLONOSCOPY  10/17/2017    normal colon--opal    ENDOMETRIAL BIOPSY      TONSILLECTOMY      UPPER GASTROINTESTINAL ENDOSCOPY  10/17/2017    gastritis; hiatal hernia--opal    WRIST SURGERY Left 2008       FAMILY HISTORY:   Lung cancer:sister and mother   DVT or PE     REVIEW OF SYSTEMS:  Constitutional: General health is good . There has been no weight changes. No fevers, fatigue or weakness.    Head: Patient denies any history of trauma, convulsive disorder or syncope. Skin:  Patient denies history of changes in pigmentation, eruptions or pruritus. No easy bruising or bleeding. EENT: no nasal congestion   Cardiovascular ,No chest pain ,No edema ,  Respiration:SOB:  ,JEFF :  Gastrointestinal:No GI bleed ,no melena  ,no hematemesis    Musculoskeletal: no joint pain ,no swelling  Neurological:no , syncope. Denies twitching, convulsions, loss of consciousness or memory. Endocrine:  . No history of goiter, exophthalmos or dryness of skin. The patient has no history of diabetes. Hematopoietic:  No history of bleeding disorders or easy bruising. Rheumatic:  No connective tissue disease history or polyarthritis/inflammatory joint disease. PHYSICAL EXAMINATION:  This Virtual visit       DATA:     FVC 1.81 which is 68 of predicted  FEV1 1.02 which is 49 of predicted    FEV1/FVC 56 which is 72 of predicted  Severe COPD        IMPRESSION:    1-Severe COPD   2- hypercapnic respiratory failure   2-some scaring   3-emphysema 1  4-Nocturnal hypoxia   6- Lung nodule     PLAN:     Patient is very pleasant 69year-old female with history of COPD very interestingly she [place] [place] [place] [id number] and she Could not handle it and give her general weakness and fatigue and she was not able to use it   Continue on albuterol as needed  On  Spiriva no longer use Dulera   Eosinophilic count pending   IgE pending  Ct scan airway to see if she will benefit from endobronchial valves,lung nodules has slightly increased but less than 6 mm   Singulair 10 mg pO daily        She also was advised to continue her home oxygen at 3 L/m    This patient has Chronic Respiratory Failure due to very sever COPD   Patients condition has worsened, and their quality of life has deteriorated. A non-invasive home ventilator is quired to reduce the risk of dying from acute on top of chronic hypercapnic respiratory failure if not seen in an acute care hospital in time.  It is my medical

## 2021-08-11 DIAGNOSIS — J44.1 CHRONIC OBSTRUCTIVE PULMONARY DISEASE WITH ACUTE EXACERBATION (HCC): ICD-10-CM

## 2021-08-11 RX ORDER — ALBUTEROL SULFATE 90 UG/1
AEROSOL, METERED RESPIRATORY (INHALATION)
Qty: 18 G | Refills: 12 | Status: SHIPPED
Start: 2021-08-11 | End: 2022-08-29

## 2021-12-07 ENCOUNTER — TELEPHONE (OUTPATIENT)
Dept: PULMONOLOGY | Age: 76
End: 2021-12-07

## 2022-01-21 ENCOUNTER — HOSPITAL ENCOUNTER (OUTPATIENT)
Dept: CT IMAGING | Age: 77
Discharge: HOME | End: 2022-01-23
Payer: MEDICARE

## 2022-01-21 DIAGNOSIS — R91.1 LUNG NODULE: ICD-10-CM

## 2022-01-21 DIAGNOSIS — J44.1 CHRONIC OBSTRUCTIVE PULMONARY DISEASE WITH ACUTE EXACERBATION (HCC): ICD-10-CM

## 2022-01-21 PROCEDURE — G1010 CDSM STANSON: HCPCS

## 2022-01-28 ENCOUNTER — VIRTUAL VISIT (OUTPATIENT)
Dept: PULMONOLOGY | Age: 77
End: 2022-01-28
Payer: MEDICARE

## 2022-01-28 DIAGNOSIS — R06.02 SOB (SHORTNESS OF BREATH): ICD-10-CM

## 2022-01-28 DIAGNOSIS — J44.1 CHRONIC OBSTRUCTIVE PULMONARY DISEASE WITH ACUTE EXACERBATION (HCC): Primary | ICD-10-CM

## 2022-01-28 PROCEDURE — G8421 BMI NOT CALCULATED: HCPCS | Performed by: INTERNAL MEDICINE

## 2022-01-28 PROCEDURE — 3023F SPIROM DOC REV: CPT | Performed by: INTERNAL MEDICINE

## 2022-01-28 PROCEDURE — 4004F PT TOBACCO SCREEN RCVD TLK: CPT | Performed by: INTERNAL MEDICINE

## 2022-01-28 PROCEDURE — 99214 OFFICE O/P EST MOD 30 MIN: CPT | Performed by: INTERNAL MEDICINE

## 2022-01-28 PROCEDURE — 1123F ACP DISCUSS/DSCN MKR DOCD: CPT | Performed by: INTERNAL MEDICINE

## 2022-01-28 PROCEDURE — 1090F PRES/ABSN URINE INCON ASSESS: CPT | Performed by: INTERNAL MEDICINE

## 2022-01-28 PROCEDURE — G8484 FLU IMMUNIZE NO ADMIN: HCPCS | Performed by: INTERNAL MEDICINE

## 2022-01-28 PROCEDURE — G8427 DOCREV CUR MEDS BY ELIG CLIN: HCPCS | Performed by: INTERNAL MEDICINE

## 2022-01-28 PROCEDURE — G8399 PT W/DXA RESULTS DOCUMENT: HCPCS | Performed by: INTERNAL MEDICINE

## 2022-01-28 PROCEDURE — 4040F PNEUMOC VAC/ADMIN/RCVD: CPT | Performed by: INTERNAL MEDICINE

## 2022-01-28 PROCEDURE — 3017F COLORECTAL CA SCREEN DOC REV: CPT | Performed by: INTERNAL MEDICINE

## 2022-01-28 RX ORDER — BUDESONIDE, GLYCOPYRROLATE, AND FORMOTEROL FUMARATE 160; 9; 4.8 UG/1; UG/1; UG/1
2 AEROSOL, METERED RESPIRATORY (INHALATION) 2 TIMES DAILY
Qty: 1 EACH | Refills: 12 | Status: ON HOLD
Start: 2022-01-28 | End: 2022-10-27 | Stop reason: SINTOL

## 2022-01-28 RX ORDER — AMOXICILLIN AND CLAVULANATE POTASSIUM 875; 125 MG/1; MG/1
1 TABLET, FILM COATED ORAL 2 TIMES DAILY
Qty: 14 TABLET | Refills: 0 | Status: SHIPPED | OUTPATIENT
Start: 2022-01-28 | End: 2022-02-04

## 2022-01-28 RX ORDER — PREDNISONE 20 MG/1
20 TABLET ORAL DAILY
Qty: 5 TABLET | Refills: 0 | Status: SHIPPED | OUTPATIENT
Start: 2022-01-28 | End: 2022-02-02

## 2022-01-28 NOTE — PROGRESS NOTES
Bessie Johnson is a 78 y.o. female evaluated via telephone on 1/28/2022. Consent:  She and/or health care decision maker is aware that that she may receive a bill for this telephone service, which includes applicable co-pays, depending on her insurance coverage, and has provided verbal consent to proceed. Documentation:  I communicated with the patient and/or health care decision maker about Virtual Phone visit today. Reviewed recent Chest CT showing need for Antibiotic course and steroid to be given. Tree in Bud appearance; repeat Chest CT in 6 mos/ENB Protocol. Breztri inhaler to be tried. Stop Spiriva inhaler when starting Breztri. New trail of script sent to pharmacy for pt. Follow up Chest Ct in 6 mos; office to schedule. RN to see if pt can qualify for NIV Trilogy device at home to help with chronic respiratory failure. Office follow up in 6-8 mos after Chest CT. Details of this discussion including any medical advice provided:       I affirm this is a Patient Initiated Episode with a Patient who has not had a related appointment within my department in the past 7 days or scheduled within the next 24 hours. Patient identification was verified at the start of the visit: Yes    Total Time: minutes: 11-20 minutes    Bessie Johnson, was evaluated through a synchronous (real-time) audio-video encounter. The patient (or guardian if applicable) is aware that this is a billable service, which includes applicable co-pays. This Virtual Visit was conducted with patient's (and/or legal guardian's) consent. The visit was conducted pursuant to the emergency declaration under the [address] [place] [address], [address] [place] [address] authority and the [employer] and [employer] Act. Patient identification was verified, and a caregiver was present when appropriate. The patient was located at home in a state where the provider was licensed to provide care.        Note: not billable if this call serves to triage the patient into an appointment for the relevant concern      Indiana Diez RN

## 2022-01-28 NOTE — PROGRESS NOTES
Department of Internal Medicine  Division of Pulmonary, Critical Care & Sleep Medicine  Pulmonary [facility]                                             Pulmonary Clinic Consult     I had the pleasure of seeing  Ramona Gilmore in the [facility] regarding their COPPD     SOB '  Virtual visit   HISTORY OF PRESENT ILLNESS:    Ramona Gilmore is a 78y.o. year old  Who start smoking at age  18   And increase gradually ,start 1/2 pack and 2 pack was the max and now about 1 pack she smoke 53 year smoking and give her about 60 pack a year      The Patient comes in with SOB that has been going on  The last 3-5 years  Associated with  Morning cough with slight sputum and post nasal drainage ,She  states that it get worse with exercise or walking long distance and he can walk  1 mile And go 3 -4 flight of stairs before get short winded    She  has cough ,productive for slight Sputum and it is more in the morning        Has history of lung disease include,allergies ,pneumonia     She use Symbicort and albuterol and tylenol ,she Spiriva and make her weak     Sleep history :  Snoring yes  Feel tired during the day  Once in great while   Wake up fresh no   excessive daytime sleepiness,taje nap and it is refreshing   Had CT which shows emphysema     On O2 3 L at night      Today visit   She has been having sinus congestion and SOB   Use only Spiriva   She still smoke but now once every 1-2 days about 5  cigarettes   Still On 2 L   Use albuterol 3-4 times week  and she use Incruse   Still smoker       ALLERGIES:  No Known Allergies    PAST MEDICAL HISTORY:       Diagnosis Date    Abnormal Pap smear 1995    Patient states she had laser surgery    COPD (chronic obstructive pulmonary disease) ([address]     Emphysema of lung ([address]     Emphysema/COPD ([address]     GERD (gastroesophageal reflux disease)     History of stomach ulcers     Hyperlipidemia     DIET    Osteoarthritis     Renal calculi     APX 40 YEARS AGO       MEDICATIONS:   Current Outpatient Medications   Medication Sig Dispense Refill    albuterol sulfate  (90 Base) MCG/ACT inhaler inhale 2 puffs by mouth and INTO THE LUNGS every 6 hours if needed for wheezing 18 g 12    tiotropium (SPIRIVA RESPIMAT) 2.5 MCG/ACT AERS inhaler inhale 2 puffs by mouth and INTO THE LUNGS once daily 1 Inhaler 5    montelukast (SINGULAIR) 10 MG tablet Take 1 tablet by mouth nightly 90 tablet 2    ketotifen (ZADITOR) 0.025 % ophthalmic solution Place 1 drop into both eyes 2 times daily 1 Bottle 2    nicotine (NICODERM CQ) 14 MG/24HR Place 1 patch onto the skin daily 30 patch 3    nicotine (NICODERM CQ) 14 MG/24HR Place 1 patch onto the skin daily 30 patch 0    acetaminophen (TYLENOL) 325 MG tablet Take 650 mg by mouth every 6 hours as needed for Pain       No current facility-administered medications for this visit. SOCIAL AND OCCUPATIONAL HEALTH:  Social History     Tobacco Use   Smoking Status Current Some Day Smoker    Packs/day: 0.50    Years: 51.00    Pack years: 25.50    Types: Cigarettes   Smokeless Tobacco Never Used     TB :no   Pets  Cats   Industrial exposure:   Birds :    SURGICAL HISTORY:   Past Surgical History:   Procedure Laterality Date    APPENDECTOMY      15 y.o.    CATARACT REMOVAL WITH IMPLANT Bilateral dec 2017, jan 2018    COLONOSCOPY  10/17/2017    normal colon--opal    ENDOMETRIAL BIOPSY      TONSILLECTOMY      UPPER GASTROINTESTINAL ENDOSCOPY  10/17/2017    gastritis; hiatal hernia--opal    WRIST SURGERY Left 2008       FAMILY HISTORY:   Lung cancer:sister and mother   DVT or PE     REVIEW OF SYSTEMS:  Constitutional: General health is good . There has been no weight changes. No fevers, fatigue or weakness. Head: Patient denies any history of trauma, convulsive disorder or syncope. Skin:  Patient denies history of changes in pigmentation, eruptions or pruritus.   No easy bruising or bleeding. EENT: no nasal congestion   Cardiovascular ,No chest pain ,No edema ,  Respiration:SOB:  ,JEFF :  Gastrointestinal:No GI bleed ,no melena  ,no hematemesis    Musculoskeletal: no joint pain ,no swelling  Neurological:no , syncope. Denies twitching, convulsions, loss of consciousness or memory. Endocrine:  . No history of goiter, exophthalmos or dryness of skin. The patient has no history of diabetes. Hematopoietic:  No history of bleeding disorders or easy bruising. Rheumatic:  No connective tissue disease history or polyarthritis/inflammatory joint disease. PHYSICAL EXAMINATION:  This Virtual visit       DATA:     FVC 1.81 which is 68 of predicted  FEV1 1.02 which is 49 of predicted    FEV1/FVC 56 which is 72 of predicted  Severe COPD        IMPRESSION:    1-Severe COPD   2- hypercapnic respiratory failure   2-some scaring   3-emphysema 1  4-Nocturnal hypoxia   6- Lung nodule     PLAN:   Ct scan shows new GGO/tree in bud lower lobes ,with sinus congestion concern for infection will give course of abx ,Augmentin X 5 days and prednisone 20 mg Po X 5 days   Repeat CT in 6 months ENB protocol   Continue on albuterol as needed  On  Spiriva ,will change to Breztri   Eosinophilic count pending   IgE pending  Does not want evaluation for Endobronchial valve    Singulair 10 mg pO daily     She also was advised to continue her home oxygen at 3 L/m    This patient has Chronic Respiratory Failure due to very sever COPD   Patients condition has worsened, and their quality of life has deteriorated. A non-invasive home ventilator is quired to reduce the risk of dying from acute on top of chronic hypercapnic respiratory failure if not seen in an acute care hospital in time. It is my medical opinion that patient is requiring augmented tidal volume to resolve CO2 retention that is not provided by BiPAP or BiPAP ST. This patient would benefit from NIV utilizing the AVAPS AE to provide a targeted tidal volume. Patients last PFT shows severe COPD . It is therefore required that due to the severity of lung disease present that the non-invasive ventilator is medically necessary in the management of patients severe condition. Without advanced therapy in the home this patient it at risk for life threatening exacerbations of thir diagnosis blanca    Pr MAX 36  P min 6  PS MAx 30   EPAP 4 ,EPA max 15  Auto rate     I spent 4-6 minutes counseling patient regarding smoking and the risk of Lung cancer and COPD and respiratory failure          Thank you for allowing me to participate in [address] [place] [address]. I will keep following with you ,should you have any concerns ,please contact me at [phone]     Sincerely,        David Farmer MD  Pulmonary & Critical Care Medicine     NOTE: This report was transcribed using voice recognition software. Every effort was made to ensure accuracy; however, inadvertent computerized transcription errors may be present.

## 2022-06-30 ENCOUNTER — TELEPHONE (OUTPATIENT)
Dept: INTERNAL MEDICINE | Age: 77
End: 2022-06-30

## 2022-06-30 NOTE — TELEPHONE ENCOUNTER
Called patient regarding her request to re-establish at the office . Left message to call the office to schedule.

## 2022-08-23 ENCOUNTER — TELEPHONE (OUTPATIENT)
Dept: PULMONOLOGY | Age: 77
End: 2022-08-23

## 2022-08-23 DIAGNOSIS — J44.1 CHRONIC OBSTRUCTIVE PULMONARY DISEASE WITH ACUTE EXACERBATION (HCC): ICD-10-CM

## 2022-08-23 DIAGNOSIS — R91.1 LUNG NODULE: Primary | ICD-10-CM

## 2022-08-23 RX ORDER — ALBUTEROL SULFATE 90 UG/1
AEROSOL, METERED RESPIRATORY (INHALATION)
Qty: 8.5 G | OUTPATIENT
Start: 2022-08-23

## 2022-08-23 NOTE — TELEPHONE ENCOUNTER
Orders reviewed and notes on chart reviewed. Office to arrange Chest CT and follow up in office appt following scan.

## 2022-08-23 NOTE — TELEPHONE ENCOUNTER
Mailed the patient a letter informing her that her CT Chest is scheduled for 9-12-22 at 11:30 am at the [facility]. She must arrive by 11:00 am. There is no prep for this test

## 2022-08-27 DIAGNOSIS — J44.1 CHRONIC OBSTRUCTIVE PULMONARY DISEASE WITH ACUTE EXACERBATION (HCC): ICD-10-CM

## 2022-08-29 RX ORDER — ALBUTEROL SULFATE 90 UG/1
AEROSOL, METERED RESPIRATORY (INHALATION)
Qty: 1 EACH | Refills: 12 | Status: SHIPPED | OUTPATIENT
Start: 2022-08-29

## 2022-09-12 ENCOUNTER — HOSPITAL ENCOUNTER (OUTPATIENT)
Dept: CT IMAGING | Age: 77
Discharge: HOME | End: 2022-09-14
Payer: MEDICARE

## 2022-09-12 DIAGNOSIS — J44.1 CHRONIC OBSTRUCTIVE PULMONARY DISEASE WITH ACUTE EXACERBATION (HCC): ICD-10-CM

## 2022-09-12 DIAGNOSIS — R91.1 LUNG NODULE: ICD-10-CM

## 2022-09-12 PROCEDURE — 71250 CT THORAX DX C-: CPT

## 2022-09-16 ENCOUNTER — APPOINTMENT (OUTPATIENT)
Dept: GENERAL RADIOLOGY | Age: 77
DRG: 190 | End: 2022-09-16
Payer: COMMERCIAL

## 2022-09-16 ENCOUNTER — HOSPITAL ENCOUNTER (INPATIENT)
Age: 77
LOS: 4 days | Discharge: HOME | DRG: 190 | End: 2022-09-20
Attending: EMERGENCY MEDICINE | Admitting: FAMILY MEDICINE
Payer: COMMERCIAL

## 2022-09-16 DIAGNOSIS — J44.1 COPD EXACERBATION (HCC): Primary | ICD-10-CM

## 2022-09-16 LAB
ALBUMIN SERPL-MCNC: 3.9 G/DL (ref 3.5–5.2)
ALP BLD-CCNC: 73 U/L (ref 35–104)
ALT SERPL-CCNC: 14 U/L (ref 0–32)
ANION GAP SERPL CALCULATED.3IONS-SCNC: 13 MMOL/L (ref 7–16)
ANISOCYTOSIS: ABNORMAL
AST SERPL-CCNC: 24 U/L (ref 0–31)
B.E.: 4 MMOL/L (ref -3–3)
BASOPHILIC STIPPLING: ABNORMAL
BASOPHILS ABSOLUTE: 0.06 E9/L (ref 0–0.2)
BASOPHILS RELATIVE PERCENT: 0.9 % (ref 0–2)
BILIRUB SERPL-MCNC: 0.2 MG/DL (ref 0–1.2)
BUN BLDV-MCNC: 29 MG/DL (ref 6–23)
CALCIUM SERPL-MCNC: 8.9 MG/DL (ref 8.6–10.2)
CHLORIDE BLD-SCNC: 98 MMOL/L (ref 98–107)
CO2: 29 MMOL/L (ref 22–29)
COHB: 0.2 % (ref 0–1.5)
CREAT SERPL-MCNC: 0.5 MG/DL (ref 0.5–1)
CRITICAL: ABNORMAL
DATE ANALYZED: ABNORMAL
DATE OF COLLECTION: ABNORMAL
EOSINOPHILS ABSOLUTE: 0 E9/L (ref 0.05–0.5)
EOSINOPHILS RELATIVE PERCENT: 0.6 % (ref 0–6)
GFR AFRICAN AMERICAN: >60
GFR NON-AFRICAN AMERICAN: >60 ML/MIN/1.73
GLUCOSE BLD-MCNC: 130 MG/DL (ref 74–99)
HCO3: 31.6 MMOL/L (ref 22–26)
HCT VFR BLD CALC: 38.7 % (ref 34–48)
HEMOGLOBIN: 12.3 G/DL (ref 11.5–15.5)
HHB: 3.1 % (ref 0–5)
LAB: ABNORMAL
LYMPHOCYTES ABSOLUTE: 0.25 E9/L (ref 1.5–4)
LYMPHOCYTES RELATIVE PERCENT: 3.5 % (ref 20–42)
Lab: ABNORMAL
MAGNESIUM: 2.1 MG/DL (ref 1.6–2.6)
MCH RBC QN AUTO: 30.8 PG (ref 26–35)
MCHC RBC AUTO-ENTMCNC: 31.8 % (ref 32–34.5)
MCV RBC AUTO: 96.8 FL (ref 80–99.9)
METHB: 0.2 % (ref 0–1.5)
MODE: ABNORMAL
MONOCYTES ABSOLUTE: 0.25 E9/L (ref 0.1–0.95)
MONOCYTES RELATIVE PERCENT: 4.3 % (ref 2–12)
NEUTROPHILS ABSOLUTE: 5.64 E9/L (ref 1.8–7.3)
NEUTROPHILS RELATIVE PERCENT: 91.3 % (ref 43–80)
O2 CONTENT: 17.5 ML/DL
O2 SATURATION: 96.9 % (ref 92–98.5)
O2HB: 96.5 % (ref 94–97)
OPERATOR ID: 5100
OVALOCYTES: ABNORMAL
PATIENT TEMP: 37 C
PCO2: 62 MMHG (ref 35–45)
PDW BLD-RTO: 13.1 FL (ref 11.5–15)
PH BLOOD GAS: 7.33 (ref 7.35–7.45)
PLATELET # BLD: 225 E9/L (ref 130–450)
PMV BLD AUTO: 8.9 FL (ref 7–12)
PO2: 89.7 MMHG (ref 75–100)
POIKILOCYTES: ABNORMAL
POTASSIUM REFLEX MAGNESIUM: 4.3 MMOL/L (ref 3.5–5)
PRO-BNP: 417 PG/ML (ref 0–450)
RBC # BLD: 4 E12/L (ref 3.5–5.5)
SARS-COV-2, NAAT: NOT DETECTED
SODIUM BLD-SCNC: 140 MMOL/L (ref 132–146)
SOURCE, BLOOD GAS: ABNORMAL
THB: 12.8 G/DL (ref 11.5–16.5)
TIME ANALYZED: 2026
TOTAL PROTEIN: 6.7 G/DL (ref 6.4–8.3)
TROPONIN, HIGH SENSITIVITY: 20 NG/L (ref 0–9)
WBC # BLD: 6.2 E9/L (ref 4.5–11.5)

## 2022-09-16 PROCEDURE — 87635 SARS-COV-2 COVID-19 AMP PRB: CPT

## 2022-09-16 PROCEDURE — 94660 CPAP INITIATION&MGMT: CPT

## 2022-09-16 PROCEDURE — 94640 AIRWAY INHALATION TREATMENT: CPT

## 2022-09-16 PROCEDURE — 85025 COMPLETE CBC W/AUTO DIFF WBC: CPT

## 2022-09-16 PROCEDURE — 84484 ASSAY OF TROPONIN QUANT: CPT

## 2022-09-16 PROCEDURE — 83735 ASSAY OF MAGNESIUM: CPT

## 2022-09-16 PROCEDURE — 71045 X-RAY EXAM CHEST 1 VIEW: CPT

## 2022-09-16 PROCEDURE — 1200000000 HC SEMI PRIVATE

## 2022-09-16 PROCEDURE — 99285 EMERGENCY DEPT VISIT HI MDM: CPT

## 2022-09-16 PROCEDURE — 82805 BLOOD GASES W/O2 SATURATION: CPT

## 2022-09-16 PROCEDURE — 83880 ASSAY OF NATRIURETIC PEPTIDE: CPT

## 2022-09-16 PROCEDURE — 80053 COMPREHEN METABOLIC PANEL: CPT

## 2022-09-16 PROCEDURE — 6370000000 HC RX 637 (ALT 250 FOR IP)

## 2022-09-16 PROCEDURE — 93005 ELECTROCARDIOGRAM TRACING: CPT

## 2022-09-16 RX ORDER — SODIUM CHLORIDE 9 MG/ML
INJECTION, SOLUTION INTRAVENOUS PRN
Status: DISCONTINUED | OUTPATIENT
Start: 2022-09-16 | End: 2022-09-20 | Stop reason: HOSPADM

## 2022-09-16 RX ORDER — PREDNISONE 20 MG/1
40 TABLET ORAL DAILY
Status: DISCONTINUED | OUTPATIENT
Start: 2022-09-19 | End: 2022-09-20 | Stop reason: HOSPADM

## 2022-09-16 RX ORDER — IPRATROPIUM BROMIDE AND ALBUTEROL SULFATE 2.5; .5 MG/3ML; MG/3ML
3 SOLUTION RESPIRATORY (INHALATION) ONCE
Status: COMPLETED | OUTPATIENT
Start: 2022-09-16 | End: 2022-09-16

## 2022-09-16 RX ORDER — ENOXAPARIN SODIUM 100 MG/ML
30 INJECTION SUBCUTANEOUS DAILY
Status: DISCONTINUED | OUTPATIENT
Start: 2022-09-17 | End: 2022-09-20 | Stop reason: HOSPADM

## 2022-09-16 RX ORDER — SODIUM CHLORIDE 0.9 % (FLUSH) 0.9 %
5-40 SYRINGE (ML) INJECTION PRN
Status: DISCONTINUED | OUTPATIENT
Start: 2022-09-16 | End: 2022-09-20 | Stop reason: HOSPADM

## 2022-09-16 RX ORDER — POLYETHYLENE GLYCOL 3350 17 G/17G
17 POWDER, FOR SOLUTION ORAL DAILY PRN
Status: DISCONTINUED | OUTPATIENT
Start: 2022-09-16 | End: 2022-09-20 | Stop reason: HOSPADM

## 2022-09-16 RX ORDER — MONTELUKAST SODIUM 10 MG/1
10 TABLET ORAL NIGHTLY
Status: DISCONTINUED | OUTPATIENT
Start: 2022-09-17 | End: 2022-09-20 | Stop reason: HOSPADM

## 2022-09-16 RX ORDER — AZITHROMYCIN 250 MG/1
500 TABLET, FILM COATED ORAL DAILY
Status: COMPLETED | OUTPATIENT
Start: 2022-09-17 | End: 2022-09-19

## 2022-09-16 RX ORDER — BUDESONIDE 0.5 MG/2ML
500 INHALANT ORAL
Status: DISCONTINUED | OUTPATIENT
Start: 2022-09-17 | End: 2022-09-20 | Stop reason: HOSPADM

## 2022-09-16 RX ORDER — NICOTINE 21 MG/24HR
1 PATCH, TRANSDERMAL 24 HOURS TRANSDERMAL DAILY
Status: DISCONTINUED | OUTPATIENT
Start: 2022-09-17 | End: 2022-09-20 | Stop reason: HOSPADM

## 2022-09-16 RX ORDER — ALBUTEROL SULFATE 2.5 MG/3ML
2.5 SOLUTION RESPIRATORY (INHALATION)
Status: DISCONTINUED | OUTPATIENT
Start: 2022-09-16 | End: 2022-09-20 | Stop reason: HOSPADM

## 2022-09-16 RX ORDER — ACETAMINOPHEN 325 MG/1
650 TABLET ORAL EVERY 6 HOURS PRN
Status: DISCONTINUED | OUTPATIENT
Start: 2022-09-16 | End: 2022-09-20 | Stop reason: HOSPADM

## 2022-09-16 RX ORDER — ACETAMINOPHEN 650 MG/1
650 SUPPOSITORY RECTAL EVERY 6 HOURS PRN
Status: DISCONTINUED | OUTPATIENT
Start: 2022-09-16 | End: 2022-09-20 | Stop reason: HOSPADM

## 2022-09-16 RX ORDER — KETOTIFEN FUMARATE 0.35 MG/ML
1 SOLUTION/ DROPS OPHTHALMIC 2 TIMES DAILY
Status: DISCONTINUED | OUTPATIENT
Start: 2022-09-17 | End: 2022-09-17

## 2022-09-16 RX ORDER — ONDANSETRON 4 MG/1
4 TABLET, ORALLY DISINTEGRATING ORAL EVERY 8 HOURS PRN
Status: DISCONTINUED | OUTPATIENT
Start: 2022-09-16 | End: 2022-09-20 | Stop reason: HOSPADM

## 2022-09-16 RX ORDER — IPRATROPIUM BROMIDE AND ALBUTEROL SULFATE 2.5; .5 MG/3ML; MG/3ML
1 SOLUTION RESPIRATORY (INHALATION)
Status: DISCONTINUED | OUTPATIENT
Start: 2022-09-17 | End: 2022-09-20 | Stop reason: HOSPADM

## 2022-09-16 RX ORDER — SODIUM CHLORIDE 0.9 % (FLUSH) 0.9 %
5-40 SYRINGE (ML) INJECTION EVERY 12 HOURS SCHEDULED
Status: DISCONTINUED | OUTPATIENT
Start: 2022-09-17 | End: 2022-09-20 | Stop reason: HOSPADM

## 2022-09-16 RX ORDER — METHYLPREDNISOLONE SODIUM SUCCINATE 40 MG/ML
40 INJECTION, POWDER, LYOPHILIZED, FOR SOLUTION INTRAMUSCULAR; INTRAVENOUS EVERY 6 HOURS
Status: DISPENSED | OUTPATIENT
Start: 2022-09-16 | End: 2022-09-18

## 2022-09-16 RX ORDER — ONDANSETRON 2 MG/ML
4 INJECTION INTRAMUSCULAR; INTRAVENOUS EVERY 6 HOURS PRN
Status: DISCONTINUED | OUTPATIENT
Start: 2022-09-16 | End: 2022-09-20 | Stop reason: HOSPADM

## 2022-09-16 RX ADMIN — IPRATROPIUM BROMIDE AND ALBUTEROL SULFATE 3 AMPULE: .5; 2.5 SOLUTION RESPIRATORY (INHALATION) at 19:52

## 2022-09-16 ASSESSMENT — ENCOUNTER SYMPTOMS
BLOOD IN STOOL: 0
WHEEZING: 0
CHEST TIGHTNESS: 0
ABDOMINAL PAIN: 0
CONSTIPATION: 0
VOMITING: 0
DIARRHEA: 0
BACK PAIN: 0

## 2022-09-16 NOTE — ED PROVIDER NOTES
[facility] ENCOUNTER      Pt Name: Artemio Young  MRN: [id number]  [place] 1945  Date of evaluation: 9/16/2022      CHIEF COMPLAINT       Chief Complaint   Patient presents with    Shortness of Breath     Since Tuesday morning. Hx of COPD, wear 3L NC at home. Given 2 breathing treatments, 125mg solu-medrol, and 500ml fluid bolus PTA. HPI  Artemio Young is a 78 y.o. female  with PMHx of COPD (3L), hx of lung nodule presents with SOB. Started tuesday, worsening since then. Endorses cough productive,  chills but did not measure temperature. Associated with poor appetite, nausea but no vomiting. Describes symptoms moderate in severity with no alleviating or exacerbating factors. Denies any fever, vomiting, headache, dizziness, vision changes, neck tenderness or stiffness, weakness, cp, palpitations, leg swelling/tenderness, abd pain, dysuria, hematuria, diarrhea, constipation. Smoker- 60 years    Except as noted above the remainder of the review of systems was reviewed and negative. Review of Systems   Constitutional:  Negative for activity change, appetite change, chills, fatigue and fever. HENT:  Negative for congestion, nosebleeds and tinnitus. Eyes:  Negative for visual disturbance. Respiratory:  Positive for cough and shortness of breath. Negative for chest tightness and wheezing. Cardiovascular:  Negative for chest pain, palpitations and leg swelling. Gastrointestinal:  Negative for abdominal pain, blood in stool, constipation, diarrhea, nausea and vomiting. Endocrine: Negative for polydipsia and polyphagia. Genitourinary:  Negative for dysuria, flank pain, hematuria, vaginal bleeding, vaginal discharge and vaginal pain. Musculoskeletal:  Negative for back pain, gait problem, joint swelling and myalgias. Skin:  Negative for rash. Allergic/Immunologic: Negative for immunocompromised state. Neurological:  Negative for dizziness, syncope, weakness, numbness and headaches. Hematological:  Negative for adenopathy. Psychiatric/Behavioral:  Negative for behavioral problems, confusion and hallucinations. Physical Exam  Constitutional:       General: She is not in acute distress. Appearance: Normal appearance. She is underweight. She is ill-appearing. She is not toxic-appearing or diaphoretic. HENT:      Head: Normocephalic and atraumatic. Right Ear: External ear normal.      Left Ear: External ear normal.      Nose: Nose normal. No congestion or rhinorrhea. Mouth/Throat:      Mouth: Mucous membranes are moist.      Pharynx: Oropharynx is clear. No oropharyngeal exudate or posterior oropharyngeal erythema. Eyes:      Conjunctiva/sclera: Conjunctivae normal.      Pupils: Pupils are equal, round, and reactive to light. Cardiovascular:      Rate and Rhythm: Normal rate and regular rhythm. Pulses: Normal pulses. Heart sounds: Normal heart sounds. Pulmonary:      Effort: Pulmonary effort is normal.      Breath sounds: Wheezing and rhonchi present. Abdominal:      General: Abdomen is flat. Bowel sounds are normal. There is no distension. Palpations: Abdomen is soft. There is no mass. Tenderness: There is no abdominal tenderness. There is no right CVA tenderness, left CVA tenderness or guarding. Hernia: No hernia is present. Musculoskeletal:      Cervical back: Normal range of motion. Skin:     General: Skin is warm and dry. Capillary Refill: Capillary refill takes less than 2 seconds. Neurological:      General: No focal deficit present. Mental Status: She is alert and oriented to person, place, and time. Mental status is at baseline. Psychiatric:         Mood and Affect: Mood normal.         Behavior: Behavior normal.         Thought Content:  Thought content normal.        Procedures     MDM     Amount and/or Complexity of Data Reviewed  Clinical lab tests: reviewed  Tests in the radiology section of CPT®: reviewed  Tests in the medicine section of CPT®: reviewed          78 y.o. female  with PMHx of COPD (3L), hx of lung nodule presents with SOB. While in the ED patient was hemodynamically stable, afebrile, nontoxic-appearing, in no respiratory distress. Physical exam remarkable for scattered wheezing and rhonchi diminished breath sounds bilaterally. Labs remarkable for hypercapnia elevated troponin with delta Trope trending down, COVID-negative, and otherwise unremarkable. Patient was placed on BiPAP. EKG sinus tachy with no sign of acute ischemia. CXR remarkable severe COPD. Patient was placed on BiPAP, given DuoNeb and Doxy. Patient admitted to the Medicine team for further management. Patient in agreement with plan of admission. ED Course as of 09/17/22 0138   Fri Sep 16, 2022   2201 EKG: This EKG is signed by emergency department physician. Rate: 108  Rhythm: Sinus tachy  Interpretation: sinus tachycardia, Biatrial enlargement  Comparison: stable as compared to patient's most recent EKG      [TC]   2206 Admitted to [facility] [TC]      ED Course User Index  [TC] Rc Garcia MD       --------------------------------------------- PAST HISTORY ---------------------------------------------  Past Medical History:  has a past medical history of Abnormal Pap smear, COPD (chronic obstructive pulmonary disease) ([facility] Emphysema of lung ([facility] Emphysema/COPD ([facility] GERD (gastroesophageal reflux disease), History of stomach ulcers, Hyperlipidemia, Osteoarthritis, and Renal calculi. Past Surgical History:  has a past surgical history that includes Endometrial biopsy; Tonsillectomy; Upper gastrointestinal endoscopy (10/17/2017); Colonoscopy (10/17/2017); Appendectomy; Cataract removal with implant (Bilateral, dec 2017, jan 2018); and Wrist surgery (Left, 2008). Social History:  reports that she has been smoking cigarettes.  She has a 25.50 pack-year smoking history. She has never used smokeless tobacco. She reports that she does not drink alcohol and does not use drugs. Family History: family history includes Arthritis in her maternal grandmother and mother; Asthma in her maternal grandmother and mother; Cancer in her brother and sister; Emphysema in her mother; Heart Disease in her father; High Blood Pressure in her mother. The patients home medications have been reviewed. Allergies: Patient has no known allergies.     -------------------------------------------------- RESULTS -------------------------------------------------    LABS:  Results for orders placed or performed during the hospital encounter of 09/16/22   COVID-19, Rapid    Specimen: Nasopharyngeal Swab   Result Value Ref Range    SARS-CoV-2, NAAT Not Detected Not Detected   CBC with Auto Differential   Result Value Ref Range    WBC 6.2 4.5 - 11.5 E9/L    RBC 4.00 3.50 - 5.50 E12/L    Hemoglobin 12.3 11.5 - 15.5 g/dL    Hematocrit 38.7 34.0 - 48.0 %    MCV 96.8 80.0 - 99.9 fL    MCH 30.8 26.0 - 35.0 pg    MCHC 31.8 (L) 32.0 - 34.5 %    RDW 13.1 11.5 - 15.0 fL    Platelets 728 570 - 553 E9/L    MPV 8.9 7.0 - 12.0 fL    Neutrophils % 91.3 (H) 43.0 - 80.0 %    Lymphocytes % 3.5 (L) 20.0 - 42.0 %    Monocytes % 4.3 2.0 - 12.0 %    Eosinophils % 0.6 0.0 - 6.0 %    Basophils % 0.9 0.0 - 2.0 %    Neutrophils Absolute 5.64 1.80 - 7.30 E9/L    Lymphocytes Absolute 0.25 (L) 1.50 - 4.00 E9/L    Monocytes Absolute 0.25 0.10 - 0.95 E9/L    Eosinophils Absolute 0.00 (L) 0.05 - 0.50 E9/L    Basophils Absolute 0.06 0.00 - 0.20 E9/L    Anisocytosis 1+     Poikilocytes 1+     Ovalocytes 1+     Basophilic Stippling 1+    Comprehensive Metabolic Panel w/ Reflex to MG   Result Value Ref Range    Sodium 140 132 - 146 mmol/L    Potassium reflex Magnesium 4.3 3.5 - 5.0 mmol/L    Chloride 98 98 - 107 mmol/L    CO2 29 22 - 29 mmol/L    Anion Gap 13 7 - 16 mmol/L    Glucose 130 (H) 74 - 99 mg/dL    BUN 29 (H) 6 - 23 mg/dL    Creatinine 0.5 0.5 - 1.0 mg/dL    GFR Non-African American >60 >=60 mL/min/1.73    GFR African American >60     Calcium 8.9 8.6 - 10.2 mg/dL    Total Protein 6.7 6.4 - 8.3 g/dL    Albumin 3.9 3.5 - 5.2 g/dL    Total Bilirubin 0.2 0.0 - 1.2 mg/dL    Alkaline Phosphatase 73 35 - 104 U/L    ALT 14 0 - 32 U/L    AST 24 0 - 31 U/L   Troponin   Result Value Ref Range    Troponin, High Sensitivity 20 (H) 0 - 9 ng/L   Brain Natriuretic Peptide   Result Value Ref Range    Pro- 0 - 450 pg/mL   Magnesium   Result Value Ref Range    Magnesium 2.1 1.6 - 2.6 mg/dL   Blood Gas, Arterial   Result Value Ref Range    Date Analyzed [id number]     Time Analyzed 2026     Source: Blood Arterial     pH, Blood Gas 7.325 (L) 7.350 - 7.450    PCO2 62.0 (H) 35.0 - 45.0 mmHg    PO2 89.7 75.0 - 100.0 mmHg    HCO3 31.6 (H) 22.0 - 26.0 mmol/L    B.E. 4.0 (H) -3.0 - 3.0 mmol/L    O2 Sat 96.9 92.0 - 98.5 %    O2Hb 96.5 94.0 - 97.0 %    COHb 0.2 0.0 - 1.5 %    MetHb 0.2 0.0 - 1.5 %    O2 Content 17.5 mL/dL    HHb 3.1 0.0 - 5.0 %    tHb (est) 12.8 11.5 - 16.5 g/dL    Mode NC- 3 L     Date Of Collection      Time Collected      Pt Temp 37.0 C     ID [id number]     Lab [id number]     Critical(s) Notified . No Critical Values    Troponin   Result Value Ref Range    Troponin, High Sensitivity 18 (H) 0 - 9 ng/L   EKG 12 Lead   Result Value Ref Range    Ventricular Rate 108 BPM    Atrial Rate 108 BPM    P-R Interval 136 ms    QRS Duration 74 ms    Q-T Interval 326 ms    QTc Calculation (Bazett) 436 ms    P Axis 86 degrees    R Axis 66 degrees    T Axis 80 degrees       RADIOLOGY:  XR CHEST PORTABLE   Final Result   No acute process. Severe COPD.                   ------------------------- NURSING NOTES AND VITALS REVIEWED ---------------------------  Date / Time Roomed:  9/16/2022  7:11 PM  ED Bed Assignment:  08/08    The nursing notes within the ED encounter and vital signs as below have been reviewed.      Patient Vitals for the past 24 hrs:   BP Temp Temp src Pulse Resp SpO2 Height Weight   09/17/22 0100 (!) 112/55 -- -- 92 15 99 % -- --   09/17/22 0015 118/61 -- -- (!) 103 21 97 % -- --   09/16/22 2300 (!) 99/52 -- -- 100 13 97 % -- --   09/16/22 2245 -- -- -- -- -- 98 % -- --   09/16/22 2200 (!) 125/58 -- -- (!) 115 29 -- -- --   09/16/22 2103 -- -- -- -- 19 -- -- --   09/16/22 1921 -- -- -- -- -- 97 % 5' (1.524 m) 90 lb (40.8 kg)   09/16/22 1919 118/61 97.4 °F (36.3 °C) Oral 98 23 100 % -- --       Oxygen Saturation Interpretation: Abnormal - but at baseline    ------------------------------------------ PROGRESS NOTES ------------------------------------------  Re-evaluation(s):  Time: 0730  Patients symptoms show no change  Repeat physical examination is not changed    Counseling:  I have spoken with the patient and discussed todays results, in addition to providing specific details for the plan of care and counseling regarding the diagnosis and prognosis. Their questions are answered at this time and they are agreeable with the plan of admission.    --------------------------------- ADDITIONAL PROVIDER NOTES ---------------------------------  Consultations:  Spoke with Dr. Brianne Vidal. Discussed case. They will admit the patient. This patient's ED course included: a personal history and physicial examination, re-evaluation prior to disposition, multiple bedside re-evaluations, IV medications, cardiac monitoring, and continuous pulse oximetry    This patient has remained hemodynamically stable during their ED course. Diagnosis:  1. COPD exacerbation ([address]        Disposition:  Patient's disposition: Admit to telemetry  Patient's condition is stable.          Martin Max MD  Resident  09/17/22 5885

## 2022-09-17 LAB
ALBUMIN SERPL-MCNC: 3.7 G/DL (ref 3.5–5.2)
ALP BLD-CCNC: 63 U/L (ref 35–104)
ALT SERPL-CCNC: 12 U/L (ref 0–32)
ANION GAP SERPL CALCULATED.3IONS-SCNC: 6 MMOL/L (ref 7–16)
AST SERPL-CCNC: 20 U/L (ref 0–31)
BASOPHILS ABSOLUTE: 0.01 E9/L (ref 0–0.2)
BASOPHILS RELATIVE PERCENT: 0.2 % (ref 0–2)
BILIRUB SERPL-MCNC: <0.2 MG/DL (ref 0–1.2)
BUN BLDV-MCNC: 28 MG/DL (ref 6–23)
CALCIUM SERPL-MCNC: 8.8 MG/DL (ref 8.6–10.2)
CHLORIDE BLD-SCNC: 102 MMOL/L (ref 98–107)
CO2: 33 MMOL/L (ref 22–29)
CREAT SERPL-MCNC: 0.4 MG/DL (ref 0.5–1)
EOSINOPHILS ABSOLUTE: 0 E9/L (ref 0.05–0.5)
EOSINOPHILS RELATIVE PERCENT: 0 % (ref 0–6)
GFR AFRICAN AMERICAN: >60
GFR NON-AFRICAN AMERICAN: >60 ML/MIN/1.73
GLUCOSE BLD-MCNC: 130 MG/DL (ref 74–99)
HCT VFR BLD CALC: 35 % (ref 34–48)
HEMOGLOBIN: 11.3 G/DL (ref 11.5–15.5)
IMMATURE GRANULOCYTES #: 0.01 E9/L
IMMATURE GRANULOCYTES %: 0.2 % (ref 0–5)
LYMPHOCYTES ABSOLUTE: 0.6 E9/L (ref 1.5–4)
LYMPHOCYTES RELATIVE PERCENT: 10.6 % (ref 20–42)
MCH RBC QN AUTO: 31.6 PG (ref 26–35)
MCHC RBC AUTO-ENTMCNC: 32.3 % (ref 32–34.5)
MCV RBC AUTO: 97.8 FL (ref 80–99.9)
MONOCYTES ABSOLUTE: 0.36 E9/L (ref 0.1–0.95)
MONOCYTES RELATIVE PERCENT: 6.4 % (ref 2–12)
NEUTROPHILS ABSOLUTE: 4.67 E9/L (ref 1.8–7.3)
NEUTROPHILS RELATIVE PERCENT: 82.6 % (ref 43–80)
PDW BLD-RTO: 13.1 FL (ref 11.5–15)
PLATELET # BLD: 226 E9/L (ref 130–450)
PMV BLD AUTO: 9.2 FL (ref 7–12)
POTASSIUM REFLEX MAGNESIUM: 4.3 MMOL/L (ref 3.5–5)
RBC # BLD: 3.58 E12/L (ref 3.5–5.5)
SODIUM BLD-SCNC: 141 MMOL/L (ref 132–146)
TOTAL PROTEIN: 6.1 G/DL (ref 6.4–8.3)
TROPONIN, HIGH SENSITIVITY: 18 NG/L (ref 0–9)
WBC # BLD: 5.7 E9/L (ref 4.5–11.5)

## 2022-09-17 PROCEDURE — 85025 COMPLETE CBC W/AUTO DIFF WBC: CPT

## 2022-09-17 PROCEDURE — 80053 COMPREHEN METABOLIC PANEL: CPT

## 2022-09-17 PROCEDURE — 6370000000 HC RX 637 (ALT 250 FOR IP): Performed by: FAMILY MEDICINE

## 2022-09-17 PROCEDURE — 96376 TX/PRO/DX INJ SAME DRUG ADON: CPT

## 2022-09-17 PROCEDURE — 94640 AIRWAY INHALATION TREATMENT: CPT

## 2022-09-17 PROCEDURE — 6360000002 HC RX W HCPCS: Performed by: FAMILY MEDICINE

## 2022-09-17 PROCEDURE — 96372 THER/PROPH/DIAG INJ SC/IM: CPT

## 2022-09-17 PROCEDURE — 1200000000 HC SEMI PRIVATE

## 2022-09-17 PROCEDURE — 96374 THER/PROPH/DIAG INJ IV PUSH: CPT

## 2022-09-17 PROCEDURE — 84484 ASSAY OF TROPONIN QUANT: CPT

## 2022-09-17 PROCEDURE — 94660 CPAP INITIATION&MGMT: CPT

## 2022-09-17 PROCEDURE — 2580000003 HC RX 258: Performed by: FAMILY MEDICINE

## 2022-09-17 RX ADMIN — IPRATROPIUM BROMIDE AND ALBUTEROL SULFATE 1 AMPULE: 2.5; .5 SOLUTION RESPIRATORY (INHALATION) at 07:54

## 2022-09-17 RX ADMIN — AZITHROMYCIN MONOHYDRATE 500 MG: 250 TABLET ORAL at 08:41

## 2022-09-17 RX ADMIN — BUDESONIDE 500 MCG: 0.5 SUSPENSION RESPIRATORY (INHALATION) at 07:54

## 2022-09-17 RX ADMIN — POLYETHYLENE GLYCOL 3350 17 G: 17 POWDER, FOR SOLUTION ORAL at 21:57

## 2022-09-17 RX ADMIN — ENOXAPARIN SODIUM 30 MG: 100 INJECTION SUBCUTANEOUS at 08:41

## 2022-09-17 RX ADMIN — MONTELUKAST SODIUM 10 MG: 10 TABLET, FILM COATED ORAL at 21:57

## 2022-09-17 RX ADMIN — IPRATROPIUM BROMIDE AND ALBUTEROL SULFATE 1 AMPULE: 2.5; .5 SOLUTION RESPIRATORY (INHALATION) at 16:09

## 2022-09-17 RX ADMIN — Medication 10 ML: at 15:32

## 2022-09-17 RX ADMIN — METHYLPREDNISOLONE SODIUM SUCCINATE 40 MG: 40 INJECTION, POWDER, FOR SOLUTION INTRAMUSCULAR; INTRAVENOUS at 14:21

## 2022-09-17 RX ADMIN — BUDESONIDE 500 MCG: 0.5 SUSPENSION RESPIRATORY (INHALATION) at 19:43

## 2022-09-17 RX ADMIN — IPRATROPIUM BROMIDE AND ALBUTEROL SULFATE 1 AMPULE: 2.5; .5 SOLUTION RESPIRATORY (INHALATION) at 19:43

## 2022-09-17 RX ADMIN — METHYLPREDNISOLONE SODIUM SUCCINATE 40 MG: 40 INJECTION, POWDER, FOR SOLUTION INTRAMUSCULAR; INTRAVENOUS at 05:43

## 2022-09-17 RX ADMIN — METHYLPREDNISOLONE SODIUM SUCCINATE 40 MG: 40 INJECTION, POWDER, FOR SOLUTION INTRAMUSCULAR; INTRAVENOUS at 17:40

## 2022-09-17 ASSESSMENT — LIFESTYLE VARIABLES
HOW MANY STANDARD DRINKS CONTAINING ALCOHOL DO YOU HAVE ON A TYPICAL DAY: PATIENT DOES NOT DRINK
HOW OFTEN DO YOU HAVE A DRINK CONTAINING ALCOHOL: NEVER

## 2022-09-17 ASSESSMENT — ENCOUNTER SYMPTOMS
NAUSEA: 1
COUGH: 1
SHORTNESS OF BREATH: 1

## 2022-09-17 NOTE — H&P
Hospitalist History & Physical      PCP: Andrew Khoury MD    Date of Service: Pt seen/examined on 9/16/2022     Chief Complaint:  had concerns including Shortness of Breath (Since Tuesday morning. Hx of COPD, wear 3L NC at home. Given 2 breathing treatments, 125mg solu-medrol, and 500ml fluid bolus PTA. ). History Of Present Illness:    Ms. Tanya Nunes, a 78y.o. year old female  who  has a past medical history of Abnormal Pap smear, COPD (chronic obstructive pulmonary disease) ([address] Emphysema of lung ([address] Emphysema/COPD ([address] GERD (gastroesophageal reflux disease), History of stomach ulcers, Hyperlipidemia, Osteoarthritis, and Renal calculi. Patient presented to the emergency department with complaints of shortness of breath. Patient has a history of severe COPD. Wears 3 L of oxygen at baseline. Symptoms began on Tuesday and have worsened in the interim. Also reporting a productive cough. Reports chills but not sure if he had a fever or not. Has had poor appetite and nausea but no vomiting. Vital signs are within normal limits and stable. SPO2 97% on 3 L nasal cannula. Laboratory studies were unremarkable exception of elevated troponin of 20. Chest x-ray shows severe COPD. Patient was given breathing treatments and IV steroids. Medicine consulted for admission. Past Medical History:   Diagnosis Date    Abnormal Pap smear 1995    Patient states she had laser surgery    COPD (chronic obstructive pulmonary disease) (HCC)     Emphysema of lung (HCC)     Emphysema/COPD (HCC)     GERD (gastroesophageal reflux disease)     History of stomach ulcers     Hyperlipidemia     DIET    Osteoarthritis     Renal calculi     APX 40 YEARS AGO       Past Surgical History:   Procedure Laterality Date    APPENDECTOMY      15 y.o.     CATARACT REMOVAL WITH IMPLANT Bilateral dec 2017, jan 2018    COLONOSCOPY  10/17/2017    normal colon--opal    ENDOMETRIAL BIOPSY      TONSILLECTOMY      UPPER GASTROINTESTINAL ENDOSCOPY  10/17/2017    gastritis; hiatal hernia--opal    WRIST SURGERY Left 2008       Prior to Admission medications    Medication Sig Start Date End Date Taking? Authorizing Provider   albuterol sulfate HFA (PROVENTIL;VENTOLIN;PROAIR) 108 (90 Base) MCG/ACT inhaler inhale 2 puffs by mouth and INTO THE LUNGS every 6 hours if needed for wheezing 8/29/22   Mulu Wong MD   Budeson-Glycopyrrol-Formoterol (BREZTRI AEROSPHERE) 160-9-4.8 MCG/ACT AERO Inhale 2 puffs into the lungs 2 times daily 1/28/22   Susan Aleman MD   montelukast (SINGULAIR) 10 MG tablet Take 1 tablet by mouth nightly 5/6/21 8/4/21  Mulu Wong MD   ketotifen (ZADITOR) 0.025 % ophthalmic solution Place 1 drop into both eyes 2 times daily 10/24/18   Aruna Arroyo MD   nicotine (NICODERM CQ) 14 MG/24HR Place 1 patch onto the skin daily 7/25/18   David Munoz MD   nicotine (NICODERM CQ) 14 MG/24HR Place 1 patch onto the skin daily 6/21/18   Isabell Mercado MD   acetaminophen (TYLENOL) 325 MG tablet Take 650 mg by mouth every 6 hours as needed for Pain    Historical Provider, MD         Allergies:  Patient has no known allergies. Social History:    TOBACCO:   reports that she has been smoking cigarettes. She has a 25.50 pack-year smoking history. She has never used smokeless tobacco.  ETOH:   reports no history of alcohol use. Family History:    Reviewed in detail and negative for DM, CAD, Cancer, CVA. Positive as follows\"      Problem Relation Age of Onset    High Blood Pressure Mother     Emphysema Mother     Arthritis Mother     Asthma Mother     Cancer Sister         lung, Hodgkins    Cancer Brother         leukemia    Heart Disease Father     Arthritis Maternal Grandmother     Asthma Maternal Grandmother        REVIEW OF SYSTEMS:   Pertinent positives as noted in the HPI. All other systems reviewed and negative.     PHYSICAL EXAM:  /61   Pulse 98   Temp 97.4 °F (36.3 °C) (Oral)   Resp 19   Ht 5' (1.524 m) Wt 90 lb (40.8 kg)   SpO2 97%   BMI 17.58 kg/m²   General appearance: No apparent distress, appears stated age and cooperative. HEENT: Normal cephalic, atraumatic without obvious deformity. Pupils equal, round, and reactive to light. Extra ocular muscles intact. Conjunctivae/corneas clear. Neck: Supple, with full range of motion. No jugular venous distention. Trachea midline. Respiratory: Coarse bilaterally with scattered wheezes  Cardiovascular: Regular rate and rhythm  Abdomen: Soft, nontender, nondistended  Musculoskeletal: No clubbing, cyanosis, edema of bilateral lower extremities. Brisk capillary refill. Skin: Normal skin color. No rashes or lesions. Neurologic:  Neurovascularly intact without any focal sensory/motor deficits. Cranial nerves: II-XII intact, grossly non-focal.  Psychiatric: Alert and oriented, thought content appropriate, normal insight    Reviewed EKG and CXR personally      CBC:   Recent Labs     09/16/22 2011   WBC 6.2   RBC 4.00   HGB 12.3   HCT 38.7   MCV 96.8   RDW 13.1        BMP:   Recent Labs     09/16/22 2011      K 4.3   CL 98   CO2 29   BUN 29*   CREATININE 0.5   MG 2.1     LFT:  Recent Labs     09/16/22 2011   PROT 6.7   ALKPHOS 73   ALT 14   AST 24   BILITOT 0.2     CE:  No results for input(s): Assunta Ghosh in the last 72 hours. PT/INR: No results for input(s): INR, APTT in the last 72 hours. BNP: No results for input(s): BNP in the last 72 hours.   ESR: No results found for: SEDRATE  CRP: No results found for: CRP  D Dimer: No results found for: DDIMER   Folate and B12:   Lab Results   Component Value Date    RKKEUOYA24 346 07/31/2012   ,   Lab Results   Component Value Date    FOLATE >24.0 07/31/2012     Lactic Acid:   Lab Results   Component Value Date    LACTA 1.6 06/10/2018     Thyroid Studies:   Lab Results   Component Value Date    TSH 2.240 02/04/2019       Oupatient labs:  Lab Results   Component Value Date    CHOL 231 (H) 10/11/2013 TRIG 86 10/11/2013    HDL 81.0 10/11/2013    LDLCALC 133 (H) 10/11/2013    TSH 2.240 02/04/2019    INR 1.2 04/29/2012    LABA1C 5.5 02/04/2019       Urinalysis:  No results found for: NITRU, WBCUA, BACTERIA, RBCUA, BLOODU, SPECGRAV, GLUCOSEU    Imaging:  CT CHEST WO CONTRAST    Result Date: 9/12/2022  EXAMINATION: CT OF THE CHEST WITHOUT CONTRAST 9/12/2022 11:00 am TECHNIQUE: CT of the chest was performed without the administration of intravenous contrast. Multiplanar reformatted images are provided for review. Automated exposure control, iterative reconstruction, and/or weight based adjustment of the mA/kV was utilized to reduce the radiation dose to as low as reasonably achievable. COMPARISON: January 21, 2022 and prior exam from June 6, 2019 HISTORY: ORDERING SYSTEM PROVIDED HISTORY: Lung nodule TECHNOLOGIST PROVIDED HISTORY: Enb protocol Reason for exam:->Lung Nodule; COPD What reading provider will be dictating this exam?->CRC FINDINGS: The heart is normal in size. No pericardial effusion. Atherosclerotic calcifications associated with the coronary arteries. No mediastinal lymphadenopathy. Stable nodule located in the left upper lobe measures approximately 5 mm. Stable nodule located in peripheral left lower lobe on axial image number 92 measures approximately 6 mm. Additional nodule in peripheral left lower lobe measures approximately 3 mm. There are multiple areas of subsegmental atelectasis or scarring bilaterally. Hyperinflation of both lungs. No airspace opacity or pleural effusion. Nonobstructing 2 mm calculus associated with the right kidney. 1. Redemonstration of 5 mm nodule located in left upper lobe and 6 mm nodule located in peripheral aspect of left lower lobe. These nodules appear similar in size compared to prior from January 21, 2022 and prior from June 6, 2019 and may reflect subsegmental atelectasis or scarring. No suspicious pulmonary nodule or mass.   Follow-up recommended

## 2022-09-17 NOTE — ED NOTES
Patient placed on bedpan and voided a small amount of urine. Patient taken off bed pan and cleaned up.       Jeffrey Pedro RN  09/17/22 0028

## 2022-09-17 NOTE — PROGRESS NOTES
Hospitalist Progress Note      SYNOPSIS: Patient admitted on 2022 for worsening shortness of breath. Patient presented to the emergency department with complaints of shortness of breath. Patient has a history of severe COPD. Wears 3 L of oxygen at baseline. Symptoms began on Tuesday and have worsened in the interim. Also reporting a productive cough. Reports chills but not sure if he had a fever or not. Has had poor appetite and nausea but no vomiting. Vital signs are within normal limits and stable. SPO2 97% on 3 L nasal cannula. Laboratory studies were unremarkable exception of elevated troponin of 20. Chest x-ray shows severe COPD. Patient was given breathing treatments and IV steroids. Medicine consulted for admission. SUBJECTIVE:    Patient seen and examined the ER. Alert awake oriented x3, on BiPAP. Denies any chest pain, nausea, vomiting. Shortness of breath improved. Records reviewed. Stable overnight. No other overnight issues reported. Temp (24hrs), Av.4 °F (36.3 °C), Min:97.4 °F (36.3 °C), Max:97.4 °F (36.3 °C)    DIET: ADULT DIET; Regular  CODE: Full Code  No intake or output data in the 24 hours ending 22 0750    OBJECTIVE:    /62   Pulse 73   Temp 97.4 °F (36.3 °C) (Oral)   Resp 13   Ht 5' (1.524 m)   Wt 90 lb (40.8 kg)   SpO2 100%   BMI 17.58 kg/m²     General appearance: No apparent distress, appears stated age and cooperative. HEENT:  Conjunctivae/corneas clear. Neck: Supple. No jugular venous distention. Respiratory: Clear to auscultation bilaterally, normal respiratory effort  Cardiovascular: Regular rate rhythm, normal S1-S2  Abdomen: Soft, nontender, nondistended  Musculoskeletal: No clubbing, cyanosis, no bilateral lower extremity edema. Brisk capillary refill.    Skin:  No rashes  on visible skin  Neurologic: awake, alert and following commands     ASSESSMENT:  -COPD exacerbation  -Hyperlipidemia  -GERD        PLAN:  -Admit to medicine  -DuoNebs every 4 hours  -Albuterol as needed   -Pulmicort twice daily  -Methylprednisolone 40 mg IV every 6 hours  Also plan to give azithromycin 500 mg daily for 3 doses to cover for bronchitis  -Supplemental O2 as needed  -Telemetry  -Continue home medications       DISPOSITION:   Possible discharge on the morning of 9/19. Medications:  REVIEWED DAILY    Infusion Medications    sodium chloride       Scheduled Medications    ketotifen  1 drop Both Eyes BID    montelukast  10 mg Oral Nightly    nicotine  1 patch TransDERmal Daily    sodium chloride flush  5-40 mL IntraVENous 2 times per day    enoxaparin  30 mg SubCUTAneous Daily    ipratropium-albuterol  1 ampule Inhalation Q4H WA    budesonide  500 mcg Nebulization BID    methylPREDNISolone  40 mg IntraVENous Q6H    Followed by    Cabrera Jose ON 9/19/2022] predniSONE  40 mg Oral Daily    azithromycin  500 mg Oral Daily     PRN Meds: sodium chloride flush, sodium chloride, ondansetron **OR** ondansetron, polyethylene glycol, acetaminophen **OR** acetaminophen, albuterol    Labs:     Recent Labs     09/16/22 2011 09/17/22  0548   WBC 6.2 5.7   HGB 12.3 11.3*   HCT 38.7 35.0    226       Recent Labs     09/16/22 2011 09/17/22  0548    141   K 4.3 4.3   CL 98 102   CO2 29 33*   BUN 29* 28*   CREATININE 0.5 0.4*   CALCIUM 8.9 8.8       Recent Labs     09/16/22 2011 09/17/22  0548   PROT 6.7 6.1*   ALKPHOS 73 63   ALT 14 12   AST 24 20   BILITOT 0.2 <0.2       No results for input(s): INR in the last 72 hours. No results for input(s): [facility] in the last 72 hours.     Chronic labs:    Lab Results   Component Value Date    CHOL 231 (H) 10/11/2013    TRIG 86 10/11/2013    HDL 81.0 10/11/2013    LDLCALC 133 (H) 10/11/2013    TSH 2.240 02/04/2019    INR 1.2 04/29/2012    LABA1C 5.5 02/04/2019       Radiology: REVIEWED DAILY    +++++++++++++++++++++++++++++++++++++++++++++++++  Fanta Robles MD  [address] [place] [address]  +++++++++++++++++++++++++++++++++++++++++++++++++  NOTE: This report was transcribed using voice recognition software. Every effort was made to ensure accuracy; however, inadvertent computerized transcription errors may be present.

## 2022-09-17 NOTE — PROGRESS NOTES
Date: 9/16/2022    Time: 9:04 PM    Patient Placed On BIPAP/CPAP/ Non-Invasive Ventilation? Yes    If no must comment. Facial area red/color change? No           If YES are Blister/Lesion present? No   If yes must notify nursing staff  BIPAP/CPAP skin barrier?   Yes    Skin barrier type:mepilexlite       Comments:  Placed on NIV per order    Carlos Archibald RCP

## 2022-09-18 LAB
EKG ATRIAL RATE: 108 BPM
EKG P AXIS: 86 DEGREES
EKG P-R INTERVAL: 136 MS
EKG Q-T INTERVAL: 326 MS
EKG QRS DURATION: 74 MS
EKG QTC CALCULATION (BAZETT): 436 MS
EKG R AXIS: 66 DEGREES
EKG T AXIS: 80 DEGREES
EKG VENTRICULAR RATE: 108 BPM

## 2022-09-18 PROCEDURE — 6370000000 HC RX 637 (ALT 250 FOR IP): Performed by: FAMILY MEDICINE

## 2022-09-18 PROCEDURE — 94660 CPAP INITIATION&MGMT: CPT

## 2022-09-18 PROCEDURE — 6360000002 HC RX W HCPCS: Performed by: FAMILY MEDICINE

## 2022-09-18 PROCEDURE — 2700000000 HC OXYGEN THERAPY PER DAY

## 2022-09-18 PROCEDURE — 94640 AIRWAY INHALATION TREATMENT: CPT

## 2022-09-18 PROCEDURE — 96372 THER/PROPH/DIAG INJ SC/IM: CPT

## 2022-09-18 PROCEDURE — 2580000003 HC RX 258: Performed by: FAMILY MEDICINE

## 2022-09-18 PROCEDURE — 1200000000 HC SEMI PRIVATE

## 2022-09-18 PROCEDURE — 96376 TX/PRO/DX INJ SAME DRUG ADON: CPT

## 2022-09-18 RX ADMIN — Medication 10 ML: at 09:07

## 2022-09-18 RX ADMIN — METHYLPREDNISOLONE SODIUM SUCCINATE 40 MG: 40 INJECTION, POWDER, FOR SOLUTION INTRAMUSCULAR; INTRAVENOUS at 02:03

## 2022-09-18 RX ADMIN — IPRATROPIUM BROMIDE AND ALBUTEROL SULFATE 1 AMPULE: 2.5; .5 SOLUTION RESPIRATORY (INHALATION) at 15:52

## 2022-09-18 RX ADMIN — IPRATROPIUM BROMIDE AND ALBUTEROL SULFATE 1 AMPULE: 2.5; .5 SOLUTION RESPIRATORY (INHALATION) at 20:02

## 2022-09-18 RX ADMIN — ENOXAPARIN SODIUM 30 MG: 100 INJECTION SUBCUTANEOUS at 09:06

## 2022-09-18 RX ADMIN — IPRATROPIUM BROMIDE AND ALBUTEROL SULFATE 1 AMPULE: 2.5; .5 SOLUTION RESPIRATORY (INHALATION) at 11:47

## 2022-09-18 RX ADMIN — METHYLPREDNISOLONE SODIUM SUCCINATE 40 MG: 40 INJECTION, POWDER, FOR SOLUTION INTRAMUSCULAR; INTRAVENOUS at 05:57

## 2022-09-18 RX ADMIN — AZITHROMYCIN MONOHYDRATE 500 MG: 250 TABLET ORAL at 09:07

## 2022-09-18 RX ADMIN — METHYLPREDNISOLONE SODIUM SUCCINATE 40 MG: 40 INJECTION, POWDER, FOR SOLUTION INTRAMUSCULAR; INTRAVENOUS at 21:00

## 2022-09-18 RX ADMIN — IPRATROPIUM BROMIDE AND ALBUTEROL SULFATE 1 AMPULE: 2.5; .5 SOLUTION RESPIRATORY (INHALATION) at 07:29

## 2022-09-18 RX ADMIN — BUDESONIDE 500 MCG: 0.5 SUSPENSION RESPIRATORY (INHALATION) at 07:29

## 2022-09-18 RX ADMIN — MONTELUKAST SODIUM 10 MG: 10 TABLET, FILM COATED ORAL at 21:00

## 2022-09-18 RX ADMIN — BUDESONIDE 500 MCG: 0.5 SUSPENSION RESPIRATORY (INHALATION) at 20:02

## 2022-09-18 RX ADMIN — METHYLPREDNISOLONE SODIUM SUCCINATE 40 MG: 40 INJECTION, POWDER, FOR SOLUTION INTRAMUSCULAR; INTRAVENOUS at 13:11

## 2022-09-18 RX ADMIN — Medication 10 ML: at 23:06

## 2022-09-18 NOTE — PROGRESS NOTES
Hospitalist Progress Note      SYNOPSIS: Patient admitted on 2022 for worsening shortness of breath. Patient presented to the emergency department with complaints of shortness of breath. Patient has a history of severe COPD. Wears 3 L of oxygen at baseline. Symptoms began on Tuesday and have worsened in the interim. Also reporting a productive cough. Reports chills but not sure if he had a fever or not. Has had poor appetite and nausea but no vomiting. Vital signs are within normal limits and stable. SPO2 97% on 3 L nasal cannula. Laboratory studies were unremarkable exception of elevated troponin of 20. Chest x-ray shows severe COPD. Patient was given breathing treatments and IV steroids. Medicine consulted for admission. SUBJECTIVE:    Patient seen and examined the ER. Patient refused BiPAP last night. Denies any chest pain, nausea or vomiting. States that shortness of breath better. Records reviewed. Stable overnight. No other overnight issues reported. Temp (24hrs), Av.8 °F (36.6 °C), Min:97.5 °F (36.4 °C), Max:98.1 °F (36.7 °C)    DIET: ADULT DIET; Regular  CODE: Full Code    Intake/Output Summary (Last 24 hours) at 2022 0807  Last data filed at 2022 1944  Gross per 24 hour   Intake 240 ml   Output --   Net 240 ml       OBJECTIVE:    /65   Pulse 92   Temp 98.1 °F (36.7 °C) (Oral)   Resp 18   Ht 5' (1.524 m)   Wt 81 lb 8 oz (37 kg)   SpO2 95%   BMI 15.92 kg/m²     General appearance: No apparent distress, appears stated age and cooperative. HEENT:  Conjunctivae/corneas clear. Neck: Supple. No jugular venous distention. Respiratory: Clear to auscultation bilaterally, normal respiratory effort  Cardiovascular: Regular rate rhythm, normal S1-S2  Abdomen: Soft, nontender, nondistended  Musculoskeletal: No clubbing, cyanosis, no bilateral lower extremity edema. Brisk capillary refill.    Skin:  No rashes  on visible skin  Neurologic: awake, alert and following commands     ASSESSMENT:  -COPD exacerbation  -Hyperlipidemia  -GERD        PLAN:  -Admit to medicine  -DuoNebs every 4 hours  -Albuterol as needed   -Pulmicort twice daily  -Methylprednisolone 40 mg IV every 6 hours  Also plan to give azithromycin 500 mg daily for 3 doses to cover for bronchitis  -Supplemental O2 as needed  -Telemetry  -Continue home medications       DISPOSITION:   Possible discharge on the morning of 9/19. Medications:  REVIEWED DAILY    Infusion Medications    sodium chloride       Scheduled Medications    montelukast  10 mg Oral Nightly    nicotine  1 patch TransDERmal Daily    sodium chloride flush  5-40 mL IntraVENous 2 times per day    enoxaparin  30 mg SubCUTAneous Daily    ipratropium-albuterol  1 ampule Inhalation Q4H WA    budesonide  500 mcg Nebulization BID    methylPREDNISolone  40 mg IntraVENous Q6H    Followed by    Radu Wahl ON 9/19/2022] predniSONE  40 mg Oral Daily    azithromycin  500 mg Oral Daily     PRN Meds: sodium chloride flush, sodium chloride, ondansetron **OR** ondansetron, polyethylene glycol, acetaminophen **OR** acetaminophen, albuterol    Labs:     Recent Labs     09/16/22 2011 09/17/22  0548   WBC 6.2 5.7   HGB 12.3 11.3*   HCT 38.7 35.0    226       Recent Labs     09/16/22 2011 09/17/22  0548    141   K 4.3 4.3   CL 98 102   CO2 29 33*   BUN 29* 28*   CREATININE 0.5 0.4*   CALCIUM 8.9 8.8       Recent Labs     09/16/22 2011 09/17/22  0548   PROT 6.7 6.1*   ALKPHOS 73 63   ALT 14 12   AST 24 20   BILITOT 0.2 <0.2       No results for input(s): INR in the last 72 hours. No results for input(s): Rowena Ill in the last 72 hours.     Chronic labs:    Lab Results   Component Value Date    CHOL 231 (H) 10/11/2013    TRIG 86 10/11/2013    HDL 81.0 10/11/2013    LDLCALC 133 (H) 10/11/2013    TSH 2.240 02/04/2019    INR 1.2 04/29/2012    LABA1C 5.5 02/04/2019       Radiology: REVIEWED DAILY    +++++++++++++++++++++++++++++++++++++++++++++++++  Irene Mckinley MD  [address]  +++++++++++++++++++++++++++++++++++++++++++++++++  NOTE: This report was transcribed using voice recognition software. Every effort was made to ensure accuracy; however, inadvertent computerized transcription errors may be present.

## 2022-09-19 LAB
ANION GAP SERPL CALCULATED.3IONS-SCNC: 8 MMOL/L (ref 7–16)
BUN BLDV-MCNC: 28 MG/DL (ref 6–23)
CALCIUM SERPL-MCNC: 8.8 MG/DL (ref 8.6–10.2)
CHLORIDE BLD-SCNC: 103 MMOL/L (ref 98–107)
CO2: 32 MMOL/L (ref 22–29)
CREAT SERPL-MCNC: 0.4 MG/DL (ref 0.5–1)
GFR AFRICAN AMERICAN: >60
GFR NON-AFRICAN AMERICAN: >60 ML/MIN/1.73
GLUCOSE BLD-MCNC: 140 MG/DL (ref 74–99)
HCT VFR BLD CALC: 35.9 % (ref 34–48)
HEMOGLOBIN: 11.5 G/DL (ref 11.5–15.5)
MCH RBC QN AUTO: 31.2 PG (ref 26–35)
MCHC RBC AUTO-ENTMCNC: 32 % (ref 32–34.5)
MCV RBC AUTO: 97.3 FL (ref 80–99.9)
PDW BLD-RTO: 13.1 FL (ref 11.5–15)
PLATELET # BLD: 240 E9/L (ref 130–450)
PMV BLD AUTO: 9.4 FL (ref 7–12)
POTASSIUM SERPL-SCNC: 4.4 MMOL/L (ref 3.5–5)
RBC # BLD: 3.69 E12/L (ref 3.5–5.5)
SODIUM BLD-SCNC: 143 MMOL/L (ref 132–146)
WBC # BLD: 12.7 E9/L (ref 4.5–11.5)

## 2022-09-19 PROCEDURE — 6370000000 HC RX 637 (ALT 250 FOR IP): Performed by: FAMILY MEDICINE

## 2022-09-19 PROCEDURE — 94640 AIRWAY INHALATION TREATMENT: CPT

## 2022-09-19 PROCEDURE — 80048 BASIC METABOLIC PNL TOTAL CA: CPT

## 2022-09-19 PROCEDURE — 1200000000 HC SEMI PRIVATE

## 2022-09-19 PROCEDURE — 2580000003 HC RX 258: Performed by: FAMILY MEDICINE

## 2022-09-19 PROCEDURE — 96376 TX/PRO/DX INJ SAME DRUG ADON: CPT

## 2022-09-19 PROCEDURE — 96372 THER/PROPH/DIAG INJ SC/IM: CPT

## 2022-09-19 PROCEDURE — 6360000002 HC RX W HCPCS: Performed by: FAMILY MEDICINE

## 2022-09-19 PROCEDURE — 6370000000 HC RX 637 (ALT 250 FOR IP): Performed by: INTERNAL MEDICINE

## 2022-09-19 PROCEDURE — 85027 COMPLETE CBC AUTOMATED: CPT

## 2022-09-19 PROCEDURE — 2700000000 HC OXYGEN THERAPY PER DAY

## 2022-09-19 PROCEDURE — 36415 COLL VENOUS BLD VENIPUNCTURE: CPT

## 2022-09-19 PROCEDURE — 94660 CPAP INITIATION&MGMT: CPT

## 2022-09-19 RX ORDER — SENNA AND DOCUSATE SODIUM 50; 8.6 MG/1; MG/1
2 TABLET, FILM COATED ORAL DAILY PRN
Status: DISCONTINUED | OUTPATIENT
Start: 2022-09-19 | End: 2022-09-20 | Stop reason: HOSPADM

## 2022-09-19 RX ADMIN — BUDESONIDE 500 MCG: 0.5 SUSPENSION RESPIRATORY (INHALATION) at 21:36

## 2022-09-19 RX ADMIN — PREDNISONE 40 MG: 20 TABLET ORAL at 09:10

## 2022-09-19 RX ADMIN — MONTELUKAST SODIUM 10 MG: 10 TABLET, FILM COATED ORAL at 20:51

## 2022-09-19 RX ADMIN — SENNOSIDES AND DOCUSATE SODIUM 2 TABLET: 50; 8.6 TABLET ORAL at 13:15

## 2022-09-19 RX ADMIN — ENOXAPARIN SODIUM 30 MG: 100 INJECTION SUBCUTANEOUS at 09:11

## 2022-09-19 RX ADMIN — BUDESONIDE 500 MCG: 0.5 SUSPENSION RESPIRATORY (INHALATION) at 09:30

## 2022-09-19 RX ADMIN — IPRATROPIUM BROMIDE AND ALBUTEROL SULFATE 1 AMPULE: 2.5; .5 SOLUTION RESPIRATORY (INHALATION) at 17:05

## 2022-09-19 RX ADMIN — AZITHROMYCIN MONOHYDRATE 500 MG: 250 TABLET ORAL at 09:10

## 2022-09-19 RX ADMIN — Medication 10 ML: at 09:11

## 2022-09-19 RX ADMIN — IPRATROPIUM BROMIDE AND ALBUTEROL SULFATE 1 AMPULE: 2.5; .5 SOLUTION RESPIRATORY (INHALATION) at 09:30

## 2022-09-19 RX ADMIN — IPRATROPIUM BROMIDE AND ALBUTEROL SULFATE 1 AMPULE: 2.5; .5 SOLUTION RESPIRATORY (INHALATION) at 21:35

## 2022-09-19 RX ADMIN — IPRATROPIUM BROMIDE AND ALBUTEROL SULFATE 1 AMPULE: 2.5; .5 SOLUTION RESPIRATORY (INHALATION) at 12:00

## 2022-09-19 NOTE — CARE COORDINATION
Social Work Discharge Planning:  SW met with patient at bedside. Patient lives with daughter and grandchildren in a  one story home with 4 steps to gain access to entry. Patient independent prior to admission, she is on  3 Liters of O2 at home supplied by [facility]. Patient relayed she uses her friend's nebulizer and doesn't have one of her own. Patient has no hx with HHC or SNF. Patient PCP is Dr. Sun Miller and pharmacy is  [facility]. Patient family will transport the patient home at discharge, SW will continue to follow and assist with transition of care.   Electronically signed by CHAYA Trivedi on 9/19/2022 at 6:54 PM

## 2022-09-19 NOTE — PROGRESS NOTES
Hospitalist Progress Note      SYNOPSIS: Patient admitted on 2022 for worsening shortness of breath. Patient presented to the emergency department with complaints of shortness of breath. Patient has a history of severe COPD. Wears 3 L of oxygen at baseline. Symptoms began on Tuesday and have worsened in the interim. Also reporting a productive cough. Reports chills but not sure if he had a fever or not. Has had poor appetite and nausea but no vomiting. Vital signs are within normal limits and stable. SPO2 97% on 3 L nasal cannula. Laboratory studies were unremarkable exception of elevated troponin of 20. Chest x-ray shows severe COPD. Patient was given breathing treatments and IV steroids. Medicine consulted for admission. Patient showed some improvement over the next 48 hours. SUBJECTIVE:    Patient seen and examined the ER. Patient appears comfortable at rest but having difficulty breathing after minimal exertion. Denies any chest pain, nausea or vomiting. States that shortness of breath better. Records reviewed. Stable overnight. No other overnight issues reported. Temp (24hrs), Av °F (36.1 °C), Min:96.9 °F (36.1 °C), Max:97 °F (36.1 °C)    DIET: ADULT DIET; Regular  CODE: Full Code    Intake/Output Summary (Last 24 hours) at 2022 0851  Last data filed at 2022 1642  Gross per 24 hour   Intake 240 ml   Output --   Net 240 ml       OBJECTIVE:    /62   Pulse 76   Temp 97 °F (36.1 °C) (Oral)   Resp 18   Ht 5' (1.524 m)   Wt 81 lb 8 oz (37 kg)   SpO2 97%   BMI 15.92 kg/m²     General appearance: No apparent distress, appears stated age and cooperative. HEENT:  Conjunctivae/corneas clear. Neck: Supple. No jugular venous distention. Respiratory: Clear to auscultation bilaterally, normal respiratory effort, moderate to severe expiratory wheeze still noted.   Cardiovascular: Regular rate rhythm, normal S1-S2  Abdomen: Soft, nontender, nondistended  Musculoskeletal: No clubbing, cyanosis, no bilateral lower extremity edema. Brisk capillary refill. Skin:  No rashes  on visible skin  Neurologic: awake, alert and following commands     ASSESSMENT:  -COPD exacerbation  -Hyperlipidemia  -GERD        PLAN:  -Admit to medicine  -DuoNebs every 4 hours  -Albuterol as needed   -Pulmicort twice daily  -Methylprednisolone 40 mg IV every 6 hours  Also plan to give azithromycin 500 mg daily for 3 doses to cover for bronchitis  -Supplemental O2 as needed  -Telemetry  -Continue home medications       DISPOSITION:   I will observe this patient for another 24 hours due to her persistent shortness of breath with minimal exertion and moderate expiratory wheeze. Possible discharge in the morning of 9/20. Medications:  REVIEWED DAILY    Infusion Medications    sodium chloride       Scheduled Medications    montelukast  10 mg Oral Nightly    nicotine  1 patch TransDERmal Daily    sodium chloride flush  5-40 mL IntraVENous 2 times per day    enoxaparin  30 mg SubCUTAneous Daily    ipratropium-albuterol  1 ampule Inhalation Q4H WA    budesonide  500 mcg Nebulization BID    predniSONE  40 mg Oral Daily    azithromycin  500 mg Oral Daily     PRN Meds: sodium chloride flush, sodium chloride, ondansetron **OR** ondansetron, polyethylene glycol, acetaminophen **OR** acetaminophen, albuterol    Labs:     Recent Labs     09/16/22 2011 09/17/22  0548 09/19/22  0422   WBC 6.2 5.7 12.7*   HGB 12.3 11.3* 11.5   HCT 38.7 35.0 35.9    226 240       Recent Labs     09/16/22 2011 09/17/22  0548 09/19/22  0422    141 143   K 4.3 4.3 4.4   CL 98 102 103   CO2 29 33* 32*   BUN 29* 28* 28*   CREATININE 0.5 0.4* 0.4*   CALCIUM 8.9 8.8 8.8       Recent Labs     09/16/22 2011 09/17/22  0548   PROT 6.7 6.1*   ALKPHOS 73 63   ALT 14 12   AST 24 20   BILITOT 0.2 <0.2       No results for input(s): INR in the last 72 hours.     No results for input(s): Inderjit Martines in

## 2022-09-20 VITALS
BODY MASS INDEX: 16 KG/M2 | HEIGHT: 60 IN | TEMPERATURE: 98.2 F | HEART RATE: 84 BPM | SYSTOLIC BLOOD PRESSURE: 129 MMHG | RESPIRATION RATE: 17 BRPM | DIASTOLIC BLOOD PRESSURE: 66 MMHG | OXYGEN SATURATION: 95 % | WEIGHT: 81.5 LBS

## 2022-09-20 LAB
ANION GAP SERPL CALCULATED.3IONS-SCNC: 5 MMOL/L (ref 7–16)
BUN BLDV-MCNC: 27 MG/DL (ref 6–23)
CALCIUM SERPL-MCNC: 9 MG/DL (ref 8.6–10.2)
CHLORIDE BLD-SCNC: 101 MMOL/L (ref 98–107)
CO2: 36 MMOL/L (ref 22–29)
CREAT SERPL-MCNC: 0.5 MG/DL (ref 0.5–1)
GFR AFRICAN AMERICAN: >60
GFR NON-AFRICAN AMERICAN: >60 ML/MIN/1.73
GLUCOSE BLD-MCNC: 85 MG/DL (ref 74–99)
HCT VFR BLD CALC: 35.6 % (ref 34–48)
HEMOGLOBIN: 11.1 G/DL (ref 11.5–15.5)
MCH RBC QN AUTO: 29.8 PG (ref 26–35)
MCHC RBC AUTO-ENTMCNC: 31.2 % (ref 32–34.5)
MCV RBC AUTO: 95.7 FL (ref 80–99.9)
PDW BLD-RTO: 13.2 FL (ref 11.5–15)
PLATELET # BLD: 245 E9/L (ref 130–450)
PMV BLD AUTO: 9.3 FL (ref 7–12)
POTASSIUM SERPL-SCNC: 4.3 MMOL/L (ref 3.5–5)
RBC # BLD: 3.72 E12/L (ref 3.5–5.5)
SODIUM BLD-SCNC: 142 MMOL/L (ref 132–146)
WBC # BLD: 11.6 E9/L (ref 4.5–11.5)

## 2022-09-20 PROCEDURE — 6360000002 HC RX W HCPCS: Performed by: FAMILY MEDICINE

## 2022-09-20 PROCEDURE — 2700000000 HC OXYGEN THERAPY PER DAY

## 2022-09-20 PROCEDURE — 85027 COMPLETE CBC AUTOMATED: CPT

## 2022-09-20 PROCEDURE — 6370000000 HC RX 637 (ALT 250 FOR IP): Performed by: FAMILY MEDICINE

## 2022-09-20 PROCEDURE — 96372 THER/PROPH/DIAG INJ SC/IM: CPT

## 2022-09-20 PROCEDURE — 94640 AIRWAY INHALATION TREATMENT: CPT

## 2022-09-20 PROCEDURE — 96376 TX/PRO/DX INJ SAME DRUG ADON: CPT

## 2022-09-20 PROCEDURE — 94660 CPAP INITIATION&MGMT: CPT

## 2022-09-20 PROCEDURE — 36415 COLL VENOUS BLD VENIPUNCTURE: CPT

## 2022-09-20 PROCEDURE — 80048 BASIC METABOLIC PNL TOTAL CA: CPT

## 2022-09-20 PROCEDURE — 2580000003 HC RX 258: Performed by: FAMILY MEDICINE

## 2022-09-20 RX ORDER — PREDNISONE 10 MG/1
10 TABLET ORAL SEE ADMIN INSTRUCTIONS
Qty: 21 TABLET | Refills: 0 | Status: SHIPPED | OUTPATIENT
Start: 2022-09-20 | End: 2022-09-29

## 2022-09-20 RX ADMIN — IPRATROPIUM BROMIDE AND ALBUTEROL SULFATE 1 AMPULE: 2.5; .5 SOLUTION RESPIRATORY (INHALATION) at 09:34

## 2022-09-20 RX ADMIN — BUDESONIDE 500 MCG: 0.5 SUSPENSION RESPIRATORY (INHALATION) at 09:34

## 2022-09-20 RX ADMIN — Medication 10 ML: at 09:02

## 2022-09-20 RX ADMIN — Medication 10 ML: at 00:18

## 2022-09-20 RX ADMIN — ENOXAPARIN SODIUM 30 MG: 100 INJECTION SUBCUTANEOUS at 09:02

## 2022-09-20 RX ADMIN — PREDNISONE 40 MG: 20 TABLET ORAL at 09:05

## 2022-09-20 NOTE — DISCHARGE SUMMARY
Hospitalist Discharge Summary    Patient ID: Billy Bhatt   Patient : 1945  Patient's PCP: Karla Stephens MD    Admit Date: 2022   Admitting Physician: Laura Akhtar DO    Discharge Date:  2022   Discharge Physician: Karly Wilson MD   Discharge Condition: Stable  Discharge Disposition: [facility] course in brief:  (Please refer to daily progress notes for a comprehensive review of the hospitalization by requesting medical records)     Patient admitted on 2022 for worsening shortness of breath. Patient presented to the emergency department with complaints of shortness of breath. Patient has a history of severe COPD. Wears 3 L of oxygen at baseline. Symptoms began on Tuesday and have worsened in the interim. Also reporting a productive cough. Reports chills but not sure if he had a fever or not. Has had poor appetite and nausea but no vomiting. Vital signs are within normal limits and stable. SPO2 97% on 3 L nasal cannula. Laboratory studies were unremarkable exception of elevated troponin of 20. Chest x-ray shows severe COPD. Patient was given breathing treatments and IV steroids. Medicine consulted for admission. Patient showed some improvement over the next 48 hours. Patient still short of breath and have some wheezing. But medically stable for discharge home. High risk for readmission. Recommended follow-up with primary care physician within 1 week. Discharge on tapering doses of steroids.     Consults:   IP CONSULT TO HOSPITALIST    Discharge Diagnoses:    ASSESSMENT:  -COPD exacerbation  -Hyperlipidemia  -GERD        PLAN:  -Admit to medicine  -DuoNebs every 4 hours  -Albuterol as needed   -Pulmicort twice daily  -Methylprednisolone 40 mg IV every 6 hours  Also plan to give azithromycin 500 mg daily for 3 doses to cover for bronchitis  -Supplemental O2 as needed  -Telemetry  -Continue home medications       Discharge Instructions / Follow up:    Future Appointments   Date Time Provider Miguelito Soni   9/23/2022 10:00 AM Lulu Baxter, APRN - CNP [facility]       Continued appropriate risk factor modification of blood pressure, diabetes and serum lipids will remain essential to reducing risk of future atherosclerotic development    Activity: activity as tolerated    Significant labs:  CBC:   Recent Labs     09/19/22  0422 09/20/22  0435   WBC 12.7* 11.6*   RBC 3.69 3.72   HGB 11.5 11.1*   HCT 35.9 35.6   MCV 97.3 95.7   RDW 13.1 13.2    245     BMP:   Recent Labs     09/19/22 0422 09/20/22  0435    142   K 4.4 4.3    101   CO2 32* 36*   BUN 28* 27*   CREATININE 0.4* 0.5     LFT:  No results for input(s): PROT, ALB, ALKPHOS, ALT, AST, BILITOT, AMYLASE, LIPASE in the last 72 hours. PT/INR: No results for input(s): INR, APTT in the last 72 hours. BNP: No results for input(s): BNP in the last 72 hours. Hgb A1C:   Lab Results   Component Value Date    LABA1C 5.5 02/04/2019     Folate and B12:   Lab Results   Component Value Date    [id number] 651 07/31/2012   ,   Lab Results   Component Value Date    FOLATE >24.0 07/31/2012     Thyroid Studies:   Lab Results   Component Value Date    TSH 2.240 02/04/2019       Urinalysis:  No results found for: NITRU, WBCUA, BACTERIA, RBCUA, BLOODU, SPECGRAV, GLUCOSEU    Imaging:  CT CHEST WO CONTRAST    Result Date: 9/12/2022  EXAMINATION: CT OF THE CHEST WITHOUT CONTRAST 9/12/2022 11:00 am TECHNIQUE: CT of the chest was performed without the administration of intravenous contrast. Multiplanar reformatted images are provided for review. Automated exposure control, iterative reconstruction, and/or weight based adjustment of the mA/kV was utilized to reduce the radiation dose to as low as reasonably achievable.  COMPARISON: January 21, 2022 and prior exam from June 6, 2019 HISTORY: ORDERING SYSTEM PROVIDED HISTORY: Lung nodule TECHNOLOGIST PROVIDED HISTORY: Enb protocol Reason for exam:->Lung Nodule; COPD What reading provider will be dictating this exam?->CRC FINDINGS: The heart is normal in size. No pericardial effusion. Atherosclerotic calcifications associated with the coronary arteries. No mediastinal lymphadenopathy. Stable nodule located in the left upper lobe measures approximately 5 mm. Stable nodule located in peripheral left lower lobe on axial image number 92 measures approximately 6 mm. Additional nodule in peripheral left lower lobe measures approximately 3 mm. There are multiple areas of subsegmental atelectasis or scarring bilaterally. Hyperinflation of both lungs. No airspace opacity or pleural effusion. Nonobstructing 2 mm calculus associated with the right kidney. 1. Redemonstration of 5 mm nodule located in left upper lobe and 6 mm nodule located in peripheral aspect of left lower lobe. These nodules appear similar in size compared to prior from January 21, 2022 and prior from June 6, 2019 and may reflect subsegmental atelectasis or scarring. No suspicious pulmonary nodule or mass. Follow-up recommended based on patient risk factors. 2. Emphysematous changes. 3. No evidence of pneumonia or pleural effusion. XR CHEST PORTABLE    Result Date: 9/16/2022  EXAMINATION: ONE XRAY VIEW OF THE CHEST 9/16/2022 5:51 pm COMPARISON: CT chest 09/12/2022 HISTORY: ORDERING SYSTEM PROVIDED HISTORY: sob TECHNOLOGIST PROVIDED HISTORY: Reason for exam:->sob What reading provider will be dictating this exam?->CRC FINDINGS: Hyperinflation of the lungs is seen with coarsened interstitial markings consistent with emphysema. Airway wall thickening is noted. No acute infiltrate is seen. Pleural spaces are clear. Heart is normal in size. No acute osseous abnormality. No acute process. Severe COPD.        Discharge Medications:      Medication List        START taking these medications      predniSONE 10 MG tablet  Commonly known as: DELTASONE  Take 1 tablet by mouth See Admin Instructions for 9 days 40 mg for 3 days, then 20 mg for 3 days, then 10 mg for 3 days, then stop            CONTINUE taking these medications      acetaminophen 325 MG tablet  Commonly known as: TYLENOL     albuterol sulfate  (90 Base) MCG/ACT inhaler  Commonly known as: PROVENTIL;VENTOLIN;PROAIR  inhale 2 puffs by mouth and INTO THE LUNGS every 6 hours if needed for wheezing     Brezi Aerosphere 160-9-4.8 MCG/ACT Aero  Generic drug: Budeson-Glycopyrrol-Formoterol  Inhale 2 puffs into the lungs 2 times daily     nicotine 14 MG/24HR  Commonly known as: NICODERM CQ  Place 1 patch onto the skin daily               Where to Get Your Medications        These medications were sent to [facility] [address] [address]      Phone: [phone]   predniSONE 10 MG tablet         Time Spent on discharge is more than 45 minutes in the examination, evaluation, counseling and review of medications and discharge plan.    +++++++++++++++++++++++++++++++++++++++++++++++++  Molina Gonzalez MD  [address]  +++++++++++++++++++++++++++++++++++++++++++++++++  NOTE: This report was transcribed using voice recognition software. Every effort was made to ensure accuracy; however, inadvertent computerized transcription errors may be present.

## 2022-09-21 NOTE — PROGRESS NOTES
Physician Progress Note      PATIENT:               Celso Heard  [facility] #:                  [id number]  :                       1945  ADMIT DATE:       2022 7:11 PM  [address] [place] DATE:        2022 1:48 PM  RESPONDING  PROVIDER #:        Sirena Samaniego MD          QUERY TEXT:    Patient admitted with COPD Exacerbation. Noted documentation of acute   respiratory failure with Hypercapnia in emergency department on 2022. In   order to support the diagnosis of acute respiratory failure, please include   additional clinical indicators in your documentation. Or please document if   the diagnosis of acute respiratory failure has been ruled out after further   study. The medical record reflects the following:  Risk Factors: COPD Exacerbation, Emphysema  Clinical Indicators: Shortness of breath onset x 3d, progressively worsening,   moderate in severity worse with exertion - ED note ; On baseline home O2   but with increased work of breathing - ED note ; ABG shows acute   respiratory failure w/ hypercapnia - ED note ; ABG: pH 7.325L, pCO2-62.0   H, HCO3- 31.6H - ABG result -   Treatment: Started BiPAP; Wears 3 L of oxygen at baseline. Acute Respiratory Failure Clinical Indicators per  MS-DRG Training Guide and   Quick Reference Guide:  pO2 < 60 mmHg or SpO2 (pulse oximetry) < 91% breathing room air  pCO2 > 50 and pH < 7.35  P/F ratio (pO2 / FIO2) < 300  pO2 decrease or pCO2 increase by 10 mmHg from baseline (if known)  Supplemental oxygen of 40% or more  Presence of respiratory distress, tachypnea, dyspnea, shortness of breath,   wheezing  Unable to speak in complete sentences  Use of accessory muscles to breathe  Extreme anxiety and feeling of impending doom  Tripod position  Confusion/altered mental status/obtunded  Options provided:  -- Acute Respiratory Failure as evidenced by, Please document evidence.   -- Acute Respiratory Failure ruled out after study and Chronic Respiratory Failure confirmed  -- Other - I will add my own diagnosis  -- Disagree - Not applicable / Not valid  -- Disagree - Clinically unable to determine / Unknown  -- Refer to Clinical Documentation Reviewer    PROVIDER RESPONSE TEXT:    This patient is in acute respiratory failure as evidenced by hypercapnia    Query created by: Chance Nixon on 9/20/2022 2:55 PM      Electronically signed by:  Ty Granados MD 9/21/2022 12:20 PM

## 2022-10-24 ENCOUNTER — HOSPITAL ENCOUNTER (INPATIENT)
Age: 77
LOS: 2 days | Discharge: HOME | DRG: 190 | End: 2022-10-27
Attending: EMERGENCY MEDICINE | Admitting: FAMILY MEDICINE
Payer: COMMERCIAL

## 2022-10-24 ENCOUNTER — OFFICE VISIT (OUTPATIENT)
Dept: PULMONOLOGY | Age: 77
End: 2022-10-24
Payer: COMMERCIAL

## 2022-10-24 ENCOUNTER — APPOINTMENT (OUTPATIENT)
Dept: GENERAL RADIOLOGY | Age: 77
DRG: 190 | End: 2022-10-24
Payer: COMMERCIAL

## 2022-10-24 DIAGNOSIS — J44.1 COPD EXACERBATION (HCC): Primary | ICD-10-CM

## 2022-10-24 DIAGNOSIS — J96.21 ACUTE ON CHRONIC RESPIRATORY FAILURE WITH HYPOXIA AND HYPERCAPNIA (HCC): Primary | ICD-10-CM

## 2022-10-24 DIAGNOSIS — J96.22 ACUTE ON CHRONIC RESPIRATORY FAILURE WITH HYPOXIA AND HYPERCAPNIA (HCC): Primary | ICD-10-CM

## 2022-10-24 DIAGNOSIS — J44.1 COPD EXACERBATION (HCC): ICD-10-CM

## 2022-10-24 PROBLEM — Z72.0 TOBACCO ABUSE: Status: ACTIVE | Noted: 2022-10-24

## 2022-10-24 LAB
ANION GAP SERPL CALCULATED.3IONS-SCNC: 9 MMOL/L (ref 7–16)
B.E.: 4.3 MMOL/L (ref -3–3)
BASOPHILS ABSOLUTE: 0.1 E9/L (ref 0–0.2)
BASOPHILS RELATIVE PERCENT: 1.4 % (ref 0–2)
BUN BLDV-MCNC: 14 MG/DL (ref 6–23)
CALCIUM SERPL-MCNC: 9.2 MG/DL (ref 8.6–10.2)
CHLORIDE BLD-SCNC: 96 MMOL/L (ref 98–107)
CO2: 32 MMOL/L (ref 22–29)
COHB: 1.4 % (ref 0–1.5)
CREAT SERPL-MCNC: 0.5 MG/DL (ref 0.5–1)
CRITICAL: ABNORMAL
D DIMER: <200 NG/ML DDU
DATE ANALYZED: ABNORMAL
DATE OF COLLECTION: ABNORMAL
EOSINOPHILS ABSOLUTE: 0.4 E9/L (ref 0.05–0.5)
EOSINOPHILS RELATIVE PERCENT: 5.4 % (ref 0–6)
GFR SERPL CREATININE-BSD FRML MDRD: >60 ML/MIN/1.73
GLUCOSE BLD-MCNC: 101 MG/DL (ref 74–99)
HCO3: 31 MMOL/L (ref 22–26)
HCT VFR BLD CALC: 37.8 % (ref 34–48)
HEMOGLOBIN: 11.9 G/DL (ref 11.5–15.5)
HHB: 1.4 % (ref 0–5)
IMMATURE GRANULOCYTES #: 0.02 E9/L
IMMATURE GRANULOCYTES %: 0.3 % (ref 0–5)
LAB: ABNORMAL
LYMPHOCYTES ABSOLUTE: 2.08 E9/L (ref 1.5–4)
LYMPHOCYTES RELATIVE PERCENT: 28.3 % (ref 20–42)
Lab: ABNORMAL
MAGNESIUM: 1.9 MG/DL (ref 1.6–2.6)
MCH RBC QN AUTO: 30.5 PG (ref 26–35)
MCHC RBC AUTO-ENTMCNC: 31.5 % (ref 32–34.5)
MCV RBC AUTO: 96.9 FL (ref 80–99.9)
METHB: 0.2 % (ref 0–1.5)
MODE: ABNORMAL
MONOCYTES ABSOLUTE: 1.04 E9/L (ref 0.1–0.95)
MONOCYTES RELATIVE PERCENT: 14.1 % (ref 2–12)
NEUTROPHILS ABSOLUTE: 3.71 E9/L (ref 1.8–7.3)
NEUTROPHILS RELATIVE PERCENT: 50.5 % (ref 43–80)
O2 CONTENT: 17.1 ML/DL
O2 SATURATION: 98.6 % (ref 92–98.5)
O2HB: 97 % (ref 94–97)
OPERATOR ID: 1926
PATIENT TEMP: 37 C
PCO2: 56.2 MMHG (ref 35–45)
PDW BLD-RTO: 13.2 FL (ref 11.5–15)
PH BLOOD GAS: 7.36 (ref 7.35–7.45)
PLATELET # BLD: 298 E9/L (ref 130–450)
PMV BLD AUTO: 9 FL (ref 7–12)
PO2: 126.8 MMHG (ref 75–100)
POTASSIUM SERPL-SCNC: 4 MMOL/L (ref 3.5–5)
PRO-BNP: 93 PG/ML (ref 0–450)
RBC # BLD: 3.9 E12/L (ref 3.5–5.5)
SARS-COV-2, NAAT: NOT DETECTED
SODIUM BLD-SCNC: 137 MMOL/L (ref 132–146)
SOURCE, BLOOD GAS: ABNORMAL
THB: 12.4 G/DL (ref 11.5–16.5)
TIME ANALYZED: 947
TROPONIN, HIGH SENSITIVITY: 18 NG/L (ref 0–9)
WBC # BLD: 7.4 E9/L (ref 4.5–11.5)

## 2022-10-24 PROCEDURE — 6360000002 HC RX W HCPCS: Performed by: INTERNAL MEDICINE

## 2022-10-24 PROCEDURE — 85025 COMPLETE CBC W/AUTO DIFF WBC: CPT

## 2022-10-24 PROCEDURE — 6370000000 HC RX 637 (ALT 250 FOR IP): Performed by: INTERNAL MEDICINE

## 2022-10-24 PROCEDURE — 82805 BLOOD GASES W/O2 SATURATION: CPT

## 2022-10-24 PROCEDURE — 6370000000 HC RX 637 (ALT 250 FOR IP): Performed by: EMERGENCY MEDICINE

## 2022-10-24 PROCEDURE — 94664 DEMO&/EVAL PT USE INHALER: CPT

## 2022-10-24 PROCEDURE — 83735 ASSAY OF MAGNESIUM: CPT

## 2022-10-24 PROCEDURE — 2580000003 HC RX 258: Performed by: STUDENT IN AN ORGANIZED HEALTH CARE EDUCATION/TRAINING PROGRAM

## 2022-10-24 PROCEDURE — 93005 ELECTROCARDIOGRAM TRACING: CPT | Performed by: EMERGENCY MEDICINE

## 2022-10-24 PROCEDURE — 96361 HYDRATE IV INFUSION ADD-ON: CPT

## 2022-10-24 PROCEDURE — 94640 AIRWAY INHALATION TREATMENT: CPT

## 2022-10-24 PROCEDURE — 80048 BASIC METABOLIC PNL TOTAL CA: CPT

## 2022-10-24 PROCEDURE — 4004F PT TOBACCO SCREEN RCVD TLK: CPT | Performed by: NURSE PRACTITIONER

## 2022-10-24 PROCEDURE — G8428 CUR MEDS NOT DOCUMENT: HCPCS | Performed by: NURSE PRACTITIONER

## 2022-10-24 PROCEDURE — G8419 CALC BMI OUT NRM PARAM NOF/U: HCPCS | Performed by: NURSE PRACTITIONER

## 2022-10-24 PROCEDURE — 84484 ASSAY OF TROPONIN QUANT: CPT

## 2022-10-24 PROCEDURE — 85378 FIBRIN DEGRADE SEMIQUANT: CPT

## 2022-10-24 PROCEDURE — G8399 PT W/DXA RESULTS DOCUMENT: HCPCS | Performed by: NURSE PRACTITIONER

## 2022-10-24 PROCEDURE — 1090F PRES/ABSN URINE INCON ASSESS: CPT | Performed by: NURSE PRACTITIONER

## 2022-10-24 PROCEDURE — 96365 THER/PROPH/DIAG IV INF INIT: CPT

## 2022-10-24 PROCEDURE — 3017F COLORECTAL CA SCREEN DOC REV: CPT | Performed by: NURSE PRACTITIONER

## 2022-10-24 PROCEDURE — 99212 OFFICE O/P EST SF 10 MIN: CPT | Performed by: NURSE PRACTITIONER

## 2022-10-24 PROCEDURE — 96372 THER/PROPH/DIAG INJ SC/IM: CPT

## 2022-10-24 PROCEDURE — 83880 ASSAY OF NATRIURETIC PEPTIDE: CPT

## 2022-10-24 PROCEDURE — G8484 FLU IMMUNIZE NO ADMIN: HCPCS | Performed by: NURSE PRACTITIONER

## 2022-10-24 PROCEDURE — 2580000003 HC RX 258: Performed by: INTERNAL MEDICINE

## 2022-10-24 PROCEDURE — G0378 HOSPITAL OBSERVATION PER HR: HCPCS

## 2022-10-24 PROCEDURE — 99285 EMERGENCY DEPT VISIT HI MDM: CPT

## 2022-10-24 PROCEDURE — 96375 TX/PRO/DX INJ NEW DRUG ADDON: CPT

## 2022-10-24 PROCEDURE — 71045 X-RAY EXAM CHEST 1 VIEW: CPT

## 2022-10-24 PROCEDURE — 1123F ACP DISCUSS/DSCN MKR DOCD: CPT | Performed by: NURSE PRACTITIONER

## 2022-10-24 PROCEDURE — 3023F SPIROM DOC REV: CPT | Performed by: NURSE PRACTITIONER

## 2022-10-24 PROCEDURE — 87635 SARS-COV-2 COVID-19 AMP PRB: CPT

## 2022-10-24 RX ORDER — ONDANSETRON 4 MG/1
4 TABLET, ORALLY DISINTEGRATING ORAL EVERY 8 HOURS PRN
Status: DISCONTINUED | OUTPATIENT
Start: 2022-10-24 | End: 2022-10-27 | Stop reason: HOSPADM

## 2022-10-24 RX ORDER — 0.9 % SODIUM CHLORIDE 0.9 %
1000 INTRAVENOUS SOLUTION INTRAVENOUS ONCE
Status: COMPLETED | OUTPATIENT
Start: 2022-10-24 | End: 2022-10-24

## 2022-10-24 RX ORDER — ENOXAPARIN SODIUM 100 MG/ML
30 INJECTION SUBCUTANEOUS DAILY
Status: DISCONTINUED | OUTPATIENT
Start: 2022-10-24 | End: 2022-10-27 | Stop reason: HOSPADM

## 2022-10-24 RX ORDER — ONDANSETRON 2 MG/ML
4 INJECTION INTRAMUSCULAR; INTRAVENOUS EVERY 6 HOURS PRN
Status: DISCONTINUED | OUTPATIENT
Start: 2022-10-24 | End: 2022-10-27 | Stop reason: HOSPADM

## 2022-10-24 RX ORDER — PREDNISONE 20 MG/1
40 TABLET ORAL ONCE
Status: COMPLETED | OUTPATIENT
Start: 2022-10-24 | End: 2022-10-24

## 2022-10-24 RX ORDER — SODIUM CHLORIDE 0.9 % (FLUSH) 0.9 %
5-40 SYRINGE (ML) INJECTION EVERY 12 HOURS SCHEDULED
Status: DISCONTINUED | OUTPATIENT
Start: 2022-10-24 | End: 2022-10-27 | Stop reason: HOSPADM

## 2022-10-24 RX ORDER — METHYLPREDNISOLONE SODIUM SUCCINATE 40 MG/ML
40 INJECTION, POWDER, LYOPHILIZED, FOR SOLUTION INTRAMUSCULAR; INTRAVENOUS EVERY 12 HOURS
Status: DISCONTINUED | OUTPATIENT
Start: 2022-10-24 | End: 2022-10-24

## 2022-10-24 RX ORDER — SODIUM CHLORIDE 0.9 % (FLUSH) 0.9 %
5-40 SYRINGE (ML) INJECTION PRN
Status: DISCONTINUED | OUTPATIENT
Start: 2022-10-24 | End: 2022-10-27 | Stop reason: HOSPADM

## 2022-10-24 RX ORDER — PREDNISONE 20 MG/1
20 TABLET ORAL 2 TIMES DAILY
Status: DISCONTINUED | OUTPATIENT
Start: 2022-10-24 | End: 2022-10-26

## 2022-10-24 RX ORDER — SODIUM CHLORIDE 9 MG/ML
INJECTION, SOLUTION INTRAVENOUS PRN
Status: DISCONTINUED | OUTPATIENT
Start: 2022-10-24 | End: 2022-10-27 | Stop reason: HOSPADM

## 2022-10-24 RX ORDER — PREDNISONE 20 MG/1
20 TABLET ORAL 2 TIMES DAILY
Status: DISCONTINUED | OUTPATIENT
Start: 2022-10-26 | End: 2022-10-24

## 2022-10-24 RX ORDER — IPRATROPIUM BROMIDE AND ALBUTEROL SULFATE 2.5; .5 MG/3ML; MG/3ML
3 SOLUTION RESPIRATORY (INHALATION) ONCE
Status: COMPLETED | OUTPATIENT
Start: 2022-10-24 | End: 2022-10-24

## 2022-10-24 RX ORDER — DOXYCYCLINE HYCLATE 100 MG/1
100 CAPSULE ORAL ONCE
Status: COMPLETED | OUTPATIENT
Start: 2022-10-24 | End: 2022-10-24

## 2022-10-24 RX ORDER — BUDESONIDE 0.5 MG/2ML
500 INHALANT ORAL 2 TIMES DAILY
Status: DISCONTINUED | OUTPATIENT
Start: 2022-10-24 | End: 2022-10-27 | Stop reason: HOSPADM

## 2022-10-24 RX ORDER — ACETAMINOPHEN 325 MG/1
650 TABLET ORAL EVERY 6 HOURS PRN
Status: DISCONTINUED | OUTPATIENT
Start: 2022-10-24 | End: 2022-10-27 | Stop reason: HOSPADM

## 2022-10-24 RX ORDER — ACETAMINOPHEN 650 MG/1
650 SUPPOSITORY RECTAL EVERY 6 HOURS PRN
Status: DISCONTINUED | OUTPATIENT
Start: 2022-10-24 | End: 2022-10-27 | Stop reason: HOSPADM

## 2022-10-24 RX ORDER — IPRATROPIUM BROMIDE AND ALBUTEROL SULFATE 2.5; .5 MG/3ML; MG/3ML
1 SOLUTION RESPIRATORY (INHALATION)
Status: DISCONTINUED | OUTPATIENT
Start: 2022-10-24 | End: 2022-10-27 | Stop reason: HOSPADM

## 2022-10-24 RX ORDER — ARFORMOTEROL TARTRATE 15 UG/2ML
15 SOLUTION RESPIRATORY (INHALATION) 2 TIMES DAILY
Status: DISCONTINUED | OUTPATIENT
Start: 2022-10-24 | End: 2022-10-27 | Stop reason: HOSPADM

## 2022-10-24 RX ADMIN — PREDNISONE 40 MG: 20 TABLET ORAL at 09:31

## 2022-10-24 RX ADMIN — AZITHROMYCIN DIHYDRATE 500 MG: 500 INJECTION, POWDER, LYOPHILIZED, FOR SOLUTION INTRAVENOUS at 17:44

## 2022-10-24 RX ADMIN — IPRATROPIUM BROMIDE AND ALBUTEROL SULFATE 1 AMPULE: .5; 2.5 SOLUTION RESPIRATORY (INHALATION) at 21:26

## 2022-10-24 RX ADMIN — DOXYCYCLINE HYCLATE 100 MG: 100 CAPSULE ORAL at 09:31

## 2022-10-24 RX ADMIN — PREDNISONE 20 MG: 20 TABLET ORAL at 23:10

## 2022-10-24 RX ADMIN — IPRATROPIUM BROMIDE AND ALBUTEROL SULFATE 1 AMPULE: .5; 2.5 SOLUTION RESPIRATORY (INHALATION) at 15:53

## 2022-10-24 RX ADMIN — BUDESONIDE INHALATION SUSPENSION 500 MCG: 0.5 SUSPENSION RESPIRATORY (INHALATION) at 21:27

## 2022-10-24 RX ADMIN — ENOXAPARIN SODIUM 30 MG: 100 INJECTION SUBCUTANEOUS at 17:48

## 2022-10-24 RX ADMIN — SODIUM CHLORIDE 1000 ML: 9 INJECTION, SOLUTION INTRAVENOUS at 21:09

## 2022-10-24 RX ADMIN — IPRATROPIUM BROMIDE AND ALBUTEROL SULFATE 3 AMPULE: .5; 2.5 SOLUTION RESPIRATORY (INHALATION) at 10:01

## 2022-10-24 RX ADMIN — CEFTRIAXONE 1000 MG: 1 INJECTION, POWDER, FOR SOLUTION INTRAMUSCULAR; INTRAVENOUS at 19:05

## 2022-10-24 RX ADMIN — ARFORMOTEROL TARTRATE 15 MCG: 15 SOLUTION RESPIRATORY (INHALATION) at 21:27

## 2022-10-24 ASSESSMENT — ENCOUNTER SYMPTOMS
NAUSEA: 0
RHINORRHEA: 0
SHORTNESS OF BREATH: 1
VOMITING: 0
COLOR CHANGE: 0
COUGH: 1
BLOOD IN STOOL: 0
ABDOMINAL PAIN: 0
BACK PAIN: 0

## 2022-10-24 NOTE — PROGRESS NOTES
Upon entering the office the patient's vitals were taken:  146/34-47-39, temp 98.3, o2 sat. 81% on 2 liters of oxygen. Patient had to be gradually bumped up to 6 liters to maintain an O 2 sat of 91%. Patient lungs auscultated with very little air exchange. Patient presents with a moist cough. Patient states that she feels like there is \"something down there\" but she can't get it up. Patient states that she has been sick for a week. Michaelle Payne NP notified of symptoms and instructed the patient to be taken to ED. Respiratory therapist as well as patient's relative were wheeled to ED with O2 provided. Dr. Jennie Cervantes made aware.

## 2022-10-24 NOTE — PROGRESS NOTES
[facility]  Department of Pulmonary, Critical Care and Sleep Medicine  Dr. Ashish Wing, Dr. Aliya Dhillon, Dr. Padilla Areas Note - Follow up      Assessment/Plan       No problem-specific Assessment & Plan notes found for this encounter. Problem List Items Addressed This Visit    None           Patient ID: Gerald Prescott is a 78 y.o. female    Chief Complaint:     HPI: Gerald Prescott is a 78 y.o. female with a PMH of     Assessment:    Plan:     Continue ***. Advised to rinse mouth after each use. P.r.n. albuterol  Advised on proper inhaler technique, and adherence to prescribed inhalers    *** PFT  *** CT chest  *** Bronchoscopy    *** 6MWT / LTOT  *** Pulmonary Rehab    *** Lung TX eval (<65 yrs)    *** ARACELIS    *** Nodule OR Screen - 50-80, smoked ? 20 pack years, smoke or quit within 15 yrs    Aspiration / GERD precautions  Head end of bed elevation. Maintain active and healthy lifestyle with weight reduction. COVID-19 precautions  Recommend yearly Influenza and appropriate pneumonia vaccinations. *** I spent 5 minutes counseling patient regarding smoking and the risk of Lung cancer and COPD and Respiratory failure. Follow up: No follow-ups on file. Doroteo Guy, APRN-CNP  Pulmonary & [facility]  Dr. Ashish Wing, Dr. Aliya Dhillon, Dr. Caitlyn Traylor    No orders of the defined types were placed in this encounter.       Immunization History   Administered Date(s) Administered    COVID-19, PFIZER PURPLE top, DILUTE for use, (age 15 y+), 30mcg/0.3mL 03/13/2021, 04/07/2021    Influenza 10/10/2012, 10/16/2013    Influenza, High Dose (Fluzone 65 yrs and older) 09/10/2018    Pneumococcal Conjugate 13-valent ([id number]) 10/24/2018    Pneumococcal Polysaccharide ([id number]) 05/15/2012, 06/07/2017       Subjective     Last office visit:    Exacerbations:    Pulm Meds:    Antiplatelet/anticoagulants: Smoking history:     Quit date:     PFT 10/24/22 Previous   FVC *** (*** %) *** (*** %)   FEV1 *** (*** %) *** (*** %)   FVC/  FEV1 *** (*** %) *** (*** %)     No obstruction  Bronchodilator response  Air trapping  No Restriction  Diffusion defect. No results found for: FEV1, FVC, PXB3XXN, TLC, DLCO    Imaging   Reviewed imaging studies personally and findings as below  No results found. CT chest: ***    ECHO: ***    Sleep Study:    ALLERGIES:No Known Allergies  SOCIAL HISTORY:   Social History     Tobacco Use    Smoking status: Some Days     Packs/day: 0.50     Years: 51.00     Pack years: 25.50     Types: Cigarettes    Smokeless tobacco: Never   Substance Use Topics    Alcohol use: No    Drug use: No     MEDS:   Current Outpatient Medications   Medication Sig Dispense Refill    albuterol sulfate HFA (PROVENTIL;VENTOLIN;PROAIR) 108 (90 Base) MCG/ACT inhaler inhale 2 puffs by mouth and INTO THE LUNGS every 6 hours if needed for wheezing 1 each 12    Budeson-Glycopyrrol-Formoterol (BREZTRI AEROSPHERE) 160-9-4.8 MCG/ACT AERO Inhale 2 puffs into the lungs 2 times daily 1 each 12    nicotine (NICODERM CQ) 14 MG/24HR Place 1 patch onto the skin daily 30 patch 0    acetaminophen (TYLENOL) 325 MG tablet Take 650 mg by mouth every 6 hours as needed for Pain       No current facility-administered medications for this visit. Review of Systems: Negative other than specified above. Objective       Vitals:   ,  ,  ,  ,  ,  ,  ,      BMI body mass index is unknown because there is no height or weight on file. Ideal Body Weight: Ideal body weight: 50.4 kg (111 lb 3.2 oz)  ---------------  Physical Exam:    General: Alert and oriented x 3. No acute distress. Eyes:  Vision - grossly normal, PERRLA  HEENT:  Head is atraumatic and normocephalic. Neck is supple, no jugular venous distention.   Respiratory: *** Lungs are clear to auscultation, respirations are nonlabored, breath sounds are equal.  Cardiovascular: S1, S2 normal, regular rate and rhythm, no murmur, no pedal edema  Gastrointestinal:  Soft, nontender, nondistended. Normal bowel sounds. No organomegaly  Neurologic:  Awake and alert, cranial nerves 2-12 grossly intact, no focal motor or sensory deficits. Musculoskeletal:      Labs     {GUILLE:[id number]}       I hope this updates you on my evaluation and clinical thinking.  Thank you for allowing me to participate in the care of [facility].      Sincerely,    Electronically signed by FITZ Bettencourt CNP on 10/24/2022 at 7:51 AM

## 2022-10-24 NOTE — CARE COORDINATION
Social Work /Transition of Care:    Pt presents to the ED secondary to shortness of breath. Pt sent to the ED from her pulmonologist office. SW met with pt who was sitting up in bed, alert and oriented x3, on 3L oxygen. Pt reports she has home oxygen (2L, Cristiano). Pt reports living with her daughter and granddaughter in a one floor home with 4 steps at the entrance. Pt reports she has no other DME at home and is in need of a nebulizer. Pt reports no recent falls. Pt reports she was driving up until a few weeks ago. Pt has no hx of HHC/DIMA. Pt's PCP is Dr Gloria Mckeon at the [facility] clinic. Pt plan is to return home upon discharge. If an order is placed for a nebulizer, pt would like referral to [facility] or [facility]. Pt's family member will be able to transport pt upon discharge. CHANNING/CHARITY to follow.

## 2022-10-24 NOTE — H&P
History and Physical      CHIEF COMPLAINT: Dyspnea      HISTORY OF PRESENT ILLNESS:      The patient is a 78 y.o. female patient of Dr. Vu Hurtado history of COPD, chronic respiratory failure on 2-3 L home O2, tobacco abuse who presents with dyspnea. Patient was recently admitted from 9/16 through 9/24 for COPD exacerbation. Patient was treated with IV steroids and breathing treatments and discharged home. She followed up with her pulmonologist in the office today. She was noted to be hypoxic at 81% on 2 L O2 NC. She was sent to the ER for further evaluation and treatment. She was 97% on 6 L, 96% on 3 L nasal cannula. In ED patient has mildly decreased chloride 96, mildly increased CO2 of bicarb bicarb of 32. High-sensitivity troponin is minimally elevated 18. ABG 7.36/56 point 2/2020 6.8 on 6 L nasal cannula. D-dimer is low at less than 200. Chest x-ray chronic changes consistent with COPD no acute infiltrate or edema. Patient continues to smoke. Patient admitted for further evaluation and treatment. Past Medical History:    Past Medical History:   Diagnosis Date    Abnormal Pap smear 1995    Patient states she had laser surgery    COPD (chronic obstructive pulmonary disease) (HCC)     Emphysema of lung (HCC)     Emphysema/COPD (HCC)     GERD (gastroesophageal reflux disease)     History of stomach ulcers     Hyperlipidemia     DIET    Osteoarthritis     Renal calculi     APX 40 YEARS AGO       Past Surgical History:    Past Surgical History:   Procedure Laterality Date    APPENDECTOMY      15 y.o. CATARACT REMOVAL WITH IMPLANT Bilateral dec 2017, jan 2018    COLONOSCOPY  10/17/2017    normal colon--opal    ENDOMETRIAL BIOPSY      TONSILLECTOMY      UPPER GASTROINTESTINAL ENDOSCOPY  10/17/2017    gastritis; hiatal hernia--opal    WRIST SURGERY Left 2008       Medications Prior to Admission:    Reviewed see med rec    Allergies:    Patient has no known allergies.     Social History:    reports that she has been smoking cigarettes. She has a 25.50 pack-year smoking history. She has never used smokeless tobacco. She reports that she does not drink alcohol and does not use drugs. Family History:   family history includes Arthritis in her maternal grandmother and mother; Asthma in her maternal grandmother and mother; Cancer in her brother and sister; Emphysema in her mother; Heart Disease in her father; High Blood Pressure in her mother. REVIEW OF SYSTEMS:  As above in the HPI, otherwise negative    PHYSICAL EXAM:    Vitals:  BP (!) 111/43   Pulse 69   Temp 98.3 °F (36.8 °C) (Oral)   Resp 16   Ht 5' (1.524 m)   Wt 75 lb (34 kg)   SpO2 97%   BMI 14.65 kg/m²     General:  Awake, alert, oriented X 3. Frail and cachectic, appears older than stated age no apparent distress. HEENT:  Normocephalic, atraumatic. Pupils equal, round, reactive to light. No scleral icterus. No conjunctival injection. Normal lips, teeth, and gums. No nasal discharge. Neck:  Supple  Heart:  RRR, no murmurs, gallops, rubs  Lungs: Poor air exchange bilaterally, bilat symmetrical expansion, no , rales, or rhonchi + few wheeze bilaterally  Abdomen:   Bowel sounds present, soft, nontender, no masses, no organomegaly, no peritoneal signs  Extremities:  No clubbing, cyanosis, or edema  Skin:  Warm and dry, no open lesions or rash  Neuro:  Cranial nerves 2-12 intact, no focal deficits  Breast: deferred  Rectal: deferred  Genitalia:  deferred    LABS:    CBC with Differential:    Lab Results   Component Value Date/Time    WBC 7.4 10/24/2022 09:28 AM    RBC 3.90 10/24/2022 09:28 AM    HGB 11.9 10/24/2022 09:28 AM    HCT 37.8 10/24/2022 09:28 AM     10/24/2022 09:28 AM    MCV 96.9 10/24/2022 09:28 AM    MCH 30.5 10/24/2022 09:28 AM    MCHC 31.5 10/24/2022 09:28 AM    RDW 13.2 10/24/2022 09:28 AM    SEGSPCT 72 04/30/2012 04:05 AM    LYMPHOPCT 28.3 10/24/2022 09:28 AM    MONOPCT 14.1 10/24/2022 09:28 AM    BASOPCT 1.4 10/24/2022 09:28 AM    MONOSABS 1.04 10/24/2022 09:28 AM    LYMPHSABS 2.08 10/24/2022 09:28 AM    EOSABS 0.40 10/24/2022 09:28 AM    BASOSABS 0.10 10/24/2022 09:28 AM     CMP:    Lab Results   Component Value Date/Time     10/24/2022 09:28 AM    K 4.0 10/24/2022 09:28 AM    K 4.3 09/17/2022 05:48 AM    CL 96 10/24/2022 09:28 AM    CO2 32 10/24/2022 09:28 AM    BUN 14 10/24/2022 09:28 AM    CREATININE 0.5 10/24/2022 09:28 AM    GFRAA >60 09/20/2022 04:35 AM    LABGLOM >60 10/24/2022 09:28 AM    GLUCOSE 101 10/24/2022 09:28 AM    GLUCOSE 172 04/30/2012 09:50 AM    PROT 6.1 09/17/2022 05:48 AM    LABALBU 3.7 09/17/2022 05:48 AM    LABALBU 4.5 04/29/2012 05:00 PM    CALCIUM 9.2 10/24/2022 09:28 AM    BILITOT <0.2 09/17/2022 05:48 AM    ALKPHOS 63 09/17/2022 05:48 AM    AST 20 09/17/2022 05:48 AM    ALT 12 09/17/2022 05:48 AM     Magnesium:    Lab Results   Component Value Date/Time    MG 1.9 10/24/2022 09:28 AM     Phosphorus:    Lab Results   Component Value Date/Time    PHOS 3.2 07/11/2018 01:00 PM     PT/INR:    Lab Results   Component Value Date/Time    PROTIME 11.6 04/29/2012 05:00 PM    INR 1.2 04/29/2012 05:00 PM     Last 3 Troponin:    Lab Results   Component Value Date/Time    TROPONINI <0.01 07/11/2018 01:00 PM    TROPONINI <0.01 06/07/2018 07:55 AM    TROPONINI <0.01 06/05/2017 12:40 PM     U/A:  No results found for: NITRITE, COLORU, PROTEINU, PHUR, LABCAST, WBCUA, RBCUA, MUCUS, TRICHOMONAS, YEAST, BACTERIA, CLARITYU, SPECGRAV, LEUKOCYTESUR, UROBILINOGEN, BILIRUBINUR, BLOODU, GLUCOSEU, AMORPHOUS  ABG:    Lab Results   Component Value Date/Time    PH 7.360 10/24/2022 09:47 AM    PH 7.292 04/29/2012 09:00 PM    PCO2 56.2 10/24/2022 09:47 AM    PO2 126.8 10/24/2022 09:47 AM    HCO3 31.0 10/24/2022 09:47 AM    BE 4.3 10/24/2022 09:47 AM    O2SAT 98.6 10/24/2022 09:47 AM     HgBA1c:    Lab Results   Component Value Date/Time    LABA1C 5.5 02/04/2019 09:40 AM     FLP:    Lab Results   Component Value Date/Time    TRIG 86 10/11/2013 10:05 AM    HDL 81.0 10/11/2013 10:05 AM    LDLCALC 133 10/11/2013 10:05 AM     TSH:    Lab Results   Component Value Date/Time    TSH 2.240 02/04/2019 09:40 AM       XR CHEST PORTABLE   Final Result   COPD. There is no evidence of failure or pneumonia. ASSESSMENT:      Principal Problem:    COPD exacerbation ([facility] [address]  Active Problems:    Tobacco abuse    Severe protein-calorie malnutrition ([facility] [address]    Chronic respiratory failure with hypoxia and hypercapnia (HCC)  Resolved Problems:    * No resolved hospital problems. *      PLAN:    Admit  PO steroids  Nebulizers  ABX  proCalcitonin   Pulmonary consult   Palliative care consult  Medications for other co morbidities cont as appropriate w dosage adjustments as necessary  PT/OT  DVT PPx  CODE STATUS Limited code okay for meds  Disposition admit to observation    Electronically signed by Christiano Perez MD on 10/24/2022 at 2:36 PM    NOTE: This report was transcribed using voice recognition software. Every effort was made to ensure accuracy; however, inadvertent computerized transcription errors may be present.

## 2022-10-24 NOTE — ED PROVIDER NOTES
ED PROVIDER NOTE    Chief Complaint   Patient presents with    Shortness of Breath     Was sent in from doctors office for SPO2 81% on 3l. C/O SOB for the last few days. HPI:  10/24/22,   Time: 9:20 AM EDT       Hari Barroso is a 78 y.o. female presenting to the ED for shortness of breath. Gradual onset 1 week ago, persistent since onset, moderate in severity, worse w/ exertion. No associated orthopnea or leg swelling. For the past 1 week having cough productive of yellow sputum increased from baseline. No associated fever, chills, chest pain, abdominal pain, nausea, vomiting, diarrhea. Her grandson recently  and she has been having a hard time dealing with her loss. Hx of COPD on 2L baseline home O2. Was at outpatient pulmonology appt today and noted to have O2 sats 81% on her home 2L NC. Arrives to ED on 6L NC. Chart review: hx of COPD, GERD, PUD, renal calculi, HLD, OA    Review of Systems:     Review of Systems   Constitutional:  Negative for appetite change, chills and fever. HENT:  Negative for congestion and rhinorrhea. Eyes:  Negative for visual disturbance. Respiratory:  Positive for cough and shortness of breath. Cardiovascular:  Negative for chest pain and leg swelling. Gastrointestinal:  Negative for abdominal pain, blood in stool, nausea and vomiting. Genitourinary:  Negative for decreased urine volume and difficulty urinating. Musculoskeletal:  Negative for back pain and neck pain. Skin:  Negative for color change.    Neurological:  Negative for dizziness, syncope, weakness, light-headedness, numbness and headaches.       --------------------------------------------- PAST HISTORY ---------------------------------------------  Past Medical History:   Past Medical History:   Diagnosis Date    Abnormal Pap smear     Patient states she had laser surgery    COPD (chronic obstructive pulmonary disease) ([facility] [facility]     Emphysema of lung ([facility] [address]     Emphysema/COPD ([facility] [address] GERD (gastroesophageal reflux disease)     History of stomach ulcers     Hyperlipidemia     DIET    Osteoarthritis     Renal calculi     APX 40 YEARS AGO       Past Surgical History:   Past Surgical History:   Procedure Laterality Date    APPENDECTOMY      15 y.o. CATARACT REMOVAL WITH IMPLANT Bilateral dec 2017, jan 2018    COLONOSCOPY  10/17/2017    normal colon--opal    ENDOMETRIAL BIOPSY      TONSILLECTOMY      UPPER GASTROINTESTINAL ENDOSCOPY  10/17/2017    gastritis; hiatal hernia--opal    WRIST SURGERY Left 2008       Social History:   Social History     Socioeconomic History    Marital status:    Tobacco Use    Smoking status: Some Days     Packs/day: 0.50     Years: 51.00     Pack years: 25.50     Types: Cigarettes    Smokeless tobacco: Never   Substance and Sexual Activity    Alcohol use: No    Drug use: No    Sexual activity: Never       Family History:   Family History   Problem Relation Age of Onset    High Blood Pressure Mother     Emphysema Mother     Arthritis Mother     Asthma Mother     Cancer Sister         lung, Hodgkins    Cancer Brother         leukemia    Heart Disease Father     Arthritis Maternal Grandmother     Asthma Maternal Grandmother        The patients home medications have been reviewed. Allergies:   No Known Allergies        ---------------------------------------------------PHYSICAL EXAM--------------------------------------    BP (!) 111/43   Pulse 69   Temp 98.3 °F (36.8 °C) (Oral)   Resp 16   Ht 5' (1.524 m)   Wt 75 lb (34 kg)   SpO2 97%   BMI 14.65 kg/m²     Physical Exam  Constitutional:       General: She is not in acute distress. Appearance: She is not toxic-appearing. HENT:      Mouth/Throat:      Mouth: Mucous membranes are moist.   Eyes:      General: No scleral icterus. Extraocular Movements: Extraocular movements intact. Pupils: Pupils are equal, round, and reactive to light.    Neck:      Comments: +JVD to supraclavicular region  Cardiovascular:      Rate and Rhythm: Normal rate and regular rhythm. Pulses: Normal pulses. Heart sounds: Normal heart sounds. No murmur heard. Pulmonary:      Comments: Increased effort, intermittent pursed lip breathing, speaking in 5 word sentences. Diffusely diminished air exchange w/ end expiratory wheezing  Abdominal:      General: There is no distension. Palpations: Abdomen is soft. Tenderness: There is no abdominal tenderness. Musculoskeletal:         General: No swelling or tenderness. Normal range of motion. Cervical back: Normal range of motion and neck supple. Skin:     General: Skin is warm and dry. Neurological:      Mental Status: She is alert and oriented to person, place, and time. Comments: Strength 5/5 and sensation grossly intact to light touch and equal bilaterally throughout all extremities          -------------------------------------------------- RESULTS -------------------------------------------------  I have personally reviewed all laboratory and imaging results for this patient. Results are listed below.      LABS:  Labs Reviewed   CBC WITH AUTO DIFFERENTIAL - Abnormal; Notable for the following components:       Result Value    MCHC 31.5 (*)     Monocytes % 14.1 (*)     Monocytes Absolute 1.04 (*)     All other components within normal limits   BASIC METABOLIC PANEL - Abnormal; Notable for the following components:    Chloride 96 (*)     CO2 32 (*)     Glucose 101 (*)     All other components within normal limits   TROPONIN - Abnormal; Notable for the following components:    Troponin, High Sensitivity 18 (*)     All other components within normal limits   BLOOD GAS, ARTERIAL - Abnormal; Notable for the following components:    PCO2 56.2 (*)     PO2 126.8 (*)     HCO3 31.0 (*)     B.E. 4.3 (*)     O2 Sat 98.6 (*)     All other components within normal limits   COVID-19, RAPID   MAGNESIUM   BRAIN NATRIURETIC PEPTIDE   D-DIMER, QUANTITATIVE ARTERIAL BLOOD GAS, RESPIRATORY ONLY       RADIOLOGY:  Interpreted personally and by Radiologist.  XR CHEST PORTABLE   Final Result   COPD. There is no evidence of failure or pneumonia. EKG:  This EKG is signed and interpreted by the EP. Sinus rhythm w/ PACs, vent rate 79bpm, normal axis, no acute injury pattern, no clinically significant change compared w/ prior EKG       ------------------------- NURSING NOTES AND VITALS REVIEWED ---------------------------   The nursing notes within the ED encounter and vital signs as below have been reviewed by myself. BP (!) 111/43   Pulse 69   Temp 98.3 °F (36.8 °C) (Oral)   Resp 16   Ht 5' (1.524 m)   Wt 75 lb (34 kg)   SpO2 97%   BMI 14.65 kg/m²   Oxygen Saturation Interpretation: Abnormal    The patients available past medical records and past encounters were reviewed. ------------------------------ ED COURSE/MEDICAL DECISION MAKING----------------------  Medications   ipratropium-albuterol (DUONEB) nebulizer solution 3 ampule (3 ampules Inhalation Given 10/24/22 1001)   predniSONE (DELTASONE) tablet 40 mg (40 mg Oral Given 10/24/22 0931)   doxycycline hyclate (VIBRAMYCIN) capsule 100 mg (100 mg Oral Given 10/24/22 0931)       Counseling: The emergency provider has spoken with the patient and discussed todays results, in addition to providing specific details for the plan of care and counseling regarding the diagnosis and prognosis. Questions are answered at this time and they are agreeable with the plan. ED Course/Medical Decision Makin y.o. female here with shortness of breath and acute on chronic hypoxic and hypercapnic respiratory failure. On arrival hemodynamically stable, some increased work of breathing. Treated w/ nebs, prednisone, doxycycline. Workup unrevealing of additional process. EKG and troponin not suggestive of ACS. D-dimer wnl with low risk Wells.   Admitted in stable condition for further management given increased O2 requirement from baseline and very minimal exertional tolerance. --------------------------------- IMPRESSION AND DISPOSITION ---------------------------------    IMPRESSION  1. Acute on chronic respiratory failure with hypoxia and hypercapnia (HCC)    2. COPD exacerbation ([facility] [address]        DISPOSITION  Disposition: Admit to telemetry  Patient condition is stable    NOTE: This report was transcribed using voice recognition software.  Every effort was made to ensure accuracy; however, inadvertent computerized transcription errors may be present    Marichuy Ballard MD  Attending Emergency Physician        Marichuy Ballard MD  10/24/22 6930

## 2022-10-25 LAB
ANION GAP SERPL CALCULATED.3IONS-SCNC: 8 MMOL/L (ref 7–16)
BUN BLDV-MCNC: 21 MG/DL (ref 6–23)
CALCIUM SERPL-MCNC: 8.5 MG/DL (ref 8.6–10.2)
CHLORIDE BLD-SCNC: 102 MMOL/L (ref 98–107)
CO2: 26 MMOL/L (ref 22–29)
CREAT SERPL-MCNC: 0.5 MG/DL (ref 0.5–1)
GFR SERPL CREATININE-BSD FRML MDRD: >60 ML/MIN/1.73
GLUCOSE BLD-MCNC: 130 MG/DL (ref 74–99)
POTASSIUM REFLEX MAGNESIUM: 4.4 MMOL/L (ref 3.5–5)
PROCALCITONIN: 0.05 NG/ML (ref 0–0.08)
SODIUM BLD-SCNC: 136 MMOL/L (ref 132–146)
VITAMIN D 25-HYDROXY: 15 NG/ML (ref 30–100)

## 2022-10-25 PROCEDURE — 36415 COLL VENOUS BLD VENIPUNCTURE: CPT

## 2022-10-25 PROCEDURE — 2580000003 HC RX 258: Performed by: FAMILY MEDICINE

## 2022-10-25 PROCEDURE — 96376 TX/PRO/DX INJ SAME DRUG ADON: CPT

## 2022-10-25 PROCEDURE — 80048 BASIC METABOLIC PNL TOTAL CA: CPT

## 2022-10-25 PROCEDURE — 1200000000 HC SEMI PRIVATE

## 2022-10-25 PROCEDURE — 2700000000 HC OXYGEN THERAPY PER DAY

## 2022-10-25 PROCEDURE — 94640 AIRWAY INHALATION TREATMENT: CPT

## 2022-10-25 PROCEDURE — 96366 THER/PROPH/DIAG IV INF ADDON: CPT

## 2022-10-25 PROCEDURE — 6360000002 HC RX W HCPCS: Performed by: INTERNAL MEDICINE

## 2022-10-25 PROCEDURE — 2580000003 HC RX 258: Performed by: INTERNAL MEDICINE

## 2022-10-25 PROCEDURE — 84145 PROCALCITONIN (PCT): CPT

## 2022-10-25 PROCEDURE — 99224 PR SBSQ OBSERVATION CARE/DAY 15 MINUTES: CPT | Performed by: INTERNAL MEDICINE

## 2022-10-25 PROCEDURE — 6370000000 HC RX 637 (ALT 250 FOR IP): Performed by: INTERNAL MEDICINE

## 2022-10-25 PROCEDURE — 82306 VITAMIN D 25 HYDROXY: CPT

## 2022-10-25 PROCEDURE — 97165 OT EVAL LOW COMPLEX 30 MIN: CPT

## 2022-10-25 PROCEDURE — 96372 THER/PROPH/DIAG INJ SC/IM: CPT

## 2022-10-25 RX ORDER — MAGNESIUM SULFATE 1 G/100ML
1000 INJECTION INTRAVENOUS ONCE
Status: COMPLETED | OUTPATIENT
Start: 2022-10-25 | End: 2022-10-25

## 2022-10-25 RX ORDER — SODIUM CHLORIDE 9 MG/ML
INJECTION, SOLUTION INTRAVENOUS CONTINUOUS
Status: ACTIVE | OUTPATIENT
Start: 2022-10-25 | End: 2022-10-25

## 2022-10-25 RX ORDER — ALPRAZOLAM 0.25 MG/1
0.25 TABLET ORAL NIGHTLY PRN
Status: DISCONTINUED | OUTPATIENT
Start: 2022-10-25 | End: 2022-10-27 | Stop reason: HOSPADM

## 2022-10-25 RX ADMIN — IPRATROPIUM BROMIDE AND ALBUTEROL SULFATE 1 AMPULE: .5; 2.5 SOLUTION RESPIRATORY (INHALATION) at 17:13

## 2022-10-25 RX ADMIN — IPRATROPIUM BROMIDE AND ALBUTEROL SULFATE 1 AMPULE: .5; 2.5 SOLUTION RESPIRATORY (INHALATION) at 08:55

## 2022-10-25 RX ADMIN — SODIUM CHLORIDE: 9 INJECTION, SOLUTION INTRAVENOUS at 17:08

## 2022-10-25 RX ADMIN — PREDNISONE 20 MG: 20 TABLET ORAL at 20:30

## 2022-10-25 RX ADMIN — CEFTRIAXONE 1000 MG: 1 INJECTION, POWDER, FOR SOLUTION INTRAMUSCULAR; INTRAVENOUS at 16:00

## 2022-10-25 RX ADMIN — AZITHROMYCIN DIHYDRATE 500 MG: 500 INJECTION, POWDER, LYOPHILIZED, FOR SOLUTION INTRAVENOUS at 14:54

## 2022-10-25 RX ADMIN — MAGNESIUM SULFATE HEPTAHYDRATE 1000 MG: 10 INJECTION, SOLUTION INTRAVENOUS at 16:02

## 2022-10-25 RX ADMIN — BUDESONIDE INHALATION SUSPENSION 500 MCG: 0.5 SUSPENSION RESPIRATORY (INHALATION) at 20:46

## 2022-10-25 RX ADMIN — IPRATROPIUM BROMIDE AND ALBUTEROL SULFATE 1 AMPULE: .5; 2.5 SOLUTION RESPIRATORY (INHALATION) at 20:46

## 2022-10-25 RX ADMIN — BUDESONIDE INHALATION SUSPENSION 500 MCG: 0.5 SUSPENSION RESPIRATORY (INHALATION) at 08:55

## 2022-10-25 RX ADMIN — ARFORMOTEROL TARTRATE 15 MCG: 15 SOLUTION RESPIRATORY (INHALATION) at 20:46

## 2022-10-25 RX ADMIN — PREDNISONE 20 MG: 20 TABLET ORAL at 07:45

## 2022-10-25 RX ADMIN — ARFORMOTEROL TARTRATE 15 MCG: 15 SOLUTION RESPIRATORY (INHALATION) at 08:55

## 2022-10-25 RX ADMIN — ENOXAPARIN SODIUM 30 MG: 100 INJECTION SUBCUTANEOUS at 07:45

## 2022-10-25 NOTE — ACP (ADVANCE CARE PLANNING)
Advance Care Planning   Healthcare Decision Maker:    Primary Decision Maker: Nadine Clemons Child - [phone]    Secondary Decision Maker: Danyelle Carpio - Child - [phone]    Click here to complete Healthcare Decision Makers including selection of the Healthcare Decision Maker Relationship (ie \"Primary\").

## 2022-10-25 NOTE — CONSULTS
[facility]  Department of Internal Medicine  Division of Pulmonary, Critical Care and Sleep Medicine  Consult Note    Jai Espinoza DO, MPH, Lilibeth Garnett, [address] [facility] Nel YOST MD      Patient: Chioma Ashby  MRN: [id number]  : 1945    Encounter Time: 9:29 AM     Date of Admission: 10/24/2022  9:19 AM    Primary Care Physician: Mu Hairston MD    Reason for Consultation: AECOPD, Active smoker, Lung nodules. HISTORY OF PRESENT ILLNESS : Ofe Bhatt 78 y.o. female was seen in consultation regarding the above chief compliant. The patient is a 78 y.o. female with a history of COPD, chronic respiratory failure on 2-3 L home O2, tobacco abuse who presents with dyspnea. Patient was recently admitted from  through  for COPD exacerbation. Patient was treated with IV steroids and breathing treatments and discharged home. She followed up with her pulmonologist in the office today. She was noted to be hypoxic at 81% on 2 L O2 NC. She was sent to the ER for further evaluation and treatment. She was 97% on 6 L, 96% on 3 L nasal cannula. In ED patient has mildly decreased chloride 96, mildly increased CO2 of bicarb bicarb of 32. High-sensitivity troponin is minimally elevated 18. ABG 7.36/56 point 2020 6.8 on 6 L nasal cannula. D-dimer is low at less than 200. Chest x-ray chronic changes consistent with COPD no acute infiltrate or edema. Patient continues to smoke. Patient admitted for further evaluation and treatment. PAST MEDICAL HISTORY:  has a past medical history of Abnormal Pap smear, COPD (chronic obstructive pulmonary disease) ([address] Emphysema of lung ([address] Emphysema/COPD ([address] GERD (gastroesophageal reflux disease), History of stomach ulcers, Hyperlipidemia, Osteoarthritis, and Renal calculi. SURGICAL HISTORY:  has a past surgical history that includes Endometrial biopsy; Tonsillectomy;  Upper gastrointestinal endoscopy (10/17/2017); Colonoscopy (10/17/2017); Appendectomy; Cataract removal with implant (Bilateral, dec 2017, jan 2018); and Wrist surgery (Left, 2008). SOCIAL HISTORY:  reports that she has been smoking cigarettes. She has a 25.50 pack-year smoking history. She has never used smokeless tobacco. She reports that she does not drink alcohol and does not use drugs. FAMILY  HISTORY: family history includes Arthritis in her maternal grandmother and mother; Asthma in her maternal grandmother and mother; Cancer in her brother and sister; Emphysema in her mother; Heart Disease in her father; High Blood Pressure in her mother. MEDICATIONS:    Prior to Admission medications    Medication Sig Start Date End Date Taking? Authorizing Provider   albuterol sulfate HFA (PROVENTIL;VENTOLIN;PROAIR) 108 (90 Base) MCG/ACT inhaler inhale 2 puffs by mouth and INTO THE LUNGS every 6 hours if needed for wheezing 8/29/22   Aditya Hastings MD   Budeson-Glycopyrrol-Formoterol (BREZTRI AEROSPHERE) 160-9-4.8 MCG/ACT AERO Inhale 2 puffs into the lungs 2 times daily  Patient not taking: Reported on 10/24/2022 1/28/22   Aditya Hastings MD   nicotine (NICODERM CQ) 14 MG/24HR Place 1 patch onto the skin daily  Patient not taking: Reported on 10/24/2022 6/21/18   Yessica Alexandra MD   acetaminophen (TYLENOL) 325 MG tablet Take 650 mg by mouth every 6 hours as needed for Pain    Historical Provider, MD       ALLERGIES: Patient has no known allergies. REVIEW OF SYSTEMS:  Otherwise negative if not reported or listed below  Constitutional:  No unanticipated weight loss. No change in sleep pattern or activity. No fevers, chills or rigors. Eyes:    No visual changes or diplopia. No scleral icterus. ENT:    No Headaches, hearing loss or vertigo. No mouth sores or sore throat. Cardiovascular:  + chest discomfort, palpitations. Respiratory:  + cough, No wheezing      No sputum production.       No hemoptysis, pleuritic pain. Gastrointestinal:  No abdominal pain, appetite loss, blood in stools. No hematemesis  Genitourinary:  No dysuria, trouble voiding or hematuria. No nocturia. Musculoskeletal:   No weakness or joint complaints. Integumentary: No rashes or pruritis. Neurological:  No headache, numbness or tingling. Psychiatric:   No effect on mood, memory, mentation, or behavior. No anxiety or depression. Endocrine:   No excessive thirst, fluid intake, or urination. No tremor. Hematologic: No abnormal bruising or bleeding. Lymphatic:  No swollen lymph nodes. Immunologic:  No hives or hx of anaphylaxis      OBJECTIVE:     PHYSICAL EXAM:   VITALS:   Vitals:    10/24/22 2127 10/24/22 2130 10/24/22 2215 10/25/22 0745   BP:  (!) 107/47 (!) 87/45 (!) 85/52   Pulse: (!) 108 70 74 89   Resp: 23 30 22 24   Temp:   (!) 96.5 °F (35.8 °C) 97.1 °F (36.2 °C)   TempSrc:   Temporal Temporal   SpO2: 92% 93% 98% 96%   Weight:       Height:            Intake/Output Summary (Last 24 hours) at 10/25/2022 0929  Last data filed at 10/25/2022 0749  Gross per 24 hour   Intake 240 ml   Output --   Net 240 ml        CONSTITUTIONAL:   A&O x 3, NAD  SKIN:     No rash,   HEENT:     EOMI, MMM, No thrush  NECK:    No bruits, No JVP appreciated  CV:      Sinus,  No murmur, No rubs, No gallops  PULMONARY:   Couse BS,  No Wheezing, No Rales, No Rhonchi      No noted egophony  ABDOMEN:     Soft, non-tender. BS normal. No R/R/G  EXT:    No deformities . No clubbing. No lower extremity edema, No venous stasis  PULSE:   Appears equal and palpable.   PSYCHIATRIC:  Seems appropriate, No acute psychosis  MS:    No fractures, No gross weakness  NEUROLOGIC:   The clinical assessment is non-focal     DATA: IMAGING & TESTING:     LABORATORY TESTS:    CBC:   Lab Results   Component Value Date/Time    WBC 7.4 10/24/2022 09:28 AM    RBC 3.90 10/24/2022 09:28 AM    HGB 11.9 10/24/2022 09:28 AM    HCT 37.8 10/24/2022 09:28 AM    MCV 96.9 10/24/2022 09:28 AM    MCH 30.5 10/24/2022 09:28 AM    MCHC 31.5 10/24/2022 09:28 AM    RDW 13.2 10/24/2022 09:28 AM     10/24/2022 09:28 AM    MPV 9.0 10/24/2022 09:28 AM     CMP:    Lab Results   Component Value Date/Time     10/25/2022 05:27 AM    K 4.4 10/25/2022 05:27 AM     10/25/2022 05:27 AM    CO2 26 10/25/2022 05:27 AM    BUN 21 10/25/2022 05:27 AM    CREATININE 0.5 10/25/2022 05:27 AM    GFRAA >60 09/20/2022 04:35 AM    LABGLOM >60 10/25/2022 05:27 AM    GLUCOSE 130 10/25/2022 05:27 AM    GLUCOSE 172 04/30/2012 09:50 AM    PROT 6.1 09/17/2022 05:48 AM    LABALBU 3.7 09/17/2022 05:48 AM    LABALBU 4.5 04/29/2012 05:00 PM    CALCIUM 8.5 10/25/2022 05:27 AM    BILITOT <0.2 09/17/2022 05:48 AM    ALKPHOS 63 09/17/2022 05:48 AM    AST 20 09/17/2022 05:48 AM    ALT 12 09/17/2022 05:48 AM     Calcium:    Lab Results   Component Value Date/Time    CALCIUM 8.5 10/25/2022 05:27 AM     Magnesium:    Lab Results   Component Value Date/Time    MG 1.9 10/24/2022 09:28 AM     Phosphorus:    Lab Results   Component Value Date/Time    PHOS 3.2 07/11/2018 01:00 PM        PRO-BNP:   Lab Results   Component Value Date    PROBNP 93 10/24/2022    PROBNP 417 09/16/2022      ABGs:   Lab Results   Component Value Date/Time    PH 7.360 10/24/2022 09:47 AM    PH 7.292 04/29/2012 09:00 PM    PO2 126.8 10/24/2022 09:47 AM    PCO2 56.2 10/24/2022 09:47 AM     Hemoglobin A1C: No components found for: HGBA1C    IMAGING:  Imaging tests were completed and reviewed and discussed radiology and care team involved and reveals   XR CHEST PORTABLE  Result Date: 10/24/2022  FINDINGS: There is hyperinflation of the lungs and flattening of diaphragms consistent with COPD. There is no pulmonary infiltrate, mass or nodule. There is no pleural effusion. The cardiac silhouette is within normal limits. Impression: COPD. There is no evidence of failure or pneumonia.        Assessment:   AECOPD  Active Tobacco abuse  Acute bronchitis   Emphysema  Acute on Chronic Respiratory failure  Lung nodules         Plan:   Admit not obs  Steroids  Bronchodialtors  Couseled on stop smoking  PET avoidance, RAST testing  Check Vit D level  Oxygen support        Kath Frye DO DO, MPH, [facility], Anatoliy Appiah  Professor of Internal Medicine  Pulmonary, Critical Care and Sleep Medicine

## 2022-10-25 NOTE — PROGRESS NOTES
Hospitalist Progress Note      SYNOPSIS: Patient admitted on 10/24/2022 for COPD exacerbation [facility])     The patient is a 78 y.o. female patient of Dr. Jayson Shaw history of COPD, chronic respiratory failure on 2-3 L home O2, tobacco abuse who presents with dyspnea. Chest x-ray chronic changes consistent with COPD no acute infiltrate or edema. Patient continues to smoke. Patient admitted for further evaluation and treatment. SUBJECTIVE:  Patient is seen sitting comfortably at the bedside on supplemental oxygen. Stable overnight. No other overnight issues reported. Temp (24hrs), Av.8 °F (36 °C), Min:96.5 °F (35.8 °C), Max:97.1 °F (36.2 °C)    DIET: ADULT DIET; Regular; No Added Salt (3-4 gm)  CODE: Limited    Intake/Output Summary (Last 24 hours) at 10/25/2022 1617  Last data filed at 10/25/2022 0749  Gross per 24 hour   Intake 240 ml   Output --   Net 240 ml       OBJECTIVE:    BP (!) 85/52   Pulse 89   Temp 97.1 °F (36.2 °C) (Temporal)   Resp 24   Ht 5' (1.524 m)   Wt 75 lb (34 kg)   SpO2 96%   BMI 14.65 kg/m²     General appearance: No apparent distress, appears stated age and cooperative. HEENT:  Conjunctivae/corneas clear. Neck: Supple. No jugular venous distention. Respiratory: Diminished to auscultation bilaterally, normal respiratory effort  Cardiovascular: Regular rate rhythm, normal S1-S2  Abdomen: Soft, nontender, nondistended  Musculoskeletal: No clubbing, cyanosis, no bilateral lower extremity edema. Brisk capillary refill. Skin:  No rashes  on visible skin  Neurologic: awake, alert and following commands     ASSESSMENT:  Acute on chronic hypoxemic and hypercapnic respiratory failure  COPD exacerbation  Emphysema   tobacco abuse  Severe protein calorie malnutrition  Lung nodules     PLAN:  Patient will be placed on the full admission. Continue steroids. Continue bronchodilators. Smoking cessation counseling. Pulmonology consulted and following along.   Continue Rocephin and Zithromax. DISPOSITION: Home    Medications:  REVIEWED DAILY    Infusion Medications    sodium chloride       Scheduled Medications    magnesium sulfate  1,000 mg IntraVENous Once    budesonide  500 mcg Nebulization BID    Arformoterol Tartrate  15 mcg Nebulization BID    sodium chloride flush  5-40 mL IntraVENous 2 times per day    enoxaparin  30 mg SubCUTAneous Daily    ipratropium-albuterol  1 ampule Inhalation Q4H WA    cefTRIAXone (ROCEPHIN) IV  1,000 mg IntraVENous Q24H    azithromycin  500 mg IntraVENous Q24H    predniSONE  20 mg Oral BID     PRN Meds: guaiFENesin, ALPRAZolam, sodium chloride flush, sodium chloride, ondansetron **OR** ondansetron, magnesium hydroxide, acetaminophen **OR** acetaminophen    Labs:     Recent Labs     10/24/22  0928   WBC 7.4   HGB 11.9   HCT 37.8          Recent Labs     10/24/22  0928 10/25/22  0527    136   K 4.0 4.4   CL 96* 102   CO2 32* 26   BUN 14 21   CREATININE 0.5 0.5   CALCIUM 9.2 8.5*       No results for input(s): PROT, ALB, ALKPHOS, ALT, AST, BILITOT, AMYLASE, LIPASE in the last 72 hours. No results for input(s): INR in the last 72 hours. No results for input(s): Normajean Kooskia in the last 72 hours. Chronic labs:    Lab Results   Component Value Date    CHOL 231 (H) 10/11/2013    TRIG 86 10/11/2013    HDL 81.0 10/11/2013    LDLCALC 133 (H) 10/11/2013    TSH 2.240 02/04/2019    INR 1.2 04/29/2012    LABA1C 5.5 02/04/2019       Radiology: REVIEWED DAILY    +++++++++++++++++++++++++++++++++++++++++++++++++  Bev Kenyon MD  [facility] - [address] [place] [address]  +++++++++++++++++++++++++++++++++++++++++++++++++  NOTE: This report was transcribed using voice recognition software. Every effort was made to ensure accuracy; however, inadvertent computerized transcription errors may be present.

## 2022-10-25 NOTE — ED NOTES
This patient states she has low BP as a baseline but states her BP has been lower than normal at times today. This RN spoke with Dr Mc Quintana about BP reading 67/48.   She told this RN to hang a liter of. 0.9%NaCl      Carmelina Valenzuela RN  10/24/22 7778

## 2022-10-25 NOTE — Clinical Note
Name: Diane WELLS   :    CSN: [id number]   INSURANCE: [unfilled]    Date of admission: 10/24/2022    Current status: [unfilled]    We recommend that patient's status is upgraded to INPATIENT;  if you agree, please place a new ADMIT order in [facility] as recommended. Rationale: Sev malnutrition (no reserve), recent adm, tx for pna, hemodyn unstable today. Clinical summary  ED 10/24 history of COPD, chronic respiratory failure on 2-3 L home O2, tobacco abuse who presents with dyspnea. Patient was recently admitted from  through  for COPD exacerbation. pulmonologist in the office today. She was noted to be hypoxic at 81% on 2 L O2 NC.  po steroids/zmax/dee, pulm/pall consults. 10/25  On 3lpm, 87/45>85/52, R 24   Vitals                   BMI 14.65    Labs and Imaging  7.36/56/127 on 6lpm.     Status decision based on clinical judgment and Commercial Utilization criteria, e.g., MCG, [place]       This chart was reviewed at 11:05 AM on 10/25/2022    DOLORES Key M.D. Physician Advisor  Cell [phone]    _____________________________________________________________________    Commercial & Medicare Advantage Plan, Self-pay : The final decision of the patient's hospitalization status depends on the attending physician's judgment. Additional comments for VUR/Denials: The information in this document is a recommendation to be used for utilization review and utilization management purposes only. This recommendation is not an order. The recommendation is made based on the information reviewed at the time of the referral, is pursuant to the [place] [facility] Conditions of Participation (42 CFR Part 482), and is neither a judgment nor an assessment with regard to the appropriateness or quality of clinical care. Nothing in this document may be used to limit, alter, or affect clinical services provided to the patient named below.  The provider of services is ultimately responsible for the submission of a claim that has met all requirements for correct coding, billing, and reimbursement.

## 2022-10-25 NOTE — PROGRESS NOTES
[address]        Date:10/25/2022                                                  Patient Name: Cameron Khan    MRN: [id number]    : 1945    Room: [address] [place] [address]          Evaluating OT: Nikki Hurtado OTR/L; [id number]       Referring Provider: Marium Gracia MD    Specific Provider Orders/Date: OT Eval and Treat 10/24/22      Diagnosis: COPD exacerbation; Chronic respiratory failure with hypoxia and hypercapnia . Surgery: None this admission     Pertinent Medical History:  has a past medical history of Abnormal Pap smear, COPD (chronic obstructive pulmonary disease) ([facility] Emphysema of lung ([facility] Emphysema/COPD ([facility] [address] [facility] GERD (gastroesophageal reflux disease), History of stomach ulcers, Hyperlipidemia, Osteoarthritis, and Renal calculi.      Recommended Adaptive Equipment: TBD     Precautions:  Fall Risk, O2     Assessment of current deficits    [x] Functional mobility  [x]ADLs  [x] Strength               []Cognition    [x] Functional transfers   [x] IADLs         [x] Safety Awareness   [x]Endurance    [] Fine Coordination              [x] Balance      [] Vision/perception   []Sensation     []Gross Motor Coordination  [] ROM  [] Delirium                   [] Motor Control     OT PLAN OF CARE   OT POC based on physician orders, patient diagnosis and results of clinical assessment    Frequency/Duration 1-3 days/wk for 2 weeks PRN   Specific OT Treatment Interventions to include:   * Instruction/training on adapted ADL techniques and AE recommendations to increase functional independence within precautions       * Training on energy conservation strategies, correct breathing pattern and techniques to improve independence/tolerance for self-care routine  * Functional transfer/mobility training/DME recommendations for increased independence, safety, and fall prevention  * Patient/Family education to increase follow through with safety techniques and functional independence  * Recommendation of environmental modifications for increased safety with functional transfers/mobility and ADLs  * Therapeutic exercise to improve motor endurance, ROM, and functional strength for ADLs/functional transfers  * Therapeutic activities to facilitate/challenge dynamic balance, stand tolerance for increased safety and independence with ADLs  * Positioning to improve skin integrity, interaction with environment and functional independence    Home Living: Pt lives with daughter & granddaughter in 1 story home with 4 steps & 1 handrail to enter. Bathroom setup: Tub/shower with shower chair   Equipment owned: Shower chair, ww, 2L O2    Prior Level of Function: IND with ADLs , IND with IADLs; engaged in functional mobility with use of  no AD  Driving: Yes  Occupation: None reported    Pain Level: Pt with no c/o pain throughout session  Cognition: A&O: 4/4; Follows multi step directions   Memory:  Good   Sequencing:  Fair+   Problem solving:  Fair+   Judgement/safety:  Fair+     Functional Assessment:  AM-PAC Daily Activity Raw Score: 19/24   Initial Eval Status  Date: 10/25/22 Treatment Status  Date: STGs = LTGs  Time frame: 10-14 days   Feeding Independent       Grooming Modified [place]   Independent    UB Dressing Modified [place]   Independent    LB Dressing Supervision   Independent    Bathing Supervision  Independent    Toileting Supervision   Independent    Bed Mobility  Supine to sit: Independent   Sit to supine: Independent      Functional Transfers Sit to stand:Modified [place]  Stand to sit:Modified [place]  Stand pivot: Modified [place]  Commode: Modified [place]  Sit to stand:Independent   Stand to sit: Independent  Stand pivot: Independent  Commode: Independent    Functional Mobility Modified [place]  No use of AD to<>from bathroom  Independent    Balance Sitting:     Static - Independent     Dynamic - Modified [place]  Standing: Modified [place]  Sitting:     Static: Independent     Dynamic: Independent  Standing: Independent   Activity Tolerance Fair+  Good   Visual/  Perceptual Glasses: Yes  Appears WFL        Safety Fair+  Good  during ADL completion   Vitals Pt on 3L O2 nasal cannula upon arrival. SpO2 96% seated EOB beginning of session. SpO2 89% during functional mobility. SpO2 91% end of session.  with functional activity. RN made aware. Hand Dominance Right   AROM (PROM) Strength Additional Info:    RUE  WFL 4-/5 grossly tested good  and wfl FMC/dexterity noted during ADL tasks     LUE WFL 4-/5 grossly tested Good  and wfl FMC/dexterity noted during ADL tasks       Hearing: WFL  Sensation:  No c/o numbness or tingling BUE  Tone: WFL BUE  Edema: Unremarkable    Comment: Cleared by RN to see pt. Upon arrival patient supine in bed and agreeable to OT session. At end of session, patient supine in bed with call light and phone within reach, all lines and tubes intact. Overall patient demonstrated decreased independence and safety during completion of ADL/functional transfer/mobility tasks. Therapist facilitated ADL tasks, functional transfers, functional mobility, bed mobility to address safety awareness, implementation of fall prevention strategies, & engagement throughout functional tasks. Pt c/o fatigue & min SOB with light activity. Pt educated on EC/WS strategies to address activity tolerance & independence throughout daily activities. Pt would benefit from continued skilled OT to increase safety and independence with completion of ADL/IADL tasks for functional independence and quality of life. Rehab Potential: Good for established goals     LTG: maximize independence with ADLs to return to PLOF    Patient and/or family were instructed on functional diagnosis, prognosis/goals and OT plan of care.  Demonstrated fair understanding. Eval Complexity: Low  History: Expanded chart review of medical records and additional review of physical, cognitive, or psychosocial history related to current functional performance  Exam: 3+ performance deficits  Assistance/Modification: Min/mod assistance or modifications required to perform tasks. May have comorbidities that affect occupational performance. Evaluation time includes thorough review of current medical information, gathering information on past medical & social history & PLOF, completion of standardized testing, informal observation of tasks, consultation with other medical professions/disciplines, assessment of data & development of POC/goals. Time In: 9:36a  Time Out: 9:46a  Total Treatment Time: 0 minutes    Min Units   OT Eval Low 56525  x     OT Eval Medium 53356      OT Eval High 59687      OT Re-Eval [id number]       Therapeutic Ex 92235       Therapeutic Activities 12628       ADL/Self Care 30154       Orthotic Management 59028       Manual 74283     Neuro Re-Ed 24248       Non-Billable Time          Evaluation Time additionally includes thorough review of current medical information, gathering information on past medical history/social history and prior level of function, interpretation of standardized testing/informal observation of tasks, assessment of data and development of plan of care and goals.               Jordi Moser OTR/L; [id number]

## 2022-10-25 NOTE — CARE COORDINATION
10/25 Care Coordination: Pt plan is to return home upon discharge. Pt lives with her daughter and granddaughter. Pt states need a nebulizer, and if ordered is okay with [facility] or [facility] medical..  Await Pulmonary to see. Cont with  PO steroids Nebulizer and   ABX. CM/SW will continue to follow for discharge planning.    Janell FARRELL,RN-CV-BC  [phone]

## 2022-10-25 NOTE — CONSULTS
Palliative Care Department  Palliative Care Initial Consult  Provider: Danay Stein DO [facility] Fellow  [phone]    Hospital Day: 2  Date of Initial Consult: 10/25/22  Referring Provider: Aida Almanzar MD  Palliative Medicine was consulted for assistance with: Code status Discussion, Assist with goals of care, and Family Support    Chief Complaint: Regina Garcia is a 78 y.o. female with chief complaint of shortness of breath. HPI:   Regina Garcia is a 78 y.o. female with significant medical history of COPD, chronic respiratory failure on 2-3 L home O2, tobacco abuse, GERD, HLD, osteoarthritis, who was admitted on 10/24/2022 with acute on chronic respiratory failure and COPD exacerbation. Per chart review, the patient was had an outpatient pulmonology appointment yesterday and was noted to be hypoxic with oxygen saturation at 81% on 2 L nasal cannula. She was sent to the emergency room for further evaluation and management. Labs and imaging were obtained. Troponin elevated at 18, chloride 96, CO2 32. D-dimer less than 200. COVID-19 negative. Chest x-ray revealed findings consistent with COPD without evidence of failure or pneumonia. She was placed on 6 L nasal cannula oxygen with improved saturation to 97%. She was given IV normal saline fluid bolus, DuoNeb nebulizer, and doxycycline. She was admitted for further evaluation and management. The patient was recently admitted from 9/16 through 9/24 for COPD exacerbation. Pulmonology was consulted. Palliative medicine was consulted to assist with goals of care, CODE STATUS discussion and family support.     ASSESSMENT/PLAN:     Pertinent Hospital Diagnoses:  Current medical issues leading to Palliative Medicine involvement include   Active Hospital Problems    Diagnosis Date Noted    Tobacco abuse [Z72.0] 10/24/2022     Priority: Medium    COPD exacerbation ([facility] [address] [facility] [J44.1] 09/16/2022     Priority: Medium    Chronic respiratory failure with hypoxia and hypercapnia ([facility] [J96.11, J96.12] 10/31/2018    Severe protein-calorie malnutrition ([facility] [E43] 06/13/2018         Palliative Care Encounter / Counseling Regarding Goals of Care:  Please see detailed goals of care discussion as below. At this time, Aguila Bradley, Does have capacity for medical decision-making. Capacity is time limited and situation/question specific. Outcome of goals of care meeting:   continue current management with goal to return home  Code status: Limited code-yes to resuscitative medications, no to chest compressions, no to defibrillation/cardioversion, no to intubation/reintubation at time of arrest.  I reviewed with the Patient that given multiple medical co-morbidities, advanced disease process, current severe acute illness, advanced age, and poor prognosis, in the event of cardiac arrest, the chances of surviving may likely be low. It was also reviewed that if they survived a cardiac arrest, a return to prior level of function also may be low. Advanced Directives: None, Patient expresses a desire to discuss further, Patient expresses desire to complete, and Will ask the LSW to assist with completion  Surrogate/Legal NOK:   - Zahida Da Silva (oldest child, son):  [phone] Patient reports she would like him to be primary decision maker.   - Willie Vigil (grandson): [phone]   - Anna Chakraborty (child): [phone]       Referral: To spiritual services/ and social work      SUBJECTIVE:   Events/Discussions:    Chart reviewed. Spoke to nurse. The patient was seen and examined at bedside. She is awake, alert and oriented to person, month, year, location and situation. She has the capacity for medical decision-making at this time. Discussed role of palliative medicine with the patient. She verbalizes understanding. Patient reports she is  and lives at home with her daughter, Anna Chakraborty and her granddaughter.   She states that her grandson, whom she also lived with,  unexpectedly 2 weeks ago. She does become tearful talking about him and says that he was her \"sunshine \". Discussed advanced directives with the patient and she denies ever having completed a living will or healthcare power of . She does express a desire to discuss further and possibly complete and agrees to have the  follow. The patient reports she has 4 children. She states she would like her oldest child and son, Suzy Martinez, to be her primary decision maker, in the event she is unable to make decisions for herself. She also lists her grandson Kris Bonilla and her daughter Caroline Lorenzo for secondary contacts as well. Discussed goals of care and CODE STATUS options with the patient. The patient reports she would like to continue with current management with the goal to return home. She does not express interest in going to a rehab or facility at this time. Discussed CODE STATUS options with the patient and she confirms she would like to remain as a limited code-yes to resuscitative medications only, no intubation or reintubation, no defibrillation or cardioversion, no chest compressions. The patient currently has no complaints except for some congestion, a chronic cough and shortness of breath- denies any new or worsening cough or shortness of breath . She feels that her COPD has worsened over the past 2 weeks especially dealing with the death of her grandson. Asked if the patient would like spiritual care, , to visit and she agrees and feels that would help. She currently denies any fever, chills, new cough, worsening shortness of breath, wheezing, chest pain, palpitations, abdominal pain, nausea, vomiting, diarrhea, constipation, or urinary issues. Asked if the patient wanted me to call any of her family for her and she denies at this time. She verbalizes understanding. Her questions were answered. Support provided to the patient.        Past Medical History: Diagnosis Date    Abnormal Pap smear 1995    Patient states she had laser surgery    COPD (chronic obstructive pulmonary disease) (HCC)     Emphysema of lung (HCC)     Emphysema/COPD (HCC)     GERD (gastroesophageal reflux disease)     History of stomach ulcers     Hyperlipidemia     DIET    Osteoarthritis     Renal calculi     APX 40 YEARS AGO       Past Surgical History:   Procedure Laterality Date    APPENDECTOMY      15 y.o. CATARACT REMOVAL WITH IMPLANT Bilateral dec 2017, jan 2018    COLONOSCOPY  10/17/2017    normal colon--opal    ENDOMETRIAL BIOPSY      TONSILLECTOMY      UPPER GASTROINTESTINAL ENDOSCOPY  10/17/2017    gastritis; hiatal hernia--opal    WRIST SURGERY Left 2008       Family History   Problem Relation Age of Onset    High Blood Pressure Mother     Emphysema Mother     Arthritis Mother     Asthma Mother     Cancer Sister         lung, Hodgkins    Cancer Brother         leukemia    Heart Disease Father     Arthritis Maternal Grandmother     Asthma Maternal Grandmother        No Known Allergies    ROS:   CONSTITUTIONAL:  fever, chill, fatigue. HEENT: blurry vision, double vision, congestion, dysphagia, odynophagia  RESPIRATORY: cough, shortness of breath, wheezing  CARDIOVASCULAR:  Chest pain/pressure, palpitation, syncope,  GASTROINTESTINAL:  abdominal pain, nausea, vomiting, diarrhea, constipation, . GENITOURINARY:  Burning, frequency, urgency, incontinence, discharge  INTEGUMENTARY: rash, wound, pruritis  HEMATOLOGIC/LYMPHATIC:  Swelling, bleeding, easy bruising  MUSCULOSKELETAL:  pain, edema, joint swelling or redness  NEUROLOGICAL:  light headed, dizziness,headache, weakness    OBJECTIVE:   Prognosis: Poor    Physical Exam:  BP (!) 85/52   Pulse 89   Temp 97.1 °F (36.2 °C) (Temporal)   Resp 24   Ht 5' (1.524 m)   Wt 75 lb (34 kg)   SpO2 96%   BMI 14.65 kg/m²     Gen: Cachectic, thin appearing, 57-year-old female lying in bed, in no acute distress.    HEENT:  Normocephalic, atraumatic, PERRL, conjunctiva pink, no drainage, mucosa moist  Neck:  Supple, no cervical lymphadenopathy. Lungs: On nasal cannula. No acute distress. Decreased air exchange. Diminished breath sounds bilaterally. No audible wheezes, rales or rhonchi. Heart: Regular rate and rhythm. No murmur. Abd: Bowel sounds present. Soft, no distention. No tenderness to palpation. No guarding. M/S/Ext: No edema. Moves all limbs. Skin:  Warm and dry  Neuro: She is awake, alert and oriented to person, month, year, location and situation. She converses appropriately. No slurred speech or facial droop. No gross focal neurological deficit. No active tremulous or seizure-like activity at this time. Psych: Appropriate behavior and affect. No agitation. Objective data reviewed: labs, images, records, medication use, vitals, and chart    Time/Communication:  Greater than 50% of time spent, total 65 minutes in counseling and coordination of care at the bedside regarding goals of care, diagnosis and prognosis, and see above. Sander Serrano,  HPM Fellow  Palliative Medicine    Patient and the plan of care discussed with the other IDT members of Palliative Care Team, and with consultants, Primary Attending, patient, family, and floor nurses, as appropriate and available. Thank you for allowing Palliative Medicine to participate in the care of Farooq Maldonado.    Note: This report was completed using [employer] voiced recognition software. Every effort has been made to ensure accuracy; however, inadvertent computerized transcription errors may be present.

## 2022-10-26 LAB
ANION GAP SERPL CALCULATED.3IONS-SCNC: 8 MMOL/L (ref 7–16)
BASOPHILS ABSOLUTE: 0.01 E9/L (ref 0–0.2)
BASOPHILS RELATIVE PERCENT: 0.1 % (ref 0–2)
BUN BLDV-MCNC: 12 MG/DL (ref 6–23)
CALCIUM SERPL-MCNC: 8.6 MG/DL (ref 8.6–10.2)
CHLORIDE BLD-SCNC: 106 MMOL/L (ref 98–107)
CO2: 27 MMOL/L (ref 22–29)
CREAT SERPL-MCNC: 0.4 MG/DL (ref 0.5–1)
EKG ATRIAL RATE: 79 BPM
EKG P AXIS: 80 DEGREES
EKG P-R INTERVAL: 172 MS
EKG Q-T INTERVAL: 382 MS
EKG QRS DURATION: 78 MS
EKG QTC CALCULATION (BAZETT): 438 MS
EKG R AXIS: 63 DEGREES
EKG T AXIS: 70 DEGREES
EKG VENTRICULAR RATE: 79 BPM
EOSINOPHILS ABSOLUTE: 0 E9/L (ref 0.05–0.5)
EOSINOPHILS RELATIVE PERCENT: 0 % (ref 0–6)
GFR SERPL CREATININE-BSD FRML MDRD: >60 ML/MIN/1.73
GLUCOSE BLD-MCNC: 120 MG/DL (ref 74–99)
HCT VFR BLD CALC: 33.6 % (ref 34–48)
HEMOGLOBIN: 10.9 G/DL (ref 11.5–15.5)
IMMATURE GRANULOCYTES #: 0.04 E9/L
IMMATURE GRANULOCYTES %: 0.5 % (ref 0–5)
LYMPHOCYTES ABSOLUTE: 1.02 E9/L (ref 1.5–4)
LYMPHOCYTES RELATIVE PERCENT: 12.3 % (ref 20–42)
MCH RBC QN AUTO: 31.4 PG (ref 26–35)
MCHC RBC AUTO-ENTMCNC: 32.4 % (ref 32–34.5)
MCV RBC AUTO: 96.8 FL (ref 80–99.9)
MONOCYTES ABSOLUTE: 0.42 E9/L (ref 0.1–0.95)
MONOCYTES RELATIVE PERCENT: 5.1 % (ref 2–12)
NEUTROPHILS ABSOLUTE: 6.82 E9/L (ref 1.8–7.3)
NEUTROPHILS RELATIVE PERCENT: 82 % (ref 43–80)
PDW BLD-RTO: 13.3 FL (ref 11.5–15)
PLATELET # BLD: 253 E9/L (ref 130–450)
PMV BLD AUTO: 8.8 FL (ref 7–12)
POTASSIUM SERPL-SCNC: 4.9 MMOL/L (ref 3.5–5)
RBC # BLD: 3.47 E12/L (ref 3.5–5.5)
SODIUM BLD-SCNC: 141 MMOL/L (ref 132–146)
WBC # BLD: 8.3 E9/L (ref 4.5–11.5)

## 2022-10-26 PROCEDURE — 97161 PT EVAL LOW COMPLEX 20 MIN: CPT

## 2022-10-26 PROCEDURE — 6370000000 HC RX 637 (ALT 250 FOR IP): Performed by: INTERNAL MEDICINE

## 2022-10-26 PROCEDURE — 6360000002 HC RX W HCPCS: Performed by: INTERNAL MEDICINE

## 2022-10-26 PROCEDURE — 94640 AIRWAY INHALATION TREATMENT: CPT

## 2022-10-26 PROCEDURE — 36415 COLL VENOUS BLD VENIPUNCTURE: CPT

## 2022-10-26 PROCEDURE — 6370000000 HC RX 637 (ALT 250 FOR IP): Performed by: FAMILY MEDICINE

## 2022-10-26 PROCEDURE — 6360000002 HC RX W HCPCS: Performed by: FAMILY MEDICINE

## 2022-10-26 PROCEDURE — 1200000000 HC SEMI PRIVATE

## 2022-10-26 PROCEDURE — 85025 COMPLETE CBC W/AUTO DIFF WBC: CPT

## 2022-10-26 PROCEDURE — 2700000000 HC OXYGEN THERAPY PER DAY

## 2022-10-26 PROCEDURE — 80048 BASIC METABOLIC PNL TOTAL CA: CPT

## 2022-10-26 PROCEDURE — 2580000003 HC RX 258: Performed by: INTERNAL MEDICINE

## 2022-10-26 PROCEDURE — 93010 ELECTROCARDIOGRAM REPORT: CPT | Performed by: INTERNAL MEDICINE

## 2022-10-26 RX ORDER — METHYLPREDNISOLONE SODIUM SUCCINATE 40 MG/ML
40 INJECTION, POWDER, LYOPHILIZED, FOR SOLUTION INTRAMUSCULAR; INTRAVENOUS EVERY 12 HOURS
Status: DISCONTINUED | OUTPATIENT
Start: 2022-10-26 | End: 2022-10-27 | Stop reason: HOSPADM

## 2022-10-26 RX ORDER — VITAMIN B COMPLEX
6000 TABLET ORAL DAILY
Status: DISCONTINUED | OUTPATIENT
Start: 2022-10-26 | End: 2022-10-27 | Stop reason: HOSPADM

## 2022-10-26 RX ORDER — VITAMIN B COMPLEX
2000 TABLET ORAL DAILY
Status: DISCONTINUED | OUTPATIENT
Start: 2022-11-02 | End: 2022-10-27 | Stop reason: HOSPADM

## 2022-10-26 RX ORDER — GUAIFENESIN 400 MG/1
400 TABLET ORAL 3 TIMES DAILY
Status: DISCONTINUED | OUTPATIENT
Start: 2022-10-26 | End: 2022-10-27 | Stop reason: HOSPADM

## 2022-10-26 RX ADMIN — ENOXAPARIN SODIUM 30 MG: 100 INJECTION SUBCUTANEOUS at 08:41

## 2022-10-26 RX ADMIN — CEFTRIAXONE 1000 MG: 1 INJECTION, POWDER, FOR SOLUTION INTRAMUSCULAR; INTRAVENOUS at 15:45

## 2022-10-26 RX ADMIN — ARFORMOTEROL TARTRATE 15 MCG: 15 SOLUTION RESPIRATORY (INHALATION) at 20:21

## 2022-10-26 RX ADMIN — BUDESONIDE INHALATION SUSPENSION 500 MCG: 0.5 SUSPENSION RESPIRATORY (INHALATION) at 07:24

## 2022-10-26 RX ADMIN — IPRATROPIUM BROMIDE AND ALBUTEROL SULFATE 1 AMPULE: .5; 2.5 SOLUTION RESPIRATORY (INHALATION) at 20:20

## 2022-10-26 RX ADMIN — ARFORMOTEROL TARTRATE 15 MCG: 15 SOLUTION RESPIRATORY (INHALATION) at 07:24

## 2022-10-26 RX ADMIN — BUDESONIDE INHALATION SUSPENSION 500 MCG: 0.5 SUSPENSION RESPIRATORY (INHALATION) at 20:22

## 2022-10-26 RX ADMIN — SODIUM CHLORIDE, PRESERVATIVE FREE 10 ML: 5 INJECTION INTRAVENOUS at 20:53

## 2022-10-26 RX ADMIN — PREDNISONE 20 MG: 20 TABLET ORAL at 08:41

## 2022-10-26 RX ADMIN — IPRATROPIUM BROMIDE AND ALBUTEROL SULFATE 1 AMPULE: .5; 2.5 SOLUTION RESPIRATORY (INHALATION) at 11:10

## 2022-10-26 RX ADMIN — IPRATROPIUM BROMIDE AND ALBUTEROL SULFATE 1 AMPULE: .5; 2.5 SOLUTION RESPIRATORY (INHALATION) at 16:03

## 2022-10-26 RX ADMIN — GUAIFENESIN 400 MG: 400 TABLET ORAL at 20:55

## 2022-10-26 RX ADMIN — Medication 6000 UNITS: at 15:45

## 2022-10-26 RX ADMIN — METHYLPREDNISOLONE SODIUM SUCCINATE 40 MG: 40 INJECTION, POWDER, FOR SOLUTION INTRAMUSCULAR; INTRAVENOUS at 18:16

## 2022-10-26 RX ADMIN — IPRATROPIUM BROMIDE AND ALBUTEROL SULFATE 1 AMPULE: .5; 2.5 SOLUTION RESPIRATORY (INHALATION) at 07:24

## 2022-10-26 NOTE — CARE COORDINATION
10/26 Care Coordination: Acute on chronic hypoxemic and hypercapnic respiratory failure  COPD exacerbation, Emphysema,  tobacco abuse. Continue steroids. Continue bronchodilators, IV ATB po Steroids. Discharge plan remains Home, Has O2 2-3 liters [facility] [place]. Pt states need a nebulizer, and if ordered is okay with [facility] or [facility] medical, Pulmonary following. CM/SW will continue to follow for discharge planning.    Evelia ANNN,RN-CV-BC  [phone]

## 2022-10-26 NOTE — PROGRESS NOTES
Chart reviewed.  from palliative medicine with patient today to complete living will and POA documents, patient was too tired to complete them she is going to review them. CODE STATUS status to limited meds only. Plan at discharge for patient to discharge back home with her daughter and granddaughter. Goal of care and CODE STATUS has been established. No further PM needs has been 95. Going to sign off for now. Please reconsult if new PM needs arises.

## 2022-10-26 NOTE — PROGRESS NOTES
Physical Therapy  Physical Therapy Initial Assessment     Name: Sofia Cm  : [birth year]  MRN: [id number]      Date of Service: 10/26/2022    Evaluating PT:  Johanny King PT, DPT [id number]    Room #:  6737/4183-W  Diagnosis:  COPD exacerbation ([facility] [address] [J44.1]  Acute on chronic respiratory failure with hypoxia and hypercapnia (HCC) [J96.21, J96.22]  PMHx/PSHx:  COPD, GERD, HLD, OA  Procedure/Surgery:  none this admission  Precautions:  Falls, O2  Equipment Needs:  none, pt has FWW    SUBJECTIVE:    Pt lives with daughter and granddaughter in a 1 story home with 4 stairs to enter and 1 rail. Bed is on 1st floor and bath is on 1st floor. Pt ambulated with no AD PTA. OBJECTIVE:   Initial Evaluation  Date: 10/26/22 Treatment Short Term/ Long Term   Goals   AM-PAC 6 Clicks      Was pt agreeable to Eval/treatment? yes     Does pt have pain? No c/o pain     Bed Mobility  Rolling: NT  Supine to sit: NT  Sit to supine: NT  Scooting: NT  Rolling: Independent   Supine to sit: Independent   Sit to supine: Independent   Scooting: Independent    Transfers Sit to stand: Supervision   Stand to sit: Supervision   Stand pivot: Supervision   Sit to stand: Independent   Stand to sit:  Independent   Stand pivot: Independent    Ambulation    15 feet and 30 feet with no AD Supervision   >150 feet with no AD Independent    Stair negotiation: ascended and descended  NT  4 steps with 1 rail Mod I   ROM BUE:  Defer to OT note  BLE:  WFL     Strength BUE:  Defer to OT note  BLE:  grossly 4/5  WNL   Balance Sitting EOB:  Independent   Dynamic Standing:  Supervision   Sitting EOB:  Independent   Dynamic Standing:  Independent      Pt is A & O x 4  Sensation:  denies abnormalities  Edema:  none noted    Vitals:  SPO2 on 2L: 91-95%    Patient education  Pt educated on role of PT, safety during mobility    Patient response to education:   Pt verbalized understanding Pt demonstrated skill Pt requires further education in this area yes yes yes     ASSESSMENT:    Conditions Requiring Skilled Therapeutic Intervention:    []Decreased strength     []Decreased ROM  [x]Decreased functional mobility  []Decreased balance   [x]Decreased endurance   []Decreased posture  []Decreased sensation  []Decreased coordination   []Decreased vision  []Decreased safety awareness   []Increased pain       Comments:  patient ambulating in room upon entry and agreeable to PT evaluation. Pt returned to sitting EOB for social history. Futher ambulation completed with no AD and no unsteadiness noted. Mild SOB that resolved with rest. Pt positioned in chair at end of session with all needs met. Treatment:  Patient practiced and was instructed in the following treatment:    Transfer Training: Verbal and tactile cueing provided for sequencing and safety during mobility. Gait Training: Ambulation with no AD and verbal cues for proper technique and safety. Pt's/ family goals   1. None stated    Prognosis is good for reaching above PT goals. Patient and or family understand(s) diagnosis, prognosis, and plan of care.   yes    PHYSICAL THERAPY PLAN OF CARE:    PT POC is established based on physician order and patient diagnosis     Referring provider/PT Order:    10/26/22 0930  PT eval and treat  Start:  10/26/22 0930,   End:  10/26/22 0930,   ONE TIME,   Standing Count:  1 Occurrences,   R         Tahir eRyes MD     Diagnosis:  COPD exacerbation ([facility] [J44.1]  Acute on chronic respiratory failure with hypoxia and hypercapnia ([facility] [J96.21, J96.22]  Specific instructions for next treatment:  progress mobility as appropriate    Current Treatment Recommendations:     [] Strengthening to improve independence with functional mobility   [] ROM to improve independence with functional mobility   [] Balance Training to improve static/dynamic balance and to reduce fall risk  [x] Endurance Training to improve activity tolerance during functional mobility   [x] Transfer Training to improve safety and independence with all functional transfers   [x] Gait Training to improve gait mechanics, endurance and assess need for appropriate assistive device  [x] Stair Training in preparation for safe discharge home and/or into the community   [] Positioning to prevent skin breakdown and contractures  [x] Safety and Education Training   [x] Patient/Caregiver Education   [] HEP  [x] Other     PT long term treatment goals are located in above grid    Frequency of treatments: 2-5x/week x 1-2 weeks. Time in  1505  Time out  1520    Total Treatment Time  0 minutes     Evaluation Time includes thorough review of current medical information, gathering information on past medical history/social history and prior level of function, completion of standardized testing/informal observation of tasks, assessment of data and education on plan of care and goals.     CPT codes:  [x] Low Complexity PT evaluation 30379  [] Moderate Complexity PT evaluation 02371  [] High Complexity PT evaluation 96186  [] PT Re-evaluation 42788  [] Gait training 21571 - minutes  [] Manual therapy 46158 - minutes  [] Therapeutic activities 35252 - minutes  [] Therapeutic exercises 73815 - minutes  [] Neuromuscular reeducation 45666 - minutes     Lakesha Cohen PT, DPT  [id number]

## 2022-10-26 NOTE — PROGRESS NOTES
Hospitalist Progress Note      SYNOPSIS: Patient admitted on 10/24/2022 for COPD exacerbation [facility])  The patient is a 78 y.o. female patient of Dr. Oralia Jacobs history of COPD, chronic respiratory failure on 2-3 L home O2, tobacco abuse who presents with dyspnea. Chest x-ray chronic changes consistent with COPD no acute infiltrate or edema. Patient continues to smoke. Patient admitted for further evaluation and treatment. SUBJECTIVE:  Patient is seen sitting comfortably at the bedside on supplemental oxygen. Stable overnight. No other overnight issues reported. Temp (24hrs), Av.8 °F (36.6 °C), Min:97.4 °F (36.3 °C), Max:98.2 °F (36.8 °C)    DIET: ADULT DIET; Regular  CODE: Limited    Intake/Output Summary (Last 24 hours) at 10/26/2022 1703  Last data filed at 10/26/2022 1454  Gross per 24 hour   Intake 480 ml   Output --   Net 480 ml         OBJECTIVE:    BP (!) 103/43   Pulse 78   Temp 98.2 °F (36.8 °C) (Oral)   Resp 16   Ht 5' (1.524 m)   Wt 75 lb (34 kg)   SpO2 97%   BMI 14.65 kg/m²     General appearance: No apparent distress, appears stated age and cooperative. HEENT:  Conjunctivae/corneas clear. Neck: Supple. No jugular venous distention. Respiratory: Diminished to auscultation bilaterally, coarse sounds bilaterally, normal respiratory effort  Cardiovascular: Regular rate rhythm, normal S1-S2  Abdomen: Soft, nontender, nondistended  Musculoskeletal: No clubbing, cyanosis, no bilateral lower extremity edema. Brisk capillary refill. Skin:  No rashes  on visible skin  Neurologic: awake, alert and following commands       ASSESSMENT:  Acute on chronic hypoxemic and hypercapnic respiratory failure  COPD exacerbation  Emphysema   tobacco abuse  Severe protein calorie malnutrition  Lung nodules     PLAN:  Says not feeling much better, lungs significantly diminished on exam.   I will add scheduled duonebs and increase steroids from po to IV  Will continue supplemental oxygen.   Smoking cessation counseling. Pulmonology following along. Continue antibiotics. Anticipate DC in 48 hrs or so. DISPOSITION: Home with daughter. Medications:  REVIEWED DAILY    Infusion Medications    sodium chloride       Scheduled Medications    Vitamin D  6,000 Units Oral Daily    Followed by    Madeleine Lazar ON 11/2/2022] Vitamin D  2,000 Units Oral Daily    budesonide  500 mcg Nebulization BID    Arformoterol Tartrate  15 mcg Nebulization BID    sodium chloride flush  5-40 mL IntraVENous 2 times per day    enoxaparin  30 mg SubCUTAneous Daily    ipratropium-albuterol  1 ampule Inhalation Q4H WA    cefTRIAXone (ROCEPHIN) IV  1,000 mg IntraVENous Q24H    predniSONE  20 mg Oral BID     PRN Meds: guaiFENesin, ALPRAZolam, sodium chloride flush, sodium chloride, ondansetron **OR** ondansetron, magnesium hydroxide, acetaminophen **OR** acetaminophen    Labs:     Recent Labs     10/24/22  0928 10/26/22  0612   WBC 7.4 8.3   HGB 11.9 10.9*   HCT 37.8 33.6*    253         Recent Labs     10/24/22  0928 10/25/22  0527 10/26/22  0612    136 141   K 4.0 4.4 4.9   CL 96* 102 106   CO2 32* 26 27   BUN 14 21 12   CREATININE 0.5 0.5 0.4*   CALCIUM 9.2 8.5* 8.6         No results for input(s): PROT, ALB, ALKPHOS, ALT, AST, BILITOT, AMYLASE, LIPASE in the last 72 hours. No results for input(s): INR in the last 72 hours. No results for input(s): Doyne Knock in the last 72 hours.     Chronic labs:    Lab Results   Component Value Date    CHOL 231 (H) 10/11/2013    TRIG 86 10/11/2013    HDL 81.0 10/11/2013    LDLCALC 133 (H) 10/11/2013    TSH 2.240 02/04/2019    INR 1.2 04/29/2012    LABA1C 5.5 02/04/2019       Radiology: REVIEWED DAILY    +++++++++++++++++++++++++++++++++++++++++++++++++  MD Dayton Calderon Physician - [address] [facility], [place]  +++++++++++++++++++++++++++++++++++++++++++++++++  NOTE: This report was transcribed using voice recognition software. Every effort was made to ensure accuracy; however, inadvertent computerized transcription errors may be present.

## 2022-10-26 NOTE — PROGRESS NOTES
Palliative Medicine LSW met with pt to review advance directives. She is alert, oriented x 4 but states she didn't sleep well and only wants the forms left with her today. LSW explained process of completing forms for future use.

## 2022-10-27 VITALS
HEART RATE: 74 BPM | OXYGEN SATURATION: 97 % | WEIGHT: 75 LBS | BODY MASS INDEX: 14.72 KG/M2 | TEMPERATURE: 98.3 F | HEIGHT: 60 IN | SYSTOLIC BLOOD PRESSURE: 110 MMHG | RESPIRATION RATE: 18 BRPM | DIASTOLIC BLOOD PRESSURE: 50 MMHG

## 2022-10-27 LAB
ANION GAP SERPL CALCULATED.3IONS-SCNC: 6 MMOL/L (ref 7–16)
BASOPHILS ABSOLUTE: 0.03 E9/L (ref 0–0.2)
BASOPHILS RELATIVE PERCENT: 0.4 % (ref 0–2)
BUN BLDV-MCNC: 18 MG/DL (ref 6–23)
CALCIUM SERPL-MCNC: 9.1 MG/DL (ref 8.6–10.2)
CHLORIDE BLD-SCNC: 103 MMOL/L (ref 98–107)
CO2: 31 MMOL/L (ref 22–29)
CREAT SERPL-MCNC: 0.4 MG/DL (ref 0.5–1)
EOSINOPHILS ABSOLUTE: 0 E9/L (ref 0.05–0.5)
EOSINOPHILS RELATIVE PERCENT: 0 % (ref 0–6)
GFR SERPL CREATININE-BSD FRML MDRD: >60 ML/MIN/1.73
GLUCOSE BLD-MCNC: 94 MG/DL (ref 74–99)
HCT VFR BLD CALC: 35.2 % (ref 34–48)
HEMOGLOBIN: 11.6 G/DL (ref 11.5–15.5)
IMMATURE GRANULOCYTES #: 0.04 E9/L
IMMATURE GRANULOCYTES %: 0.5 % (ref 0–5)
LYMPHOCYTES ABSOLUTE: 1.16 E9/L (ref 1.5–4)
LYMPHOCYTES RELATIVE PERCENT: 13.8 % (ref 20–42)
MCH RBC QN AUTO: 31.6 PG (ref 26–35)
MCHC RBC AUTO-ENTMCNC: 33 % (ref 32–34.5)
MCV RBC AUTO: 95.9 FL (ref 80–99.9)
MONOCYTES ABSOLUTE: 0.47 E9/L (ref 0.1–0.95)
MONOCYTES RELATIVE PERCENT: 5.6 % (ref 2–12)
NEUTROPHILS ABSOLUTE: 6.72 E9/L (ref 1.8–7.3)
NEUTROPHILS RELATIVE PERCENT: 79.7 % (ref 43–80)
PDW BLD-RTO: 13.3 FL (ref 11.5–15)
PLATELET # BLD: 286 E9/L (ref 130–450)
PMV BLD AUTO: 9 FL (ref 7–12)
POTASSIUM SERPL-SCNC: 4.6 MMOL/L (ref 3.5–5)
RBC # BLD: 3.67 E12/L (ref 3.5–5.5)
SODIUM BLD-SCNC: 140 MMOL/L (ref 132–146)
WBC # BLD: 8.4 E9/L (ref 4.5–11.5)

## 2022-10-27 PROCEDURE — 6360000002 HC RX W HCPCS: Performed by: INTERNAL MEDICINE

## 2022-10-27 PROCEDURE — 6370000000 HC RX 637 (ALT 250 FOR IP): Performed by: INTERNAL MEDICINE

## 2022-10-27 PROCEDURE — 2580000003 HC RX 258: Performed by: INTERNAL MEDICINE

## 2022-10-27 PROCEDURE — 80048 BASIC METABOLIC PNL TOTAL CA: CPT

## 2022-10-27 PROCEDURE — 6360000002 HC RX W HCPCS: Performed by: FAMILY MEDICINE

## 2022-10-27 PROCEDURE — 6370000000 HC RX 637 (ALT 250 FOR IP): Performed by: FAMILY MEDICINE

## 2022-10-27 PROCEDURE — 99232 SBSQ HOSP IP/OBS MODERATE 35: CPT | Performed by: INTERNAL MEDICINE

## 2022-10-27 PROCEDURE — 94640 AIRWAY INHALATION TREATMENT: CPT

## 2022-10-27 PROCEDURE — 2700000000 HC OXYGEN THERAPY PER DAY

## 2022-10-27 PROCEDURE — 36415 COLL VENOUS BLD VENIPUNCTURE: CPT

## 2022-10-27 PROCEDURE — 85025 COMPLETE CBC W/AUTO DIFF WBC: CPT

## 2022-10-27 RX ORDER — MELATONIN
1000 DAILY
Qty: 30 TABLET | Refills: 0 | Status: SHIPPED | OUTPATIENT
Start: 2022-10-27

## 2022-10-27 RX ORDER — PREDNISONE 50 MG/1
50 TABLET ORAL DAILY
Qty: 5 TABLET | Refills: 0 | Status: SHIPPED | OUTPATIENT
Start: 2022-10-27 | End: 2022-11-01

## 2022-10-27 RX ORDER — BUDESONIDE 0.5 MG/2ML
500 INHALANT ORAL 2 TIMES DAILY
Qty: 60 EACH | Refills: 3 | Status: SHIPPED | OUTPATIENT
Start: 2022-10-27

## 2022-10-27 RX ORDER — IPRATROPIUM BROMIDE AND ALBUTEROL SULFATE 2.5; .5 MG/3ML; MG/3ML
3 SOLUTION RESPIRATORY (INHALATION) EVERY 6 HOURS PRN
Qty: 360 ML | Refills: 0 | Status: SHIPPED | OUTPATIENT
Start: 2022-10-27 | End: 2022-11-26

## 2022-10-27 RX ADMIN — SODIUM CHLORIDE, PRESERVATIVE FREE 10 ML: 5 INJECTION INTRAVENOUS at 08:12

## 2022-10-27 RX ADMIN — ENOXAPARIN SODIUM 30 MG: 100 INJECTION SUBCUTANEOUS at 08:07

## 2022-10-27 RX ADMIN — GUAIFENESIN 400 MG: 400 TABLET ORAL at 08:07

## 2022-10-27 RX ADMIN — BUDESONIDE INHALATION SUSPENSION 500 MCG: 0.5 SUSPENSION RESPIRATORY (INHALATION) at 08:28

## 2022-10-27 RX ADMIN — IPRATROPIUM BROMIDE AND ALBUTEROL SULFATE 1 AMPULE: .5; 2.5 SOLUTION RESPIRATORY (INHALATION) at 08:27

## 2022-10-27 RX ADMIN — Medication 6000 UNITS: at 08:07

## 2022-10-27 RX ADMIN — ARFORMOTEROL TARTRATE 15 MCG: 15 SOLUTION RESPIRATORY (INHALATION) at 08:27

## 2022-10-27 RX ADMIN — METHYLPREDNISOLONE SODIUM SUCCINATE 40 MG: 40 INJECTION, POWDER, FOR SOLUTION INTRAMUSCULAR; INTRAVENOUS at 05:25

## 2022-10-27 NOTE — PROGRESS NOTES
[place]  Department of Internal Medicine  Division of Pulmonary, Critical Care and Sleep Medicine  Consult Note    Kristin Torres DO, MPH, Zeynep De La Vega, [address] Laura YOST MD      Patient: Waldemar Jackson  MRN: [id number]  : 1945    Encounter Time: 1:12 PM     Date of Admission: 10/24/2022  9:19 AM    Primary Care Physician: Rios Adan MD    Reason for Consultation: AECOPD, Active smoker, Lung nodules. SUBJECTIVE:  Discharghe planning      OBJECTIVE:     PHYSICAL EXAM:   VITALS:   Vitals:    10/26/22 1603 10/26/22 2020 10/26/22 2038 10/27/22 0733   BP:   (!) 109/58 (!) 110/50   Pulse:   67 74   Resp:   16 18   Temp:   98.3 °F (36.8 °C) 98.3 °F (36.8 °C)   TempSrc:   Temporal Temporal   SpO2: 97% 94% 98% 97%   Weight:       Height:            Intake/Output Summary (Last 24 hours) at 10/27/2022 1312  Last data filed at 10/27/2022 0826  Gross per 24 hour   Intake 600 ml   Output --   Net 600 ml          CONSTITUTIONAL:   A&O x 3, NAD  SKIN:     No rash,   HEENT:     EOMI, MMM, No thrush  NECK:    No bruits, No JVP appreciated  CV:      Sinus,  No murmur, No rubs, No gallops  PULMONARY:   Couse BS,  No Wheezing, No Rales, No Rhonchi      No noted egophony  ABDOMEN:     Soft, non-tender. BS normal. No R/R/G  EXT:    No deformities . No clubbing. No lower extremity edema, No venous stasis  PULSE:   Appears equal and palpable.   PSYCHIATRIC:  Seems appropriate, No acute psychosis  MS:    No fractures, No gross weakness  NEUROLOGIC:   The clinical assessment is non-focal     DATA: IMAGING & TESTING:     LABORATORY TESTS:    CBC:   Lab Results   Component Value Date/Time    WBC 8.4 10/27/2022 07:08 AM    RBC 3.67 10/27/2022 07:08 AM    HGB 11.6 10/27/2022 07:08 AM    HCT 35.2 10/27/2022 07:08 AM    MCV 95.9 10/27/2022 07:08 AM    MCH 31.6 10/27/2022 07:08 AM    MCHC 33.0 10/27/2022 07:08 AM    RDW 13.3 10/27/2022 07:08 AM    PLT 286 10/27/2022 07:08 AM    MPV 9.0 10/27/2022 07:08 AM     CMP:    Lab Results   Component Value Date/Time     10/27/2022 07:08 AM    K 4.6 10/27/2022 07:08 AM    K 4.4 10/25/2022 05:27 AM     10/27/2022 07:08 AM    CO2 31 10/27/2022 07:08 AM    BUN 18 10/27/2022 07:08 AM    CREATININE 0.4 10/27/2022 07:08 AM    GFRAA >60 09/20/2022 04:35 AM    LABGLOM >60 10/27/2022 07:08 AM    GLUCOSE 94 10/27/2022 07:08 AM    GLUCOSE 172 04/30/2012 09:50 AM    PROT 6.1 09/17/2022 05:48 AM    LABALBU 3.7 09/17/2022 05:48 AM    LABALBU 4.5 04/29/2012 05:00 PM    CALCIUM 9.1 10/27/2022 07:08 AM    BILITOT <0.2 09/17/2022 05:48 AM    ALKPHOS 63 09/17/2022 05:48 AM    AST 20 09/17/2022 05:48 AM    ALT 12 09/17/2022 05:48 AM     Calcium:    Lab Results   Component Value Date/Time    CALCIUM 9.1 10/27/2022 07:08 AM     Magnesium:    Lab Results   Component Value Date/Time    MG 1.9 10/24/2022 09:28 AM     Phosphorus:    Lab Results   Component Value Date/Time    PHOS 3.2 07/11/2018 01:00 PM        PRO-BNP:   Lab Results   Component Value Date    PROBNP 93 10/24/2022    PROBNP 417 09/16/2022      ABGs:   Lab Results   Component Value Date/Time    PH 7.360 10/24/2022 09:47 AM    PH 7.292 04/29/2012 09:00 PM    PO2 126.8 10/24/2022 09:47 AM    PCO2 56.2 10/24/2022 09:47 AM     Hemoglobin A1C: No components found for: HGBA1C    IMAGING:  Imaging tests were completed and reviewed and discussed radiology and care team involved and reveals   XR CHEST PORTABLE  Result Date: 10/24/2022  FINDINGS: There is hyperinflation of the lungs and flattening of diaphragms consistent with COPD. There is no pulmonary infiltrate, mass or nodule. There is no pleural effusion. The cardiac silhouette is within normal limits. Impression: COPD. There is no evidence of failure or pneumonia.        Assessment:   AECOPD  Active Tobacco abuse  Acute bronchitis   Emphysema  Acute on Chronic Respiratory failure  Lung nodules         Plan:   Steroids po taper  Bronchodialtors  Couseled on stop smoking  PET avoidance, RAST testing  Check Vit D level - low supplement  Oxygen support        Pedro Edwards DO DO, MPH, [facility], [facility]  Professor of Internal Medicine  Pulmonary, Critical Care and Sleep Medicine

## 2022-10-27 NOTE — PROGRESS NOTES
CLINICAL PHARMACY NOTE: MEDS TO BEDS    Total # of Prescriptions Filled: 4   The following medications were delivered to the patient:  Prednisone 50 mg  Vitamin d3 25 mcg  Budesonide 0.5 mg/2 ml  Ipratropium-albu 0.5-2.5 sol    Additional Documentation:     Delivered meds to patient @12:25

## 2022-10-27 NOTE — DISCHARGE SUMMARY
Hospital Medicine Discharge Summary    Patient ID: Yadiel Soares      Patient's PCP: Vladimir Alvarado MD    Admit Date: 10/24/2022     Discharge Date: 10/27/2022      Admitting Physician: Charlotte Brooks MD     Discharge Physician: Charlotte Brooks MD     Discharge Diagnoses:  Acute on chronic hypoxemic and hypercapnic respiratory failure  COPD exacerbation  Emphysema   tobacco abuse  Severe protein calorie malnutrition  Lung nodules     Active Hospital Problems    Diagnosis Date Noted    Tobacco abuse [Z72.0] 10/24/2022     Priority: Medium    COPD exacerbation ([address] [J44.1] 09/16/2022     Priority: Medium    Chronic respiratory failure with hypoxia and hypercapnia ([address] [J96.11, J96.12] 10/31/2018    Severe protein-calorie malnutrition ([address] [E43] 06/13/2018       The patient was seen and examined on day of discharge and this discharge summary is in conjunction with any daily progress note from day of discharge. Hospital Course:  Patient presented with shortness of breath secondary to COPD exacerbation. Patient was started on steroids and bronchodilators with improvement. Patient was seen by pulmonology in consultation. Patient is reporting clinical improvement. She uses 2 L of supplemental oxygen at home at baseline. Patient be discharged to home in stable condition with oral steroids. Patient is given a prescription for nebulizer machine and albuterol/ipratropium nebulizer. Patient is advised on smoking cessation. Patient should follow-up with PCP within 1 to 2 weeks. Patient is discharged home in stable condition. Exam:     BP (!) 110/50   Pulse 74   Temp 98.3 °F (36.8 °C) (Temporal)   Resp 18   Ht 5' (1.524 m)   Wt 75 lb (34 kg)   SpO2 97%   BMI 14.65 kg/m²     General appearance: No apparent distress, appears stated age and cooperative. HEENT: Pupils equal, round, and reactive to light. Conjunctivae/corneas clear. Neck: Supple, with full range of motion.  No jugular venous distention. Trachea midline. Respiratory:  Normal respiratory effort. Clear to auscultation, bilaterally without Rales/Wheezes/Rhonchi. Cardiovascular: Regular rate and rhythm with normal S1/S2 without murmurs, rubs or gallops. Abdomen: Soft, non-tender, non-distended with normal bowel sounds. Musculoskeletal: No clubbing, cyanosis or edema bilaterally. Full range of motion without deformity. Skin: Skin color, texture, turgor normal.  No rashes or lesions. Neurologic:  Neurovascularly intact without any focal sensory/motor deficits. Cranial nerves: II-XII intact, grossly non-focal.  Psychiatric: Alert and oriented, thought content appropriate, normal insight      Consults:     IP CONSULT TO INTERNAL MEDICINE  IP CONSULT TO PALLIATIVE CARE  IP CONSULT TO PULMONOLOGY  IP CONSULT TO SOCIAL WORK  IP CONSULT TO SPIRITUAL SERVICES      Labs:  For convenience and continuity at follow-up the following most recent labs are provided:      CBC:    Lab Results   Component Value Date/Time    WBC 8.4 10/27/2022 07:08 AM    HGB 11.6 10/27/2022 07:08 AM    HCT 35.2 10/27/2022 07:08 AM     10/27/2022 07:08 AM       Renal:    Lab Results   Component Value Date/Time     10/27/2022 07:08 AM    K 4.6 10/27/2022 07:08 AM    K 4.4 10/25/2022 05:27 AM     10/27/2022 07:08 AM    CO2 31 10/27/2022 07:08 AM    BUN 18 10/27/2022 07:08 AM    CREATININE 0.4 10/27/2022 07:08 AM    CALCIUM 9.1 10/27/2022 07:08 AM    PHOS 3.2 07/11/2018 01:00 PM       Discharge Medications:     Discharge Medication List as of 10/27/2022 11:09 AM             Details   budesonide (PULMICORT) 0.5 MG/2ML nebulizer suspension Take 2 mLs by nebulization in the morning and 2 mLs in the evening., Disp-60 each, R-3Normal      ipratropium-albuterol (DUONEB) 0.5-2.5 (3) MG/3ML SOLN nebulizer solution Inhale 3 mLs into the lungs every 6 hours as needed for Shortness of Breath, Disp-360 mL, R-0Normal      vitamin D3 (CHOLECALCIFEROL) 25 MCG (1000 UT) TABS tablet Take 1 tablet by mouth daily, Disp-30 tablet, R-0Normal      predniSONE (DELTASONE) 50 MG tablet Take 1 tablet by mouth daily for 5 days, Disp-5 tablet, R-0Normal      Nebulizers (AIRIAL COMPACT MINI NEBULIZER) MISC EVERY 6 HOURS PRN Starting u 10/27/2022, Disp-1 each, R-0, Normal                Details   albuterol sulfate HFA (PROVENTIL;VENTOLIN;PROAIR) 108 (90 Base) MCG/ACT inhaler inhale 2 puffs by mouth and INTO THE LUNGS every 6 hours if needed for wheezing, Disp-1 each, R-12Normal      acetaminophen (TYLENOL) 325 MG tablet Take 650 mg by mouth every 6 hours as needed for PainHistorical Med             Time Spent on discharge is more than 30 minutes in the examination, evaluation, counseling and review of medications and discharge plan.       Signed:    Chapin Taylor MD   10/27/2022

## 2022-10-27 NOTE — CARE COORDINATION
10/27 Care Coordination: Changed to IV Steroids,Cont on IV Rocephin. Pulmonology following. Pt plan is to return home upon discharge. Pt lives with her daughter and granddaughter. PT/OT saw AM-PAC 22/24. CM/SW will continue to follow for discharge planning.    Neo FARRELL,RN--BC  [phone]

## 2022-11-09 ENCOUNTER — OFFICE VISIT (OUTPATIENT)
Dept: PULMONOLOGY | Age: 77
End: 2022-11-09
Payer: COMMERCIAL

## 2022-11-09 VITALS
HEIGHT: 60 IN | HEART RATE: 82 BPM | RESPIRATION RATE: 22 BRPM | BODY MASS INDEX: 15.59 KG/M2 | WEIGHT: 79.4 LBS | TEMPERATURE: 97.1 F | OXYGEN SATURATION: 89 %

## 2022-11-09 DIAGNOSIS — F17.200 CURRENTLY SMOKES TOBACCO: ICD-10-CM

## 2022-11-09 DIAGNOSIS — J44.1 COPD EXACERBATION (HCC): Primary | ICD-10-CM

## 2022-11-09 DIAGNOSIS — J30.2 SEASONAL ALLERGIES: ICD-10-CM

## 2022-11-09 DIAGNOSIS — J96.11 CHRONIC RESPIRATORY FAILURE WITH HYPOXIA (HCC): ICD-10-CM

## 2022-11-09 PROCEDURE — G8484 FLU IMMUNIZE NO ADMIN: HCPCS | Performed by: NURSE PRACTITIONER

## 2022-11-09 PROCEDURE — 1111F DSCHRG MED/CURRENT MED MERGE: CPT | Performed by: NURSE PRACTITIONER

## 2022-11-09 PROCEDURE — 99213 OFFICE O/P EST LOW 20 MIN: CPT | Performed by: NURSE PRACTITIONER

## 2022-11-09 PROCEDURE — 3023F SPIROM DOC REV: CPT | Performed by: NURSE PRACTITIONER

## 2022-11-09 PROCEDURE — 3017F COLORECTAL CA SCREEN DOC REV: CPT | Performed by: NURSE PRACTITIONER

## 2022-11-09 PROCEDURE — G8399 PT W/DXA RESULTS DOCUMENT: HCPCS | Performed by: NURSE PRACTITIONER

## 2022-11-09 PROCEDURE — 1090F PRES/ABSN URINE INCON ASSESS: CPT | Performed by: NURSE PRACTITIONER

## 2022-11-09 PROCEDURE — 1123F ACP DISCUSS/DSCN MKR DOCD: CPT | Performed by: NURSE PRACTITIONER

## 2022-11-09 PROCEDURE — G8419 CALC BMI OUT NRM PARAM NOF/U: HCPCS | Performed by: NURSE PRACTITIONER

## 2022-11-09 PROCEDURE — 99406 BEHAV CHNG SMOKING 3-10 MIN: CPT | Performed by: NURSE PRACTITIONER

## 2022-11-09 PROCEDURE — 4004F PT TOBACCO SCREEN RCVD TLK: CPT | Performed by: NURSE PRACTITIONER

## 2022-11-09 PROCEDURE — G8427 DOCREV CUR MEDS BY ELIG CLIN: HCPCS | Performed by: NURSE PRACTITIONER

## 2022-11-09 NOTE — PROGRESS NOTES
Hospital follow up in office today to see provider. Reviewed meds and determined pt has not been using Pulmicort nebs per orders. Reviewed use of Pulmicort nebs per orders 2 times a day and use of DuoNeb aerosols prn per orders. Pt has meds in home and nebulizer device. Dtr at today's visit with pt. O2 continuous at 2 liters and pt reports she wears it all the time with short times without. Pt currently smoking still; does not wear O2 at that time. Pt given copy of AVS and reviewed with medications reviewed. Pt for follow up lung screen in Sept 2023 and office visit in 6 mos. Appt card to be mailed. Trelegy 100mcg sample given at today's visit per NP orders; advised pt on use daily along with other meds she has ordered.

## 2022-11-09 NOTE — PROGRESS NOTES
[facility]  Department of Pulmonary, Critical Care and Sleep Medicine  Dr. Humberto Sexton, Dr. Jesús Glass, Dr. Yvoana Chase Office Note - Follow up      Assessment/Plan       Problem List Items Addressed This Visit          Respiratory    COPD exacerbation ([address] - Primary    Relevant Medications    fluticasone-umeclidin-vilant (Sofiya Alstrom) 100-62.5-25 MCG/INH AEPB    Other Relevant Orders    CT Lung Screen (Initial/Annual)     Other Visit Diagnoses       Currently smokes tobacco        Relevant Orders    CT Lung Screen (Initial/Annual)    Chronic respiratory failure with hypoxia (HCC)        Seasonal allergies                  Patient ID: Andrae Nolan is a 78 y.o. female    Chief Complaint: Shortness of breath    HPI: Andrae Nolan is a 78 y.o. female with a PMH of severe COPD, lung nodules, current tobacco use, chronic hypoxic respiratory failure on continuous supplemental O2 who presents to the office today for follow up and evaluation of COPD. Ms. Satnam Nelson was recently hospitalized in October for AECOPD, she states today she has returned to her baseline breathing. She has not been taking any maintenance inhalers for treatment of her COPD. She reports she has only been using her albuterol inhaler and she uses it 4 times per day. We discussed the appropriate therapy for her COPD and she is willing to try Trelegy and we discussed the appropriate usage of the Pulmicort nebulizer twice daily. I wrote down her pulmonary medications she should be taking, how much and how often and provided that to her and her daughter. Unfortunately she continues to smoke, we discussed the importance of smoking cessation and the risks involved with continued smoking, such as worsening respiratory condition, cardiovascular events and stroke. She denies chest pain, syncope or hemoptysis. Assessment:    Severe COPD, not on appropriate therapy.  Counseled and she is agreeable  Tobacco use, 1/2 pack per day current use  Lung nodules, stable. Continue yearly lung screen. Next CT September 2023  Chronic hypoxic respiratory failure  Vitamin D deficiency, on supplementation  Seasonal allergies      Plan:     Start Trelegy  Advised to rinse mouth after each use. P.r.n. albuterol  Advised on proper inhaler technique, and adherence to prescribed inhalers    Continue duonebs Q6 hrs as needed for wheezing and shortness of breath, continue Pulmicort Q 12 hrs    Stop smoking    Nodule OR Screen - 50-80, smoked ? 20 pack years, smoke or quit within 15 yrs. For LDCT chest lung screen September 2023    Aspiration / GERD precautions. Head end of bed elevation. COVID-19 precautions  Recommend yearly Influenza and appropriate pneumonia vaccinations. Spirometry next visit    I spent 5 minutes counseling patient regarding smoking and the risk of Lung cancer and COPD and Respiratory failure. Follow up: Follow up in 6 months    Mychal Guzman, APRN-CNP  Pulmonary Betty Brooks, Dr. Bishop Mckinney, Dr. Pennie Hawley This Encounter   Procedures    CT Lung Screen (Initial/Annual)     Age: 78 y.o. Smoking History:   Social History    Tobacco Use      Smoking status: Every Day        Packs/day: 0.50        Years: 51.00        Pack years: 25.5        Types: Cigarettes      Smokeless tobacco: Never    Alcohol use: No    Drug use: No    Pack years: 25.5  Last CT lung screen: No previous lung cancer screening exam     Standing Status:   Future     Standing Expiration Date:   5/9/2024     Order Specific Question:   Is there documentation of shared decision making? Answer:   Yes     Order Specific Question:   Does the patient show any signs or symptoms of lung cancer? Answer:   No     Order Specific Question:   Is this the first (baseline) CT or an annual exam?     Answer:    Annual [2]     Order Specific Question:   Is this a low dose CT or a routine CT? Answer:   Low Dose CT [1]     Order Specific Question:   Smoking Status? Answer:   Every Day [1]     Order Specific Question:   Smoking packs per day? Answer:   0.5     Order Specific Question:   Years smoking? Answer:   46       Immunization History   Administered Date(s) Administered    COVID-19, PFIZER PURPLE top, DILUTE for use, (age 15 y+), 30mcg/0.3mL 03/13/2021, 04/07/2021    Influenza 10/10/2012, 10/16/2013    Influenza, High Dose (Fluzone 65 yrs and older) 09/10/2018    Pneumococcal Conjugate 13-valent ([id number]) 10/24/2018    Pneumococcal Polysaccharide ([id number]) 05/15/2012, 06/07/2017       Subjective     Last office visit: January 2022    Exacerbations: October 2022 with hospitalization    Pulm Meds: Start Trelegy    Antiplatelet/anticoagulants: None    Smoking history: Current smoker, 25 pack years      PFT 5/17/2018    FVC 1.81 (68 %)    FEV1 1.02 (49 %)    FVC/  FEV1 56 (72 %)      Severe obstruction    No results found for: FEV1, FVC, BDY1PKG, TLC, DLCO    Imaging   Reviewed imaging studies personally and findings as below  XR CHEST PORTABLE    Result Date: 10/24/2022  EXAMINATION: ONE XRAY VIEW OF THE CHEST 10/24/2022 9:59 am COMPARISON: 09/16/2022 HISTORY: ORDERING SYSTEM PROVIDED HISTORY: short of breath TECHNOLOGIST PROVIDED HISTORY: Reason for exam:->short of breath What reading provider will be dictating this exam?->CRC FINDINGS: There is hyperinflation of the lungs and flattening of diaphragms consistent with COPD. There is no pulmonary infiltrate, mass or nodule. There is no pleural effusion. The cardiac silhouette is within normal limits. COPD. There is no evidence of failure or pneumonia. CT chest September 12, 2022:    Impression   1. Redemonstration of 5 mm nodule located in left upper lobe and 6 mm nodule   located in peripheral aspect of left lower lobe.   These nodules appear   similar in size compared to prior from January 21, 2022 and prior from June 6, 2019 and may reflect subsegmental atelectasis or scarring. No suspicious   pulmonary nodule or mass. Follow-up recommended based on patient risk   factors. 2. Emphysematous changes. 3. No evidence of pneumonia or pleural effusion. ALLERGIES:No Known Allergies    SOCIAL HISTORY:   Social History     Tobacco Use    Smoking status: Every Day     Packs/day: 0.50     Years: 51.00     Pack years: 25.50     Types: Cigarettes    Smokeless tobacco: Never   Substance Use Topics    Alcohol use: No    Drug use: No     MEDS:   Current Outpatient Medications   Medication Sig Dispense Refill    fluticasone-umeclidin-vilant (TRELEGY ELLIPTA) 100-62.5-25 MCG/INH AEPB Inhale 1 puff into the lungs daily 1 each 6    ipratropium-albuterol (DUONEB) 0.5-2.5 (3) MG/3ML SOLN nebulizer solution Inhale 3 mLs into the lungs every 6 hours as needed for Shortness of Breath 360 mL 0    albuterol sulfate HFA (PROVENTIL;VENTOLIN;PROAIR) 108 (90 Base) MCG/ACT inhaler inhale 2 puffs by mouth and INTO THE LUNGS every 6 hours if needed for wheezing 1 each 12    budesonide (PULMICORT) 0.5 MG/2ML nebulizer suspension Take 2 mLs by nebulization in the morning and 2 mLs in the evening. (Patient not taking: Reported on 11/9/2022) 60 each 3    vitamin D3 (CHOLECALCIFEROL) 25 MCG (1000 UT) TABS tablet Take 1 tablet by mouth daily 30 tablet 0    Nebulizers (AIRIAL COMPACT MINI NEBULIZER) MISC Take 1 vial by nebulization every 6 hours as needed (shortness of breathe/wheezing) 1 each 0    acetaminophen (TYLENOL) 325 MG tablet Take 650 mg by mouth every 6 hours as needed for Pain       No current facility-administered medications for this visit. Review of Systems: Negative other than specified above.       Objective       Vitals:   , Heart Rate: 82, Resp: 22, Temp: 97.1 °F (36.2 °C),  , SpO2: (!) 89 %, Height: 5' (152.4 cm), Weight: 79 lb 6.4 oz (36 kg)    BMI body mass index is 15.51 kg/m². Ideal Body Weight: Ideal body weight: 50.4 kg (111 lb 3.2 oz)  ---------------  Physical Exam:    General: Alert and oriented x 3. No acute distress. Eyes:  Vision - grossly normal, PERRLA  HEENT:  Head is atraumatic and normocephalic. Neck is supple, no jugular venous distention. Respiratory:  Lungs very diminished to auscultation, no rhoncki, crackles or wheezing. Respirations are nonlabored, breath sounds are equal.  Cardiovascular:  S1, S2 normal, regular rate and rhythm, no murmur, no pedal edema  Gastrointestinal:  Soft, nontender, nondistended. Normal bowel sounds. No organomegaly  Neurologic:  Awake and alert, cranial nerves 2-12 grossly intact, no focal motor or sensory deficits.       Labs     CBC with Differential:    Lab Results   Component Value Date/Time    WBC 8.4 10/27/2022 07:08 AM    RBC 3.67 10/27/2022 07:08 AM    HGB 11.6 10/27/2022 07:08 AM    HCT 35.2 10/27/2022 07:08 AM     10/27/2022 07:08 AM    MCV 95.9 10/27/2022 07:08 AM    MCH 31.6 10/27/2022 07:08 AM    MCHC 33.0 10/27/2022 07:08 AM    RDW 13.3 10/27/2022 07:08 AM    SEGSPCT 72 04/30/2012 04:05 AM    LYMPHOPCT 13.8 10/27/2022 07:08 AM    MONOPCT 5.6 10/27/2022 07:08 AM    BASOPCT 0.4 10/27/2022 07:08 AM    MONOSABS 0.47 10/27/2022 07:08 AM    LYMPHSABS 1.16 10/27/2022 07:08 AM    EOSABS 0.00 10/27/2022 07:08 AM    BASOSABS 0.03 10/27/2022 07:08 AM     CMP:    Lab Results   Component Value Date/Time     10/27/2022 07:08 AM    K 4.6 10/27/2022 07:08 AM    K 4.4 10/25/2022 05:27 AM     10/27/2022 07:08 AM    CO2 31 10/27/2022 07:08 AM    BUN 18 10/27/2022 07:08 AM    CREATININE 0.4 10/27/2022 07:08 AM    GFRAA >60 09/20/2022 04:35 AM    LABGLOM >60 10/27/2022 07:08 AM    GLUCOSE 94 10/27/2022 07:08 AM    GLUCOSE 172 04/30/2012 09:50 AM    PROT 6.1 09/17/2022 05:48 AM    LABALBU 3.7 09/17/2022 05:48 AM    LABALBU 4.5 04/29/2012 05:00 PM    CALCIUM 9.1 10/27/2022 07:08 AM    BILITOT <0.2 09/17/2022 05:48 AM ALKPHOS 63 09/17/2022 05:48 AM    AST 20 09/17/2022 05:48 AM    ALT 12 09/17/2022 05:48 AM     Magnesium:    Lab Results   Component Value Date/Time    MG 1.9 10/24/2022 09:28 AM     Phosphorus:    Lab Results   Component Value Date/Time    PHOS 3.2 07/11/2018 01:00 PM     HgBA1c:    Lab Results   Component Value Date/Time    LABA1C 5.5 02/04/2019 09:40 AM        I hope this updates you on my evaluation and clinical thinking.  Thank you for allowing me to participate in the care of Chinmay Foreman.      Sincerely,    Electronically signed by FITZ Gimenez CNP on 11/9/2022 at 11:40 AM

## 2023-02-28 ENCOUNTER — TELEPHONE (OUTPATIENT)
Dept: PULMONOLOGY | Age: 78
End: 2023-02-28

## 2023-02-28 DIAGNOSIS — J96.12 CHRONIC RESPIRATORY FAILURE WITH HYPOXIA AND HYPERCAPNIA (HCC): ICD-10-CM

## 2023-02-28 DIAGNOSIS — J44.1 COPD EXACERBATION (HCC): Primary | ICD-10-CM

## 2023-02-28 DIAGNOSIS — J96.11 CHRONIC RESPIRATORY FAILURE WITH HYPOXIA AND HYPERCAPNIA (HCC): ICD-10-CM

## 2023-02-28 NOTE — TELEPHONE ENCOUNTER
This office attempted to call the patient without success. Patient requesting a refill however she did not say what medication it was that needed filled. The patient's voicemail was full. We will try again.

## 2023-03-02 ENCOUNTER — APPOINTMENT (OUTPATIENT)
Dept: CT IMAGING | Age: 78
DRG: 190 | End: 2023-03-02
Payer: COMMERCIAL

## 2023-03-02 ENCOUNTER — APPOINTMENT (OUTPATIENT)
Dept: GENERAL RADIOLOGY | Age: 78
DRG: 190 | End: 2023-03-02
Payer: COMMERCIAL

## 2023-03-02 ENCOUNTER — HOSPITAL ENCOUNTER (INPATIENT)
Age: 78
LOS: 3 days | Discharge: HOME | DRG: 190 | End: 2023-03-05
Attending: EMERGENCY MEDICINE | Admitting: FAMILY MEDICINE
Payer: COMMERCIAL

## 2023-03-02 DIAGNOSIS — J96.11 CHRONIC RESPIRATORY FAILURE WITH HYPOXIA AND HYPERCAPNIA (HCC): ICD-10-CM

## 2023-03-02 DIAGNOSIS — J44.1 COPD EXACERBATION (HCC): Primary | ICD-10-CM

## 2023-03-02 DIAGNOSIS — J96.01 ACUTE RESPIRATORY FAILURE WITH HYPOXIA (HCC): ICD-10-CM

## 2023-03-02 DIAGNOSIS — J96.12 CHRONIC RESPIRATORY FAILURE WITH HYPOXIA AND HYPERCAPNIA (HCC): ICD-10-CM

## 2023-03-02 DIAGNOSIS — J18.9 PNEUMONIA OF RIGHT LOWER LOBE DUE TO INFECTIOUS ORGANISM: ICD-10-CM

## 2023-03-02 LAB
ALBUMIN SERPL-MCNC: 4.5 G/DL (ref 3.5–5.2)
ALP BLD-CCNC: 61 U/L (ref 35–104)
ALT SERPL-CCNC: 11 U/L (ref 0–32)
ANION GAP SERPL CALCULATED.3IONS-SCNC: 9 MMOL/L (ref 7–16)
AST SERPL-CCNC: 21 U/L (ref 0–31)
BASOPHILS ABSOLUTE: 0.11 E9/L (ref 0–0.2)
BASOPHILS RELATIVE PERCENT: 2.5 % (ref 0–2)
BILIRUB SERPL-MCNC: 0.6 MG/DL (ref 0–1.2)
BUN BLDV-MCNC: 28 MG/DL (ref 6–23)
CALCIUM SERPL-MCNC: 9.4 MG/DL (ref 8.6–10.2)
CHLORIDE BLD-SCNC: 99 MMOL/L (ref 98–107)
CO2: 31 MMOL/L (ref 22–29)
CREAT SERPL-MCNC: 0.5 MG/DL (ref 0.5–1)
D DIMER: 281 NG/ML DDU
EOSINOPHILS ABSOLUTE: 0.27 E9/L (ref 0.05–0.5)
EOSINOPHILS RELATIVE PERCENT: 6.2 % (ref 0–6)
GFR SERPL CREATININE-BSD FRML MDRD: >60 ML/MIN/1.73
GLUCOSE BLD-MCNC: 84 MG/DL (ref 74–99)
HCT VFR BLD CALC: 41.8 % (ref 34–48)
HEMOGLOBIN: 13.3 G/DL (ref 11.5–15.5)
IMMATURE GRANULOCYTES #: 0.01 E9/L
IMMATURE GRANULOCYTES %: 0.2 % (ref 0–5)
INFLUENZA A: NOT DETECTED
INFLUENZA B: NOT DETECTED
LYMPHOCYTES ABSOLUTE: 0.89 E9/L (ref 1.5–4)
LYMPHOCYTES RELATIVE PERCENT: 20.6 % (ref 20–42)
MCH RBC QN AUTO: 30.6 PG (ref 26–35)
MCHC RBC AUTO-ENTMCNC: 31.8 % (ref 32–34.5)
MCV RBC AUTO: 96.1 FL (ref 80–99.9)
MONOCYTES ABSOLUTE: 0.29 E9/L (ref 0.1–0.95)
MONOCYTES RELATIVE PERCENT: 6.7 % (ref 2–12)
NEUTROPHILS ABSOLUTE: 2.76 E9/L (ref 1.8–7.3)
NEUTROPHILS RELATIVE PERCENT: 63.8 % (ref 43–80)
PDW BLD-RTO: 12.6 FL (ref 11.5–15)
PLATELET # BLD: 211 E9/L (ref 130–450)
PMV BLD AUTO: 9.8 FL (ref 7–12)
POTASSIUM SERPL-SCNC: 3.7 MMOL/L (ref 3.5–5)
PRO-BNP: 70 PG/ML (ref 0–450)
RBC # BLD: 4.35 E12/L (ref 3.5–5.5)
REASON FOR REJECTION: NORMAL
REJECTED TEST: NORMAL
SARS-COV-2 RNA, RT PCR: NOT DETECTED
SODIUM BLD-SCNC: 139 MMOL/L (ref 132–146)
TOTAL PROTEIN: 7.3 G/DL (ref 6.4–8.3)
TROPONIN, HIGH SENSITIVITY: 17 NG/L (ref 0–9)
TROPONIN, HIGH SENSITIVITY: 19 NG/L (ref 0–9)
WBC # BLD: 4.3 E9/L (ref 4.5–11.5)

## 2023-03-02 PROCEDURE — 71275 CT ANGIOGRAPHY CHEST: CPT

## 2023-03-02 PROCEDURE — 71045 X-RAY EXAM CHEST 1 VIEW: CPT

## 2023-03-02 PROCEDURE — 85378 FIBRIN DEGRADE SEMIQUANT: CPT

## 2023-03-02 PROCEDURE — 80053 COMPREHEN METABOLIC PANEL: CPT

## 2023-03-02 PROCEDURE — 93005 ELECTROCARDIOGRAM TRACING: CPT | Performed by: STUDENT IN AN ORGANIZED HEALTH CARE EDUCATION/TRAINING PROGRAM

## 2023-03-02 PROCEDURE — 6370000000 HC RX 637 (ALT 250 FOR IP): Performed by: STUDENT IN AN ORGANIZED HEALTH CARE EDUCATION/TRAINING PROGRAM

## 2023-03-02 PROCEDURE — 36415 COLL VENOUS BLD VENIPUNCTURE: CPT

## 2023-03-02 PROCEDURE — 84484 ASSAY OF TROPONIN QUANT: CPT

## 2023-03-02 PROCEDURE — 83880 ASSAY OF NATRIURETIC PEPTIDE: CPT

## 2023-03-02 PROCEDURE — 94664 DEMO&/EVAL PT USE INHALER: CPT

## 2023-03-02 PROCEDURE — 6360000002 HC RX W HCPCS: Performed by: STUDENT IN AN ORGANIZED HEALTH CARE EDUCATION/TRAINING PROGRAM

## 2023-03-02 PROCEDURE — 85025 COMPLETE CBC W/AUTO DIFF WBC: CPT

## 2023-03-02 PROCEDURE — 99285 EMERGENCY DEPT VISIT HI MDM: CPT

## 2023-03-02 PROCEDURE — 6360000004 HC RX CONTRAST MEDICATION: Performed by: RADIOLOGY

## 2023-03-02 PROCEDURE — 96374 THER/PROPH/DIAG INJ IV PUSH: CPT

## 2023-03-02 PROCEDURE — 96375 TX/PRO/DX INJ NEW DRUG ADDON: CPT

## 2023-03-02 PROCEDURE — 2580000003 HC RX 258: Performed by: STUDENT IN AN ORGANIZED HEALTH CARE EDUCATION/TRAINING PROGRAM

## 2023-03-02 PROCEDURE — 1200000000 HC SEMI PRIVATE

## 2023-03-02 PROCEDURE — 87636 SARSCOV2 & INF A&B AMP PRB: CPT

## 2023-03-02 RX ORDER — IPRATROPIUM BROMIDE AND ALBUTEROL SULFATE 2.5; .5 MG/3ML; MG/3ML
3 SOLUTION RESPIRATORY (INHALATION) ONCE
Status: COMPLETED | OUTPATIENT
Start: 2023-03-02 | End: 2023-03-02

## 2023-03-02 RX ORDER — METHYLPREDNISOLONE SODIUM SUCCINATE 125 MG/2ML
125 INJECTION, POWDER, LYOPHILIZED, FOR SOLUTION INTRAMUSCULAR; INTRAVENOUS ONCE
Status: COMPLETED | OUTPATIENT
Start: 2023-03-02 | End: 2023-03-02

## 2023-03-02 RX ADMIN — CEFTRIAXONE 1000 MG: 1 INJECTION, POWDER, FOR SOLUTION INTRAMUSCULAR; INTRAVENOUS at 23:37

## 2023-03-02 RX ADMIN — METHYLPREDNISOLONE SODIUM SUCCINATE 125 MG: 125 INJECTION, POWDER, FOR SOLUTION INTRAMUSCULAR; INTRAVENOUS at 20:27

## 2023-03-02 RX ADMIN — IOPAMIDOL 75 ML: 755 INJECTION, SOLUTION INTRAVENOUS at 23:14

## 2023-03-02 RX ADMIN — IPRATROPIUM BROMIDE AND ALBUTEROL SULFATE 3 AMPULE: 2.5; .5 SOLUTION RESPIRATORY (INHALATION) at 20:55

## 2023-03-03 LAB
ALBUMIN SERPL-MCNC: 4.3 G/DL (ref 3.5–5.2)
ALP BLD-CCNC: 57 U/L (ref 35–104)
ALT SERPL-CCNC: 10 U/L (ref 0–32)
ANION GAP SERPL CALCULATED.3IONS-SCNC: 9 MMOL/L (ref 7–16)
AST SERPL-CCNC: 16 U/L (ref 0–31)
BASOPHILS ABSOLUTE: 0.01 E9/L (ref 0–0.2)
BASOPHILS RELATIVE PERCENT: 0.2 % (ref 0–2)
BILIRUB SERPL-MCNC: 0.3 MG/DL (ref 0–1.2)
BUN BLDV-MCNC: 31 MG/DL (ref 6–23)
CALCIUM SERPL-MCNC: 9.3 MG/DL (ref 8.6–10.2)
CHLORIDE BLD-SCNC: 104 MMOL/L (ref 98–107)
CO2: 33 MMOL/L (ref 22–29)
CREAT SERPL-MCNC: 0.4 MG/DL (ref 0.5–1)
EKG ATRIAL RATE: 76 BPM
EKG P AXIS: 68 DEGREES
EKG P-R INTERVAL: 166 MS
EKG Q-T INTERVAL: 374 MS
EKG QRS DURATION: 78 MS
EKG QTC CALCULATION (BAZETT): 420 MS
EKG R AXIS: -63 DEGREES
EKG T AXIS: 44 DEGREES
EKG VENTRICULAR RATE: 76 BPM
EOSINOPHILS ABSOLUTE: 0 E9/L (ref 0.05–0.5)
EOSINOPHILS RELATIVE PERCENT: 0 % (ref 0–6)
GFR SERPL CREATININE-BSD FRML MDRD: >60 ML/MIN/1.73
GLUCOSE BLD-MCNC: 220 MG/DL (ref 74–99)
HCT VFR BLD CALC: 40 % (ref 34–48)
HEMOGLOBIN: 12.7 G/DL (ref 11.5–15.5)
IMMATURE GRANULOCYTES #: 0.02 E9/L
IMMATURE GRANULOCYTES %: 0.4 % (ref 0–5)
LYMPHOCYTES ABSOLUTE: 0.76 E9/L (ref 1.5–4)
LYMPHOCYTES RELATIVE PERCENT: 15.9 % (ref 20–42)
MCH RBC QN AUTO: 30.4 PG (ref 26–35)
MCHC RBC AUTO-ENTMCNC: 31.8 % (ref 32–34.5)
MCV RBC AUTO: 95.7 FL (ref 80–99.9)
MONOCYTES ABSOLUTE: 0.11 E9/L (ref 0.1–0.95)
MONOCYTES RELATIVE PERCENT: 2.3 % (ref 2–12)
NEUTROPHILS ABSOLUTE: 3.87 E9/L (ref 1.8–7.3)
NEUTROPHILS RELATIVE PERCENT: 81.2 % (ref 43–80)
PDW BLD-RTO: 12.3 FL (ref 11.5–15)
PLATELET # BLD: 211 E9/L (ref 130–450)
PMV BLD AUTO: 9.6 FL (ref 7–12)
POTASSIUM REFLEX MAGNESIUM: 4.3 MMOL/L (ref 3.5–5)
RBC # BLD: 4.18 E12/L (ref 3.5–5.5)
SODIUM BLD-SCNC: 146 MMOL/L (ref 132–146)
TOTAL PROTEIN: 6.6 G/DL (ref 6.4–8.3)
WBC # BLD: 4.8 E9/L (ref 4.5–11.5)

## 2023-03-03 PROCEDURE — 85025 COMPLETE CBC W/AUTO DIFF WBC: CPT

## 2023-03-03 PROCEDURE — 6360000002 HC RX W HCPCS: Performed by: FAMILY MEDICINE

## 2023-03-03 PROCEDURE — 93010 ELECTROCARDIOGRAM REPORT: CPT | Performed by: INTERNAL MEDICINE

## 2023-03-03 PROCEDURE — 2700000000 HC OXYGEN THERAPY PER DAY

## 2023-03-03 PROCEDURE — 36415 COLL VENOUS BLD VENIPUNCTURE: CPT

## 2023-03-03 PROCEDURE — 80053 COMPREHEN METABOLIC PANEL: CPT

## 2023-03-03 PROCEDURE — 6370000000 HC RX 637 (ALT 250 FOR IP): Performed by: FAMILY MEDICINE

## 2023-03-03 PROCEDURE — 94640 AIRWAY INHALATION TREATMENT: CPT

## 2023-03-03 PROCEDURE — 6360000002 HC RX W HCPCS: Performed by: INTERNAL MEDICINE

## 2023-03-03 PROCEDURE — 2580000003 HC RX 258: Performed by: FAMILY MEDICINE

## 2023-03-03 PROCEDURE — 1200000000 HC SEMI PRIVATE

## 2023-03-03 RX ORDER — METHYLPREDNISOLONE SODIUM SUCCINATE 40 MG/ML
40 INJECTION, POWDER, LYOPHILIZED, FOR SOLUTION INTRAMUSCULAR; INTRAVENOUS EVERY 6 HOURS
Status: DISCONTINUED | OUTPATIENT
Start: 2023-03-03 | End: 2023-03-03

## 2023-03-03 RX ORDER — PREDNISONE 20 MG/1
40 TABLET ORAL DAILY
Status: DISCONTINUED | OUTPATIENT
Start: 2023-03-05 | End: 2023-03-03

## 2023-03-03 RX ORDER — POLYETHYLENE GLYCOL 3350 17 G/17G
17 POWDER, FOR SOLUTION ORAL DAILY PRN
Status: DISCONTINUED | OUTPATIENT
Start: 2023-03-03 | End: 2023-03-05 | Stop reason: HOSPADM

## 2023-03-03 RX ORDER — ONDANSETRON 4 MG/1
4 TABLET, ORALLY DISINTEGRATING ORAL EVERY 8 HOURS PRN
Status: DISCONTINUED | OUTPATIENT
Start: 2023-03-03 | End: 2023-03-05 | Stop reason: HOSPADM

## 2023-03-03 RX ORDER — SODIUM CHLORIDE 0.9 % (FLUSH) 0.9 %
5-40 SYRINGE (ML) INJECTION EVERY 12 HOURS SCHEDULED
Status: DISCONTINUED | OUTPATIENT
Start: 2023-03-03 | End: 2023-03-05 | Stop reason: HOSPADM

## 2023-03-03 RX ORDER — ACETAMINOPHEN 650 MG/1
650 SUPPOSITORY RECTAL EVERY 6 HOURS PRN
Status: DISCONTINUED | OUTPATIENT
Start: 2023-03-03 | End: 2023-03-05 | Stop reason: HOSPADM

## 2023-03-03 RX ORDER — ACETAMINOPHEN 325 MG/1
650 TABLET ORAL EVERY 6 HOURS PRN
Status: DISCONTINUED | OUTPATIENT
Start: 2023-03-03 | End: 2023-03-05 | Stop reason: HOSPADM

## 2023-03-03 RX ORDER — IPRATROPIUM BROMIDE AND ALBUTEROL SULFATE 2.5; .5 MG/3ML; MG/3ML
1 SOLUTION RESPIRATORY (INHALATION)
Status: DISCONTINUED | OUTPATIENT
Start: 2023-03-03 | End: 2023-03-05 | Stop reason: HOSPADM

## 2023-03-03 RX ORDER — ENOXAPARIN SODIUM 100 MG/ML
30 INJECTION SUBCUTANEOUS DAILY
Status: DISCONTINUED | OUTPATIENT
Start: 2023-03-04 | End: 2023-03-05 | Stop reason: HOSPADM

## 2023-03-03 RX ORDER — BUDESONIDE 0.5 MG/2ML
500 INHALANT ORAL 2 TIMES DAILY
Status: DISCONTINUED | OUTPATIENT
Start: 2023-03-03 | End: 2023-03-05 | Stop reason: HOSPADM

## 2023-03-03 RX ORDER — PREDNISONE 20 MG/1
40 TABLET ORAL DAILY
Status: DISCONTINUED | OUTPATIENT
Start: 2023-03-05 | End: 2023-03-05 | Stop reason: HOSPADM

## 2023-03-03 RX ORDER — SODIUM CHLORIDE 9 MG/ML
INJECTION, SOLUTION INTRAVENOUS PRN
Status: DISCONTINUED | OUTPATIENT
Start: 2023-03-03 | End: 2023-03-05 | Stop reason: HOSPADM

## 2023-03-03 RX ORDER — METHYLPREDNISOLONE SODIUM SUCCINATE 125 MG/2ML
60 INJECTION, POWDER, LYOPHILIZED, FOR SOLUTION INTRAMUSCULAR; INTRAVENOUS EVERY 6 HOURS
Status: COMPLETED | OUTPATIENT
Start: 2023-03-03 | End: 2023-03-04

## 2023-03-03 RX ORDER — SODIUM CHLORIDE 0.9 % (FLUSH) 0.9 %
5-40 SYRINGE (ML) INJECTION PRN
Status: DISCONTINUED | OUTPATIENT
Start: 2023-03-03 | End: 2023-03-05 | Stop reason: HOSPADM

## 2023-03-03 RX ORDER — ENOXAPARIN SODIUM 100 MG/ML
40 INJECTION SUBCUTANEOUS DAILY
Status: DISCONTINUED | OUTPATIENT
Start: 2023-03-03 | End: 2023-03-03

## 2023-03-03 RX ORDER — ARFORMOTEROL TARTRATE 15 UG/2ML
15 SOLUTION RESPIRATORY (INHALATION) 2 TIMES DAILY
Status: DISCONTINUED | OUTPATIENT
Start: 2023-03-03 | End: 2023-03-05 | Stop reason: HOSPADM

## 2023-03-03 RX ORDER — VITAMIN B COMPLEX
1000 TABLET ORAL DAILY
Status: DISCONTINUED | OUTPATIENT
Start: 2023-03-03 | End: 2023-03-05 | Stop reason: HOSPADM

## 2023-03-03 RX ORDER — ALBUTEROL SULFATE 2.5 MG/3ML
2.5 SOLUTION RESPIRATORY (INHALATION)
Status: DISCONTINUED | OUTPATIENT
Start: 2023-03-03 | End: 2023-03-05 | Stop reason: HOSPADM

## 2023-03-03 RX ORDER — ONDANSETRON 2 MG/ML
4 INJECTION INTRAMUSCULAR; INTRAVENOUS EVERY 6 HOURS PRN
Status: DISCONTINUED | OUTPATIENT
Start: 2023-03-03 | End: 2023-03-05 | Stop reason: HOSPADM

## 2023-03-03 RX ADMIN — Medication 10 ML: at 21:15

## 2023-03-03 RX ADMIN — ARFORMOTEROL TARTRATE 15 MCG: 15 SOLUTION RESPIRATORY (INHALATION) at 08:37

## 2023-03-03 RX ADMIN — METHYLPREDNISOLONE SODIUM SUCCINATE 40 MG: 40 INJECTION, POWDER, FOR SOLUTION INTRAMUSCULAR; INTRAVENOUS at 03:42

## 2023-03-03 RX ADMIN — IPRATROPIUM BROMIDE AND ALBUTEROL SULFATE 1 AMPULE: 2.5; .5 SOLUTION RESPIRATORY (INHALATION) at 11:54

## 2023-03-03 RX ADMIN — METHYLPREDNISOLONE SODIUM SUCCINATE 40 MG: 40 INJECTION, POWDER, FOR SOLUTION INTRAMUSCULAR; INTRAVENOUS at 08:00

## 2023-03-03 RX ADMIN — BUDESONIDE 500 MCG: 0.5 SUSPENSION RESPIRATORY (INHALATION) at 08:37

## 2023-03-03 RX ADMIN — IPRATROPIUM BROMIDE AND ALBUTEROL SULFATE 1 AMPULE: 2.5; .5 SOLUTION RESPIRATORY (INHALATION) at 19:33

## 2023-03-03 RX ADMIN — METHYLPREDNISOLONE SODIUM SUCCINATE 40 MG: 40 INJECTION, POWDER, FOR SOLUTION INTRAMUSCULAR; INTRAVENOUS at 14:53

## 2023-03-03 RX ADMIN — Medication 1000 UNITS: at 08:01

## 2023-03-03 RX ADMIN — METHYLPREDNISOLONE SODIUM SUCCINATE 60 MG: 125 INJECTION, POWDER, FOR SOLUTION INTRAMUSCULAR; INTRAVENOUS at 21:15

## 2023-03-03 RX ADMIN — BUDESONIDE 500 MCG: 0.5 SUSPENSION RESPIRATORY (INHALATION) at 19:33

## 2023-03-03 RX ADMIN — ENOXAPARIN SODIUM 40 MG: 100 INJECTION SUBCUTANEOUS at 08:02

## 2023-03-03 RX ADMIN — IPRATROPIUM BROMIDE AND ALBUTEROL SULFATE 1 AMPULE: 2.5; .5 SOLUTION RESPIRATORY (INHALATION) at 08:37

## 2023-03-03 RX ADMIN — ARFORMOTEROL TARTRATE 15 MCG: 15 SOLUTION RESPIRATORY (INHALATION) at 19:33

## 2023-03-03 RX ADMIN — IPRATROPIUM BROMIDE AND ALBUTEROL SULFATE 1 AMPULE: 2.5; .5 SOLUTION RESPIRATORY (INHALATION) at 15:54

## 2023-03-03 RX ADMIN — Medication 10 ML: at 08:01

## 2023-03-03 ASSESSMENT — PAIN SCALES - GENERAL: PAINLEVEL_OUTOF10: 0

## 2023-03-03 NOTE — CARE COORDINATION
Patient with a past medical history of Abnormal Pap smear, COPD (chronic obstructive pulmonary disease) (HCC), Emphysema of lung ([address] Emphysema/COPD ([address] GERD (gastroesophageal reflux disease), History of stomach ulcers, Hyperlipidemia, Osteoarthritis, and Renal calculi is admitted with COPD exacerbation and Acute respiratory failure with hypoxia. Plan per internal med, DuoNebs every 4 hours. Albuterol as needed. Pulmicort twice daily. Methylprednisolone 40 mg IV every 6 hours. I met with patient in room to explain role and discuss transition of care. She lives with her daughter and grandchildren in a  one story home with 4 steps to gain access to entry, bed and bath on main floor. Patient independent prior to admission. She is on 4-5 Liters of O2 at home supplied by [facility]. Patient is currently on O2 4 liters nasal cannula with O2 sat 100%. Other DME is a nebulizer, walker and shower seat. Patient has no hx with HHC or SNF. PCP is Dr. Filippo Rai and pharmacy is [facility]. Patient said the plan is to return home with her family and either her son or daughter will transport her home at time of discharge and will bring a portable tank for the ride. PT/OT alexandra ordered to make sure patient is safe to return home. Sonya Dsouza RN CM  [phone]          Case Management Assessment  Initial Evaluation    Date/Time of Evaluation: 3/3/2023 3:04 PM  Assessment Completed by: Sonya Dsouza RN    If patient is discharged prior to next notation, then this note serves as note for discharge by case management.     Patient Name: Alicia Rai                   YOB: 1945  Diagnosis: COPD exacerbation ([facility] [address] [facility] [J44.1]  Acute respiratory failure with hypoxia ([address] [J96.01]  Pneumonia of right lower lobe due to infectious organism [J18.9]                   Date / Time: 3/2/2023  7:30 PM    Patient Admission Status: Inpatient   Readmission Risk (Low < 19, Mod (19-27), High > 27): Readmission Risk Score: 13.8    Current PCP: Ailyn Burnham MD  PCP verified by CM? Yes Ailyn Burnham MD)    Chart Reviewed: Yes      History Provided by: Patient  Patient Orientation: Alert and Oriented    Patient Cognition: Alert    Hospitalization in the last 30 days (Readmission):  No    If yes, Readmission Assessment in  Navigator will be completed. Advance Directives:      Code Status: Limited   Patient's Primary Decision Maker is:      Primary Decision Maker: Hazel Nguyen - Child - [phone]    Secondary Decision Maker: Tiffanie Aide - Child - [phone]    Discharge Planning:    Patient lives with: Children, Family Members Type of Home: House  Primary Care Giver: Family  Patient Support Systems include: Children, Family Members   Current Financial resources:    Current community resources:    Current services prior to admission: None            Current DME:              Type of Home Care services:  None    ADLS  Prior functional level: Assistance with the following:, Housework, Shopping  Current functional level: Other (see comment) (PT/OT evals ordered)    PT AM-PAC:   /24  OT AM-PAC:   /24    Family can provide assistance at DC: Yes  Would you like Case Management to discuss the discharge plan with any other family members/significant others, and if so, who? No  Plans to Return to Present Housing: Unknown at present  Other Identified Issues/Barriers to RETURNING to current housing:   Potential Assistance needed at discharge:  Other (Comment)            Potential DME:    Patient expects to discharge to: [address] [place] [address] for transportation at discharge:      Financial    Payor: Danay Marie / Plan: [address] / Product Type: *No Product type* /     Does insurance require precert for SNF: Yes    Potential assistance Purchasing Medications:    Meds-to-Beds request: Yes      [facility] Prader [id number] - F [phone]  Linda Weber [place]i [place] [address]  Phone: [phone] Fax: [phone] - P [phone] - F [phone]  [address] [place], [place] [address] [id number]  Phone: [phone] Fax: [phone]      Notes:    Factors facilitating achievement of predicted outcomes: Family support, Motivated, Cooperative, and Pleasant    Barriers to discharge: none known    Additional Case Management Notes: The Plan for Transition of Care is related to the following treatment goals of COPD exacerbation ([address] [J44.1]  Acute respiratory failure with hypoxia ([address] [J96.01]  Pneumonia of right lower lobe due to infectious organism [P80.7]    IF APPLICABLE: The Patient and/or patient representative Zoila Barrera and her family were provided with a choice of provider and agrees with the discharge plan.  Freedom of choice list with basic dialogue that supports the patient's individualized plan of care/goals and shares the quality data associated with the providers was provided to:     Patient Representative Name:       The Patient and/or Patient Representative Agree with the Discharge Plan     Amber Ku RN  Case Management Department  Ph: [phone] Fax: [phone]

## 2023-03-03 NOTE — ED NOTES
Pt assisted on and off bedpan, noted large amts clear yellow urine, pericare given. Pt hob elevated, o2 nasal cannula intact, sr x 2 up and call light at bedside. Pt voiced no other needs at this time.      Juarez Vyas LPN  82/11/73 0857

## 2023-03-03 NOTE — ACP (ADVANCE CARE PLANNING)
Advance Care Planning     Advance Care Planning Activator (Inpatient)  Conversation Note      Date of ACP Conversation: 3/3/2023   Conversation Conducted with: Patient with Decision Making Capacity  ACP Activator: Carlitos Huertas diagnoses warranting ACP:  Principal Problem:    COPD exacerbation ([address]  Resolved Problems:    * No resolved hospital problems. *        Health Care Decision Maker:   Current Designated Health Care Decision Maker:     Primary Decision Maker: Ny Polanco Child - [phone]    Secondary Decision Maker: Magui Ade Child - [phone]  She also requests jh boejoseline dileep be listed as alternate contact [phone]   As 2nd alternate contact ahead of oma    Care Preferences    Ventilation: \"If you were in your present state of health and suddenly became very ill and were unable to breathe on your own, what would your preference be about the use of a ventilator (breathing machine) if it were available to you? \"      Would the patient desire the use of ventilator (breathing machine)?: no    \"If your health worsens and it becomes clear that your chance of recovery is unlikely, what would your preference be about the use of a ventilator (breathing machine) if it were available to you? \"     Would the patient desire the use of ventilator (breathing machine)?: No      Resuscitation  \"CPR works best to restart the heart when there is a sudden event, like a heart attack, in someone who is otherwise healthy. Unfortunately, CPR does not typically restart the heart for people who have serious health conditions or who are very sick. \"    \"In the event your heart stopped as a result of an underlying serious health condition, would you want attempts to be made to restart your heart (answer \"yes\" for attempt to resuscitate) or would you prefer a natural death (answer \"no\" for do not attempt to resuscitate)? \" no       [] Yes   [] No   Educated Patient / Decision Maker regarding differences between Advance Directives and portable DNR orders.         Conversation Outcomes:  [] ACP discussion completed  [] Existing advance directive reviewed with patient; no changes to patient's previously recorded wishes  [] New Advance Directive completed  [] Portable Do Not Rescitate prepared for Provider review and signature  [] POLST/POST/MOLST/MOST prepared for Provider review and signature    Details of ACP discussion:  dnr   Length of ACP Conversation in minutes: 18min   Code status following completion of discussion: Limited     Follow-up plan:    [] Schedule follow-up conversation to continue planning  [] Referred individual to Provider for additional questions/concerns   [] Advised patient/agent/surrogate to review completed ACP document and update if needed with changes in condition, patient preferences or care setting  [] This note routed to one or more involved healthcare providers  [x] None    Electronically signed by Rodney Silva DO on 3/3/2023 at 6:09 PM

## 2023-03-03 NOTE — PROGRESS NOTES
DVT Prophylaxis Adjustment Policy (DVT Prophylaxis)     This patient is on DVT Prophylaxis medication that requires a dose adjustment      Date Body Weight IBW  Adjusted BW SCr  CrCl Dialysis status   3/3/2023 77 lb (34.9 kg) Ideal body weight: 50.4 kg (111 lb 3.2 oz) Serum creatinine: 0.4 mg/dL (L) 03/03/23 0646  Estimated creatinine clearance: 67 mL/min (A) N/a       Pharmacy has dose-adjusted the DVT Prophylaxis regimen to match   the recommendations from the following table        Ordered Medication:Lovenox 40mg daily    Order Changed/converted to: Lovenox 30mg daily      These changes were made per protocol according to the [facility] Pharmacist   Review for Appropriate Use and Automatic Dose Adjustments of   Subcutaneous Anticoagulants Policy     *Please note this dose may need readjusted if patient's condition changes. Please contact pharmacy with any questions regarding these changes.     MEJIA Farah [facility]  3/3/2023  2:39 PM

## 2023-03-03 NOTE — ED NOTES
Pt transport to imaging     [facility], RN  03/02/23 4001

## 2023-03-03 NOTE — ED PROVIDER NOTES
[address] [facility] Name: Lucia Aase  MRN: [id number]  [place] 1945  Date of evaluation: 3/2/2023  Provider: Raheem Avendaño DO  PCP: David Torres MD  Note Started: 8:40 PM EST 3/2/23    CHIEF COMPLAINT       Chief Complaint   Patient presents with    Shortness of Breath     Patient arrives with complaints of SOB with 2 weeks. EMS gave 2 nebulizers, and 125 solumedrol. Patient on 4L home O2       HISTORY OF PRESENT ILLNESS: 1 or more Elements   History From: Patient    Limitations to history : None    Lucia Aase is a 78 y.o. female with past medical history of COPD on chronic home oxygen at 2 L ATC, osteoarthritis, hyperlipidemia and severe protein calorie malnutrition who presents to the emergency department due to worsening shortness of breath. Patient states her symptoms have been ongoing for 2 weeks but progressively worsening tonight. Shortness of breath is significantly worse with exertion. Patient states that she is only able to do minimal task before feeling extremely short of breath. Patient does states that she is on albuterol, Trelegy and Pulmicort at home. She has been taking her inhalers without much improvement in her symptoms. She does endorse associated chills and states that she sometimes wakes up in the night sweating. She was brought in by EMS from home for further evaluation. Patient states that her daughter called EMS. She was given breathing treatments in route to the emergency department. She is noting significant improvement in her symptoms since arrival to the ED. Patient on 4 L via nasal cannula with appropriate bedside SpO2. No signs of acute respiratory distress noted.   Patient is denying any other associated symptoms including chest pain, cough, congestion, sore throat, myalgias, lightheadedness, dizziness, headache, syncope, numbness, tingling, vision changes, abdominal pain, urinary symptoms, constipation or diarrhea. On initial assessment she is nontoxic-appearing and in no acute distress. Nursing Notes were all reviewed and agreed with or any disagreements were addressed in the HPI.    ROS:   Pertinent positives and negatives are stated within HPI, all other systems reviewed and are negative.    --------------------------------------------- PAST HISTORY ---------------------------------------------  Past Medical History:  has a past medical history of Abnormal Pap smear, COPD [facility]chronic obstructive [facility] disease) ([facility] Emphysema of lung ([facility] Emphysema/COPD ([facility] GERD (gastroesophageal reflux disease), History of stomach ulcers, Hyperlipidemia, Osteoarthritis, and Renal calculi. Past Surgical History:  has a past surgical history that includes Endometrial biopsy; Tonsillectomy; Upper gastrointestinal endoscopy (10/17/2017); Colonoscopy (10/17/2017); Appendectomy; Cataract removal with implant (Bilateral, dec 2017, jan 2018); and Wrist surgery (Left, 2008). Social History:  reports that she has been smoking cigarettes. She has a 25.50 pack-year smoking history. She has never used smokeless tobacco. She reports that she does not drink alcohol and does not use drugs. Family History: family history includes Arthritis in her maternal grandmother and mother; Asthma in her maternal grandmother and mother; Cancer in her brother and sister; Emphysema in her mother; Heart Disease in her father; High Blood Pressure in her mother. The patients home medications have been reviewed. Allergies: Patient has no known allergies. ---------------------------------------------------PHYSICAL EXAM--------------------------------------    Constitutional/General: Alert and oriented x3, well appearing, non toxic in NAD, elderly and frail-appearing. Borderline cachectic.   Head: Normocephalic and atraumatic  Mouth: Oropharynx clear, handling secretions, no trismus  Neck: Supple, full ROM,  Pulmonary: Globally diminished breath sounds, worsened by body habitus. Lungs sound tight on auscultation. Scant end expiratory wheezes without rales or rhonchi noted. Not in respiratory distress  Cardiovascular:  Regular rate. Regular rhythm. No murmurs  Chest: no chest wall tenderness  Abdomen: Soft. Non tender. Non distended. No rebound, guarding, or rigidity. No pulsatile masses appreciated. Musculoskeletal: Moves all extremities x 4. Warm and well perfused, no clubbing, cyanosis, or edema. Capillary refill <3 seconds  Skin: warm and dry. No rashes. Neurologic: GCS 15, no gross focal neurologic deficits  Psych: Normal Affect    -------------------------------------------------- RESULTS -------------------------------------------------  I have personally reviewed all laboratory and imaging results for this patient. Results are listed below.      LABS:  Results for orders placed or performed during the hospital encounter of 03/02/23   COVID-19 & Influenza Combo    Specimen: Nasopharyngeal Swab   Result Value Ref Range    SARS-CoV-2 RNA, RT PCR NOT DETECTED NOT DETECTED    INFLUENZA A NOT DETECTED NOT DETECTED    INFLUENZA B NOT DETECTED NOT DETECTED   CBC with Auto Differential   Result Value Ref Range    WBC 4.3 (L) 4.5 - 11.5 E9/L    RBC 4.35 3.50 - 5.50 E12/L    Hemoglobin 13.3 11.5 - 15.5 g/dL    Hematocrit 41.8 34.0 - 48.0 %    MCV 96.1 80.0 - 99.9 fL    MCH 30.6 26.0 - 35.0 pg    MCHC 31.8 (L) 32.0 - 34.5 %    RDW 12.6 11.5 - 15.0 fL    Platelets 056 277 - 420 E9/L    MPV 9.8 7.0 - 12.0 fL    Neutrophils % 63.8 43.0 - 80.0 %    Immature Granulocytes % 0.2 0.0 - 5.0 %    Lymphocytes % 20.6 20.0 - 42.0 %    Monocytes % 6.7 2.0 - 12.0 %    Eosinophils % 6.2 (H) 0.0 - 6.0 %    Basophils % 2.5 (H) 0.0 - 2.0 %    Neutrophils Absolute 2.76 1.80 - 7.30 E9/L    Immature Granulocytes # 0.01 E9/L    Lymphocytes Absolute 0.89 (L) 1.50 - 4.00 E9/L    Monocytes Absolute 0.29 0.10 - 0.95 E9/L    Eosinophils Absolute 0.27 0.05 - 0.50 E9/L    Basophils Absolute 0.11 0.00 - 0.20 E9/L   CMP   Result Value Ref Range    Sodium 139 132 - 146 mmol/L    Potassium 3.7 3.5 - 5.0 mmol/L    Chloride 99 98 - 107 mmol/L    CO2 31 (H) 22 - 29 mmol/L    Anion Gap 9 7 - 16 mmol/L    Glucose 84 74 - 99 mg/dL    BUN 28 (H) 6 - 23 mg/dL    Creatinine 0.5 0.5 - 1.0 mg/dL    Est, Glom Filt Rate >60 >=60 mL/min/1.73    Calcium 9.4 8.6 - 10.2 mg/dL    Total Protein 7.3 6.4 - 8.3 g/dL    Albumin 4.5 3.5 - 5.2 g/dL    Total Bilirubin 0.6 0.0 - 1.2 mg/dL    Alkaline Phosphatase 61 35 - 104 U/L    ALT 11 0 - 32 U/L    AST 21 0 - 31 U/L   Troponin   Result Value Ref Range    Troponin, High Sensitivity 19 (H) 0 - 9 ng/L   Brain Natriuretic Peptide   Result Value Ref Range    Pro-BNP 70 0 - 450 pg/mL   SPECIMEN REJECTION   Result Value Ref Range    Rejected Test DIMER     Reason for Rejection see below    D-Dimer, Quantitative   Result Value Ref Range    D-Dimer, Quant 281 ng/mL DDU   Troponin   Result Value Ref Range    Troponin, High Sensitivity 17 (H) 0 - 9 ng/L   Comprehensive Metabolic Panel w/ Reflex to MG   Result Value Ref Range    Sodium 146 132 - 146 mmol/L    Potassium reflex Magnesium 4.3 3.5 - 5.0 mmol/L    Chloride 104 98 - 107 mmol/L    CO2 33 (H) 22 - 29 mmol/L    Anion Gap 9 7 - 16 mmol/L    Glucose 220 (H) 74 - 99 mg/dL    BUN 31 (H) 6 - 23 mg/dL    Creatinine 0.4 (L) 0.5 - 1.0 mg/dL    Est, Glom Filt Rate >60 >=60 mL/min/1.73    Calcium 9.3 8.6 - 10.2 mg/dL    Total Protein 6.6 6.4 - 8.3 g/dL    Albumin 4.3 3.5 - 5.2 g/dL    Total Bilirubin 0.3 0.0 - 1.2 mg/dL    Alkaline Phosphatase 57 35 - 104 U/L    ALT 10 0 - 32 U/L    AST 16 0 - 31 U/L   CBC with Auto Differential   Result Value Ref Range    WBC 4.8 4.5 - 11.5 E9/L    RBC 4.18 3.50 - 5.50 E12/L    Hemoglobin 12.7 11.5 - 15.5 g/dL    Hematocrit 40.0 34.0 - 48.0 %    MCV 95.7 80.0 - 99.9 fL    MCH 30.4 26.0 - 35.0 pg    MCHC 31.8 (L) 32.0 - 34.5 %    RDW 12.3 11.5 - 15.0 fL    Platelets 654 706 - 195 E9/L    MPV 9.6 7.0 - 12.0 fL    Neutrophils % 81.2 (H) 43.0 - 80.0 %    Immature Granulocytes % 0.4 0.0 - 5.0 %    Lymphocytes % 15.9 (L) 20.0 - 42.0 %    Monocytes % 2.3 2.0 - 12.0 %    Eosinophils % 0.0 0.0 - 6.0 %    Basophils % 0.2 0.0 - 2.0 %    Neutrophils Absolute 3.87 1.80 - 7.30 E9/L    Immature Granulocytes # 0.02 E9/L    Lymphocytes Absolute 0.76 (L) 1.50 - 4.00 E9/L    Monocytes Absolute 0.11 0.10 - 0.95 E9/L    Eosinophils Absolute 0.00 (L) 0.05 - 0.50 E9/L    Basophils Absolute 0.01 0.00 - 0.20 E9/L   EKG 12 Lead   Result Value Ref Range    Ventricular Rate 76 BPM    Atrial Rate 76 BPM    P-R Interval 166 ms    QRS Duration 78 ms    Q-T Interval 374 ms    QTc Calculation (Bazett) 420 ms    P Axis 68 degrees    R Axis -63 degrees    T Axis 44 degrees       RADIOLOGY:   Interpretation per the Radiologist below, if available at the time of this note:    CTA PULMONARY W CONTRAST   Final Result   No evidence of pulmonary embolism or acute pulmonary abnormality. Severe COPD with no acute changes. XR CHEST PORTABLE   Final Result   Advanced emphysema with right perihilar and right basilar infiltrates   concerning for pneumonia. No results found. No results found. See preliminary interpretation by me below. EKG: This EKG is signed and interpreted by me. Normal sinus rhythm with ventricular rate of 76 bpm.  MO interval normal.  QTc not prolonged. No evidence of acute ST elevation MI. Left axis deviation. PVCs. No significant changes compared to previous EKG on 10/24/2022.      ------------------------- NURSING NOTES AND VITALS REVIEWED ---------------------------   The nursing notes within the ED encounter and vital signs as below have been reviewed by myself.   BP (!) 116/52   Pulse 70   Temp 97.8 °F (36.6 °C) (Temporal)   Resp 18   Ht 5' (1.524 m)   Wt 77 lb (34.9 kg)   SpO2 100%   BMI 15.04 kg/m²   Oxygen Saturation Interpretation: Improved after treatment    The patients available past medical records and past encounters were reviewed.         ------------------------------ ED COURSE/MEDICAL DECISION MAKING----------------------  Medications   budesonide (PULMICORT) nebulizer suspension 500 mcg (500 mcg Nebulization Given 3/3/23 0837)   Vitamin D (CHOLECALCIFEROL) tablet 1,000 Units (1,000 Units Oral Given 3/3/23 0801)   sodium chloride flush 0.9 % injection 5-40 mL (10 mLs IntraVENous Given 3/3/23 0801)   sodium chloride flush 0.9 % injection 5-40 mL (has no administration in time range)   0.9 % sodium chloride infusion (has no administration in time range)   ondansetron (ZOFRAN-ODT) disintegrating tablet 4 mg (has no administration in time range)     Or   ondansetron (ZOFRAN) injection 4 mg (has no administration in time range)   polyethylene glycol (GLYCOLAX) packet 17 g (has no administration in time range)   enoxaparin (LOVENOX) injection 40 mg (40 mg SubCUTAneous Given 3/3/23 0802)   acetaminophen (TYLENOL) tablet 650 mg (has no administration in time range)     Or   acetaminophen (TYLENOL) suppository 650 mg (has no administration in time range)   ipratropium-albuterol (DUONEB) nebulizer solution 1 ampule (1 ampule Inhalation Given 3/3/23 0837)   albuterol (PROVENTIL) nebulizer solution 2.5 mg (has no administration in time range)   methylPREDNISolone sodium (SOLU-MEDROL) injection 40 mg (40 mg IntraVENous Given 3/3/23 0800)     Followed by   predniSONE (DELTASONE) tablet 40 mg (has no administration in time range)   arformoterol tartrate (BROVANA) nebulizer solution 15 mcg (15 mcg Nebulization Given 3/3/23 0837)   ipratropium-albuterol (DUONEB) nebulizer solution 3 ampule (3 ampules Inhalation Given 3/2/23 2055)   methylPREDNISolone sodium (SOLU-MEDROL) injection 125 mg (125 mg IntraVENous Given 3/2/23 2027)   iopamidol (ISOVUE-370) 76 % injection 75 mL (75 mLs IntraVENous Given 3/2/23 5480)   cefTRIAXone (ROCEPHIN) 1,000 mg in sterile water 10 mL IV syringe (1,000 mg IntraVENous Given 3/2/23 2133)       Medical Decision Making/Differential Diagnosis:    CC/HPI Summary, Pertinent Physical Exam Findings, Social Determinants of health, Records Reviewed, DDx, testing done/not done, ED Course, Reassessment, disposition considerations/shared decision making with patient, consults, disposition:        Medical Decision Making:   I, Dr. Leopoldo Gosselin am the resident physician of record. History From: Patient    Limitations to history : None     Toy Torres is a 78 y.o. female who presents to the ED for shortness of breath. Vital signs upon arrival BP (!) 116/52   Pulse 70   Temp 97.8 °F (36.6 °C) (Temporal)   Resp 18   Ht 5' (1.524 m)   Wt 77 lb (34.9 kg)   SpO2 100%   BMI 15.04 kg/m²     On initial evaluation, patient is nontoxic-appearing, afebrile, hemodynamically stable and in no acute distress. No signs of acute respiratory distress noted. Working diagnoses include but not limited to acute viral syndrome, pneumonia, pulmonary embolism, ACS, anemia, COPD exacerbation, CHF exacerbation, asthma and metabolic or respiratory acidosis. Physical exam was remarkable for scant expiratory wheezes and tight lung sounds on auscultation of the lungs. No rales or rhonchi noted. No other concerning physical exam findings. Abdomen soft, nontender nondistended. No significant pretibial edema. Work-up in the emergency department as interpreted by me include CBC with no leukocytosis, left shift or significant anemia. WBC 4.3 and hemoglobin stable at 13.3. Platelet count normal at 211. CMP unremarkable with stable renal function, creatinine 0.5/BUN 28. No major electrolyte abnormalities including normal potassium of 3.7 and sodium of 139. LFTs within normal limits. Viral testing negative for COVID and influenza. Cardiac evaluation generally unremarkable. Initial troponin 19 with a repeat of 17, delta 2.   EKG sinus and nonspecific with no acute ischemic changes. Chest x-ray noting focal consolidation on the right but no other concerning acute cardiopulmonary processes. D-dimer was obtained and elevated at 281. Follow-up CTA pulmonary negative for acute PE or other acute cardiopulmonary process. Imaging notes severe COPD and patient has known history. Patient prophylactically covered with 1 dose of IV Rocephin and IV doxycycline in the emergency department. She was also given IV Solu-Medrol and DuoNeb treatments x3. Respiratory status improving on reevaluation. Patient still requiring oxygen above her baseline needs. SPO2 remained appropriate on 5 L via nasal cannula with baseline requirements of 2 L. Plan to admit the patient for further work-up and management. Patient accepted for admission by Dr. Lilian Tinoco. Patient agreeable with plan after shared decision making. She remained hemodynamically stable in the ED. Non-plain film images such as CT, Ultrasound and MRI are read by the radiologist. Cherlynn Lob radiographic images are visualized and preliminarily interpreted by the ED Provider with the below findings:    Chest x-ray with possible focal consolidation on the right with no large pleural effusions or pneumothorax. Normal cardiac silhouette. CTA pulmonary with no acute PE, large pleural effusions or focal consolidations. Discussion with Other Profesionals : Admitting Team who accepted the patient for admission    Social Determinants : None    Records Reviewed : Other echocardiogram from 6/18/2018 reviewed. Patient has a ejection fraction of 50 to 55%. Normal left ventricular diastolic filling pattern for age. Chronic conditions: COPD on chronic home oxygen at 2 L ATC, osteoarthritis, hyperlipidemia and severe protein calorie malnutrition    CONSULTS: Internal medicine      Disposition:   Admission    Consultation with Dr. Lilian Tinoco who accepted the patient for admission.   The patient will be admitted for further treatment and evaluation for 1. [facility] ([facility]    2. Acute respiratory failure with hypoxia (HCC)    3. Pneumonia of right lower lobe due to infectious organism          Re-Evaluations/Consultations:             ED Course as of 03/03/23 0928   Thu Mar 02, 2023   2228 Patient accepted for admission by Dr. Correa Height [PP]      ED Course User Index  [PP] Michael Nguyen DO         This patient's ED course included: History, physical examination, reevaluation prior to disposition    This patient has remained hemodynamically stable during their ED course. Counseling: The emergency provider has spoken with the patient and discussed todays results, in addition to providing specific details for the plan of care and counseling regarding the diagnosis and prognosis. Questions are answered at this time and they are agreeable with the plan.       --------------------------------- IMPRESSION AND DISPOSITION ---------------------------------    IMPRESSION  1. COPD exacerbation ([facility]    2. Acute respiratory failure with hypoxia (HCC)    3. Pneumonia of right lower lobe due to infectious organism        DISPOSITION  Disposition: Admit to telemetry  Patient condition is stable        NOTE: This report was transcribed using voice recognition software.  Every effort was made to ensure accuracy; however, inadvertent computerized transcription errors may be present         Michael Nguyen DO  Resident  03/03/23 0874

## 2023-03-03 NOTE — H&P
Hospitalist History & Physical      PCP: Jose Hooks MD    Date of Service:  3/3/2023     Chief Complaint:  had concerns including Shortness of Breath (Patient arrives with complaints of SOB with 2 weeks. EMS gave 2 nebulizers, and 125 solumedrol. Patient on 4L home O2). History Of Present Illness:    Ms. Rupa Pina, a 78y.o. year old female  who  has a past medical history of Abnormal Pap smear, COPD (chronic obstructive pulmonary disease) ([address] Emphysema of lung ([address] Emphysema/COPD ([address] GERD (gastroesophageal reflux disease), History of stomach ulcers, Hyperlipidemia, Osteoarthritis, and Renal calculi. Rupa Pina is a 78 y.o. female with past medical history of COPD on chronic home oxygen at 2 L ATC, osteoarthritis, hyperlipidemia and severe protein calorie malnutrition who presents to the emergency department due to worsening shortness of breath. Patient states her symptoms have been ongoing for 2 weeks but progressively worsening tonight. Shortness of breath is significantly worse with exertion. Patient states that she is only able to do minimal task before feeling extremely short of breath. Patient does states that she is on albuterol, Trelegy and Pulmicort at home. She has been taking her inhalers without much improvement in her symptoms. She does endorse associated chills and states that she sometimes wakes up in the night sweating. She was brought in by EMS from home for further evaluation. Patient states that her daughter called EMS. She was given breathing treatments in route to the emergency department. She is noting significant improvement in her symptoms since arrival to the ED. Patient on 4 L via nasal cannula with appropriate bedside SpO2. No signs of acute respiratory distress noted.   Patient is denying any other associated symptoms including chest pain, cough, congestion, sore throat, myalgias, lightheadedness, dizziness, headache, syncope, numbness, tingling, vision changes, abdominal pain, urinary symptoms, constipation or diarrhea.      Past Medical History:   Diagnosis Date    Abnormal Pap smear 1995    Patient states she had laser surgery    COPD (chronic obstructive pulmonary disease) (HCC)     Emphysema of lung (HCC)     Emphysema/COPD (HCC)     GERD (gastroesophageal reflux disease)     History of stomach ulcers     Hyperlipidemia     DIET    Osteoarthritis     Renal calculi     APX 40 YEARS AGO       Past Surgical History:   Procedure Laterality Date    APPENDECTOMY      17 y.o.    CATARACT REMOVAL WITH IMPLANT Bilateral dec 2017, jan 2018    COLONOSCOPY  10/17/2017    normal colon--opal    ENDOMETRIAL BIOPSY      TONSILLECTOMY      UPPER GASTROINTESTINAL ENDOSCOPY  10/17/2017    gastritis; hiatal hernia--opal    WRIST SURGERY Left 2008       Prior to Admission medications    Medication Sig Start Date End Date Taking? Authorizing Provider   fluticasone-umeclidin-vilant (TRELEGY ELLIPTA) 100-62.5-25 MCG/ACT AEPB inhaler Inhale 1 puff into the lungs daily 2/28/23   FITZ Munguia - CNP   budesonide (PULMICORT) 0.5 MG/2ML nebulizer suspension Take 2 mLs by nebulization in the morning and 2 mLs in the evening.  Patient not taking: Reported on 11/9/2022 10/27/22   Irene Alcantara MD   ipratropium-albuterol (DUONEB) 0.5-2.5 (3) MG/3ML SOLN nebulizer solution Inhale 3 mLs into the lungs every 6 hours as needed for Shortness of Breath 10/27/22 11/26/22  Irene Alcantara MD   vitamin D3 (CHOLECALCIFEROL) 25 MCG (1000 UT) TABS tablet Take 1 tablet by mouth daily 10/27/22   Irene Alcantara MD   Nebulizers (AIRIAL COMPACT MINI NEBULIZER) MISC Take 1 vial by nebulization every 6 hours as needed (shortness of breathe/wheezing) 10/27/22   Irene Alcantara MD   albuterol sulfate HFA (PROVENTIL;VENTOLIN;PROAIR) 108 (90 Base) MCG/ACT inhaler inhale 2 puffs by mouth and INTO THE LUNGS every 6 hours if needed for wheezing 8/29/22   Susan Garduno MD  acetaminophen (TYLENOL) 325 MG tablet Take 650 mg by mouth every 6 hours as needed for Pain    Historical Provider, MD         Allergies:  Patient has no known allergies. Social History:    TOBACCO:   reports that she has been smoking cigarettes. She has a 25.50 pack-year smoking history. She has never used smokeless tobacco.  ETOH:   reports no history of alcohol use. Family History:    Reviewed in detail and negative for DM, CAD, Cancer, CVA. Positive as follows\"      Problem Relation Age of Onset    High Blood Pressure Mother     Emphysema Mother     Arthritis Mother     Asthma Mother     Cancer Sister         lung, Hodgkins    Cancer Brother         leukemia    Heart Disease Father     Arthritis Maternal Grandmother     Asthma Maternal Grandmother        REVIEW OF SYSTEMS:   Pertinent positives as noted in the HPI. All other systems reviewed and negative. PHYSICAL EXAM:  BP (!) 112/55   Pulse 75   Temp 97.1 °F (36.2 °C)   Resp 20   SpO2 98%         CBC:   Recent Labs     03/02/23 2023   WBC 4.3*   RBC 4.35   HGB 13.3   HCT 41.8   MCV 96.1   RDW 12.6        BMP:   Recent Labs     03/02/23 2023      K 3.7   CL 99   CO2 31*   BUN 28*   CREATININE 0.5     LFT:  Recent Labs     03/02/23 2023   PROT 7.3   ALKPHOS 61   ALT 11   AST 21   BILITOT 0.6     CE:  No results for input(s): Callie Gazella in the last 72 hours. PT/INR: No results for input(s): INR, APTT in the last 72 hours. BNP: No results for input(s): BNP in the last 72 hours.   ESR: No results found for: SEDRATE  CRP: No results found for: CRP  D Dimer:   Lab Results   Component Value Date    DDIMER 281 03/02/2023      Folate and B12:   Lab Results   Component Value Date    [id number] 314 07/31/2012   ,   Lab Results   Component Value Date    FOLATE >24.0 07/31/2012     Lactic Acid:   Lab Results   Component Value Date    LACTA 1.6 06/10/2018     Thyroid Studies:   Lab Results   Component Value Date    TSH 2.240 02/04/2019 Oupatient labs:  Lab Results   Component Value Date    CHOL 231 (H) 10/11/2013    TRIG 86 10/11/2013    HDL 81.0 10/11/2013    LDLCALC 133 (H) 10/11/2013    TSH 2.240 02/04/2019    INR 1.2 04/29/2012    LABA1C 5.5 02/04/2019       Urinalysis:  No results found for: NITRU, WBCUA, BACTERIA, RBCUA, BLOODU, SPECGRAV, GLUCOSEU    Imaging:  XR CHEST PORTABLE    Result Date: 3/2/2023  EXAMINATION: ONE XRAY VIEW OF THE CHEST 3/2/2023 8:14 pm COMPARISON: October 24, 2022 HISTORY: ORDERING SYSTEM PROVIDED HISTORY: sob TECHNOLOGIST PROVIDED HISTORY: Reason for exam:->sob What reading provider will be dictating this exam?->CRC FINDINGS: There is cardiomegaly. There is advanced emphysema. There is patchy right perihilar and right basilar infiltrates. There is atelectasis in the left base. Advanced emphysema with right perihilar and right basilar infiltrates concerning for pneumonia. CTA PULMONARY W CONTRAST    Result Date: 3/2/2023  EXAMINATION: CTA OF THE CHEST 3/2/2023 10:54 pm TECHNIQUE: CTA of the chest was performed after the administration of intravenous contrast.  Multiplanar reformatted images are provided for review. MIP images are provided for review. Automated exposure control, iterative reconstruction, and/or weight based adjustment of the mA/kV was utilized to reduce the radiation dose to as low as reasonably achievable. COMPARISON: None. HISTORY: ORDERING SYSTEM PROVIDED HISTORY: r/o PE TECHNOLOGIST PROVIDED HISTORY: Reason for exam:->r/o PE Decision Support Exception - unselect if not a suspected or confirmed emergency medical condition->Emergency Medical Condition (MA) What reading provider will be dictating this exam?->CRC FINDINGS: Pulmonary Arteries: Pulmonary arteries are adequately opacified for evaluation. No evidence of intraluminal filling defect to suggest pulmonary embolism. Main pulmonary artery is normal in caliber. Mediastinum: No evidence of mediastinal lymphadenopathy.   The heart and pericardium demonstrate no acute abnormality. There is no acute abnormality of the thoracic aorta. Lungs/pleura: The lungs are without acute process. No focal consolidation or pulmonary edema. No evidence of pleural effusion or pneumothorax. The lungs are hyperinflated with chronic interstitial lung changes. Upper Abdomen: Limited images of the upper abdomen are unremarkable. Soft Tissues/Bones: No acute bone or soft tissue abnormality. No evidence of pulmonary embolism or acute pulmonary abnormality. Severe COPD with no acute changes. ASSESSMENT:  -COPD exacerbation  -Acute respiratory failure with hypoxia      PLAN:  -Admit to medicine  -DuoNebs every 4 hours  -Albuterol as needed  -Pulmicort twice daily  -Methylprednisolone 40 mg IV every 6 hours  -Continue home medications        Diet: No diet orders on file  Code Status: Prior  Surrogate decision maker confirmed with patient:   Extended Emergency Contact Information  Primary Emergency Contact: Hazel Nguyen  Address: [address] [place] [address] Phone: [phone]  Mobile Phone: [phone]  Relation: Child  Secondary Emergency Contact: [address] [place] [address] Phone: [phone]  Mobile Phone: [phone]  Relation: Grandchild    DVT Prophylaxis: []Lovenox []Heparin []PCD [] [address] []Encouraged ambulation  Disposition: []Med/Surg [] Intermediate [] ICU/CCU  Admit status: [] Observation [] Inpatient     +++++++++++++++++++++++++++++++++++++++++++++++++  Michael Anderson,   +++++++++++++++++++++++++++++++++++++++++++++++++  NOTE: This report was transcribed using voice recognition software. Every effort was made to ensure accuracy; however, inadvertent computerized transcription errors may be present.

## 2023-03-03 NOTE — PROGRESS NOTES
Non billable rounding  Patient was admitted after midnight by my partner    77 yo patient with severe COPD /chrnc hypercapn resp failure /home 02  2 L at baseline use  admitted to the hospital    Admit to smoking currentl - \" a few puffs yesterday\"  Last year pulmonologist recommended A non-invasive home ventilator is quired to reduce the risk of dying     [facility]at for infiltrate  Vs  97.8 (36.6) 18 70 116/52 -- Semi fowlers -- 4 100 Nasal cannula    Patient diagnosed with acute COPD exacerbation likely triggered by cigarette tobacco use  Along with acute on chronic respiratory failure    Patient is receiving treatment with nebulizers bronchodilators and IV steroids  We will increase IV steroid dose  Smoking cessation is recommended  Enoxaparin has been ordered for DVT prophylaxis

## 2023-03-04 ENCOUNTER — APPOINTMENT (OUTPATIENT)
Dept: GENERAL RADIOLOGY | Age: 78
DRG: 190 | End: 2023-03-04
Payer: COMMERCIAL

## 2023-03-04 PROBLEM — E43 SEVERE PROTEIN-CALORIE MALNUTRITION (HCC): Chronic | Status: ACTIVE | Noted: 2018-06-13

## 2023-03-04 PROCEDURE — 94640 AIRWAY INHALATION TREATMENT: CPT

## 2023-03-04 PROCEDURE — 71045 X-RAY EXAM CHEST 1 VIEW: CPT

## 2023-03-04 PROCEDURE — 6370000000 HC RX 637 (ALT 250 FOR IP): Performed by: FAMILY MEDICINE

## 2023-03-04 PROCEDURE — 6360000002 HC RX W HCPCS: Performed by: FAMILY MEDICINE

## 2023-03-04 PROCEDURE — 1200000000 HC SEMI PRIVATE

## 2023-03-04 PROCEDURE — 2580000003 HC RX 258: Performed by: FAMILY MEDICINE

## 2023-03-04 PROCEDURE — 6360000002 HC RX W HCPCS: Performed by: INTERNAL MEDICINE

## 2023-03-04 PROCEDURE — 6360000002 HC RX W HCPCS: Performed by: STUDENT IN AN ORGANIZED HEALTH CARE EDUCATION/TRAINING PROGRAM

## 2023-03-04 PROCEDURE — 2580000003 HC RX 258: Performed by: STUDENT IN AN ORGANIZED HEALTH CARE EDUCATION/TRAINING PROGRAM

## 2023-03-04 RX ADMIN — METHYLPREDNISOLONE SODIUM SUCCINATE 60 MG: 125 INJECTION, POWDER, FOR SOLUTION INTRAMUSCULAR; INTRAVENOUS at 04:08

## 2023-03-04 RX ADMIN — AZITHROMYCIN MONOHYDRATE 250 MG: 500 INJECTION, POWDER, LYOPHILIZED, FOR SOLUTION INTRAVENOUS at 11:16

## 2023-03-04 RX ADMIN — Medication 10 ML: at 08:25

## 2023-03-04 RX ADMIN — Medication 5 ML: at 20:08

## 2023-03-04 RX ADMIN — METHYLPREDNISOLONE SODIUM SUCCINATE 60 MG: 125 INJECTION, POWDER, FOR SOLUTION INTRAMUSCULAR; INTRAVENOUS at 22:49

## 2023-03-04 RX ADMIN — ARFORMOTEROL TARTRATE 15 MCG: 15 SOLUTION RESPIRATORY (INHALATION) at 19:36

## 2023-03-04 RX ADMIN — ARFORMOTEROL TARTRATE 15 MCG: 15 SOLUTION RESPIRATORY (INHALATION) at 09:09

## 2023-03-04 RX ADMIN — IPRATROPIUM BROMIDE AND ALBUTEROL SULFATE 1 AMPULE: 2.5; .5 SOLUTION RESPIRATORY (INHALATION) at 13:05

## 2023-03-04 RX ADMIN — IPRATROPIUM BROMIDE AND ALBUTEROL SULFATE 1 AMPULE: 2.5; .5 SOLUTION RESPIRATORY (INHALATION) at 09:08

## 2023-03-04 RX ADMIN — BUDESONIDE 500 MCG: 0.5 SUSPENSION RESPIRATORY (INHALATION) at 19:36

## 2023-03-04 RX ADMIN — IPRATROPIUM BROMIDE AND ALBUTEROL SULFATE 1 AMPULE: 2.5; .5 SOLUTION RESPIRATORY (INHALATION) at 19:36

## 2023-03-04 RX ADMIN — ENOXAPARIN SODIUM 30 MG: 100 INJECTION SUBCUTANEOUS at 08:25

## 2023-03-04 RX ADMIN — Medication 1000 UNITS: at 08:25

## 2023-03-04 RX ADMIN — BUDESONIDE 500 MCG: 0.5 SUSPENSION RESPIRATORY (INHALATION) at 09:10

## 2023-03-04 RX ADMIN — METHYLPREDNISOLONE SODIUM SUCCINATE 60 MG: 125 INJECTION, POWDER, FOR SOLUTION INTRAMUSCULAR; INTRAVENOUS at 18:05

## 2023-03-04 RX ADMIN — METHYLPREDNISOLONE SODIUM SUCCINATE 60 MG: 125 INJECTION, POWDER, FOR SOLUTION INTRAMUSCULAR; INTRAVENOUS at 11:10

## 2023-03-04 NOTE — PROGRESS NOTES
Hospitalist Progress Note      PCP: Willow Jordan MD    Date of Admission: 3/2/2023    Chief Complaint: SOB    Hospital Course:    Nathaly Junior is a 78 y.o. female with past medical history of COPD on chronic home oxygen at 2 L ATC, osteoarthritis, hyperlipidemia and severe protein calorie malnutrition who presents to the emergency department due to worsening shortness of breath. Subjective: Patient was seen at bedside discussed about the plan regarding continuing steroids, wean oxygen as tolerated       Medications:  Reviewed    Infusion Medications    sodium chloride       Scheduled Medications    azithromycin  250 mg IntraVENous Q24H    budesonide  500 mcg Nebulization BID    Vitamin D  1,000 Units Oral Daily    sodium chloride flush  5-40 mL IntraVENous 2 times per day    ipratropium-albuterol  1 ampule Inhalation Q4H WA    arformoterol tartrate  15 mcg Nebulization BID    enoxaparin  30 mg SubCUTAneous Daily    methylPREDNISolone  60 mg IntraVENous Q6H    Followed by    Rashad Silva ON 3/5/2023] predniSONE  40 mg Oral Daily     PRN Meds: sodium chloride flush, sodium chloride, ondansetron **OR** ondansetron, polyethylene glycol, acetaminophen **OR** acetaminophen, albuterol      Intake/Output Summary (Last 24 hours) at 3/4/2023 1139  Last data filed at 3/4/2023 1017  Gross per 24 hour   Intake 480 ml   Output --   Net 480 ml       Exam:    BP (!) 98/49   Pulse 73   Temp 97.5 °F (36.4 °C) (Temporal)   Resp 18   Ht 5' (1.524 m)   Wt 77 lb (34.9 kg)   SpO2 96%   BMI 15.04 kg/m²     General appearance: No apparent distress, appears stated age and cooperative. HEENT: Pupils equal, round, and reactive to light. Conjunctivae/corneas clear. Neck: Supple, with full range of motion. No jugular venous distention. Trachea midline. Respiratory:  Normal respiratory effort. Mild wheezing  Cardiovascular: Regular rate and rhythm with normal S1/S2 without murmurs, rubs or gallops.   Abdomen: Soft, non-tender, non-distended with normal bowel sounds. Musculoskeletal: No clubbing, cyanosis or edema bilaterally. Full range of motion without deformity. Skin: Skin color, texture, turgor normal.  No rashes or lesions. Neurologic:  No acute findings. Psychiatric: Alert and oriented, thought content appropriate, normal insight    Labs:   Recent Labs     03/02/23 2023 03/03/23  0646   WBC 4.3* 4.8   HGB 13.3 12.7   HCT 41.8 40.0    211     Recent Labs     03/02/23 2023 03/03/23  0646    146   K 3.7 4.3   CL 99 104   CO2 31* 33*   BUN 28* 31*   CREATININE 0.5 0.4*   CALCIUM 9.4 9.3     Recent Labs     03/02/23 2023 03/03/23  0646   AST 21 16   ALT 11 10   BILITOT 0.6 0.3   ALKPHOS 61 57     No results for input(s): INR in the last 72 hours. No results for input(s): Ze Shorts in the last 72 hours. Assessment/Plan:    Active Hospital Problems    Diagnosis Date Noted    COPD exacerbation ([facility] [[email or web address] [J44.1] 09/16/2022     Priority: Medium   -COPD exacerbation  -Acute respiratory failure with hypoxia        PLAN:  -Continue on tele  -DuoNebs every 4 hours  - Continue on Azithromycin  -Albuterol as needed  -Pulmicort twice daily  -Methylprednisolone 40 mg IV every 6 hours  -Continue home medications      DVT Prophylaxis: lovenox  Diet: ADULT DIET;  Regular  Code Status: Limited    Dispo - continue on IV steroids, consider switching to oral , wean oxygen as tolerated    Jaden Sharpe MD

## 2023-03-04 NOTE — PROGRESS NOTES
Comprehensive Nutrition Assessment    Type and Reason for Visit:  Initial, Positive Nutrition Screen    Nutrition Recommendations/Plan:     Continue Current Diet, Start Oral Nutrition Supplement       Malnutrition Assessment:  Malnutrition Status:  Severe malnutrition (03/04/23 1323)    Context:  Chronic Illness     Findings of the 6 clinical characteristics of malnutrition:  Energy Intake:  75% or less estimated energy requirements for 1 month or longer  Weight Loss:  Unable to assess (limited wt hx)     Body Fat Loss:  Severe body fat loss Orbital, Triceps   Muscle Mass Loss:  Severe muscle mass loss Temples (temporalis), Clavicles (pectoralis & deltoids), Thigh (quadraceps), Calf (gastrocnemius)  Fluid Accumulation:  No significant fluid accumulation    Strength:  Not Performed    Nutrition Assessment:    Pt admit w/ worsening SOB 2/2 COPD exacerbation. Pt meets criteria for Severe Malnutrition. Will add Ensure BID & Boost pudding daily for protein variety. Nutrition Related Findings:    Pt alert, cachectic, fluid bal WNL, no edema, Abd WDL     Wound Type: None       Current Nutrition Intake & Therapies:    Average Meal Intake: 26-50%     ADULT DIET; Regular    Anthropometric Measures:  Height: 5' (152.4 cm)  Ideal Body Weight (IBW): 100 lbs (45 kg)       Current Body Weight: 77 lb (34.9 kg) (3/3 , no method UTO updated wt. last measured 179lb 11/2022), 77 % IBW.     Current BMI (kg/m2): 15  Usual Body Weight: 81 lb 8 oz (37 kg) (9/17/22 EMR measured)  % Weight Change (Calculated): -5.5, possible loss unable to assess                     BMI Categories: Underweight (BMI less than 22) age over 72    Estimated Daily Nutrient Needs:  Energy Requirements Based On: Kcal/kg  Weight Used for Energy Requirements: Current  Energy (kcal/day): 35-40 kcal/kg for healthy wt gain; 6019-6474  Weight Used for Protein Requirements: Current  Protein (g/day): 1.5-1.8 g/kg CBW; 55-65  Method Used for Fluid Requirements: 1 ml/kcal  Fluid (ml/day): 2742-3926    Nutrition Diagnosis:   Severe malnutrition, In context of chronic illness related to catabolic illness (COPD) as evidenced by poor intake prior to admission, severe loss of subcutaneous fat, severe muscle loss    Nutrition Interventions:   Nutrition Education/Counseling: Education not indicated  Coordination of Nutrition Care: Continue to monitor while inpatient       Goals:     Goals: PO intake 50% or greater, by next RD assessment       Nutrition Monitoring and Evaluation:      Food/Nutrient Intake Outcomes: Food and Nutrient Intake, Supplement Intake  Physical Signs/Symptoms Outcomes: Biochemical Data, Nutrition Focused Physical Findings, Weight, GI Status, Fluid Status or Edema, Skin    Discharge Planning:    Continue Oral Nutrition Supplement     Aria Ferreira RD, LD  Contact: Ext [phone]

## 2023-03-04 NOTE — PLAN OF CARE
Problem: Skin/Tissue Integrity  Goal: Absence of new skin breakdown  Description: 1. Monitor for areas of redness and/or skin breakdown  2. Assess vascular access sites hourly  3. Every 4-6 hours minimum:  Change oxygen saturation probe site  4. Every 4-6 hours:  If on nasal continuous positive airway pressure, respiratory therapy assess nares and determine need for appliance change or resting period.   Outcome: Progressing     Problem: Safety - Adult  Goal: Free from fall injury  Outcome: Progressing     Problem: Pain  Goal: Verbalizes/displays adequate comfort level or baseline comfort level  Outcome: Progressing     Problem: Nutrition Deficit:  Goal: Optimize nutritional status  Outcome: Progressing

## 2023-03-04 NOTE — PLAN OF CARE
Problem: Skin/Tissue Integrity  Goal: Absence of new skin breakdown  Description: 1. Monitor for areas of redness and/or skin breakdown  2. Assess vascular access sites hourly  3. Every 4-6 hours minimum:  Change oxygen saturation probe site  4. Every 4-6 hours:  If on nasal continuous positive airway pressure, respiratory therapy assess nares and determine need for appliance change or resting period.   Outcome: Progressing     Problem: Safety - Adult  Goal: Free from fall injury  Outcome: Progressing  Flowsheets (Taken 3/3/2023 1041 by Olivia Moscoso RN)  Free From Fall Injury: Instruct family/caregiver on patient safety

## 2023-03-05 VITALS
HEIGHT: 60 IN | BODY MASS INDEX: 15.12 KG/M2 | OXYGEN SATURATION: 93 % | HEART RATE: 82 BPM | TEMPERATURE: 97.3 F | WEIGHT: 77 LBS | SYSTOLIC BLOOD PRESSURE: 117 MMHG | DIASTOLIC BLOOD PRESSURE: 57 MMHG | RESPIRATION RATE: 16 BRPM

## 2023-03-05 PROCEDURE — 2580000003 HC RX 258: Performed by: FAMILY MEDICINE

## 2023-03-05 PROCEDURE — 94640 AIRWAY INHALATION TREATMENT: CPT

## 2023-03-05 PROCEDURE — 6360000002 HC RX W HCPCS: Performed by: FAMILY MEDICINE

## 2023-03-05 PROCEDURE — 6370000000 HC RX 637 (ALT 250 FOR IP): Performed by: FAMILY MEDICINE

## 2023-03-05 PROCEDURE — 6370000000 HC RX 637 (ALT 250 FOR IP): Performed by: INTERNAL MEDICINE

## 2023-03-05 RX ORDER — AZITHROMYCIN 250 MG/1
500 TABLET, FILM COATED ORAL DAILY
Qty: 6 TABLET | Refills: 0 | Status: SHIPPED | OUTPATIENT
Start: 2023-03-05 | End: 2023-03-06 | Stop reason: SDUPTHER

## 2023-03-05 RX ORDER — PREDNISONE 20 MG/1
40 TABLET ORAL DAILY
Qty: 20 TABLET | Refills: 0 | Status: SHIPPED | OUTPATIENT
Start: 2023-03-06 | End: 2023-03-06 | Stop reason: SDUPTHER

## 2023-03-05 RX ADMIN — IPRATROPIUM BROMIDE AND ALBUTEROL SULFATE 1 AMPULE: 2.5; .5 SOLUTION RESPIRATORY (INHALATION) at 13:20

## 2023-03-05 RX ADMIN — IPRATROPIUM BROMIDE AND ALBUTEROL SULFATE 1 AMPULE: 2.5; .5 SOLUTION RESPIRATORY (INHALATION) at 15:34

## 2023-03-05 RX ADMIN — ENOXAPARIN SODIUM 30 MG: 100 INJECTION SUBCUTANEOUS at 08:53

## 2023-03-05 RX ADMIN — BUDESONIDE 500 MCG: 0.5 SUSPENSION RESPIRATORY (INHALATION) at 09:45

## 2023-03-05 RX ADMIN — Medication 1000 UNITS: at 08:53

## 2023-03-05 RX ADMIN — Medication 10 ML: at 08:53

## 2023-03-05 RX ADMIN — PREDNISONE 40 MG: 20 TABLET ORAL at 08:53

## 2023-03-05 RX ADMIN — IPRATROPIUM BROMIDE AND ALBUTEROL SULFATE 1 AMPULE: 2.5; .5 SOLUTION RESPIRATORY (INHALATION) at 09:45

## 2023-03-05 NOTE — DISCHARGE SUMMARY
Hospitalist Discharge Summary    Patient ID: Marcelina Bhatt   Patient : 1945  Patient's PCP: Joanna Mendoza MD    Admit Date: 3/2/2023   Admitting Physician: Erinn Subramanian DO    Discharge Date:  3/5/2023   Discharge Physician: Orlando Benson MD   Discharge Condition: Stable  Discharge Disposition: [facility] course in brief:  (Please refer to daily progress notes for a comprehensive review of the hospitalization by requesting medical records)   Cruzito Osullivan is a 78 y.o. female with past medical history of COPD on chronic home oxygen at 2 L ATC, osteoarthritis, hyperlipidemia and severe protein calorie malnutrition who presents to the emergency department due to worsening shortness of breath. Patient was started on methylprednisolone and slowly weaned off the oxygen back to baseline home oxygen requirement. Ambulatory pulse ox was performed and patient was stable on her home oxygen requirement. steroids were switched to oral and patient was deemed stable for discharge with close follow-up with PCP. Consults:   IP CONSULT TO INTERNAL MEDICINE    Discharge Diagnoses:  COPD in exacerbation  Acute hypoxic respiratory failure  Chronic oxygen dependent-2 L nasal cannula  Vitamin D deficiency  Severe protein calorie malnutrition      Discharge Instructions / Follow up: Follow-up with PCP within 1 week of discharge. Follow-up with consultants as indicated by them. Compliance with medications as prescribed on discharge. No future appointments. The patient's condition is satble. At this time the patient is without objective evidence of an acute process requiring continuing hospitalization or inpatient management. They are stable for discharge with outpatient follow-up. I have spoken with the patient and discussed the results of the current hospitalization, in addition to providing specific details for the plan of care and counseling regarding the diagnosis and prognosis. The plan has been discussed in detail and they are aware of the specific conditions for emergent return, as well as the importance of follow-up. Their questions are answered at this time and they are agreeable with the plan for discharge to home. Continued appropriate risk factor modification of blood pressure, diabetes and serum lipids will remain essential to reducing risk of future atherosclerotic development    Activity: activity as tolerated    Physical exam:  General appearance: Malnourished, appears stated age and cooperative. HEENT: Conjunctivae/corneas clear. Mucous membranes moist.  Neck: Supple. No JVD. Respiratory:  Clear to auscultation bilaterally. Normal respiratory effort. 2L NC at baseline  Cardiovascular:  RRR. S1, S2 without MRG. PV: Pulses palpable. No edema. Abdomen: Soft, non-tender, non-distended. +BS  Musculoskeletal: No obvious deformities. Skin: Normal skin color. No rashes or lesions. Good turgor. Neurologic:  Grossly non-focal. Awake, alert, following commands. Psychiatric: Alert and oriented, thought content appropriate, normal insight and judgement    Significant labs:  CBC:   Recent Labs     03/02/23 2023 03/03/23  0646   WBC 4.3* 4.8   RBC 4.35 4.18   HGB 13.3 12.7   HCT 41.8 40.0   MCV 96.1 95.7   RDW 12.6 12.3    211     BMP:   Recent Labs     03/02/23 2023 03/03/23  0646    146   K 3.7 4.3   CL 99 104   CO2 31* 33*   BUN 28* 31*   CREATININE 0.5 0.4*     LFT:  Recent Labs     03/02/23 2023 03/03/23  0646   PROT 7.3 6.6   ALKPHOS 61 57   ALT 11 10   AST 21 16   BILITOT 0.6 0.3     PT/INR: No results for input(s): INR, APTT in the last 72 hours. BNP: No results for input(s): BNP in the last 72 hours.   Hgb A1C:   Lab Results   Component Value Date    LABA1C 5.5 02/04/2019     Folate and B12:   Lab Results   Component Value Date    HYBCOJJL99 113 07/31/2012   ,   Lab Results   Component Value Date    FOLATE >24.0 07/31/2012     Thyroid Studies:   Lab Results   Component Value Date    TSH 2.240 02/04/2019       Urinalysis:  No results found for: NITRU, WBCUA, BACTERIA, RBCUA, BLOODU, SPECGRAV, GLUCOSEU    Imaging:  XR CHEST PORTABLE    Result Date: 3/4/2023  EXAMINATION: ONE XRAY VIEW OF THE CHEST 3/4/2023 10:20 am COMPARISON: Previous CT scan and chest radiograph 03/02/2023. HISTORY: ORDERING SYSTEM PROVIDED HISTORY: dyspnea TECHNOLOGIST PROVIDED HISTORY: Reason for exam:->dyspnea What reading provider will be dictating this exam?->CRC FINDINGS: Heart size is normal.  There is persistent prominence of the mediastinum. There is advanced emphysema with atelectasis in the right lung base. There is no focal consolidation or pleural effusion. There is questionable nodular density in the right upper lobe laterally of unknown etiology and surveillance recommended. Advanced emphysema with scarring and atelectasis in the lung bases. There is no focal consolidation or pleural effusion. Nodular density in the right upper lobe. Consider surveillance. XR CHEST PORTABLE    Result Date: 3/2/2023  EXAMINATION: ONE XRAY VIEW OF THE CHEST 3/2/2023 8:14 pm COMPARISON: October 24, 2022 HISTORY: ORDERING SYSTEM PROVIDED HISTORY: sob TECHNOLOGIST PROVIDED HISTORY: Reason for exam:->sob What reading provider will be dictating this exam?->CRC FINDINGS: There is cardiomegaly. There is advanced emphysema. There is patchy right perihilar and right basilar infiltrates. There is atelectasis in the left base. Advanced emphysema with right perihilar and right basilar infiltrates concerning for pneumonia. CTA PULMONARY W CONTRAST    Result Date: 3/2/2023  EXAMINATION: CTA OF THE CHEST 3/2/2023 10:54 pm TECHNIQUE: CTA of the chest was performed after the administration of intravenous contrast.  Multiplanar reformatted images are provided for review. MIP images are provided for review.  Automated exposure control, iterative reconstruction, and/or weight based adjustment of the mA/kV was utilized to reduce the radiation dose to as low as reasonably achievable. COMPARISON: None. HISTORY: ORDERING SYSTEM PROVIDED HISTORY: r/o PE TECHNOLOGIST PROVIDED HISTORY: Reason for exam:->r/o PE Decision Support Exception - unselect if not a suspected or confirmed emergency medical condition->Emergency Medical Condition (MA) What reading provider will be dictating this exam?->CRC FINDINGS: Pulmonary Arteries: Pulmonary arteries are adequately opacified for evaluation. No evidence of intraluminal filling defect to suggest pulmonary embolism. Main pulmonary artery is normal in caliber. Mediastinum: No evidence of mediastinal lymphadenopathy. The heart and pericardium demonstrate no acute abnormality. There is no acute abnormality of the thoracic aorta. Lungs/pleura: The lungs are without acute process. No focal consolidation or pulmonary edema. No evidence of pleural effusion or pneumothorax. The lungs are hyperinflated with chronic interstitial lung changes. Upper Abdomen: Limited images of the upper abdomen are unremarkable. Soft Tissues/Bones: No acute bone or soft tissue abnormality. No evidence of pulmonary embolism or acute pulmonary abnormality. Severe COPD with no acute changes.        Discharge Medications:      Medication List        START taking these medications      azithromycin 250 MG tablet  Commonly known as: ZITHROMAX  Take 2 tablets by mouth daily for 3 days     fluticasone-umeclidin-vilant 100-62.5-25 MCG/ACT Aepb inhaler  Commonly known as: TRELEGY ELLIPTA  Inhale 1 puff into the lungs daily     predniSONE 20 MG tablet  Commonly known as: DELTASONE  Take 2 tablets by mouth daily for 10 days  Start taking on: March 6, 2023            CONTINUE taking these medications      acetaminophen 325 MG tablet  Commonly known as: TYLENOL     Airial Compact Mini Nebulizer Misc  Take 1 vial by nebulization every 6 hours as needed (shortness of breathe/wheezing)     albuterol sulfate  (90 Base) MCG/ACT inhaler  Commonly known as: PROVENTIL;VENTOLIN;PROAIR  inhale 2 puffs by mouth and INTO THE LUNGS every 6 hours if needed for wheezing     ipratropium-albuterol 0.5-2.5 (3) MG/3ML Soln nebulizer solution  Commonly known as: DUONEB  Inhale 3 mLs into the lungs every 6 hours as needed for Shortness of Breath     vitamin D3 25 MCG (1000 UT) Tabs tablet  Commonly known as: CHOLECALCIFEROL  Take 1 tablet by mouth daily            ASK your doctor about these medications      budesonide 0.5 MG/2ML nebulizer suspension  Commonly known as: PULMICORT  Take 2 mLs by nebulization in the morning and 2 mLs in the evening. Where to Get Your Medications        These medications were sent to [facility] [address] [address]      Phone: [phone]   azithromycin 250 MG tablet  predniSONE 20 MG tablet         Time Spent on discharge is more than 45 minutes in the examination, evaluation, counseling and review of medications and discharge plan.    +++++++++++++++++++++++++++++++++++++++++++++++++  Meri Gonzales MD  [address]  +++++++++++++++++++++++++++++++++++++++++++++++++  NOTE: This report was transcribed using voice recognition software. Every effort was made to ensure accuracy; however, inadvertent computerized transcription errors may be present.

## 2023-03-05 NOTE — PROGRESS NOTES
PULSE OX ON ROOM AIR SITTING__90__%  PULSE OX ON ROOM AIR AMBULATING __88__%  PULSE OX ON ___2_LITERS SITTING RECOVERY _98___%  PULSE OX ON ___2_LITERS AMBULATING RECOVERY __90_%

## 2023-03-05 NOTE — PLAN OF CARE
Problem: Skin/Tissue Integrity  Goal: Absence of new skin breakdown  Description: 1. Monitor for areas of redness and/or skin breakdown  2. Assess vascular access sites hourly  3. Every 4-6 hours minimum:  Change oxygen saturation probe site  4. Every 4-6 hours:  If on nasal continuous positive airway pressure, respiratory therapy assess nares and determine need for appliance change or resting period.   3/4/2023 2134 by Kenya Paulson RN  Outcome: Progressing  3/4/2023 1620 by Janes Guido RN  Outcome: Progressing     Problem: Safety - Adult  Goal: Free from fall injury  3/4/2023 2134 by Kenya Paulson RN  Outcome: Progressing  3/4/2023 1620 by Janes Guido RN  Outcome: Progressing     Problem: Pain  Goal: Verbalizes/displays adequate comfort level or baseline comfort level  3/4/2023 2134 by Kenya Paulson RN  Outcome: Progressing  3/4/2023 1620 by Janes Guido RN  Outcome: Progressing     Problem: Nutrition Deficit:  Goal: Optimize nutritional status  3/4/2023 2134 by Kenya Paulson RN  Outcome: Progressing  3/4/2023 1620 by Janes Guido RN  Outcome: Progressing

## 2023-03-06 RX ORDER — AZITHROMYCIN 250 MG/1
500 TABLET, FILM COATED ORAL DAILY
Qty: 6 TABLET | Refills: 0 | Status: SHIPPED | OUTPATIENT
Start: 2023-03-06 | End: 2023-03-09

## 2023-03-06 RX ORDER — PREDNISONE 20 MG/1
40 TABLET ORAL DAILY
Qty: 20 TABLET | Refills: 0 | Status: SHIPPED | OUTPATIENT
Start: 2023-03-06 | End: 2023-03-16

## 2023-07-11 NOTE — PROGRESS NOTES
[facility]  Internal Medicine Residency Program  Progress Note - House Team 2    Patient:  Cordelia Mann 81 y.o. female MRN: [id number]     Date of Service: 6/12/2018     CC: COPD exacerbation   Overnight events: no acute events overnight     Subjective     Patient seems sluggish this morning, noted to be sleepy. Was seen by other house team residents that state she was more active during their evaluations earlier in the morning. Pt was on BiPAP through out the night. No acute events overnight. She was able to eat breakfast without Nausea. Objective     Physical Exam:  · Vitals: /60   Pulse 105   Temp 97.8 °F (36.6 °C) (Temporal)   Resp 20   Ht 5' (1.524 m)   Wt 78 lb 14.4 oz (35.8 kg) Comment: all blankets and pillows off bed  SpO2 98%   BMI 15.41 kg/m²     · I & O - 24hr: No intake/output data recorded. · General Appearance: alert, appears stated age, cooperative and Patient on BiPAP   · HEENT:  Head: Normocephalic, no lesions, without obvious abnormality. · Head: Normal, normocephalic, atraumatic. · Neck: no adenopathy, no carotid bruit, no JVD, supple, symmetrical, trachea midline and thyroid not enlarged, symmetric, no tenderness/mass/nodules  · Lung: rhonchi bilaterally and posterior - bilateral, wheezes posterior - bilateral and tightness noted on exam  · Heart: regular rate and rhythm, S1, S2 normal, no murmur, click, rub or gallop  · Abdomen: soft, non-tender; bowel sounds normal; no masses,  no organomegaly  · Extremities:  extremities normal, atraumatic, no cyanosis or edema  · Musculokeletal: No joint swelling, no muscle tenderness. ROM normal in all joints of extremities.    · Neurologic: Mental status: Alert, oriented, thought content appropriate, Slow in response (noted sleepiness)   Subject  Pertinent Labs & Imaging Studies   ena  CBC with Differential:    Lab Results   Component Value Date    WBC 10.5 06/11/2018    RBC 3.93 06/11/2018    HGB 12.1 06/11/2018 [FreeTextEntry1] : 65 yof w/ back and leg pain which was severe but has improved.\par \par I have personally reviewed the patient's MRI in detail and discussed it with them which is significant for mild bilateral foraminal stenosis at L5-S1.\par \par Physical therapy prescribed - goal will be to increase ROM, strengthening, postural training, other modalities ad cj which may include massage and stim. Goals of therapy discussed with the patient in detail and will be discussed with physical therapist. Patient will follow-up following course of physical therapy to monitor progress and adjust therapy as needed.\par \par Acetaminophen 1,000 mg q8h prn for moderate pain. Risks, benefits, and alternatives of acetaminophen discussed with patient.\par \par Ibuprofen 600 mg q8h prn add when pain is not adequately controlled with acetaminophen. Risks, benefits, and alternatives of ibuprofen discussed with patient.\par \par Diet and nutritional strategies discussed which may improve patients pain and will improve overall health.\par

## 2023-08-04 ENCOUNTER — TELEPHONE (OUTPATIENT)
Dept: PULMONOLOGY | Age: 78
End: 2023-08-04

## 2023-08-04 DIAGNOSIS — J44.1 COPD EXACERBATION (HCC): Primary | ICD-10-CM

## 2023-08-04 DIAGNOSIS — J96.11 CHRONIC RESPIRATORY FAILURE WITH HYPOXIA AND HYPERCAPNIA (HCC): ICD-10-CM

## 2023-08-04 DIAGNOSIS — J96.12 CHRONIC RESPIRATORY FAILURE WITH HYPOXIA AND HYPERCAPNIA (HCC): ICD-10-CM

## 2023-08-04 DIAGNOSIS — J44.1 COPD EXACERBATION (HCC): ICD-10-CM

## 2023-08-04 DIAGNOSIS — J44.1 CHRONIC OBSTRUCTIVE PULMONARY DISEASE WITH ACUTE EXACERBATION (HCC): ICD-10-CM

## 2023-08-04 RX ORDER — ALBUTEROL SULFATE 90 UG/1
AEROSOL, METERED RESPIRATORY (INHALATION)
Qty: 1 EACH | Refills: 12 | Status: SHIPPED | OUTPATIENT
Start: 2023-08-04

## 2023-08-04 RX ORDER — IPRATROPIUM BROMIDE AND ALBUTEROL SULFATE 2.5; .5 MG/3ML; MG/3ML
3 SOLUTION RESPIRATORY (INHALATION) EVERY 6 HOURS PRN
Qty: 360 ML | Refills: 6 | Status: SHIPPED | OUTPATIENT
Start: 2023-08-04

## 2023-08-04 NOTE — PROGRESS NOTES
Pt calling into office requesting refill on Trelegy inhaler. Also needs appt in office after next Chest CT. Advised office will arrange for testing and will mail out appt letter along with office appt after CT is done.

## 2023-08-04 NOTE — TELEPHONE ENCOUNTER
Call from pt requesting refills for Albuterol, Trelegy and follow up appt needed for O2 qualifications. Advised pt RN will send refills over to [facility] as she requested as well as mail out appt letter. For Chest CT and office visit to follow up.

## 2023-08-07 ENCOUNTER — TELEPHONE (OUTPATIENT)
Dept: PULMONOLOGY | Age: 78
End: 2023-08-07

## 2023-08-07 NOTE — TELEPHONE ENCOUNTER
Mailed a letter to patient informing her that her CT Chest is scheduled for 9-18-23 at 1:00 pm at the [facility]. She must arrive by 12:30 pm. There is no prep for this test

## 2023-09-15 ENCOUNTER — TELEPHONE (OUTPATIENT)
Dept: CASE MANAGEMENT | Age: 78
End: 2023-09-15

## 2023-09-15 NOTE — TELEPHONE ENCOUNTER
I called the patient and she confirmed her CT lung screening at [facility] on 9/18/2023 at 1:00 pm.  I reminded the patient to arrive at 12:30 pm enter through the main entrance, and register. Patient confirmed.           Electronically signed by Klarissa Payne on 9/15/2023 at 2:32 PM

## 2023-09-18 ENCOUNTER — HOSPITAL ENCOUNTER (OUTPATIENT)
Dept: CT IMAGING | Age: 78
Discharge: HOME | End: 2023-09-20
Payer: MEDICARE

## 2023-09-18 DIAGNOSIS — F17.200 CURRENTLY SMOKES TOBACCO: ICD-10-CM

## 2023-09-18 DIAGNOSIS — J44.1 COPD EXACERBATION (HCC): ICD-10-CM

## 2023-09-18 PROCEDURE — 71271 CT THORAX LUNG CANCER SCR C-: CPT

## 2023-09-19 ENCOUNTER — TELEPHONE (OUTPATIENT)
Dept: CASE MANAGEMENT | Age: 78
End: 2023-09-19

## 2023-09-19 NOTE — TELEPHONE ENCOUNTER
No call, encounter opened to process CT Lung Screening. CT Lung Screen: 9/18/2024    IMPRESSION:  1. There is no pulmonary infiltrate, mass or suspicious pulmonary nodule  2. Advanced emphysematous changes     LUNG RADS:  Lung-RADS 2 - Benign ()     Management:  12 month screening LDCT     RECOMMENDATIONS:  If you would like to register your patient with the [facility], please contact the Nurse Navigator at  [phone]. Pack years: 25.5    Social History     Tobacco Use  Smoking Status: Current Every Day Smoker    Start Date:    Quit Date:    Types: Cigarettes   Packs/Day: 0.5   Years: 46   Pack Years: 25.5   Smokeless Tobacco: Never         Results letter sent to patient via my chart or mailed.      [address]

## 2023-09-25 ENCOUNTER — OFFICE VISIT (OUTPATIENT)
Dept: PULMONOLOGY | Age: 78
End: 2023-09-25
Payer: MEDICARE

## 2023-09-25 VITALS
TEMPERATURE: 97.5 F | HEART RATE: 84 BPM | WEIGHT: 80.8 LBS | SYSTOLIC BLOOD PRESSURE: 110 MMHG | BODY MASS INDEX: 15.25 KG/M2 | HEIGHT: 61 IN | RESPIRATION RATE: 16 BRPM | DIASTOLIC BLOOD PRESSURE: 55 MMHG

## 2023-09-25 DIAGNOSIS — Z99.81 O2 DEPENDENT: ICD-10-CM

## 2023-09-25 DIAGNOSIS — J44.9 STAGE 4 VERY SEVERE COPD BY GOLD CLASSIFICATION (HCC): Primary | ICD-10-CM

## 2023-09-25 DIAGNOSIS — R64 PULMONARY CACHEXIA DUE TO COPD (HCC): ICD-10-CM

## 2023-09-25 DIAGNOSIS — J96.11 CHRONIC RESPIRATORY FAILURE WITH HYPOXIA (HCC): ICD-10-CM

## 2023-09-25 DIAGNOSIS — Z72.0 TOBACCO ABUSE: ICD-10-CM

## 2023-09-25 DIAGNOSIS — J44.9 PULMONARY CACHEXIA DUE TO COPD (HCC): ICD-10-CM

## 2023-09-25 LAB
EXPIRATORY TIME: 7.03 SEC
FEF 25-75% %PRED-PRE: 13 L/SEC
FEF 25-75% PRED: 1.54 L/SEC
FEF 25-75%-PRE: 0.21 L/SEC
FEV1 %PRED-PRE: 29 %
FEV1 PRED: 1.8 L
FEV1/FVC %PRED-PRE: 66 %
FEV1/FVC PRED: 78 %
FEV1/FVC: 52 %
FEV1: 0.52 L
FVC %PRED-PRE: 43 %
FVC PRED: 2.34 L
FVC: 1.01 L
PEF %PRED-PRE: 23 L/SEC
PEF PRED: 4.61 L/SEC
PEF-PRE: 1.09 L/SEC

## 2023-09-25 PROCEDURE — 1123F ACP DISCUSS/DSCN MKR DOCD: CPT | Performed by: NURSE PRACTITIONER

## 2023-09-25 PROCEDURE — 94010 BREATHING CAPACITY TEST: CPT | Performed by: NURSE PRACTITIONER

## 2023-09-25 PROCEDURE — G8427 DOCREV CUR MEDS BY ELIG CLIN: HCPCS | Performed by: NURSE PRACTITIONER

## 2023-09-25 PROCEDURE — 99406 BEHAV CHNG SMOKING 3-10 MIN: CPT | Performed by: NURSE PRACTITIONER

## 2023-09-25 PROCEDURE — G8419 CALC BMI OUT NRM PARAM NOF/U: HCPCS | Performed by: NURSE PRACTITIONER

## 2023-09-25 PROCEDURE — G8399 PT W/DXA RESULTS DOCUMENT: HCPCS | Performed by: NURSE PRACTITIONER

## 2023-09-25 PROCEDURE — 4004F PT TOBACCO SCREEN RCVD TLK: CPT | Performed by: NURSE PRACTITIONER

## 2023-09-25 PROCEDURE — 99214 OFFICE O/P EST MOD 30 MIN: CPT | Performed by: NURSE PRACTITIONER

## 2023-09-25 PROCEDURE — 90686 IIV4 VACC NO PRSV 0.5 ML IM: CPT | Performed by: NURSE PRACTITIONER

## 2023-09-25 PROCEDURE — 1090F PRES/ABSN URINE INCON ASSESS: CPT | Performed by: NURSE PRACTITIONER

## 2023-09-25 PROCEDURE — 3023F SPIROM DOC REV: CPT | Performed by: NURSE PRACTITIONER

## 2023-09-25 RX ORDER — FLUTICASONE PROPIONATE 50 MCG
2 SPRAY, SUSPENSION (ML) NASAL DAILY
Qty: 16 G | Refills: 2 | Status: SHIPPED | OUTPATIENT
Start: 2023-09-25

## 2023-09-25 ASSESSMENT — PULMONARY FUNCTION TESTS
FEV1_PREDICTED: 1.80
FEV1/FVC: 52
FVC_PERCENT_PREDICTED_PRE: 43
FVC: 1.01
FVC_PREDICTED: 2.34
FEV1/FVC_PREDICTED: 78
FEV1_PERCENT_PREDICTED_PRE: 29
FEV1: 0.52
FEV1/FVC_PERCENT_PREDICTED_PRE: 66

## 2023-09-25 NOTE — PROGRESS NOTES
Follow up in office today with CT chest done in Sept; reviewed and discussed images with NP. Dtr present with pt today. O2 continues per orders. Flu vaccine given per orders in Right upper arm; pt tolerated injection well. Band-Aid applied. No allergies confined related to vaccine. Follow up in 6 mos; appt card given.
RBC 4.18 03/03/2023 06:46 AM    HGB 12.7 03/03/2023 06:46 AM    HCT 40.0 03/03/2023 06:46 AM     03/03/2023 06:46 AM    MCV 95.7 03/03/2023 06:46 AM    MCH 30.4 03/03/2023 06:46 AM    MCHC 31.8 03/03/2023 06:46 AM    RDW 12.3 03/03/2023 06:46 AM    SEGSPCT 72 04/30/2012 04:05 AM    LYMPHOPCT 15.9 03/03/2023 06:46 AM    MONOPCT 2.3 03/03/2023 06:46 AM    BASOPCT 0.2 03/03/2023 06:46 AM    MONOSABS 0.11 03/03/2023 06:46 AM    LYMPHSABS 0.76 03/03/2023 06:46 AM    EOSABS 0.00 03/03/2023 06:46 AM    BASOSABS 0.01 03/03/2023 06:46 AM     CMP:    Lab Results   Component Value Date/Time     03/03/2023 06:46 AM    K 4.3 03/03/2023 06:46 AM     03/03/2023 06:46 AM    CO2 33 03/03/2023 06:46 AM    BUN 31 03/03/2023 06:46 AM    CREATININE 0.4 03/03/2023 06:46 AM    GFRAA >60 09/20/2022 04:35 AM    LABGLOM >60 03/03/2023 06:46 AM    GLUCOSE 220 03/03/2023 06:46 AM    GLUCOSE 172 04/30/2012 09:50 AM    PROT 6.6 03/03/2023 06:46 AM    LABALBU 4.3 03/03/2023 06:46 AM    LABALBU 4.5 04/29/2012 05:00 PM    CALCIUM 9.3 03/03/2023 06:46 AM    BILITOT 0.3 03/03/2023 06:46 AM    ALKPHOS 57 03/03/2023 06:46 AM    AST 16 03/03/2023 06:46 AM    ALT 10 03/03/2023 06:46 AM     Magnesium:    Lab Results   Component Value Date/Time    MG 1.9 10/24/2022 09:28 AM     Phosphorus:    Lab Results   Component Value Date/Time    PHOS 3.2 07/11/2018 01:00 PM     HgBA1c:    Lab Results   Component Value Date/Time    LABA1C 5.5 02/04/2019 09:40 AM        I hope this updates you on my evaluation and clinical thinking.  Thank you for allowing me to participate in the care of Abbie Elam.      Sincerely,    Electronically signed by FITZ Ornelas CNP on 9/25/2023 at 12:30 PM

## 2024-01-17 NOTE — PLAN OF CARE
Problem: Falls - Risk of:  Goal: Will remain free from falls  Will remain free from falls   Outcome: Met This Shift      Problem: Infection:  Goal: Will remain free from infection  Will remain free from infection   Outcome: Ongoing      Problem: Daily Care:  Goal: Daily care needs are met  Daily care needs are met   Outcome: Met This Shift      Problem: Pain:  Goal: Patient's pain/discomfort is manageable  Patient's pain/discomfort is manageable   Outcome: Met This Shift      Problem: Skin Integrity:  Goal: Skin integrity will stabilize  Skin integrity will stabilize   Outcome: Met This Shift      Problem: Skin Integrity:  Goal: Absence of new skin breakdown  Absence of new skin breakdown   Outcome: Met This Shift      Problem: Gas Exchange - Impaired:  Goal: Levels of oxygenation will improve  Levels of oxygenation will improve   Outcome: Met This Shift Detail Level: Detailed Other Procedure: patient is getting a haircut on Friday Introduction Text (Please End With A Colon): The following procedure was deferred: patient is not feeling well: Size Of Lesion In Cm (Optional): 0

## 2024-04-14 ENCOUNTER — APPOINTMENT (OUTPATIENT)
Dept: GENERAL RADIOLOGY | Age: 79
End: 2024-04-14
Payer: MEDICARE

## 2024-04-14 ENCOUNTER — HOSPITAL ENCOUNTER (EMERGENCY)
Age: 79
Discharge: HOME | End: 2024-04-15
Attending: EMERGENCY MEDICINE
Payer: MEDICARE

## 2024-04-14 DIAGNOSIS — R06.02 SHORTNESS OF BREATH: Primary | ICD-10-CM

## 2024-04-14 DIAGNOSIS — J44.1 CHRONIC OBSTRUCTIVE PULMONARY DISEASE WITH ACUTE EXACERBATION (HCC): ICD-10-CM

## 2024-04-14 LAB
ALBUMIN SERPL-MCNC: 4 G/DL (ref 3.5–5.2)
ALP SERPL-CCNC: 56 U/L (ref 35–104)
ALT SERPL-CCNC: 8 U/L (ref 0–32)
ANION GAP SERPL CALCULATED.3IONS-SCNC: 4 MMOL/L (ref 7–16)
AST SERPL-CCNC: 16 U/L (ref 0–31)
BASOPHILS # BLD: 0.07 K/UL (ref 0–0.2)
BASOPHILS NFR BLD: 1 % (ref 0–2)
BILIRUB SERPL-MCNC: 0.3 MG/DL (ref 0–1.2)
BNP SERPL-MCNC: 308 PG/ML (ref 0–450)
BUN SERPL-MCNC: 25 MG/DL (ref 6–23)
CALCIUM SERPL-MCNC: 9.2 MG/DL (ref 8.6–10.2)
CHLORIDE SERPL-SCNC: 101 MMOL/L (ref 98–107)
CO2 SERPL-SCNC: 37 MMOL/L (ref 22–29)
CREAT SERPL-MCNC: 0.5 MG/DL (ref 0.5–1)
EOSINOPHIL # BLD: 0.37 K/UL (ref 0.05–0.5)
EOSINOPHILS RELATIVE PERCENT: 8 % (ref 0–6)
ERYTHROCYTE [DISTWIDTH] IN BLOOD BY AUTOMATED COUNT: 13.2 % (ref 11.5–15)
GFR SERPL CREATININE-BSD FRML MDRD: >90 ML/MIN/1.73M2
GLUCOSE SERPL-MCNC: 102 MG/DL (ref 74–99)
HCT VFR BLD AUTO: 35.9 % (ref 34–48)
HGB BLD-MCNC: 11.4 G/DL (ref 11.5–15.5)
IMM GRANULOCYTES # BLD AUTO: <0.03 K/UL (ref 0–0.58)
IMM GRANULOCYTES NFR BLD: 0 % (ref 0–5)
INR PPP: 1.1
LYMPHOCYTES NFR BLD: 1.66 K/UL (ref 1.5–4)
LYMPHOCYTES RELATIVE PERCENT: 34 % (ref 20–42)
MAGNESIUM SERPL-MCNC: 2.2 MG/DL (ref 1.6–2.6)
MCH RBC QN AUTO: 31.1 PG (ref 26–35)
MCHC RBC AUTO-ENTMCNC: 31.8 G/DL (ref 32–34.5)
MCV RBC AUTO: 97.8 FL (ref 80–99.9)
MONOCYTES NFR BLD: 0.73 K/UL (ref 0.1–0.95)
MONOCYTES NFR BLD: 15 % (ref 2–12)
NEUTROPHILS NFR BLD: 42 % (ref 43–80)
NEUTS SEG NFR BLD: 2.05 K/UL (ref 1.8–7.3)
PLATELET # BLD AUTO: 212 K/UL (ref 130–450)
PMV BLD AUTO: 9.4 FL (ref 7–12)
POTASSIUM SERPL-SCNC: 4.5 MMOL/L (ref 3.5–5)
PROT SERPL-MCNC: 6.1 G/DL (ref 6.4–8.3)
PROTHROMBIN TIME: 12.1 SEC (ref 9.3–12.4)
RBC # BLD AUTO: 3.67 M/UL (ref 3.5–5.5)
SODIUM SERPL-SCNC: 142 MMOL/L (ref 132–146)
TROPONIN I SERPL HS-MCNC: 16 NG/L (ref 0–9)
TROPONIN I SERPL HS-MCNC: 21 NG/L (ref 0–9)
WBC OTHER # BLD: 4.9 K/UL (ref 4.5–11.5)

## 2024-04-14 PROCEDURE — 94640 AIRWAY INHALATION TREATMENT: CPT

## 2024-04-14 PROCEDURE — 85025 COMPLETE CBC W/AUTO DIFF WBC: CPT

## 2024-04-14 PROCEDURE — 93005 ELECTROCARDIOGRAM TRACING: CPT | Performed by: EMERGENCY MEDICINE

## 2024-04-14 PROCEDURE — 85610 PROTHROMBIN TIME: CPT

## 2024-04-14 PROCEDURE — 84484 ASSAY OF TROPONIN QUANT: CPT

## 2024-04-14 PROCEDURE — 99285 EMERGENCY DEPT VISIT HI MDM: CPT

## 2024-04-14 PROCEDURE — 6370000000 HC RX 637 (ALT 250 FOR IP): Performed by: EMERGENCY MEDICINE

## 2024-04-14 PROCEDURE — 71045 X-RAY EXAM CHEST 1 VIEW: CPT

## 2024-04-14 PROCEDURE — 80053 COMPREHEN METABOLIC PANEL: CPT

## 2024-04-14 PROCEDURE — 83735 ASSAY OF MAGNESIUM: CPT

## 2024-04-14 PROCEDURE — 83880 ASSAY OF NATRIURETIC PEPTIDE: CPT

## 2024-04-14 RX ORDER — PREDNISONE 20 MG/1
60 TABLET ORAL ONCE
Status: COMPLETED | OUTPATIENT
Start: 2024-04-14 | End: 2024-04-14

## 2024-04-14 RX ORDER — IPRATROPIUM BROMIDE AND ALBUTEROL SULFATE 2.5; .5 MG/3ML; MG/3ML
1 SOLUTION RESPIRATORY (INHALATION) ONCE
Status: COMPLETED | OUTPATIENT
Start: 2024-04-14 | End: 2024-04-14

## 2024-04-14 RX ORDER — PREDNISONE 20 MG/1
40 TABLET ORAL DAILY
Qty: 10 TABLET | Refills: 0 | Status: SHIPPED | OUTPATIENT
Start: 2024-04-14 | End: 2024-04-19

## 2024-04-14 RX ORDER — ASPIRIN 81 MG/1
324 TABLET, CHEWABLE ORAL ONCE
Status: COMPLETED | OUTPATIENT
Start: 2024-04-14 | End: 2024-04-14

## 2024-04-14 RX ADMIN — ASPIRIN 324 MG: 81 TABLET, CHEWABLE ORAL at 21:59

## 2024-04-14 RX ADMIN — PREDNISONE 60 MG: 20 TABLET ORAL at 23:28

## 2024-04-14 RX ADMIN — IPRATROPIUM BROMIDE AND ALBUTEROL SULFATE 1 DOSE: 2.5; .5 SOLUTION RESPIRATORY (INHALATION) at 19:29

## 2024-04-14 ASSESSMENT — PAIN - FUNCTIONAL ASSESSMENT
PAIN_FUNCTIONAL_ASSESSMENT: NONE - DENIES PAIN
PAIN_FUNCTIONAL_ASSESSMENT: NONE - DENIES PAIN

## 2024-04-14 ASSESSMENT — LIFESTYLE VARIABLES
HOW OFTEN DO YOU HAVE A DRINK CONTAINING ALCOHOL: NEVER
HOW MANY STANDARD DRINKS CONTAINING ALCOHOL DO YOU HAVE ON A TYPICAL DAY: PATIENT DOES NOT DRINK

## 2024-04-14 NOTE — ED PROVIDER NOTES
COURSE/MEDICAL DECISION MAKING----------------------  Medications   predniSONE (DELTASONE) tablet 60 mg (has no administration in time range)   ipratropium 0.5 mg-albuterol 2.5 mg (DUONEB) nebulizer solution 1 Dose (1 Dose Inhalation Given 4/14/24 1929)   aspirin chewable tablet 324 mg (324 mg Oral Given 4/14/24 2159)         ED COURSE:  ED Course as of 04/14/24 2305   [place] Apr 14, 2024 2303 estrellita Arevalo improved [AT]      ED Course User Index  [AT] Varun Crowley MD             This patient's ED course included: a personal history and physicial examination    This patient has remained hemodynamically stable during their ED course.      Re-Evaluations:             Re-evaluation.  Patient’s symptoms are improving    Re-examination  4/14/24   7:08 PM EDT          Vital Signs:   Vitals:    04/14/24 1856 04/14/24 2200   BP: (!) 112/54 110/73   Pulse: 81 89   Resp: 18 17   Temp: 98.1 °F (36.7 °C)    SpO2: 99% 100%             Counseling:   The emergency provider has spoken with the patient and discussed today’s results, in addition to providing specific details for the plan of care and counseling regarding the diagnosis and prognosis.  Questions are answered at this time and they are agreeable with the plan.       --------------------------------- IMPRESSION AND DISPOSITION ---------------------------------    IMPRESSION  1. Shortness of breath    2. Chronic obstructive pulmonary disease with acute exacerbation (HCC)        DISPOSITION  Disposition: Discharge to home  Patient condition is stable    NOTE: This report was transcribed using voice recognition software. Every effort was made to ensure accuracy; however, inadvertent computerized transcription errors may be present        Varun Crowley MD  04/14/24 230

## 2024-04-15 VITALS
DIASTOLIC BLOOD PRESSURE: 69 MMHG | SYSTOLIC BLOOD PRESSURE: 121 MMHG | RESPIRATION RATE: 16 BRPM | TEMPERATURE: 98 F | OXYGEN SATURATION: 100 % | HEART RATE: 71 BPM

## 2024-04-15 LAB
EKG ATRIAL RATE: 78 BPM
EKG P AXIS: 81 DEGREES
EKG P-R INTERVAL: 158 MS
EKG Q-T INTERVAL: 364 MS
EKG QRS DURATION: 78 MS
EKG QTC CALCULATION (BAZETT): 414 MS
EKG R AXIS: -69 DEGREES
EKG T AXIS: 60 DEGREES
EKG VENTRICULAR RATE: 78 BPM

## 2024-04-15 PROCEDURE — 93010 ELECTROCARDIOGRAM REPORT: CPT | Performed by: INTERNAL MEDICINE

## 2024-04-22 ENCOUNTER — OFFICE VISIT (OUTPATIENT)
Dept: INTERNAL MEDICINE | Age: 79
End: 2024-04-22
Payer: MEDICARE

## 2024-04-22 VITALS
DIASTOLIC BLOOD PRESSURE: 53 MMHG | OXYGEN SATURATION: 93 % | SYSTOLIC BLOOD PRESSURE: 88 MMHG | WEIGHT: 73 LBS | HEART RATE: 60 BPM | RESPIRATION RATE: 18 BRPM | HEIGHT: 60 IN | BODY MASS INDEX: 14.33 KG/M2 | TEMPERATURE: 98.5 F

## 2024-04-22 DIAGNOSIS — J44.1 COPD EXACERBATION (HCC): ICD-10-CM

## 2024-04-22 DIAGNOSIS — F17.200 SMOKING: ICD-10-CM

## 2024-04-22 DIAGNOSIS — J44.9 PULMONARY CACHEXIA DUE TO COPD (HCC): ICD-10-CM

## 2024-04-22 DIAGNOSIS — J96.12 CHRONIC RESPIRATORY FAILURE WITH HYPOXIA AND HYPERCAPNIA (HCC): ICD-10-CM

## 2024-04-22 DIAGNOSIS — R64 PULMONARY CACHEXIA DUE TO COPD (HCC): ICD-10-CM

## 2024-04-22 DIAGNOSIS — E55.9 VITAMIN D DEFICIENCY: Primary | ICD-10-CM

## 2024-04-22 DIAGNOSIS — J96.11 CHRONIC RESPIRATORY FAILURE WITH HYPOXIA AND HYPERCAPNIA (HCC): ICD-10-CM

## 2024-04-22 PROCEDURE — G8399 PT W/DXA RESULTS DOCUMENT: HCPCS

## 2024-04-22 PROCEDURE — 99212 OFFICE O/P EST SF 10 MIN: CPT

## 2024-04-22 PROCEDURE — G8427 DOCREV CUR MEDS BY ELIG CLIN: HCPCS

## 2024-04-22 PROCEDURE — 3023F SPIROM DOC REV: CPT

## 2024-04-22 PROCEDURE — 1123F ACP DISCUSS/DSCN MKR DOCD: CPT

## 2024-04-22 PROCEDURE — 4004F PT TOBACCO SCREEN RCVD TLK: CPT

## 2024-04-22 PROCEDURE — 1090F PRES/ABSN URINE INCON ASSESS: CPT

## 2024-04-22 PROCEDURE — G8419 CALC BMI OUT NRM PARAM NOF/U: HCPCS

## 2024-04-22 PROCEDURE — 99204 OFFICE O/P NEW MOD 45 MIN: CPT

## 2024-04-22 RX ORDER — MELATONIN
1000 DAILY
Qty: 30 TABLET | Refills: 0 | Status: SHIPPED | OUTPATIENT
Start: 2024-04-22

## 2024-04-22 RX ORDER — IPRATROPIUM BROMIDE AND ALBUTEROL SULFATE 2.5; .5 MG/3ML; MG/3ML
3 SOLUTION RESPIRATORY (INHALATION) EVERY 6 HOURS PRN
Qty: 360 ML | Refills: 6 | Status: SHIPPED | OUTPATIENT
Start: 2024-04-22

## 2024-04-22 RX ORDER — POLYETHYLENE GLYCOL 3350 17 G
2 POWDER IN PACKET (EA) ORAL PRN
Qty: 100 EACH | Refills: 3 | Status: SHIPPED | OUTPATIENT
Start: 2024-04-22

## 2024-04-22 SDOH — ECONOMIC STABILITY: FOOD INSECURITY: WITHIN THE PAST 12 MONTHS, YOU WORRIED THAT YOUR FOOD WOULD RUN OUT BEFORE YOU GOT MONEY TO BUY MORE.: SOMETIMES TRUE

## 2024-04-22 SDOH — ECONOMIC STABILITY: FOOD INSECURITY: WITHIN THE PAST 12 MONTHS, THE FOOD YOU BOUGHT JUST DIDN'T LAST AND YOU DIDN'T HAVE MONEY TO GET MORE.: SOMETIMES TRUE

## 2024-04-22 SDOH — ECONOMIC STABILITY: HOUSING INSECURITY
IN THE LAST 12 MONTHS, WAS THERE A TIME WHEN YOU DID NOT HAVE A STEADY PLACE TO SLEEP OR SLEPT IN A SHELTER (INCLUDING NOW)?: NO

## 2024-04-22 SDOH — ECONOMIC STABILITY: INCOME INSECURITY: HOW HARD IS IT FOR YOU TO PAY FOR THE VERY BASICS LIKE FOOD, HOUSING, MEDICAL CARE, AND HEATING?: NOT HARD AT ALL

## 2024-04-22 ASSESSMENT — ENCOUNTER SYMPTOMS
COUGH: 1
CONSTIPATION: 1
EYES NEGATIVE: 1
STRIDOR: 1
WHEEZING: 1
ALLERGIC/IMMUNOLOGIC NEGATIVE: 1
CHEST TIGHTNESS: 1
SHORTNESS OF BREATH: 1

## 2024-04-22 ASSESSMENT — PATIENT HEALTH QUESTIONNAIRE - PHQ9
SUM OF ALL RESPONSES TO PHQ QUESTIONS 1-9: 1
SUM OF ALL RESPONSES TO PHQ QUESTIONS 1-9: 1
1. LITTLE INTEREST OR PLEASURE IN DOING THINGS: NOT AT ALL
SUM OF ALL RESPONSES TO PHQ9 QUESTIONS 1 & 2: 1
SUM OF ALL RESPONSES TO PHQ QUESTIONS 1-9: 1
2. FEELING DOWN, DEPRESSED OR HOPELESS: SEVERAL DAYS
SUM OF ALL RESPONSES TO PHQ QUESTIONS 1-9: 1

## 2024-04-22 NOTE — PROGRESS NOTES
[facility]      SUBJECTIVE:  Therese Bhatt (:  1945) is a 78 y.o. female here for evaluation of the following chief complaint(s):  Follow-up (ER)    She is here for ED follow up of sob, beginning 3 weeks ago.  The complaint has been persistent, moderate in severity, and worsened by nothing.  Brought in by EMS.  History of COPD.  On 2 L chronically.  Shortness of breath for past several weeks.  Nonproductive cough.  No fever chills or sweats.  No nausea vomiting diarrhea.  No chest pain.       Today   On 3 L she is 94%  She is here for ED follow up, she is not using her Trelegy inhaler for couple of months . She smokes 1.5 pack per week, she only uses her albuterol      She is on 2 L at base, but since last week with increasing cigarettes she is on 3 L oxygen     During past year she has been admitted for COPD exacerbation 2 times       We discussed the necessity of quitting smoking, she agreed to use nicotine patches (1.5 pack per week , we started her on 7 mg patches ) and 2 mg lozenge     PHQ-9 score is 5  Her appetite is good but her BMI is 14.26 and she does not gain weight, she might benefit from mitazapine, we are considering to start it next visit        We discussed necessity of using her Trelegy inhaler as scheduled, she demonstrated a good technique of using inhaler,       Patient left the office before my precepting with attending, the daughter had another appointment and they thought the visit is over       Review of Systems   Constitutional: Negative.    Eyes: Negative.    Respiratory:  Positive for cough, chest tightness, shortness of breath, wheezing and stridor.    Cardiovascular: Negative.    Gastrointestinal:  Positive for constipation.   Endocrine: Positive for cold intolerance.   Genitourinary: Negative.    Musculoskeletal: Negative.    Skin: Negative.    Allergic/Immunologic: Negative.    Neurological: Negative.    Hematological:

## 2024-04-22 NOTE — PROGRESS NOTES
[facility]  Internal Medicine Residency Clinic    Attending Physician Statement  I have discussed the case, including pertinent history and exam findings with the resident physician.I have seen and examined the patient and the key elements of the encounter have been performed by me. I agree with the assessment, plan and orders as documented by the resident. I have reviewed the relevant PMHx, PSHx, FamHx, SocialHx, medications, and allergies and updated history as appropriate.    Patient presents for emergency room follow up of medical problems. Presented to ED 4/14/24 for mild COPD exacerbation treated with Duoneb and discharged from ER home. Reports chronic dyspnea and is at baseline per her report. Here to reestablish care with PCP, last seen 2019 in our office.     COPD, very severe on chronic O2 with pulmonary cachexia  Follows with pulmonary -- reports not using any inhalers aside from albuterol PRN. Dr. Hansen counseled regarding appropriate inhaler use to improve COPD symptoms. Patient with BMI 14 and she weighs 73#, reported strong appetite. Patient is limited code from prior admission on chart review.     Depression, mild  Continue support of family. She could possibly benefit from mirtazapine    Tobacco use disorder   Continues to smoke, pre-contemplative    Remainder of medical problems as per resident note.    Evaristo Hoffman,   4/22/2024 2:18 PM

## 2024-04-22 NOTE — PATIENT INSTRUCTIONS
Dear Therese Bhatt,        Thank you for coming to your appointment today. I hope we have addressed all of your needs.       Please make sure to do the following:  - Continue your medications as listed.  - Get labs drawn before our next follow up. We will call you with the results     - We will see each other again in 3 month        Have a great day!        Sincerely,  Stacie Hasnen MD  4/22/2024  2:11 PM

## 2024-06-05 ENCOUNTER — HOSPITAL ENCOUNTER (INPATIENT)
Age: 79
LOS: 5 days | Discharge: NURSING, REHAB OR LONG-TERM CARE | DRG: 189 | End: 2024-06-10
Attending: EMERGENCY MEDICINE | Admitting: INTERNAL MEDICINE
Payer: MEDICARE

## 2024-06-05 ENCOUNTER — APPOINTMENT (OUTPATIENT)
Dept: GENERAL RADIOLOGY | Age: 79
DRG: 189 | End: 2024-06-05
Payer: MEDICARE

## 2024-06-05 DIAGNOSIS — J44.1 COPD WITH ACUTE EXACERBATION (HCC): Primary | ICD-10-CM

## 2024-06-05 DIAGNOSIS — J96.02 ACUTE RESPIRATORY FAILURE WITH HYPERCAPNIA (HCC): ICD-10-CM

## 2024-06-05 DIAGNOSIS — E55.9 VITAMIN D DEFICIENCY: ICD-10-CM

## 2024-06-05 PROBLEM — J96.92 RESPIRATORY FAILURE WITH HYPERCAPNIA (HCC): Status: ACTIVE | Noted: 2024-06-05

## 2024-06-05 LAB
AADO2: 82.4 MMHG
AADO2: ABNORMAL MMHG
ALBUMIN SERPL-MCNC: 4 G/DL (ref 3.5–5.2)
ALP SERPL-CCNC: 72 U/L (ref 35–104)
ALT SERPL-CCNC: <5 U/L (ref 0–32)
ANION GAP SERPL CALCULATED.3IONS-SCNC: 7 MMOL/L (ref 7–16)
AST SERPL-CCNC: 18 U/L (ref 0–31)
B.E.: 1.5 MMOL/L (ref -3–3)
B.E.: 5.9 MMOL/L (ref -3–3)
BASOPHILS # BLD: 0.04 K/UL (ref 0–0.2)
BASOPHILS NFR BLD: 0 % (ref 0–2)
BILIRUB SERPL-MCNC: 0.4 MG/DL (ref 0–1.2)
BNP SERPL-MCNC: 143 PG/ML (ref 0–450)
BUN SERPL-MCNC: 21 MG/DL (ref 6–23)
CALCIUM SERPL-MCNC: 9.6 MG/DL (ref 8.6–10.2)
CHLORIDE SERPL-SCNC: 96 MMOL/L (ref 98–107)
CO2 SERPL-SCNC: 35 MMOL/L (ref 22–29)
COHB: 0.8 % (ref 0–1.5)
COHB: 1.4 % (ref 0–1.5)
CREAT SERPL-MCNC: 0.5 MG/DL (ref 0.5–1)
CRITICAL: ABNORMAL
CRITICAL: ABNORMAL
DATE ANALYZED: ABNORMAL
DATE ANALYZED: ABNORMAL
DATE OF COLLECTION: ABNORMAL
DATE OF COLLECTION: ABNORMAL
EOSINOPHIL # BLD: 0.23 K/UL (ref 0.05–0.5)
EOSINOPHILS RELATIVE PERCENT: 3 % (ref 0–6)
ERYTHROCYTE [DISTWIDTH] IN BLOOD BY AUTOMATED COUNT: 12.8 % (ref 11.5–15)
FIO2: 50 %
FIO2: 50 %
GFR, ESTIMATED: >90 ML/MIN/1.73M2
GLUCOSE SERPL-MCNC: 154 MG/DL (ref 74–99)
HCO3: 31.2 MMOL/L (ref 22–26)
HCO3: 36.9 MMOL/L (ref 22–26)
HCT VFR BLD AUTO: 34 % (ref 34–48)
HGB BLD-MCNC: 10.3 G/DL (ref 11.5–15.5)
HHB: 0.5 % (ref 0–5)
HHB: 1.1 % (ref 0–5)
IMM GRANULOCYTES # BLD AUTO: 0.03 K/UL (ref 0–0.58)
IMM GRANULOCYTES NFR BLD: 0 % (ref 0–5)
LAB: ABNORMAL
LAB: ABNORMAL
LYMPHOCYTES NFR BLD: 3.37 K/UL (ref 1.5–4)
LYMPHOCYTES RELATIVE PERCENT: 37 % (ref 20–42)
Lab: 2117
Lab: 2225
MCH RBC QN AUTO: 31.3 PG (ref 26–35)
MCHC RBC AUTO-ENTMCNC: 30.3 G/DL (ref 32–34.5)
MCV RBC AUTO: 103.3 FL (ref 80–99.9)
METHB: 0.3 % (ref 0–1.5)
METHB: 0.4 % (ref 0–1.5)
MODE: ABNORMAL
MODE: ABNORMAL
MONOCYTES NFR BLD: 1.07 K/UL (ref 0.1–0.95)
MONOCYTES NFR BLD: 12 % (ref 2–12)
NEUTROPHILS NFR BLD: 49 % (ref 43–80)
NEUTS SEG NFR BLD: 4.45 K/UL (ref 1.8–7.3)
O2 SATURATION: 98.9 % (ref 92–98.5)
O2 SATURATION: 99.5 % (ref 92–98.5)
O2HB: 97.1 % (ref 94–97)
O2HB: 98.4 % (ref 94–97)
OPERATOR ID: ABNORMAL
OPERATOR ID: ABNORMAL
PATIENT TEMP: 37 C
PATIENT TEMP: 37 C
PCO2: 79.1 MMHG (ref 35–45)
PCO2: 92.8 MMHG (ref 35–45)
PEEP/CPAP: 5 CMH2O
PEEP/CPAP: 6 CMH2O
PFO2: 3.14 MMHG/%
PFO2: 6.16 MMHG/%
PH BLOOD GAS: 7.21 (ref 7.35–7.45)
PH BLOOD GAS: 7.22 (ref 7.35–7.45)
PIP: 12 CMH2O
PIP: 15 CMH2O
PLATELET # BLD AUTO: 234 K/UL (ref 130–450)
PMV BLD AUTO: 9.8 FL (ref 7–12)
PO2: 157.2 MMHG (ref 75–100)
PO2: 307.8 MMHG (ref 75–100)
POTASSIUM SERPL-SCNC: 4.4 MMOL/L (ref 3.5–5)
POTASSIUM SERPL-SCNC: 4.44 MMOL/L (ref 3.5–5)
PROT SERPL-MCNC: 7 G/DL (ref 6.4–8.3)
RBC # BLD AUTO: 3.29 M/UL (ref 3.5–5.5)
RI(T): 0.52
SODIUM SERPL-SCNC: 138 MMOL/L (ref 132–146)
SOURCE, BLOOD GAS: ABNORMAL
SOURCE, BLOOD GAS: ABNORMAL
THB: 12.1 G/DL (ref 11.5–16.5)
THB: 13.1 G/DL (ref 11.5–16.5)
TIME ANALYZED: 2118
TIME ANALYZED: 2229
TROPONIN I SERPL HS-MCNC: 19 NG/L (ref 0–9)
WBC OTHER # BLD: 9.2 K/UL (ref 4.5–11.5)

## 2024-06-05 PROCEDURE — 84484 ASSAY OF TROPONIN QUANT: CPT

## 2024-06-05 PROCEDURE — 71045 X-RAY EXAM CHEST 1 VIEW: CPT

## 2024-06-05 PROCEDURE — 80053 COMPREHEN METABOLIC PANEL: CPT

## 2024-06-05 PROCEDURE — 82805 BLOOD GASES W/O2 SATURATION: CPT

## 2024-06-05 PROCEDURE — 85025 COMPLETE CBC W/AUTO DIFF WBC: CPT

## 2024-06-05 PROCEDURE — 2000000000 HC ICU R&B

## 2024-06-05 PROCEDURE — 5A09457 ASSISTANCE WITH RESPIRATORY VENTILATION, 24-96 CONSECUTIVE HOURS, CONTINUOUS POSITIVE AIRWAY PRESSURE: ICD-10-PCS | Performed by: INTERNAL MEDICINE

## 2024-06-05 PROCEDURE — 6370000000 HC RX 637 (ALT 250 FOR IP): Performed by: EMERGENCY MEDICINE

## 2024-06-05 PROCEDURE — 99285 EMERGENCY DEPT VISIT HI MDM: CPT

## 2024-06-05 PROCEDURE — 83880 ASSAY OF NATRIURETIC PEPTIDE: CPT

## 2024-06-05 PROCEDURE — 94640 AIRWAY INHALATION TREATMENT: CPT

## 2024-06-05 PROCEDURE — 84132 ASSAY OF SERUM POTASSIUM: CPT

## 2024-06-05 RX ORDER — MELATONIN
1 DAILY
Qty: 30 TABLET | Refills: 2 | Status: ON HOLD | OUTPATIENT
Start: 2024-06-05

## 2024-06-05 RX ORDER — IPRATROPIUM BROMIDE AND ALBUTEROL SULFATE 2.5; .5 MG/3ML; MG/3ML
1 SOLUTION RESPIRATORY (INHALATION)
Status: COMPLETED | OUTPATIENT
Start: 2024-06-05 | End: 2024-06-05

## 2024-06-05 RX ADMIN — IPRATROPIUM BROMIDE AND ALBUTEROL SULFATE 1 DOSE: 2.5; .5 SOLUTION RESPIRATORY (INHALATION) at 21:24

## 2024-06-05 RX ADMIN — IPRATROPIUM BROMIDE AND ALBUTEROL SULFATE 1 DOSE: 2.5; .5 SOLUTION RESPIRATORY (INHALATION) at 21:26

## 2024-06-05 RX ADMIN — IPRATROPIUM BROMIDE AND ALBUTEROL SULFATE 1 DOSE: 2.5; .5 SOLUTION RESPIRATORY (INHALATION) at 21:23

## 2024-06-05 ASSESSMENT — PAIN - FUNCTIONAL ASSESSMENT: PAIN_FUNCTIONAL_ASSESSMENT: NONE - DENIES PAIN

## 2024-06-06 LAB
AADO2: ABNORMAL MMHG
AADO2: ABNORMAL MMHG
ANION GAP SERPL CALCULATED.3IONS-SCNC: 15 MMOL/L (ref 7–16)
B PARAP IS1001 DNA NPH QL NAA+NON-PROBE: NOT DETECTED
B PERT DNA SPEC QL NAA+PROBE: NOT DETECTED
B.E.: 10.2 MMOL/L (ref -3–3)
B.E.: 6.3 MMOL/L (ref -3–3)
B.E.: 8.4 MMOL/L (ref -3–3)
B.E.: 9.4 MMOL/L (ref -3–3)
BASOPHILS # BLD: 0 K/UL (ref 0–0.2)
BASOPHILS NFR BLD: 0 % (ref 0–2)
BUN SERPL-MCNC: 27 MG/DL (ref 6–23)
C PNEUM DNA NPH QL NAA+NON-PROBE: NOT DETECTED
CALCIUM SERPL-MCNC: 9.3 MG/DL (ref 8.6–10.2)
CHLORIDE SERPL-SCNC: 96 MMOL/L (ref 98–107)
CHOLEST SERPL-MCNC: 208 MG/DL
CO2 SERPL-SCNC: 29 MMOL/L (ref 22–29)
COHB: 0.1 % (ref 0–1.5)
COHB: 0.3 % (ref 0–1.5)
COHB: 0.4 % (ref 0–1.5)
COHB: 0.4 % (ref 0–1.5)
CREAT SERPL-MCNC: 0.5 MG/DL (ref 0.5–1)
CRITICAL: ABNORMAL
DATE ANALYZED: ABNORMAL
DATE OF COLLECTION: ABNORMAL
EOSINOPHIL # BLD: 0 K/UL (ref 0.05–0.5)
EOSINOPHILS RELATIVE PERCENT: 0 % (ref 0–6)
ERYTHROCYTE [DISTWIDTH] IN BLOOD BY AUTOMATED COUNT: 12.8 % (ref 11.5–15)
FERRITIN SERPL-MCNC: 87 NG/ML
FIO2: 25 %
FIO2: 40 %
FLUAV RNA NPH QL NAA+NON-PROBE: NOT DETECTED
FLUBV RNA NPH QL NAA+NON-PROBE: NOT DETECTED
FOLATE SERPL-MCNC: 14.7 NG/ML (ref 4.8–24.2)
GFR, ESTIMATED: >90 ML/MIN/1.73M2
GLUCOSE SERPL-MCNC: 312 MG/DL (ref 74–99)
HADV DNA NPH QL NAA+NON-PROBE: NOT DETECTED
HCO3: 34.1 MMOL/L (ref 22–26)
HCO3: 34.9 MMOL/L (ref 22–26)
HCO3: 36.9 MMOL/L (ref 22–26)
HCO3: 38.5 MMOL/L (ref 22–26)
HCOV 229E RNA NPH QL NAA+NON-PROBE: NOT DETECTED
HCOV HKU1 RNA NPH QL NAA+NON-PROBE: NOT DETECTED
HCOV NL63 RNA NPH QL NAA+NON-PROBE: NOT DETECTED
HCOV OC43 RNA NPH QL NAA+NON-PROBE: NOT DETECTED
HCT VFR BLD AUTO: 39.4 % (ref 34–48)
HDLC SERPL-MCNC: 75 MG/DL
HGB BLD-MCNC: 12 G/DL (ref 11.5–15.5)
HHB: 1 % (ref 0–5)
HHB: 1.1 % (ref 0–5)
HHB: 1.9 % (ref 0–5)
HHB: 4.5 % (ref 0–5)
HMPV RNA NPH QL NAA+NON-PROBE: NOT DETECTED
HPIV1 RNA NPH QL NAA+NON-PROBE: NOT DETECTED
HPIV2 RNA NPH QL NAA+NON-PROBE: NOT DETECTED
HPIV3 RNA NPH QL NAA+NON-PROBE: NOT DETECTED
HPIV4 RNA NPH QL NAA+NON-PROBE: NOT DETECTED
IRON SATN MFR SERPL: 10 % (ref 15–50)
IRON SERPL-MCNC: 25 UG/DL (ref 37–145)
LAB: ABNORMAL
LDLC SERPL CALC-MCNC: 120 MG/DL
LYMPHOCYTES NFR BLD: 0.49 K/UL (ref 1.5–4)
LYMPHOCYTES RELATIVE PERCENT: 7 % (ref 20–42)
Lab: 145
Lab: 1720
Lab: 2229
Lab: 415
M PNEUMO DNA NPH QL NAA+NON-PROBE: NOT DETECTED
MCH RBC QN AUTO: 31.1 PG (ref 26–35)
MCHC RBC AUTO-ENTMCNC: 30.5 G/DL (ref 32–34.5)
MCV RBC AUTO: 102.1 FL (ref 80–99.9)
METHB: 0.3 % (ref 0–1.5)
METHB: 0.4 % (ref 0–1.5)
METHB: 0.6 % (ref 0–1.5)
METHB: 0.6 % (ref 0–1.5)
MODE: ABNORMAL
MONOCYTES NFR BLD: 0.06 K/UL (ref 0.1–0.95)
MONOCYTES NFR BLD: 1 % (ref 2–12)
NEUTROPHILS NFR BLD: 92 % (ref 43–80)
NEUTS SEG NFR BLD: 6.45 K/UL (ref 1.8–7.3)
O2 SATURATION: 95.1 % (ref 92–98.5)
O2 SATURATION: 98 % (ref 92–98.5)
O2 SATURATION: 98.9 % (ref 92–98.5)
O2 SATURATION: 99 % (ref 92–98.5)
O2HB: 94.8 % (ref 94–97)
O2HB: 97.2 % (ref 94–97)
O2HB: 98.2 % (ref 94–97)
O2HB: 98.2 % (ref 94–97)
OPERATOR ID: 1768
OPERATOR ID: 3342
OPERATOR ID: 3342
OPERATOR ID: ABNORMAL
PATH REV BLD -IMP: NORMAL
PATIENT TEMP: 37 C
PCO2: 48 MMHG (ref 35–45)
PCO2: 65.2 MMHG (ref 35–45)
PCO2: 68.6 MMHG (ref 35–45)
PCO2: 88.2 MMHG (ref 35–45)
PEEP/CPAP: 5 CMH2O
PFO2: 5.41 MMHG/%
PFO2: 5.99 MMHG/%
PH BLOOD GAS: 7.26 (ref 7.35–7.45)
PH BLOOD GAS: 7.34 (ref 7.35–7.45)
PH BLOOD GAS: 7.35 (ref 7.35–7.45)
PH BLOOD GAS: 7.48 (ref 7.35–7.45)
PLATELET # BLD AUTO: 251 K/UL (ref 130–450)
PMV BLD AUTO: 9.8 FL (ref 7–12)
PO2: 119.3 MMHG (ref 75–100)
PO2: 149.8 MMHG (ref 75–100)
PO2: 216.5 MMHG (ref 75–100)
PO2: 71.1 MMHG (ref 75–100)
POTASSIUM SERPL-SCNC: 4.9 MMOL/L (ref 3.5–5)
RBC # BLD AUTO: 3.86 M/UL (ref 3.5–5.5)
RBC # BLD: ABNORMAL 10*6/UL
RR MECHANICAL: 24 B/MIN
RSV RNA NPH QL NAA+NON-PROBE: NOT DETECTED
RV+EV RNA NPH QL NAA+NON-PROBE: NOT DETECTED
SARS-COV-2 RNA NPH QL NAA+NON-PROBE: NOT DETECTED
SODIUM SERPL-SCNC: 140 MMOL/L (ref 132–146)
SOURCE, BLOOD GAS: ABNORMAL
SPECIMEN DESCRIPTION: NORMAL
THB: 10.2 G/DL (ref 11.5–16.5)
THB: 10.4 G/DL (ref 11.5–16.5)
THB: 12.1 G/DL (ref 11.5–16.5)
THB: 12.4 G/DL (ref 11.5–16.5)
TIBC SERPL-MCNC: 255 UG/DL (ref 250–450)
TIME ANALYZED: 1725
TIME ANALYZED: 205
TIME ANALYZED: 2235
TIME ANALYZED: 417
TRANSFERRIN SERPL-MCNC: 216 MG/DL (ref 200–360)
TRIGL SERPL-MCNC: 67 MG/DL
TROPONIN I SERPL HS-MCNC: 18 NG/L (ref 0–9)
TSH SERPL DL<=0.05 MIU/L-ACNC: 0.94 UIU/ML (ref 0.27–4.2)
VIT B12 SERPL-MCNC: 444 PG/ML (ref 211–946)
VLDLC SERPL CALC-MCNC: 13 MG/DL
VT MECHANICAL: 400 ML
WBC OTHER # BLD: 7 K/UL (ref 4.5–11.5)

## 2024-06-06 PROCEDURE — 97162 PT EVAL MOD COMPLEX 30 MIN: CPT

## 2024-06-06 PROCEDURE — 82805 BLOOD GASES W/O2 SATURATION: CPT

## 2024-06-06 PROCEDURE — 0202U NFCT DS 22 TRGT SARS-COV-2: CPT

## 2024-06-06 PROCEDURE — 80048 BASIC METABOLIC PNL TOTAL CA: CPT

## 2024-06-06 PROCEDURE — 2580000003 HC RX 258

## 2024-06-06 PROCEDURE — 94640 AIRWAY INHALATION TREATMENT: CPT

## 2024-06-06 PROCEDURE — 82746 ASSAY OF FOLIC ACID SERUM: CPT

## 2024-06-06 PROCEDURE — 85025 COMPLETE CBC W/AUTO DIFF WBC: CPT

## 2024-06-06 PROCEDURE — 6360000002 HC RX W HCPCS

## 2024-06-06 PROCEDURE — 97535 SELF CARE MNGMENT TRAINING: CPT

## 2024-06-06 PROCEDURE — 84466 ASSAY OF TRANSFERRIN: CPT

## 2024-06-06 PROCEDURE — 6370000000 HC RX 637 (ALT 250 FOR IP)

## 2024-06-06 PROCEDURE — 99222 1ST HOSP IP/OBS MODERATE 55: CPT | Performed by: EMERGENCY MEDICINE

## 2024-06-06 PROCEDURE — 97530 THERAPEUTIC ACTIVITIES: CPT

## 2024-06-06 PROCEDURE — 94660 CPAP INITIATION&MGMT: CPT

## 2024-06-06 PROCEDURE — 84484 ASSAY OF TROPONIN QUANT: CPT

## 2024-06-06 PROCEDURE — 36600 WITHDRAWAL OF ARTERIAL BLOOD: CPT

## 2024-06-06 PROCEDURE — 82728 ASSAY OF FERRITIN: CPT

## 2024-06-06 PROCEDURE — 2700000000 HC OXYGEN THERAPY PER DAY

## 2024-06-06 PROCEDURE — 97166 OT EVAL MOD COMPLEX 45 MIN: CPT

## 2024-06-06 PROCEDURE — 84443 ASSAY THYROID STIM HORMONE: CPT

## 2024-06-06 PROCEDURE — 99291 CRITICAL CARE FIRST HOUR: CPT | Performed by: INTERNAL MEDICINE

## 2024-06-06 PROCEDURE — 82607 VITAMIN B-12: CPT

## 2024-06-06 PROCEDURE — 2060000000 HC ICU INTERMEDIATE R&B

## 2024-06-06 PROCEDURE — 99497 ADVNCD CARE PLAN 30 MIN: CPT | Performed by: EMERGENCY MEDICINE

## 2024-06-06 PROCEDURE — 83540 ASSAY OF IRON: CPT

## 2024-06-06 PROCEDURE — 80061 LIPID PANEL: CPT

## 2024-06-06 RX ORDER — ENOXAPARIN SODIUM 100 MG/ML
30 INJECTION SUBCUTANEOUS DAILY
Status: DISCONTINUED | OUTPATIENT
Start: 2024-06-06 | End: 2024-06-07

## 2024-06-06 RX ORDER — ONDANSETRON 4 MG/1
4 TABLET, ORALLY DISINTEGRATING ORAL EVERY 8 HOURS PRN
Status: DISCONTINUED | OUTPATIENT
Start: 2024-06-06 | End: 2024-06-10 | Stop reason: HOSPADM

## 2024-06-06 RX ORDER — ACETAMINOPHEN 325 MG/1
650 TABLET ORAL EVERY 6 HOURS PRN
Status: DISCONTINUED | OUTPATIENT
Start: 2024-06-06 | End: 2024-06-10 | Stop reason: HOSPADM

## 2024-06-06 RX ORDER — ONDANSETRON 2 MG/ML
4 INJECTION INTRAMUSCULAR; INTRAVENOUS EVERY 6 HOURS PRN
Status: DISCONTINUED | OUTPATIENT
Start: 2024-06-06 | End: 2024-06-10 | Stop reason: HOSPADM

## 2024-06-06 RX ORDER — ACETAMINOPHEN 650 MG/1
650 SUPPOSITORY RECTAL EVERY 6 HOURS PRN
Status: DISCONTINUED | OUTPATIENT
Start: 2024-06-06 | End: 2024-06-10 | Stop reason: HOSPADM

## 2024-06-06 RX ORDER — SODIUM CHLORIDE 0.9 % (FLUSH) 0.9 %
5-40 SYRINGE (ML) INJECTION PRN
Status: DISCONTINUED | OUTPATIENT
Start: 2024-06-06 | End: 2024-06-10 | Stop reason: HOSPADM

## 2024-06-06 RX ORDER — ARFORMOTEROL TARTRATE 15 UG/2ML
15 SOLUTION RESPIRATORY (INHALATION)
Status: DISCONTINUED | OUTPATIENT
Start: 2024-06-06 | End: 2024-06-10 | Stop reason: HOSPADM

## 2024-06-06 RX ORDER — SODIUM CHLORIDE 9 MG/ML
INJECTION, SOLUTION INTRAVENOUS PRN
Status: DISCONTINUED | OUTPATIENT
Start: 2024-06-06 | End: 2024-06-10 | Stop reason: HOSPADM

## 2024-06-06 RX ORDER — ALBUTEROL SULFATE 2.5 MG/3ML
2.5 SOLUTION RESPIRATORY (INHALATION) EVERY 6 HOURS PRN
Status: DISCONTINUED | OUTPATIENT
Start: 2024-06-06 | End: 2024-06-10 | Stop reason: HOSPADM

## 2024-06-06 RX ORDER — VITAMIN B COMPLEX
1 TABLET ORAL DAILY
Status: DISCONTINUED | OUTPATIENT
Start: 2024-06-06 | End: 2024-06-10 | Stop reason: HOSPADM

## 2024-06-06 RX ORDER — POLYETHYLENE GLYCOL 3350 17 G/17G
17 POWDER, FOR SOLUTION ORAL DAILY PRN
Status: DISCONTINUED | OUTPATIENT
Start: 2024-06-06 | End: 2024-06-10 | Stop reason: HOSPADM

## 2024-06-06 RX ORDER — IPRATROPIUM BROMIDE AND ALBUTEROL SULFATE 2.5; .5 MG/3ML; MG/3ML
1 SOLUTION RESPIRATORY (INHALATION)
Status: DISCONTINUED | OUTPATIENT
Start: 2024-06-06 | End: 2024-06-10 | Stop reason: HOSPADM

## 2024-06-06 RX ORDER — BUDESONIDE 0.5 MG/2ML
0.5 INHALANT ORAL
Status: DISCONTINUED | OUTPATIENT
Start: 2024-06-06 | End: 2024-06-10 | Stop reason: HOSPADM

## 2024-06-06 RX ORDER — SODIUM CHLORIDE 0.9 % (FLUSH) 0.9 %
5-40 SYRINGE (ML) INJECTION EVERY 12 HOURS SCHEDULED
Status: DISCONTINUED | OUTPATIENT
Start: 2024-06-06 | End: 2024-06-10 | Stop reason: HOSPADM

## 2024-06-06 RX ORDER — AZITHROMYCIN 250 MG/1
500 TABLET, FILM COATED ORAL DAILY
Status: COMPLETED | OUTPATIENT
Start: 2024-06-06 | End: 2024-06-08

## 2024-06-06 RX ADMIN — ARFORMOTEROL TARTRATE 15 MCG: 15 SOLUTION RESPIRATORY (INHALATION) at 09:01

## 2024-06-06 RX ADMIN — IPRATROPIUM BROMIDE AND ALBUTEROL SULFATE 1 DOSE: 2.5; .5 SOLUTION RESPIRATORY (INHALATION) at 11:50

## 2024-06-06 RX ADMIN — AZITHROMYCIN DIHYDRATE 500 MG: 250 TABLET ORAL at 08:23

## 2024-06-06 RX ADMIN — Medication 1000 UNITS: at 08:32

## 2024-06-06 RX ADMIN — WATER 40 MG: 1 INJECTION INTRAMUSCULAR; INTRAVENOUS; SUBCUTANEOUS at 08:24

## 2024-06-06 RX ADMIN — IPRATROPIUM BROMIDE AND ALBUTEROL SULFATE 1 DOSE: 2.5; .5 SOLUTION RESPIRATORY (INHALATION) at 20:22

## 2024-06-06 RX ADMIN — BUDESONIDE 500 MCG: 0.5 SUSPENSION RESPIRATORY (INHALATION) at 09:01

## 2024-06-06 RX ADMIN — ARFORMOTEROL TARTRATE 15 MCG: 15 SOLUTION RESPIRATORY (INHALATION) at 20:22

## 2024-06-06 RX ADMIN — WATER 40 MG: 1 INJECTION INTRAMUSCULAR; INTRAVENOUS; SUBCUTANEOUS at 02:38

## 2024-06-06 RX ADMIN — SODIUM CHLORIDE, PRESERVATIVE FREE 10 ML: 5 INJECTION INTRAVENOUS at 20:56

## 2024-06-06 RX ADMIN — BUDESONIDE 500 MCG: 0.5 SUSPENSION RESPIRATORY (INHALATION) at 20:22

## 2024-06-06 RX ADMIN — WATER 40 MG: 1 INJECTION INTRAMUSCULAR; INTRAVENOUS; SUBCUTANEOUS at 20:56

## 2024-06-06 RX ADMIN — IPRATROPIUM BROMIDE AND ALBUTEROL SULFATE 1 DOSE: 2.5; .5 SOLUTION RESPIRATORY (INHALATION) at 17:20

## 2024-06-06 RX ADMIN — ENOXAPARIN SODIUM 30 MG: 100 INJECTION SUBCUTANEOUS at 08:23

## 2024-06-06 RX ADMIN — IPRATROPIUM BROMIDE AND ALBUTEROL SULFATE 1 DOSE: 2.5; .5 SOLUTION RESPIRATORY (INHALATION) at 09:01

## 2024-06-06 RX ADMIN — SODIUM CHLORIDE, PRESERVATIVE FREE 5 ML: 5 INJECTION INTRAVENOUS at 08:33

## 2024-06-06 ASSESSMENT — PAIN SCALES - GENERAL
PAINLEVEL_OUTOF10: 0

## 2024-06-06 NOTE — ED PROVIDER NOTES
HPI:  6/5/24, Time: 9:17 PM EDT         Therese Bhatt is a 78 y.o. female history of COPD acid reflux history of hyperlipidemia osteoarthritis history renal calculi presenting to the ED for shortness of breath, beginning 1 day ago.  The complaint has been persistent, moderate in severity, and worsened by nothing.  Patient presenting here because of shortness of breath.  Patient's symptoms started reportedly several days ago but worsened today.  Patient was brought in by EMS on CPAP.  Patient complaining of shortness of breath patient complaining of no chest pain there is no history of fever.  Patient reporting no leg pain or swelling.  There is no history of fall.  Patient was given Solu-Medrol as well as DuoNeb    ROS:   Pertinent positives and negatives are stated within HPI, all other systems reviewed and are negative.  --------------------------------------------- PAST HISTORY ---------------------------------------------  Past Medical History:  has a past medical history of Abnormal Pap smear, COPD (chronic obstructive pulmonary disease) (HCC), Emphysema of lung (HCC), Emphysema/COPD (HCC), GERD (gastroesophageal reflux disease), History of stomach ulcers, Hyperlipidemia, Osteoarthritis, and Renal calculi.    Past Surgical History:  has a past surgical history that includes Endometrial biopsy; Tonsillectomy; Upper gastrointestinal endoscopy (10/17/2017); Colonoscopy (10/17/2017); Appendectomy; Cataract removal with implant (Bilateral, dec 2017, jan 2018); and Wrist surgery (Left, 2008).    Social History:  reports that she has been smoking cigarettes. She has a 25.5 pack-year smoking history. She has never used smokeless tobacco. She reports that she does not drink alcohol and does not use drugs.    Family History: family history includes Arthritis in her maternal grandmother and mother; Asthma in her maternal grandmother and mother; Cancer in her brother and sister; Emphysema in her mother; Heart Disease in

## 2024-06-06 NOTE — ACP (ADVANCE CARE PLANNING)
Advance Care Planning   Healthcare Decision Maker:    Primary Decision Maker: Hazel Nguyen - Child - [phone]    Secondary Decision Maker: Steven Nguyenon - Grandchild - [phone]    Secondary Decision Maker: Ralph Bhatt - Child - [phone]    Click here to complete Healthcare Decision Makers including selection of the Healthcare Decision Maker Relationship (ie \"Primary\").

## 2024-06-06 NOTE — CARE COORDINATION
Care Coordination: LOS 1 day   Met with patient and many family members including son, grand daughter and grandson.  Pt lives in a 2-story home with son, daughter, sister, granddaughter. 5 steps to enter. Has 3 ltr nc- uncertain if from [facility]. Has walker and Rollator. Follows at internal med clinic. Family to transport home.  Ralph is primary decision maker, oma 2nd grandson George supplemental. No hx of DIMA, or hhc.  Plan is home at discharge, family agrees. Declines need for hhc for now, will consider if needed.    Electronically signed by Gladis Dietrich RN on 6/6/2024 at 1:14 PM     The Plan for Transition of Care is related to the following treatment goals: dc    The Patient and/or patient son ralph and grandson was provided with a choice of provider and agrees   with the discharge plan. [x] Yes [] No    Freedom of choice list was provided with basic dialogue that supports the patient's individualized plan of care/goals, treatment preferences and shares the quality data associated with the providers. [x] Yes [] No

## 2024-06-06 NOTE — H&P
Psychiatric:         Behavior: Behavior is cooperative.        Labs and Imaging Studies   Basic Labs  Recent Labs     06/05/24 2117 06/05/24 2122   NA  --  138   K 4.44 4.4   CL  --  96*   CO2  --  35*   BUN  --  21   CREATININE  --  0.5   GLUCOSE  --  154*   CALCIUM  --  9.6       Recent Labs     06/05/24 2122   WBC 9.2   RBC 3.29*   HGB 10.3*   HCT 34.0   .3*   MCH 31.3   MCHC 30.3*   RDW 12.8      MPV 9.8     Imaging Studies:     XR CHEST PORTABLE    Result Date: 6/5/2024  EXAMINATION: ONE XRAY VIEW OF THE CHEST 6/5/2024 10:05 pm COMPARISON: 04/14/2024 HISTORY: ORDERING SYSTEM PROVIDED HISTORY: sob TECHNOLOGIST PROVIDED HISTORY: Reason for exam:->sob FINDINGS: The lungs are without acute focal process.  There is no effusion or pneumothorax. The cardiomediastinal silhouette is without acute process. The osseous structures are without acute process.     No acute process.         Resident's Assessment and Plan       Therese Bhatt is a 78 y.o. female with  has a past medical history of Abnormal Pap smear, COPD (chronic obstructive pulmonary disease) (HCC), Emphysema of lung (HCC), Emphysema/COPD (HCC), GERD (gastroesophageal reflux disease), History of stomach ulcers, Hyperlipidemia, Osteoarthritis, and Renal calculi.    Came with CC: Shortness of Breath      Assessment:  Acute on chronic hypoxic respiratory failure 2/2 COPD exacerbation  COPD exacerbation 2/2 secondhand smoke exposure vs allergies vs medication noncompliance  Severe COPD, Gold Stage IV, on Trelegy, Duoneb, Albuterol outpatient, [facility]  Lung nodules, stable, last CT screen 9/2023  Pulmonary cachexia due to COPD on Ensure supplement outpatient  Seasonal allergies  Macrocytic anemia  Vitamin D deficiency on supplementation       Plan:   Repeat ABG now that patient is on AVAPS  Continue NIPPV, monitor mentation as patient is high risk for decompensation  Respiratory panel and procalcitonin to evaluate for

## 2024-06-06 NOTE — CONSULTS
Palliative Care Department  Palliative Care Initial Consult  Provider: Ita Khanna DO  [phone]    Hospital Day: 2  Date of Initial Consult: 06/06/2024  Referring Provider: Rosita Rizo DO   Palliative Medicine was consulted for assistance with: Assist with goals of care, code status    Chief Complaint: Therese Bhatt is a 78 y.o. female with chief complaint of shortness of breath    HPI:   Therese Bhatt is a 78 y.o. female with significant medical history of COPD, GERD, hyperlipidemia, osteoarthritis, history of stomach ulcers, who was admitted to [facility] on 6/5/2024 with acute respiratory failure with hypercapnia and acute COPD exacerbation.  Patient presented to the ED via EMS for the chief complaint of progressively worsening shortness of breath.  Labs and imaging were obtained.  No leukocytosis.  Hemoglobin 10.3, CO2 35, troponin 19, proBNP 143.  ABG showed pH 7.2, pCO2 92.8, PaO2 157.2 and bicarb 36.9.  Patient was given DuoNeb nebulizer, Solu-Medrol by EMS.  Patient was found to be in respiratory distress and placed on BiPAP therapy.  Chest x-ray showed no acute findings.  The patient was admitted into the medical intensive care unit for further evaluation and management.  Critical care has been consulted.  Palliative medicine was consulted to assist with goals of care and CODE STATUS discussion--per critical care note, the patient was previously limited code and now would like full code.    Chart review reveals patient follows with pulmonology and has stage IV gold criteria COPD, on Trelegy, DuoNeb, albuterol-follows with [facility] pulmonology.  Chart review also reveals the patient was evaluated in the emergency department 4-14 24 for shortness of breath and COPD exacerbation and was discharged home.    ASSESSMENT/PLAN:     Pertinent Hospital Diagnoses:  Current medical issues leading to Palliative Medicine involvement include   Active Hospital Problems    
    06/05/24 2122   WBC 9.2   HGB 10.3*   HCT 34.0           Last 3 Blood Glucose:   Recent Labs     06/05/24 2122   GLUCOSE 154*        PT/INR:    Lab Results   Component Value Date/Time    PROTIME 12.1 04/14/2024 07:19 PM    PROTIME 11.6 04/29/2012 05:00 PM    INR 1.1 04/14/2024 07:19 PM     PTT:    Lab Results   Component Value Date/Time    APTT 30.3 04/29/2012 05:00 PM       Comprehensive Metabolic Profile:   Recent Labs     06/05/24 2117 06/05/24 2122   NA  --  138   K 4.44 4.4   CL  --  96*   CO2  --  35*   BUN  --  21   CREATININE  --  0.5   GLUCOSE  --  154*   CALCIUM  --  9.6   BILITOT  --  0.4   ALKPHOS  --  72   AST  --  18   ALT  --  <5      Magnesium:   Lab Results   Component Value Date/Time    MG 2.2 04/14/2024 07:19 PM     Phosphorus:   Lab Results   Component Value Date/Time    PHOS 3.2 07/11/2018 01:00 PM      Imaging Studies        XR CHEST PORTABLE    Result Date: 6/5/2024  EXAMINATION: ONE XRAY VIEW OF THE CHEST 6/5/2024 10:05 pm COMPARISON: 04/14/2024 HISTORY: ORDERING SYSTEM PROVIDED HISTORY: sob TECHNOLOGIST PROVIDED HISTORY: Reason for exam:->sob FINDINGS: The lungs are without acute focal process.  There is no effusion or pneumothorax. The cardiomediastinal silhouette is without acute process. The osseous structures are without acute process.     No acute process.       Resident's Assessment and Plan     Therese Bhatt is a 78 y.o. female came with   has a past medical history of Abnormal Pap smear, COPD (chronic obstructive pulmonary disease) (HCC), Emphysema of lung (HCC), Emphysema/COPD (HCC), GERD (gastroesophageal reflux disease), History of stomach ulcers, Hyperlipidemia, Osteoarthritis, and Renal calculi.  CC of Shortness of breath    Assessment:  Acute on chronic hypoxic respiratory failure 2/2 COPD exacerbation vs progression of COPD, on 2L NC at baseline  COPD exacerbation 2/2 secondhand smoke exposure vs allergies vs medication noncompliance  Severe COPD, Gold Stage IV,

## 2024-06-06 NOTE — ACP (ADVANCE CARE PLANNING)
Advance Care Planning      Palliative Medicine Provider (MD/NP)  Advance Care Planning (ACP) Conversation      Date of Conversation: 06/06/24  The patient and/or authorized decision maker consented to a voluntary Advance Care Planning conversation.   Individuals present for the conversation:   Patient with decision making capacity and grandchildren    Legal Healthcare Agent(s): -- Patient is  and has 4 living children.     Primary Decision Maker: Hazel Nguyen - Child - [phone]    Secondary Decision Maker: Ralph Bhatt - Child - [phone]    ACP documents available in EMR prior to discussion:  -None    Primary Palliative Diagnosis(es):  Acute COPD exacerbation  Acute on chronic respiratory failure with hypercapnia    Conversation Summary:  Patient was seen and examined at bedside today.  Patient is with decision-making capacity and is able to participate in meaningful conversation at this time.  She is awake, alert and oriented x 4.  Discussion held about ACP.  No healthcare power of  or living will in [facility] EMR.  Patient denies having completed documents in the past.  Patient is interested in completing healthcare power of  documents.  Will inform palliative .     Discussion held about CODE STATUS options at length.  Provided pamphlet.  Patient wants to change CODE STATUS to limited-no to all/DNR CCA DNI.     Resuscitation Status:    Code Status: Limited    Outcomes / Completed Documentation:  An explanation of advance directives and their importance was provided and the following forms completed:    -Other - Discussed with palliative medicine , patient interested in reviewing and completing documents    If new document completed, original was provided to patient and/or family member.    Copy was placed for scanning into the [facility] EMR.      I spent 30 minutes providing separately identifiable ACP services with the patient and/or surrogate decision maker in a

## 2024-06-07 LAB
25(OH)D3 SERPL-MCNC: 26.2 NG/ML (ref 30–100)
ANION GAP SERPL CALCULATED.3IONS-SCNC: 8 MMOL/L (ref 7–16)
BASOPHILS # BLD: 0.01 K/UL (ref 0–0.2)
BASOPHILS NFR BLD: 0 % (ref 0–2)
BUN SERPL-MCNC: 35 MG/DL (ref 6–23)
CALCIUM SERPL-MCNC: 9.7 MG/DL (ref 8.6–10.2)
CHLORIDE SERPL-SCNC: 97 MMOL/L (ref 98–107)
CO2 SERPL-SCNC: 36 MMOL/L (ref 22–29)
CREAT SERPL-MCNC: 0.5 MG/DL (ref 0.5–1)
EOSINOPHIL # BLD: 0 K/UL (ref 0.05–0.5)
EOSINOPHILS RELATIVE PERCENT: 0 % (ref 0–6)
ERYTHROCYTE [DISTWIDTH] IN BLOOD BY AUTOMATED COUNT: 12.8 % (ref 11.5–15)
GFR, ESTIMATED: >90 ML/MIN/1.73M2
GLUCOSE BLD-MCNC: 141 MG/DL (ref 74–99)
GLUCOSE BLD-MCNC: 154 MG/DL (ref 74–99)
GLUCOSE BLD-MCNC: 156 MG/DL (ref 74–99)
GLUCOSE BLD-MCNC: 191 MG/DL (ref 74–99)
HCT VFR BLD AUTO: 29.5 % (ref 34–48)
HGB BLD-MCNC: 9.5 G/DL (ref 11.5–15.5)
IMM GRANULOCYTES # BLD AUTO: 0.05 K/UL (ref 0–0.58)
IMM GRANULOCYTES NFR BLD: 1 % (ref 0–5)
IMM RETICS NFR: 7.4 % (ref 3–15.9)
LYMPHOCYTES NFR BLD: 0.85 K/UL (ref 1.5–4)
LYMPHOCYTES RELATIVE PERCENT: 9 % (ref 20–42)
MAGNESIUM SERPL-MCNC: 2 MG/DL (ref 1.6–2.6)
MCH RBC QN AUTO: 31.4 PG (ref 26–35)
MCHC RBC AUTO-ENTMCNC: 32.2 G/DL (ref 32–34.5)
MCV RBC AUTO: 97.4 FL (ref 80–99.9)
MONOCYTES NFR BLD: 0.57 K/UL (ref 0.1–0.95)
MONOCYTES NFR BLD: 6 % (ref 2–12)
NEUTROPHILS NFR BLD: 85 % (ref 43–80)
NEUTS SEG NFR BLD: 8.54 K/UL (ref 1.8–7.3)
PHOSPHATE SERPL-MCNC: 2.9 MG/DL (ref 2.5–4.5)
PMV BLD AUTO: 9.9 FL (ref 7–12)
POTASSIUM SERPL-SCNC: 5 MMOL/L (ref 3.5–5)
RBC # BLD AUTO: 3.03 M/UL (ref 3.5–5.5)
RETIC HEMOGLOBIN: 34 PG (ref 28.2–36.6)
RETICS # AUTO: 0.03 M/UL
RETICS/RBC NFR AUTO: 1.1 % (ref 0.4–1.9)
SODIUM SERPL-SCNC: 141 MMOL/L (ref 132–146)
WBC OTHER # BLD: 10 K/UL (ref 4.5–11.5)

## 2024-06-07 PROCEDURE — 97530 THERAPEUTIC ACTIVITIES: CPT

## 2024-06-07 PROCEDURE — 94640 AIRWAY INHALATION TREATMENT: CPT

## 2024-06-07 PROCEDURE — 99233 SBSQ HOSP IP/OBS HIGH 50: CPT | Performed by: INTERNAL MEDICINE

## 2024-06-07 PROCEDURE — 6360000002 HC RX W HCPCS

## 2024-06-07 PROCEDURE — 97535 SELF CARE MNGMENT TRAINING: CPT

## 2024-06-07 PROCEDURE — 82962 GLUCOSE BLOOD TEST: CPT

## 2024-06-07 PROCEDURE — 6370000000 HC RX 637 (ALT 250 FOR IP)

## 2024-06-07 PROCEDURE — 85025 COMPLETE CBC W/AUTO DIFF WBC: CPT

## 2024-06-07 PROCEDURE — 36415 COLL VENOUS BLD VENIPUNCTURE: CPT

## 2024-06-07 PROCEDURE — 99232 SBSQ HOSP IP/OBS MODERATE 35: CPT | Performed by: EMERGENCY MEDICINE

## 2024-06-07 PROCEDURE — 84100 ASSAY OF PHOSPHORUS: CPT

## 2024-06-07 PROCEDURE — 2700000000 HC OXYGEN THERAPY PER DAY

## 2024-06-07 PROCEDURE — 85045 AUTOMATED RETICULOCYTE COUNT: CPT

## 2024-06-07 PROCEDURE — 6370000000 HC RX 637 (ALT 250 FOR IP): Performed by: NURSE PRACTITIONER

## 2024-06-07 PROCEDURE — 99231 SBSQ HOSP IP/OBS SF/LOW 25: CPT | Performed by: INTERNAL MEDICINE

## 2024-06-07 PROCEDURE — 2580000003 HC RX 258

## 2024-06-07 PROCEDURE — 83735 ASSAY OF MAGNESIUM: CPT

## 2024-06-07 PROCEDURE — 82306 VITAMIN D 25 HYDROXY: CPT

## 2024-06-07 PROCEDURE — 2060000000 HC ICU INTERMEDIATE R&B

## 2024-06-07 PROCEDURE — 80048 BASIC METABOLIC PNL TOTAL CA: CPT

## 2024-06-07 PROCEDURE — 94660 CPAP INITIATION&MGMT: CPT

## 2024-06-07 RX ORDER — INSULIN LISPRO 100 [IU]/ML
0-4 INJECTION, SOLUTION INTRAVENOUS; SUBCUTANEOUS
Status: DISCONTINUED | OUTPATIENT
Start: 2024-06-07 | End: 2024-06-07

## 2024-06-07 RX ORDER — GLUCAGON 1 MG/ML
1 KIT INJECTION PRN
Status: DISCONTINUED | OUTPATIENT
Start: 2024-06-07 | End: 2024-06-10 | Stop reason: HOSPADM

## 2024-06-07 RX ORDER — INSULIN LISPRO 100 [IU]/ML
0-4 INJECTION, SOLUTION INTRAVENOUS; SUBCUTANEOUS
Status: DISCONTINUED | OUTPATIENT
Start: 2024-06-07 | End: 2024-06-10 | Stop reason: HOSPADM

## 2024-06-07 RX ORDER — DEXTROSE MONOHYDRATE 100 MG/ML
INJECTION, SOLUTION INTRAVENOUS CONTINUOUS PRN
Status: DISCONTINUED | OUTPATIENT
Start: 2024-06-07 | End: 2024-06-10 | Stop reason: HOSPADM

## 2024-06-07 RX ORDER — INSULIN LISPRO 100 [IU]/ML
0-4 INJECTION, SOLUTION INTRAVENOUS; SUBCUTANEOUS NIGHTLY
Status: DISCONTINUED | OUTPATIENT
Start: 2024-06-07 | End: 2024-06-10 | Stop reason: HOSPADM

## 2024-06-07 RX ORDER — HEPARIN SODIUM 5000 [USP'U]/ML
5000 INJECTION, SOLUTION INTRAVENOUS; SUBCUTANEOUS EVERY 8 HOURS
Status: DISCONTINUED | OUTPATIENT
Start: 2024-06-07 | End: 2024-06-10 | Stop reason: HOSPADM

## 2024-06-07 RX ADMIN — IPRATROPIUM BROMIDE AND ALBUTEROL SULFATE 1 DOSE: 2.5; .5 SOLUTION RESPIRATORY (INHALATION) at 15:19

## 2024-06-07 RX ADMIN — BUDESONIDE 500 MCG: 0.5 SUSPENSION RESPIRATORY (INHALATION) at 20:13

## 2024-06-07 RX ADMIN — ENOXAPARIN SODIUM 30 MG: 100 INJECTION SUBCUTANEOUS at 08:56

## 2024-06-07 RX ADMIN — Medication 1000 UNITS: at 08:56

## 2024-06-07 RX ADMIN — ARFORMOTEROL TARTRATE 15 MCG: 15 SOLUTION RESPIRATORY (INHALATION) at 20:13

## 2024-06-07 RX ADMIN — WATER 40 MG: 1 INJECTION INTRAMUSCULAR; INTRAVENOUS; SUBCUTANEOUS at 08:56

## 2024-06-07 RX ADMIN — SODIUM CHLORIDE, PRESERVATIVE FREE 10 ML: 5 INJECTION INTRAVENOUS at 08:56

## 2024-06-07 RX ADMIN — IPRATROPIUM BROMIDE AND ALBUTEROL SULFATE 1 DOSE: 2.5; .5 SOLUTION RESPIRATORY (INHALATION) at 20:13

## 2024-06-07 RX ADMIN — SODIUM CHLORIDE, PRESERVATIVE FREE 10 ML: 5 INJECTION INTRAVENOUS at 20:46

## 2024-06-07 RX ADMIN — AZITHROMYCIN DIHYDRATE 500 MG: 250 TABLET ORAL at 08:56

## 2024-06-07 RX ADMIN — IPRATROPIUM BROMIDE AND ALBUTEROL SULFATE 1 DOSE: 2.5; .5 SOLUTION RESPIRATORY (INHALATION) at 11:25

## 2024-06-07 RX ADMIN — ACETAMINOPHEN 650 MG: 325 TABLET ORAL at 04:37

## 2024-06-07 ASSESSMENT — PAIN SCALES - GENERAL: PAINLEVEL_OUTOF10: 4

## 2024-06-07 NOTE — CARE COORDINATION
CM Update: Met with pt and family at bedside to discuss transition of care. We reviewed therapy evals and pt is adamant she is going home. Family supports her decision and will assist with care. They will provide transport when released. Pt transferred from MICU. Currently on 3 liters O2 which is her baseline at home. Pulmonology consulted. CM to follow(TF)        MARISSA MohamudN,RN  Case Management  [phone]

## 2024-06-07 NOTE — ACP (ADVANCE CARE PLANNING)
Advance Care Planning     Palliative Team Advance Care Planning (ACP) Conversation    Date of Conversation: 06/07/24    Individuals present for the conversation: Patient with decision making capacity     ACP documents on file prior to discussion:  -Other limited code, no to all    Previously completed document/s not on file: Patient / participant reports that there are no previously executed ACP documents.    Healthcare Decision Maker:    Primary Decision Maker: Hazel Nguyen - Child - [phone]    Secondary Decision Maker: PatrickGeorge - Grandchild - [phone]    Secondary Decision Maker: Ralph Bhatt - Child - [phone]     Conversation Summary:  Met with pt to follow up on advance directives.  Pt states she does not want to discuss this as she already told everyone that she does not want resuscitated.  LSW attempted to explain the purpose of HCPOA and Living Will but pt did not want to engage in conversation.     Resuscitation Status:   Code Status: Limited     Documentation Completed:  -No new documents completed.    I spent 20 minutes with the patient and/or surrogate decision maker discussing the patient's wishes and goals.      CHAYA Gonzales

## 2024-06-08 LAB
ANION GAP SERPL CALCULATED.3IONS-SCNC: 5 MMOL/L (ref 7–16)
BASOPHILS # BLD: 0.02 K/UL (ref 0–0.2)
BASOPHILS NFR BLD: 0 % (ref 0–2)
BUN SERPL-MCNC: 34 MG/DL (ref 6–23)
CALCIUM SERPL-MCNC: 9 MG/DL (ref 8.6–10.2)
CO2 SERPL-SCNC: 37 MMOL/L (ref 22–29)
CREAT SERPL-MCNC: 0.4 MG/DL (ref 0.5–1)
EOSINOPHIL # BLD: 0.02 K/UL (ref 0.05–0.5)
EOSINOPHILS RELATIVE PERCENT: 0 % (ref 0–6)
ERYTHROCYTE [DISTWIDTH] IN BLOOD BY AUTOMATED COUNT: 13.1 % (ref 11.5–15)
GFR, ESTIMATED: >90 ML/MIN/1.73M2
GLUCOSE BLD-MCNC: 101 MG/DL (ref 74–99)
GLUCOSE BLD-MCNC: 155 MG/DL (ref 74–99)
GLUCOSE BLD-MCNC: 176 MG/DL (ref 74–99)
GLUCOSE SERPL-MCNC: 78 MG/DL (ref 74–99)
HCT VFR BLD AUTO: 29.6 % (ref 34–48)
HGB BLD-MCNC: 9.5 G/DL (ref 11.5–15.5)
IMM GRANULOCYTES # BLD AUTO: 0.03 K/UL (ref 0–0.58)
IMM GRANULOCYTES NFR BLD: 0 % (ref 0–5)
LYMPHOCYTES NFR BLD: 1.8 K/UL (ref 1.5–4)
LYMPHOCYTES RELATIVE PERCENT: 20 % (ref 20–42)
MAGNESIUM SERPL-MCNC: 1.9 MG/DL (ref 1.6–2.6)
MCH RBC QN AUTO: 31.3 PG (ref 26–35)
MCHC RBC AUTO-ENTMCNC: 32.1 G/DL (ref 32–34.5)
MCV RBC AUTO: 97.4 FL (ref 80–99.9)
MONOCYTES NFR BLD: 0.88 K/UL (ref 0.1–0.95)
MONOCYTES NFR BLD: 10 % (ref 2–12)
NEUTROPHILS NFR BLD: 69 % (ref 43–80)
NEUTS SEG NFR BLD: 6.11 K/UL (ref 1.8–7.3)
PHOSPHATE SERPL-MCNC: 2.3 MG/DL (ref 2.5–4.5)
PLATELET # BLD AUTO: 239 K/UL (ref 130–450)
PMV BLD AUTO: 10 FL (ref 7–12)
POTASSIUM SERPL-SCNC: 4 MMOL/L (ref 3.5–5)
RBC # BLD AUTO: 3.04 M/UL (ref 3.5–5.5)
SODIUM SERPL-SCNC: 140 MMOL/L (ref 132–146)
WBC OTHER # BLD: 8.9 K/UL (ref 4.5–11.5)

## 2024-06-08 PROCEDURE — 94640 AIRWAY INHALATION TREATMENT: CPT

## 2024-06-08 PROCEDURE — 6360000002 HC RX W HCPCS: Performed by: NURSE PRACTITIONER

## 2024-06-08 PROCEDURE — 6370000000 HC RX 637 (ALT 250 FOR IP)

## 2024-06-08 PROCEDURE — 80048 BASIC METABOLIC PNL TOTAL CA: CPT

## 2024-06-08 PROCEDURE — 2700000000 HC OXYGEN THERAPY PER DAY

## 2024-06-08 PROCEDURE — 36415 COLL VENOUS BLD VENIPUNCTURE: CPT

## 2024-06-08 PROCEDURE — 94660 CPAP INITIATION&MGMT: CPT

## 2024-06-08 PROCEDURE — 2580000003 HC RX 258

## 2024-06-08 PROCEDURE — 99232 SBSQ HOSP IP/OBS MODERATE 35: CPT | Performed by: INTERNAL MEDICINE

## 2024-06-08 PROCEDURE — 6360000002 HC RX W HCPCS

## 2024-06-08 PROCEDURE — 6370000000 HC RX 637 (ALT 250 FOR IP): Performed by: NURSE PRACTITIONER

## 2024-06-08 PROCEDURE — 82962 GLUCOSE BLOOD TEST: CPT

## 2024-06-08 PROCEDURE — 83735 ASSAY OF MAGNESIUM: CPT

## 2024-06-08 PROCEDURE — 2580000003 HC RX 258: Performed by: STUDENT IN AN ORGANIZED HEALTH CARE EDUCATION/TRAINING PROGRAM

## 2024-06-08 PROCEDURE — 85025 COMPLETE CBC W/AUTO DIFF WBC: CPT

## 2024-06-08 PROCEDURE — 6360000002 HC RX W HCPCS: Performed by: STUDENT IN AN ORGANIZED HEALTH CARE EDUCATION/TRAINING PROGRAM

## 2024-06-08 PROCEDURE — 1200000000 HC SEMI PRIVATE

## 2024-06-08 PROCEDURE — 99233 SBSQ HOSP IP/OBS HIGH 50: CPT | Performed by: INTERNAL MEDICINE

## 2024-06-08 PROCEDURE — 84100 ASSAY OF PHOSPHORUS: CPT

## 2024-06-08 RX ORDER — LANOLIN ALCOHOL/MO/W.PET/CERES
400 CREAM (GRAM) TOPICAL 2 TIMES DAILY
Status: COMPLETED | OUTPATIENT
Start: 2024-06-08 | End: 2024-06-08

## 2024-06-08 RX ADMIN — POLYETHYLENE GLYCOL 3350 17 G: 17 POWDER, FOR SOLUTION ORAL at 06:54

## 2024-06-08 RX ADMIN — WATER 40 MG: 1 INJECTION INTRAMUSCULAR; INTRAVENOUS; SUBCUTANEOUS at 08:18

## 2024-06-08 RX ADMIN — HEPARIN SODIUM 5000 UNITS: 5000 INJECTION INTRAVENOUS; SUBCUTANEOUS at 00:44

## 2024-06-08 RX ADMIN — HEPARIN SODIUM 5000 UNITS: 5000 INJECTION INTRAVENOUS; SUBCUTANEOUS at 08:18

## 2024-06-08 RX ADMIN — BUDESONIDE 500 MCG: 0.5 SUSPENSION RESPIRATORY (INHALATION) at 08:23

## 2024-06-08 RX ADMIN — SODIUM CHLORIDE, PRESERVATIVE FREE 10 ML: 5 INJECTION INTRAVENOUS at 08:18

## 2024-06-08 RX ADMIN — Medication 500 MG: at 09:17

## 2024-06-08 RX ADMIN — Medication 400 MG: at 21:50

## 2024-06-08 RX ADMIN — AZITHROMYCIN DIHYDRATE 500 MG: 250 TABLET ORAL at 08:18

## 2024-06-08 RX ADMIN — Medication 1000 UNITS: at 08:18

## 2024-06-08 RX ADMIN — SODIUM CHLORIDE, PRESERVATIVE FREE 10 ML: 5 INJECTION INTRAVENOUS at 21:49

## 2024-06-08 RX ADMIN — ARFORMOTEROL TARTRATE 15 MCG: 15 SOLUTION RESPIRATORY (INHALATION) at 08:23

## 2024-06-08 RX ADMIN — IPRATROPIUM BROMIDE AND ALBUTEROL SULFATE 1 DOSE: 2.5; .5 SOLUTION RESPIRATORY (INHALATION) at 08:23

## 2024-06-08 RX ADMIN — Medication 400 MG: at 09:17

## 2024-06-08 RX ADMIN — HEPARIN SODIUM 5000 UNITS: 5000 INJECTION INTRAVENOUS; SUBCUTANEOUS at 16:23

## 2024-06-08 RX ADMIN — IPRATROPIUM BROMIDE AND ALBUTEROL SULFATE 1 DOSE: 2.5; .5 SOLUTION RESPIRATORY (INHALATION) at 16:20

## 2024-06-08 ASSESSMENT — PAIN SCALES - GENERAL
PAINLEVEL_OUTOF10: 0
PAINLEVEL_OUTOF10: 0

## 2024-06-09 LAB
ANION GAP SERPL CALCULATED.3IONS-SCNC: 3 MMOL/L (ref 7–16)
BASOPHILS # BLD: 0.02 K/UL (ref 0–0.2)
BASOPHILS NFR BLD: 0 % (ref 0–2)
BUN SERPL-MCNC: 29 MG/DL (ref 6–23)
CALCIUM SERPL-MCNC: 8.9 MG/DL (ref 8.6–10.2)
CHLORIDE SERPL-SCNC: 100 MMOL/L (ref 98–107)
CO2 SERPL-SCNC: 37 MMOL/L (ref 22–29)
CREAT SERPL-MCNC: 0.5 MG/DL (ref 0.5–1)
EOSINOPHIL # BLD: 0.02 K/UL (ref 0.05–0.5)
EOSINOPHILS RELATIVE PERCENT: 0 % (ref 0–6)
ERYTHROCYTE [DISTWIDTH] IN BLOOD BY AUTOMATED COUNT: 13.1 % (ref 11.5–15)
GFR, ESTIMATED: >90 ML/MIN/1.73M2
GLUCOSE BLD-MCNC: 122 MG/DL (ref 74–99)
GLUCOSE BLD-MCNC: 133 MG/DL (ref 74–99)
GLUCOSE BLD-MCNC: 163 MG/DL (ref 74–99)
GLUCOSE BLD-MCNC: 89 MG/DL (ref 74–99)
GLUCOSE SERPL-MCNC: 78 MG/DL (ref 74–99)
HCT VFR BLD AUTO: 29 % (ref 34–48)
HGB BLD-MCNC: 9.4 G/DL (ref 11.5–15.5)
IMM GRANULOCYTES # BLD AUTO: <0.03 K/UL (ref 0–0.58)
IMM GRANULOCYTES NFR BLD: 0 % (ref 0–5)
LYMPHOCYTES NFR BLD: 2.12 K/UL (ref 1.5–4)
LYMPHOCYTES RELATIVE PERCENT: 28 % (ref 20–42)
MAGNESIUM SERPL-MCNC: 2.2 MG/DL (ref 1.6–2.6)
MCH RBC QN AUTO: 31 PG (ref 26–35)
MCHC RBC AUTO-ENTMCNC: 32.4 G/DL (ref 32–34.5)
MCV RBC AUTO: 95.7 FL (ref 80–99.9)
MONOCYTES NFR BLD: 0.95 K/UL (ref 0.1–0.95)
MONOCYTES NFR BLD: 13 % (ref 2–12)
NEUTROPHILS NFR BLD: 58 % (ref 43–80)
NEUTS SEG NFR BLD: 4.37 K/UL (ref 1.8–7.3)
PHOSPHATE SERPL-MCNC: 2.7 MG/DL (ref 2.5–4.5)
PLATELET # BLD AUTO: 246 K/UL (ref 130–450)
PMV BLD AUTO: 9.7 FL (ref 7–12)
POTASSIUM SERPL-SCNC: 4.7 MMOL/L (ref 3.5–5)
RBC # BLD AUTO: 3.03 M/UL (ref 3.5–5.5)
SODIUM SERPL-SCNC: 140 MMOL/L (ref 132–146)
WBC OTHER # BLD: 7.5 K/UL (ref 4.5–11.5)

## 2024-06-09 PROCEDURE — 99233 SBSQ HOSP IP/OBS HIGH 50: CPT | Performed by: INTERNAL MEDICINE

## 2024-06-09 PROCEDURE — 6360000002 HC RX W HCPCS: Performed by: NURSE PRACTITIONER

## 2024-06-09 PROCEDURE — 84100 ASSAY OF PHOSPHORUS: CPT

## 2024-06-09 PROCEDURE — 6370000000 HC RX 637 (ALT 250 FOR IP)

## 2024-06-09 PROCEDURE — 6360000002 HC RX W HCPCS

## 2024-06-09 PROCEDURE — 99232 SBSQ HOSP IP/OBS MODERATE 35: CPT | Performed by: INTERNAL MEDICINE

## 2024-06-09 PROCEDURE — 6360000002 HC RX W HCPCS: Performed by: STUDENT IN AN ORGANIZED HEALTH CARE EDUCATION/TRAINING PROGRAM

## 2024-06-09 PROCEDURE — 1200000000 HC SEMI PRIVATE

## 2024-06-09 PROCEDURE — 80048 BASIC METABOLIC PNL TOTAL CA: CPT

## 2024-06-09 PROCEDURE — 6370000000 HC RX 637 (ALT 250 FOR IP): Performed by: NURSE PRACTITIONER

## 2024-06-09 PROCEDURE — 2580000003 HC RX 258: Performed by: STUDENT IN AN ORGANIZED HEALTH CARE EDUCATION/TRAINING PROGRAM

## 2024-06-09 PROCEDURE — 85025 COMPLETE CBC W/AUTO DIFF WBC: CPT

## 2024-06-09 PROCEDURE — 83735 ASSAY OF MAGNESIUM: CPT

## 2024-06-09 PROCEDURE — 82962 GLUCOSE BLOOD TEST: CPT

## 2024-06-09 PROCEDURE — 36415 COLL VENOUS BLD VENIPUNCTURE: CPT

## 2024-06-09 PROCEDURE — 2700000000 HC OXYGEN THERAPY PER DAY

## 2024-06-09 PROCEDURE — 94640 AIRWAY INHALATION TREATMENT: CPT

## 2024-06-09 PROCEDURE — 2580000003 HC RX 258

## 2024-06-09 RX ADMIN — IPRATROPIUM BROMIDE AND ALBUTEROL SULFATE 1 DOSE: 2.5; .5 SOLUTION RESPIRATORY (INHALATION) at 19:56

## 2024-06-09 RX ADMIN — ARFORMOTEROL TARTRATE 15 MCG: 15 SOLUTION RESPIRATORY (INHALATION) at 09:09

## 2024-06-09 RX ADMIN — Medication 1000 UNITS: at 08:46

## 2024-06-09 RX ADMIN — BUDESONIDE 500 MCG: 0.5 SUSPENSION RESPIRATORY (INHALATION) at 09:09

## 2024-06-09 RX ADMIN — WATER 40 MG: 1 INJECTION INTRAMUSCULAR; INTRAVENOUS; SUBCUTANEOUS at 08:46

## 2024-06-09 RX ADMIN — SODIUM CHLORIDE, PRESERVATIVE FREE 10 ML: 5 INJECTION INTRAVENOUS at 08:46

## 2024-06-09 RX ADMIN — HEPARIN SODIUM 5000 UNITS: 5000 INJECTION INTRAVENOUS; SUBCUTANEOUS at 14:38

## 2024-06-09 RX ADMIN — SODIUM CHLORIDE, PRESERVATIVE FREE 10 ML: 5 INJECTION INTRAVENOUS at 20:35

## 2024-06-09 RX ADMIN — ARFORMOTEROL TARTRATE 15 MCG: 15 SOLUTION RESPIRATORY (INHALATION) at 19:56

## 2024-06-09 RX ADMIN — HEPARIN SODIUM 5000 UNITS: 5000 INJECTION INTRAVENOUS; SUBCUTANEOUS at 20:35

## 2024-06-09 RX ADMIN — BUDESONIDE 500 MCG: 0.5 SUSPENSION RESPIRATORY (INHALATION) at 19:56

## 2024-06-09 RX ADMIN — IPRATROPIUM BROMIDE AND ALBUTEROL SULFATE 1 DOSE: 2.5; .5 SOLUTION RESPIRATORY (INHALATION) at 09:09

## 2024-06-09 RX ADMIN — IPRATROPIUM BROMIDE AND ALBUTEROL SULFATE 1 DOSE: 2.5; .5 SOLUTION RESPIRATORY (INHALATION) at 16:11

## 2024-06-09 RX ADMIN — HEPARIN SODIUM 5000 UNITS: 5000 INJECTION INTRAVENOUS; SUBCUTANEOUS at 06:17

## 2024-06-09 ASSESSMENT — PAIN SCALES - GENERAL: PAINLEVEL_OUTOF10: 0

## 2024-06-10 VITALS
RESPIRATION RATE: 17 BRPM | SYSTOLIC BLOOD PRESSURE: 102 MMHG | TEMPERATURE: 97.6 F | HEART RATE: 90 BPM | BODY MASS INDEX: 14.33 KG/M2 | DIASTOLIC BLOOD PRESSURE: 60 MMHG | OXYGEN SATURATION: 100 % | HEIGHT: 60 IN | WEIGHT: 73 LBS

## 2024-06-10 LAB
ANION GAP SERPL CALCULATED.3IONS-SCNC: 5 MMOL/L (ref 7–16)
BASOPHILS # BLD: 0.01 K/UL (ref 0–0.2)
BASOPHILS NFR BLD: 0 % (ref 0–2)
BUN SERPL-MCNC: 34 MG/DL (ref 6–23)
CALCIUM SERPL-MCNC: 9.3 MG/DL (ref 8.6–10.2)
CHLORIDE SERPL-SCNC: 103 MMOL/L (ref 98–107)
CO2 SERPL-SCNC: 36 MMOL/L (ref 22–29)
CREAT SERPL-MCNC: 0.5 MG/DL (ref 0.5–1)
EOSINOPHIL # BLD: 0.02 K/UL (ref 0.05–0.5)
EOSINOPHILS RELATIVE PERCENT: 0 % (ref 0–6)
ERYTHROCYTE [DISTWIDTH] IN BLOOD BY AUTOMATED COUNT: 13.2 % (ref 11.5–15)
GFR, ESTIMATED: >90 ML/MIN/1.73M2
GLUCOSE BLD-MCNC: 122 MG/DL (ref 74–99)
GLUCOSE BLD-MCNC: 130 MG/DL (ref 74–99)
GLUCOSE BLD-MCNC: 55 MG/DL (ref 74–99)
GLUCOSE BLD-MCNC: 66 MG/DL (ref 74–99)
GLUCOSE BLD-MCNC: 88 MG/DL (ref 74–99)
GLUCOSE BLD-MCNC: 92 MG/DL (ref 74–99)
GLUCOSE BLD-MCNC: 98 MG/DL (ref 74–99)
GLUCOSE SERPL-MCNC: 73 MG/DL (ref 74–99)
HCT VFR BLD AUTO: 29.7 % (ref 34–48)
HGB BLD-MCNC: 9.1 G/DL (ref 11.5–15.5)
IMM GRANULOCYTES # BLD AUTO: 0.03 K/UL (ref 0–0.58)
IMM GRANULOCYTES NFR BLD: 0 % (ref 0–5)
LYMPHOCYTES NFR BLD: 1.76 K/UL (ref 1.5–4)
LYMPHOCYTES RELATIVE PERCENT: 26 % (ref 20–42)
MAGNESIUM SERPL-MCNC: 2.1 MG/DL (ref 1.6–2.6)
MCH RBC QN AUTO: 29.9 PG (ref 26–35)
MCHC RBC AUTO-ENTMCNC: 30.6 G/DL (ref 32–34.5)
MCV RBC AUTO: 97.7 FL (ref 80–99.9)
MONOCYTES NFR BLD: 0.92 K/UL (ref 0.1–0.95)
MONOCYTES NFR BLD: 13 % (ref 2–12)
NEUTROPHILS NFR BLD: 60 % (ref 43–80)
NEUTS SEG NFR BLD: 4.14 K/UL (ref 1.8–7.3)
PHOSPHATE SERPL-MCNC: 3.2 MG/DL (ref 2.5–4.5)
PLATELET # BLD AUTO: 257 K/UL (ref 130–450)
PMV BLD AUTO: 10 FL (ref 7–12)
POTASSIUM SERPL-SCNC: 5.4 MMOL/L (ref 3.5–5)
RBC # BLD AUTO: 3.04 M/UL (ref 3.5–5.5)
SODIUM SERPL-SCNC: 144 MMOL/L (ref 132–146)
WBC OTHER # BLD: 6.9 K/UL (ref 4.5–11.5)

## 2024-06-10 PROCEDURE — 2580000003 HC RX 258: Performed by: STUDENT IN AN ORGANIZED HEALTH CARE EDUCATION/TRAINING PROGRAM

## 2024-06-10 PROCEDURE — 6370000000 HC RX 637 (ALT 250 FOR IP)

## 2024-06-10 PROCEDURE — 99233 SBSQ HOSP IP/OBS HIGH 50: CPT | Performed by: INTERNAL MEDICINE

## 2024-06-10 PROCEDURE — 84100 ASSAY OF PHOSPHORUS: CPT

## 2024-06-10 PROCEDURE — 85025 COMPLETE CBC W/AUTO DIFF WBC: CPT

## 2024-06-10 PROCEDURE — 2700000000 HC OXYGEN THERAPY PER DAY

## 2024-06-10 PROCEDURE — 82962 GLUCOSE BLOOD TEST: CPT

## 2024-06-10 PROCEDURE — 6370000000 HC RX 637 (ALT 250 FOR IP): Performed by: NURSE PRACTITIONER

## 2024-06-10 PROCEDURE — 97530 THERAPEUTIC ACTIVITIES: CPT

## 2024-06-10 PROCEDURE — 83735 ASSAY OF MAGNESIUM: CPT

## 2024-06-10 PROCEDURE — 6360000002 HC RX W HCPCS: Performed by: NURSE PRACTITIONER

## 2024-06-10 PROCEDURE — 80048 BASIC METABOLIC PNL TOTAL CA: CPT

## 2024-06-10 PROCEDURE — 2580000003 HC RX 258

## 2024-06-10 PROCEDURE — 6360000002 HC RX W HCPCS: Performed by: STUDENT IN AN ORGANIZED HEALTH CARE EDUCATION/TRAINING PROGRAM

## 2024-06-10 PROCEDURE — 94640 AIRWAY INHALATION TREATMENT: CPT

## 2024-06-10 PROCEDURE — 97535 SELF CARE MNGMENT TRAINING: CPT

## 2024-06-10 PROCEDURE — 36415 COLL VENOUS BLD VENIPUNCTURE: CPT

## 2024-06-10 RX ADMIN — WATER 40 MG: 1 INJECTION INTRAMUSCULAR; INTRAVENOUS; SUBCUTANEOUS at 10:28

## 2024-06-10 RX ADMIN — Medication 1000 UNITS: at 10:28

## 2024-06-10 RX ADMIN — INSULIN HUMAN 10 UNITS: 100 INJECTION, SOLUTION PARENTERAL at 10:27

## 2024-06-10 RX ADMIN — HEPARIN SODIUM 5000 UNITS: 5000 INJECTION INTRAVENOUS; SUBCUTANEOUS at 14:25

## 2024-06-10 RX ADMIN — DEXTROSE MONOHYDRATE 250 ML: 100 INJECTION, SOLUTION INTRAVENOUS at 10:33

## 2024-06-10 RX ADMIN — HEPARIN SODIUM 5000 UNITS: 5000 INJECTION INTRAVENOUS; SUBCUTANEOUS at 05:40

## 2024-06-10 RX ADMIN — IPRATROPIUM BROMIDE AND ALBUTEROL SULFATE 1 DOSE: 2.5; .5 SOLUTION RESPIRATORY (INHALATION) at 15:45

## 2024-06-10 ASSESSMENT — PAIN SCALES - GENERAL: PAINLEVEL_OUTOF10: 0

## 2024-06-10 NOTE — PROGRESS NOTES
[facility]   INTERNAL MEDICINE RESIDENCY FACULTY    Attending Physician Statement  I have discussed the case, including pertinent history (medical, surgical, family and social) and exam findings with the resident and the team.  I have seen and examined the patient and the key elements of the encounter have been performed by me.  I agree with the assessment, plan and orders as documented by the resident.      The patient was seen earlier by me on rounds with the team.    Patient with COPD in acute exacerbation - improving - on home oxygen and ATBx and steroids - seeing pulmonary as well and determining home with OP pulmonary rehab vs SNF - will follow up today - ready to DC medically.     I have reviewed my findings and recommendations with Therese Bhatt.  Remainder of medical problems as per resident note.      Davey Mclean MD, M.D., [facility]  Internal Medicine Residency Faculty      
    [facility]   INTERNAL MEDICINE RESIDENCY FACULTY    Attending Physician Statement  I have discussed the case, including pertinent history (medical, surgical, family and social) and exam findings with the resident and the team.  I have seen and examined the patient and the key elements of the encounter have been performed by me.  I agree with the assessment, plan and orders as documented by the resident.      The patient was seen earlier by me on rounds with the team.    Patient with severe COPD in exacerbation - she is seeing Dr. Spivey as well - she is very debilitated - almost ready to leave hospital=  home vs SNF.  All questions answered - no concerns from patient.     I have reviewed my findings and recommendations with Therese Bhatt.  Remainder of medical problems as per resident note.      Davey Mclean MD, M.D., [facility]  Internal Medicine Residency Faculty      
  OCCUPATIONAL THERAPY INITIAL EVALUATION    [facility]  [address], [place]       Date:2024                                                               Patient Name: Therese Bhatt  MRN: [id number]  : 1945  Room: [address] [place] [address]    Evaluating OT: Barb Connelly, OTD,  OTR/L; [id number]    Referring Provider: Rosita Rizo DO    Specific Provider Orders/Date: OT eval and treat (24)       Diagnosis: COPD with acute exacerbation (HCC) [J44.1]  Acute respiratory failure with hypercapnia (HCC) [J96.02]  Respiratory failure with hypercapnia (HCC) [J96.92]     Reason for admission: Pt admitted with respiratory failure, SOB, hx COPD    Surgery/Procedures: None this admission     Pertinent Medical History:    Past Medical History:   Diagnosis Date    Abnormal Pap smear     Patient states she had laser surgery    COPD (chronic obstructive pulmonary disease) (HCC)     Emphysema of lung (HCC)     Emphysema/COPD (HCC)     GERD (gastroesophageal reflux disease)     History of stomach ulcers     Hyperlipidemia     DIET    Osteoarthritis     Renal calculi     APX 40 YEARS AGO        *Precautions:  Fall Risk, O2, skin integirty (sacral wound), alarms+    Assessment of current deficits   [x] Functional mobility  [x]ADLs  [x] Strength               []Cognition   [x] Functional transfers   [x] IADLs         [x] Safety Awareness   [x]Endurance   [x] Fine Coordination        [x] ROM     [] Vision/perception   []Sensation    [x]Gross Motor Coordination [x] Balance   [] Delirium                  []Motor Control     [] Communication    OT PLAN OF CARE   OT POC based on physician orders, patient diagnosis and results of clinical assessment.       Frequency/Duration: 1-3 days/wk for 1-2 weeks PRN    Specific OT Treatment Interventions to include:   * Instruction/training on adapted ADL techniques and AE recommendations to increase functional 
  Palliative Care Department  Palliative Care Progress Note  Provider: Ita Khanna DO  [phone]    Hospital Day: 3  Date of Initial Consult: 06/06/2024  Referring Provider: Rosita Rizo DO   Palliative Medicine was consulted for assistance with: Assist with goals of care, code status    Chief Complaint: Therese Bhatt is a 78 y.o. female with chief complaint of shortness of breath    HPI:   Therese Bhatt is a 78 y.o. female with significant medical history of COPD, GERD, hyperlipidemia, osteoarthritis, history of stomach ulcers, who was admitted to [facility] on 6/5/2024 with acute respiratory failure with hypercapnia and acute COPD exacerbation.  Patient presented to the ED via EMS for the chief complaint of progressively worsening shortness of breath.  Labs and imaging were obtained.  No leukocytosis.  Hemoglobin 10.3, CO2 35, troponin 19, proBNP 143.  ABG showed pH 7.2, pCO2 92.8, PaO2 157.2 and bicarb 36.9.  Patient was given DuoNeb nebulizer, Solu-Medrol by EMS.  Patient was found to be in respiratory distress and placed on BiPAP therapy.  Chest x-ray showed no acute findings.  The patient was admitted into the medical intensive care unit for further evaluation and management.  Critical care has been consulted.  Palliative medicine was consulted to assist with goals of care and CODE STATUS discussion--per critical care note, the patient was previously limited code and now would like full code.    Chart review reveals patient follows with pulmonology and has stage IV gold criteria COPD, on Trelegy, DuoNeb, albuterol-follows with [facility] pulmonology.  Chart review also reveals the patient was evaluated in the emergency department 4-14 24 for shortness of breath and COPD exacerbation and was discharged home.    ASSESSMENT/PLAN:     Pertinent Hospital Diagnoses:  Current medical issues leading to Palliative Medicine involvement include   Active Hospital Problems    
  [facility]  Department of Internal Medicine  Division of Pulmonary, Critical Care and Sleep Medicine  Consult Note      Jamison Portillo, APRN-CNP        SUBJECTIVE: We are following Therese Bhatt for acute on chronic respiratory failure. She was transferred to the 8th floor today. Grand daughter is present at bedside. Currently without complaints. Feels like her breathing is much better.     OBJECTIVE:     PHYSICAL EXAM:   VITALS:   Vitals:    06/08/24 0745 06/08/24 0823 06/08/24 1128 06/08/24 1245   BP: 117/62  (!) 113/57 107/89   Pulse: 93  90 94   Resp: 30  19 18   Temp: 97.7 °F (36.5 °C)  98.4 °F (36.9 °C) 98.5 °F (36.9 °C)   TempSrc: Infrared  Temporal Temporal   SpO2: 98% 98% 98% 95%   Weight:       Height:          No intake or output data in the 24 hours ending 06/08/24 1426       CONSTITUTIONAL:    A&O x 3, NAD, cachetic and ill appearing   SKIN:     No rash, No suspicious lesions, No skin discoloration  HEENT:     EOMI, MMM, No thrush  NECK:     No bruits, No JVP appreciated  CV:      Sinus,  No murmur, No rubs, No gallops  PULMONARY:    Globally diminished BS,  No Wheezing, No Rales, No Rhonchi      No noted egophony  ABDOMEN:     Soft, non-tender. BS normal. No R/R/G  EXT:    No deformities .  No clubbing.       No lower extremity edema, No venous stasis  PULSE:    Appears equal and palpable.  PSYCHIATRIC:   Seems appropriate, No acute psychosis  MS:    No fractures, No gross weakness  NEUROLOGIC:   The clinical assessment is non-focal     DATA: IMAGING & TESTING:     LABORATORY TESTS:    CBC with Differential:    Lab Results   Component Value Date/Time    WBC 8.9 06/08/2024 06:07 AM    RBC 3.04 06/08/2024 06:07 AM    HGB 9.5 06/08/2024 06:07 AM    HCT 29.6 06/08/2024 06:07 AM     06/08/2024 06:07 AM    MCV 97.4 06/08/2024 06:07 AM    MCH 31.3 
  [facility]  Department of Internal Medicine  Division of Pulmonary, Critical Care and Sleep Medicine  Consult Note      Jamison Portillo, APRN-CNP        SUBJECTIVE: We are following Therese Whiteist for acute on chronic respiratory failure.  Patient seen and examined.  She is still not at her baseline respiratory status.  She has not been out of bed yet since being in the hospital.    OBJECTIVE:     PHYSICAL EXAM:   VITALS:   Vitals:    06/08/24 1622 06/08/24 2000 06/09/24 0745 06/09/24 0909   BP:  116/83 111/66    Pulse:  97 93 94   Resp:  18 18 16   Temp:  97.5 °F (36.4 °C) 97.7 °F (36.5 °C)    TempSrc:  Temporal Temporal    SpO2: 93% 97% 100% 100%   Weight:       Height:            Intake/Output Summary (Last 24 hours) at 6/9/2024 1519  Last data filed at 6/8/2024 2147  Gross per 24 hour   Intake 240 ml   Output --   Net 240 ml          CONSTITUTIONAL:    A&O x 3, NAD, cachetic and ill appearing   SKIN:     No rash, No suspicious lesions, No skin discoloration  HEENT:     EOMI, MMM, No thrush  NECK:     No bruits, No JVP appreciated  CV:      Sinus,  No murmur, No rubs, No gallops  PULMONARY:    Globally diminished BS,  No Wheezing, No Rales, No Rhonchi      No noted egophony  ABDOMEN:     Soft, non-tender. BS normal. No R/R/G  EXT:    No deformities .  No clubbing.       No lower extremity edema, No venous stasis  PULSE:    Appears equal and palpable.  PSYCHIATRIC:   Seems appropriate, No acute psychosis  MS:    No fractures, No gross weakness  NEUROLOGIC:   The clinical assessment is non-focal     DATA: IMAGING & TESTING:     LABORATORY TESTS:    CBC with Differential:    Lab Results   Component Value Date/Time    WBC 7.5 06/09/2024 04:57 AM    RBC 3.03 06/09/2024 04:57 AM    HGB 9.4 06/09/2024 04:57 AM    HCT 29.0 06/09/2024 04:57 AM     06/09/2024 04:57 
4 Eyes Skin Assessment     NAME:  Therese Bhatt  YOB: 1945  MEDICAL RECORD NUMBER:  [id number]    The patient is being assessed for  Admission    I agree that at least one RN has performed a thorough Head to Toe Skin Assessment on the patient. ALL assessment sites listed below have been assessed.      Areas assessed by both nurses:    Head, Face, Ears, Shoulders, Back, Chest, Arms, Elbows, Hands, Sacrum. Buttock, Coccyx, Ischium, Legs. Feet and Heels, and Under Medical Devices         Does the Patient have a Wound? Yes wound(s) were present on assessment. LDA wound assessment was Initiated and completed by RN    DTI on coccyx  Red blanchable bilateral elbows  Red blanchable bilateral heels       Walt Prevention initiated by RN: Yes  Wound Care Orders initiated by RN: Yes    Pressure Injury (Stage 3,4, Unstageable, DTI, NWPT, and Complex wounds) if present, place Wound referral order by RN under : Yes    New Ostomies, if present place, Ostomy referral order under : No     Nurse 1 eSignature: Electronically signed by Dali Carranza RN on 6/6/24 at 5:10 AM EDT    **SHARE this note so that the co-signing nurse can place an eSignature**    Nurse 2 eSignature: {Esignature:[id number]}  
4 Eyes Skin Assessment     NAME:  Therese Bhatt  YOB: 1945  MEDICAL RECORD NUMBER:  [id number]    The patient is being assessed for  Transfer to New Unit    I agree that at least one RN has performed a thorough Head to Toe Skin Assessment on the patient. ALL assessment sites listed below have been assessed.      Areas assessed by both nurses:    Head, Face, Ears, Shoulders, Back, Chest, Arms, Elbows, Hands, Sacrum. Buttock, Coccyx, Ischium, Legs. Feet and Heels, and Under Medical Devices         Does the Patient have a Wound? Yes wound(s) were present on assessment. LDA wound assessment was Initiated and completed by RN       Walt Prevention initiated by RN: Yes  Wound Care Orders initiated by RN: Yes    Pressure Injury (Stage 3,4, Unstageable, DTI, NWPT, and Complex wounds) if present, place Wound referral order by RN under : No    New Ostomies, if present place, Ostomy referral order under : No     Nurse 1 eSignature: Electronically signed by Solis Barcenas RN on 6/8/24 at 1:59 PM EDT    **SHARE this note so that the co-signing nurse can place an eSignature**    Nurse 2 eSignature: Electronically signed by Yahaira Perdomo RN on 6/8/24 at 2:21 PM EDT  
Chart reviewed.  Goals of care and CODE STATUS were discussed, and we discussed previously.  Patient wants to continue with current medical treatment, and CODE STATUS has been established DNR CCA DNI, patient does not wish to discuss CODE STATUS again.  Discharge disposition to return home with family.  Goals of care and CODE STATUS has been established.  No further PM needs has been identified.  Going to sign off for now.  Please reconsult if new PM needs arises.  
Coordination of care discussion and chart review with PM team. LSW met with to follow up on needs. She is a/ox 4. She immediately states she does not want to discus HCPOA or code statsu. She is adamant that she does not want resuscitated, limited code statsu noted. Pt may benefit form DNR order outside the hospital but she will not engage in this discussion. Pt  reprost she is feeling better, hopes to go home tomorrow and states she has all she needs at home.   
Coordination of care discussion and chart review with PM team. Pt requested information on HCPOA and Living Will. Forms provided, pt wants to review them with her family before completing. Contact information provided.  
[facility]  Internal Medicine Residency Program  Progress Note - House Team     Patient:  Therese Bhatt 78 y.o. female MRN: [id number]     Date of Service: 6/7/2024     CC: SOB  Overnight events: hypercapnia, placed on BIPAP     Subjective     Patient was seen and examined this morning at bedside in no acute distress. Overnight patient was hypercapnic and placed on BIPAP. This resolved with BIPAP use.    This morning, patient reports already feeling much better. She states she usually can walk around her home without difficulty but has progressively declined over the past 1 month 2/2 COPD symptoms. In good spirits, making jokes.     Objective     Physical Exam:  Vitals: BP (!) 113/56   Pulse (!) 104   Temp 97.8 °F (36.6 °C) (Temporal)   Resp 20   Ht 1.524 m (5')   Wt 33.1 kg (73 lb)   SpO2 98%   BMI 14.26 kg/m²     I & O - 24hr: No intake/output data recorded.   General Appearance: alert and cooperative  HEENT:  Head: Normocephalic, no lesions, without obvious abnormality.  Neck: no JVD  Lung: diminished breath sounds anterior - bilateral  Heart: regular rate and rhythm, S1, S2 normal, no murmur, click, rub or gallop  Abdomen: soft, non-tender; bowel sounds normal; no masses,  no organomegaly  Extremities:  extremities normal, atraumatic, no cyanosis or edema  Neurologic: Mental status: Alert, oriented, thought content appropriate    Pertinent Labs & Imaging Studies     CBC with Differential:    Lab Results   Component Value Date/Time    WBC 10.0 06/07/2024 05:44 AM    RBC 3.03 06/07/2024 05:44 AM    HGB 9.5 06/07/2024 05:44 AM    HCT 29.5 06/07/2024 05:44 AM     06/07/2024 05:44 AM    MCV 97.4 06/07/2024 05:44 AM    MCH 31.4 06/07/2024 05:44 AM    MCHC 32.2 06/07/2024 05:44 AM    RDW 12.8 06/07/2024 05:44 AM    LYMPHOPCT 9 06/07/2024 05:44 AM    MONOPCT 6 06/07/2024 05:44 AM    EOSPCT 0 06/07/2024 05:44 AM    BASOPCT 0 06/07/2024 05:44 AM    MONOSABS 0.57 06/07/2024 05:44 AM    
[facility]  Internal Medicine Residency / House Medicine Service    Attending Physician Statement  I have discussed the case, including pertinent history and exam findings with the resident and the team.  I have seen and examined the patient and the key elements of the encounter have been performed by me.  I agree with the assessment, plan and orders as documented by the resident.      A&O  Memory superb  VS stable   Lungs clearing  Meds reviewed   Solumedrol continues  PT/OT   Home placement on O2 with grandchildren at home  Discharge planning  Remainder of medical problems as per resident note.      Kavin Rodriguez MD FRCP [facility]  Internal Medicine Residency Faculty    
[facility]  Internal Medicine Residency / House Medicine Service    Attending Physician Statement  I have discussed the case, including pertinent history and exam findings with the resident and the team.  I have seen and examined the patient and the key elements of the encounter have been performed by me.  I agree with the assessment, plan and orders as documented by the resident.      A&O  No respiratory distress  H&L clearing  ABG results reviewed  Impression; Resolving lung failure secondary to exacerbation of COPD  Plan; Same ICU care     Remainder of medical problems as per resident note.      Kavin Rodriguez MD FRCP [facility]  Internal Medicine Residency Faculty    
Nurse to nurse called to Alea on 8WE and patient placed in transport.     Electronically signed by Carrie Alejandro RN on 6/8/24 at 12:08 PM EDT    
Occupational Therapy  OCCUPATIONAL THERAPY TREATMENT SESSION    [facility]  [address] [place], [place]      OT BEDSIDE TREATMENT NOTE      Date:2024  Patient Name: Therese Bhatt  MRN: [id number]  : 1945  Room: [address] [place] [address]     Per OT Eval:      Evaluating OT: Barb Connelly, MADALYND,  OTR/L; [id number]     Referring Provider: Rosita Rizo DO    Specific Provider Orders/Date: OT eval and treat (24)        Diagnosis: COPD with acute exacerbation (HCC) [J44.1]  Acute respiratory failure with hypercapnia (HCC) [J96.02]  Respiratory failure with hypercapnia (HCC) [J96.92]      Reason for admission: Pt admitted with respiratory failure, SOB, hx COPD     Surgery/Procedures: None this admission      Pertinent Medical History:    Past Medical History        Past Medical History:   Diagnosis Date    Abnormal Pap smear      Patient states she had laser surgery    COPD (chronic obstructive pulmonary disease) (HCC)      Emphysema of lung (HCC)      Emphysema/COPD (HCC)      GERD (gastroesophageal reflux disease)      History of stomach ulcers      Hyperlipidemia       DIET    Osteoarthritis      Renal calculi       APX 40 YEARS AGO            *Precautions:  Fall Risk, O2, skin integirty (sacral wound), alarms+, continuous pulse ox     Assessment of current deficits   [x] Functional mobility          [x]ADLs           [x] Strength                  []Cognition   [x] Functional transfers        [x] IADLs         [x] Safety Awareness   [x]Endurance   [x] Fine Coordination           [x] ROM           [] Vision/perception    []Sensation     [x]Gross Motor Coordination [x] Balance    [] Delirium                  []Motor Control     [] Communication     OT PLAN OF CARE   OT POC based on physician orders, patient diagnosis and results of clinical assessment.        Frequency/Duration: 1-3 days/wk for 1-2 weeks PRN    Specific OT Treatment Interventions 
Occupational Therapy  OCCUPATIONAL THERAPY TREATMENT SESSION    [facility]  [address] [place], [place]      OT BEDSIDE TREATMENT NOTE      Date:6/10/2024  Patient Name: Therese Bhatt  MRN: [id number]  : 1945  Room: [address] [place], [place] [address]     Per OT Eval:      Evaluating OT: Barb Connelly, MADALYND,  OTR/L; [id number]     Referring Provider: Rosita Rizo DO    Specific Provider Orders/Date: OT eval and treat (24)     Diagnosis: COPD with acute exacerbation (HCC) [J44.1]  Acute respiratory failure with hypercapnia (HCC) [J96.02]  Respiratory failure with hypercapnia (HCC) [J96.92]      Reason for admission: Pt admitted with respiratory failure, SOB, hx COPD     Surgery/Procedures: None this admission      Pertinent Medical History:    Past Medical History        Past Medical History:   Diagnosis Date    Abnormal Pap smear      Patient states she had laser surgery    COPD (chronic obstructive pulmonary disease) (HCC)      Emphysema of lung (HCC)      Emphysema/COPD (HCC)      GERD (gastroesophageal reflux disease)      History of stomach ulcers      Hyperlipidemia       DIET    Osteoarthritis      Renal calculi       APX 40 YEARS AGO            *Precautions:  Fall Risk, O2, skin integirty (sacral wound), alarms+, continuous pulse ox     Assessment of current deficits   [x] Functional mobility          [x]ADLs           [x] Strength                  []Cognition   [x] Functional transfers        [x] IADLs         [x] Safety Awareness   [x]Endurance   [x] Fine Coordination           [x] ROM           [] Vision/perception    []Sensation     [x]Gross Motor Coordination [x] Balance    [] Delirium                  []Motor Control     [] Communication     OT PLAN OF CARE   OT POC based on physician orders, patient diagnosis and results of clinical assessment.        Frequency/Duration: 1-3 days/wk for 1-2 weeks PRN    Specific OT Treatment Interventions to 
Patient admitted to MICU with the following belongings:  Glasses, Cell Phone, and Other night gown . The following belongings admitted with the patient, None, were sent home with the patient's family.   
Patient endorses increased shortness of breath and sweating following aerosol tx. She terminated inhalation prior to medication completion and continues to maintain that she was fine prior to medication administration. Breath sounds are decreased, with some scattered wheezing, likely caused by mucus. Although encouraged to cough in attempt to expectorate, she will not, just continues to state that she was fine all day until this dose of albuterol/ipatropium, budesonide and arformoterol.     She is also declining use of the bipap at this time and states that she never wants it on her face again.    After a couple of minutes she is less tachypnic and seems calmer, will continue to monitor closely.  
Patient placed back on BIPAP, states she will stay on it until 6am.  
Pefect serve message sent to Mr. Haynes Alicja and palliative care team regarding pt wanting to change code status outside of hospital and getting confused with the POA paperwork.    Electronically signed by Jose Bhatia RN on 6/7/24 at 9:08 AM EDT    
Physical Therapy    Physical Therapy Initial Assessment     Name: Therese Bhatt  : 1945  MRN: [id number]      Date of Service: 2024    Evaluating PT:  Alcides Aj PT, DPT  [id number]     Room #:  4511/4511-A  Diagnosis:  COPD with acute exacerbation (HCC) [J44.1]  Acute respiratory failure with hypercapnia (HCC) [J96.02]  Respiratory failure with hypercapnia (HCC) [J96.92]  PMHx/PSHx:   has a past medical history of Abnormal Pap smear, COPD (chronic obstructive pulmonary disease) (HCC), Emphysema of lung (HCC), Emphysema/COPD (HCC), GERD (gastroesophageal reflux disease), History of stomach ulcers, Hyperlipidemia, Osteoarthritis, and Renal calculi.   Procedure/Surgery:  none  Precautions:  Falls, O2, Skin integrity, Anxiety   Equipment Needs:  TBD    SUBJECTIVE:    Pt lives with her granddaughter, son, and dtr in a 2 story home with 5 stairs and 1 rail to enter.  Bedroom and bathroom are on the 1st level.  Pt ambulated with no AD but her dtr assists with ambulation and ADLs PTA.    OBJECTIVE:   Initial Evaluation  Date: 24 Treatment Short Term/ Long Term   Goals   AM-PAC 6 Clicks 10/24     Was pt agreeable to Eval/treatment? Yes      Does pt have pain? No c/o pain     Bed Mobility  Rolling: Min A  Supine to sit: Min A (partial)  Sit to supine: Min A  Scooting: Min A  Rolling: Supervision   Supine to sit: Supervision   Sit to supine: Supervision   Scooting: Supervision    Transfers Sit to stand: NT  Stand to sit: NT  Stand pivot: NT  Sit to stand: SBA  Stand to sit: SBA  Stand pivot: SBA   Ambulation    NT  >25 feet with AAD SBA   Stair negotiation: ascended and descended  NT  >2 steps with 1 rail Min A   ROM BUE:  Per OT eval   BLE:  WFL     Strength BUE:  Per OT eval   BLE:  WFL     Balance Sitting EOB:  Min A  Dynamic Standing:  NT  Sitting EOB:  Supervision  Dynamic Standing:  SBA     Pt is A & O x 4  RASS:  0 to +1  CAM-ICU:  NT  Sensation:  Pt denies numbness and tingling to extremities  Edema:  
Physical Therapy  Treatment Note    Name: Therese Bhatt  : 1945  MRN: [id number]      Date of Service: 6/10/2024    Evaluating PT:  Alcides Aj, PT, DPT  [id number]     Room #:  8422/8422-A  Diagnosis:  COPD with acute exacerbation (HCC) [J44.1]  Acute respiratory failure with hypercapnia (HCC) [J96.02]  Respiratory failure with hypercapnia (HCC) [J96.92]  PMHx/PSHx:   has a past medical history of Abnormal Pap smear, COPD (chronic obstructive pulmonary disease) (HCC), Emphysema of lung (HCC), Emphysema/COPD (HCC), GERD (gastroesophageal reflux disease), History of stomach ulcers, Hyperlipidemia, Osteoarthritis, and Renal calculi.   Procedure/Surgery:  none  Precautions:  Falls, O2, Skin integrity, Anxiety   Equipment Needs:  TBD    SUBJECTIVE:    Pt lives with her granddaughter, son, and dtr in a 2 story home with 5 stairs and 1 rail to enter.  Bedroom and bathroom are on the 1st level.  Pt ambulated with no AD but her dtr assists with ambulation and ADLs PTA.    OBJECTIVE:   Initial Evaluation  Date: 24 Treatment  Date: 6/10/24 Short Term/ Long Term   Goals   AM-PAC 6 Clicks 10/24 15/24    Was pt agreeable to Eval/treatment? Yes  yes    Does pt have pain? No c/o pain No pain    Bed Mobility  Rolling: Min A  Supine to sit: Min A (partial)  Sit to supine: Min A  Scooting: Min A Rolling: SBA  Supine to sit: SBA  Sit to supine: NT  Scooting: SBA Rolling: Supervision   Supine to sit: Supervision   Sit to supine: Supervision   Scooting: Supervision    Transfers Sit to stand: NT  Stand to sit: NT  Stand pivot: NT Sit to stand: CGA  Stand to sit: CGA  Stand pivot: min A with ww Sit to stand: SBA  Stand to sit: SBA  Stand pivot: SBA   Ambulation    NT 20'x2, 10'x2  with ww min A >25 feet with AAD SBA   Stair negotiation: ascended and descended  NT NT >2 steps with 1 rail Min A   ROM BUE:  Per OT eval   BLE:  WFL     Strength BUE:  Per OT eval   BLE:  WFL     Balance Sitting EOB:  Min A  Dynamic Standing:  NT 
Pt refusing to wear bipap tonight.   
[facility]  Internal Medicine Residency Program  Progress Note - House Team       Patient:  Therese Bhatt 78 y.o. female   MRN: [id number]       Date of Service: 6/8/2024    CC: Shortness of Breath (Sudden onset SOB, sat 99% on 4L at home. Pt received Duoneb and Solumedrol 125mg from EMS. Pt arrived on C-Pap)    Overnight events: none     Subjective     Patient was seen and examined at bedside. She lying down in bed comfortably. She reported feeling well and denied any concerns. She denied chest pain, SOB, cough, N/V/D. She refused wearing NIV at night but did not have any SOB or wheezing overnight. She is finishing her meals. She stated that she hasn't had a bowel movement for the last few days and was not getting her PRN glycolax. She got out of bed yesterday with no issues and is urinating regularly.    Objective     I & O - 24hr:  No intake or output data in the 24 hours ending 06/08/24 0950  Net IO Since Admission: 480 mL [06/08/24 0950]    Physical Exam  Vitals: /62   Pulse 93   Temp 97.7 °F (36.5 °C) (Infrared)   Resp 30   Ht 1.524 m (5')   Wt 33.1 kg (73 lb)   SpO2 98%   BMI 14.26 kg/m²     General Appearance: alert, appears stated age, and cooperative  HEENT:  Head: Normal, normocephalic, atraumatic.  Lung: diminished breath sounds bibasilar  Heart: regular rate and rhythm and S1, S2 normal  Abdomen:  normal BS  Extremities:  extremities normal, atraumatic, no cyanosis or edema  Neurologic: Alert, oriented, thought content appropriate    Diet:   ADULT DIET; Regular  ADULT ORAL NUTRITION SUPPLEMENT; Breakfast, AM Snack, Lunch, PM Snack, Dinner; Standard High Calorie/High Protein Oral Supplement      Pertinent Labs & Imaging Studies     Labs    Recent Labs     06/06/24  0636 06/07/24  0544 06/08/24  0607   WBC 7.0 10.0 8.9   HGB 12.0 9.5* 9.5*   HCT 39.4 29.5* 29.6*   .1* 97.4 97.4    242 239     Recent Labs     06/06/24  1111 06/07/24  0544 06/08/24  0607   NA 
This RN explained to the patient the importance of wearing the BIPAP machine and educated her on her CO2 levels. Patient agreeable to wearing the BIPAP.     Patient wore the BIPAP for over an hour, repeat ABG's done per doctors orders.   
Visited the patient and she indicated she id trying but is frustrated because no one seems to understand her. They keep on talking about end of life which is not what she is after. She stated that her life is in the hands of God and only him will decide when it will. She indicated of calling her children to come and see what they can do. I prayed for her and assured her of our support and she was appreciative.    PAMELA Freedman.PH,[email or web address],[id number]  
Value Date/Time    APTT 30.3 04/29/2012 05:00 PM       Comprehensive Metabolic Profile:   Recent Labs     06/07/24  0544 06/08/24  0607 06/09/24  0457    140 140   K 5.0 4.0 4.7   CL 97* 98 100   CO2 36* 37* 37*   BUN 35* 34* 29*   CREATININE 0.5 0.4* 0.5   GLUCOSE 112* 78 78   CALCIUM 9.7 9.0 8.9      Magnesium:   Lab Results   Component Value Date/Time    MG 2.2 06/09/2024 04:57 AM     Phosphorus:   Lab Results   Component Value Date/Time    PHOS 2.7 06/09/2024 04:57 AM     Ionized Calcium: No results found for: \"CAION\"   Troponin: No results for input(s): \"TROPONINI\" in the last 72 hours.    Imaging Studies:      Resident's Assessment and Plan     SUMMARY OF HOSPITAL STAY:       Consults:   Pulmonology    Assessment:    Acute on chronic hypoxic hypercapnic respiratory failure 2/2 COPD exacerbation  COPD exacerbation vs COPD progression likely 2/2 continuing to smoke and medication noncompliance  COPD GOLD stage IV - on Trelegy, Duoneb, and albuterol at home, 2L oxygen baseline   Lung nodules - stable (last CT 9/2023)  Pulmonary cachexia - on Ensure supplementation at home  Allergic rhinitis  Anemia of chronic inflammation 2/2 COPD  Vitamin D deficiency - on supplementation at home  Smoking use disorder, conts to smoke    Plan:    Pulmonology following, s/p azithromycin 3 days, NIV (continues to refuse), currently on her home 2L oxygen saturating 99%   Triple therapy, IV prednisone   Outpt palliative  Cont heparin sq for DVT PPX  Nicotine patch  DNR/DNI  Nutritional supplements  PT/OT    PT/OT evaluation: not indicated at this time  DVT prophylaxis: Lovenox  GI prophylaxis: not indicated at this brandon e  Diet: ADULT DIET; Regular  ADULT ORAL NUTRITION SUPPLEMENT; Breakfast, AM Snack, Lunch, PM Snack, Dinner; Standard High Calorie/High Protein Oral Supplement  Bowel regimen: PRN  Pain management: as needed  Disposition: continue current care / discharge to home pt says she isn't quite ready to go still 
\"HGBA1C\"    IMAGING:  Imaging tests were completed and reviewed and discussed radiology and care team involved and reveals   XR CHEST PORTABLE    Result Date: 6/5/2024  EXAMINATION: ONE XRAY VIEW OF THE CHEST 6/5/2024 10:05 pm COMPARISON: 04/14/2024 HISTORY: ORDERING SYSTEM PROVIDED HISTORY: sob TECHNOLOGIST PROVIDED HISTORY: Reason for exam:->sob FINDINGS: The lungs are without acute focal process.  There is no effusion or pneumothorax. The cardiomediastinal silhouette is without acute process. The osseous structures are without acute process.     No acute process.       Assessment:     Acute on chronic hypoxic respiratory failure 2/2 COPD exacerbation vs progression of COPD  Very severe COPD  Continue tobacco use  Lung nodules, stable on last CT chest  Pulmonary cachexia due to COPD      Plan:     [facility] at discharge for rehab- discussed with Social Work  Oxygen as needed  NIPPV as needed  Nicotine patch  Systemic steroids  Nebulized bronchodilators  Azithromycin   Heparin 5000 units sq tid for dvt prophylaxis  Recommend outpatient Palliative referral for symptom management of very severe COPD  PT/OT-needs to be out of bed prior to being discharged from hospital.  Would recommend rehab given her significant deconditioning.  OOB as tolerated  High calorie/protein supplements    Case and plan discussed with Dr. Allyssa Portillo, APRN - CNP  
tests were completed and reviewed and discussed radiology and care team involved and reveals   XR CHEST PORTABLE    Result Date: 6/5/2024  EXAMINATION: ONE XRAY VIEW OF THE CHEST 6/5/2024 10:05 pm COMPARISON: 04/14/2024 HISTORY: ORDERING SYSTEM PROVIDED HISTORY: sob TECHNOLOGIST PROVIDED HISTORY: Reason for exam:->sob FINDINGS: The lungs are without acute focal process.  There is no effusion or pneumothorax. The cardiomediastinal silhouette is without acute process. The osseous structures are without acute process.     No acute process.       Assessment:     Acute on chronic hypoxic respiratory failure 2/2 COPD exacerbation vs progression of COPD  Very severe COPD  Continue tobacco use  Lung nodules, stable on last CT chest  Pulmonary cachexia due to COPD      Plan:     Oxygen as needed  NIPPV as needed  Nicotine patch  Systemic steroids  Nebulized bronchodilators  Azithromycin   Change lovenox to sq heparin 5000 units tid (women under 45kg at increased risk for bleeding with lovenox) patient only 33.1 kg  Recommend outpatient Palliative referral for symptom management of very severe COPD  PT/OT  OOB as tolerated  High calorie/protein supplements      Case and plan discussed with FITZ Hung - CNP        I saw and evaluated the patient and performed the MDM as documented in my note. Radiographs, labs and medication list were reviewed by me independently. I spoke with bedside nursing, therapists and consultants. The case was discussed in detail and plans for care were reviewed with Binta BYRNES. I provided the substantive portion of this visit by personally performing the history/MDM component in its entirety.Review of CNP documentation was conducted and revisions were made as appropriate.      Ivana Spivey,      2:25 PM   
that she has been smoking cigarettes. She has a 25.5 pack-year smoking history. She has never used smokeless tobacco.  Alcohol use per chart:  reports no history of alcohol use.  DVT prophylaxis: Heparin   GI prophylaxis: on diet   Bowel regimen: glycolax   Diet:   ADULT DIET; Regular  ADULT ORAL NUTRITION SUPPLEMENT; Breakfast, AM Snack, Lunch, PM Snack, Dinner; Standard High Calorie/High Protein Oral Supplement   Pain management: as needed  Code status: Limited   Disposition: Continue Current Care  Family: updated as available    Mera Coffman MD, PGY-1   Attending physician: Dr. Fox       [facility]  Internal Medicine Faculty   Attending Physician Statement    Therese Bhatt is a 78 y.o. female was seen, examined and discussed with the multi-disciplinary teaching team during rounds. I have personally seen and examined the patient and the key elements of the encounter were performed by me. The data collected below information that was obtained, reviewed, analyzed and interpreted today. Imaging test are reviewed during rounds. Comparison to previous images are always explored.     CBC:   Lab Results   Component Value Date/Time    WBC 6.9 06/10/2024 04:24 AM    RBC 3.04 06/10/2024 04:24 AM    HGB 9.1 06/10/2024 04:24 AM    HCT 29.7 06/10/2024 04:24 AM     06/10/2024 04:24 AM    MCV 97.7 06/10/2024 04:24 AM       BMP:    Lab Results   Component Value Date/Time     06/10/2024 04:24 AM    K 5.4 06/10/2024 04:24 AM    K 4.3 03/03/2023 06:46 AM     06/10/2024 04:24 AM    CO2 36 06/10/2024 04:24 AM    BUN 34 06/10/2024 04:24 AM    CREATININE 0.5 06/10/2024 04:24 AM    GLUCOSE 73 06/10/2024 04:24 AM    GLUCOSE 172 04/30/2012 09:50 AM    CALCIUM 9.3 06/10/2024 04:24 AM       TSH:    Lab Results   Component Value Date/Time    TSH 0.94 06/06/2024 11:11 AM         Imaging Studies:    RADIOLOGY:  My interpretation of the current and previous films reveal no pneumothorax    EKG:

## 2024-06-10 NOTE — DISCHARGE INSTR - COC
Continuity of Care Form    Patient Name: Therese Bhatt   :  1945  MRN:  [id number]    Admit date:  2024  Discharge date:  6/10/2024    Code Status Order: Limited   Advance Directives:     Admitting Physician:  Evaristo Hoffman DO  PCP: Stacie Hansen MD    Discharging Nurse: SERAFIN  Discharging Hospital Unit/Room#: 8422/8422-A  Discharging Unit Phone Number: [phone]    Emergency Contact:   Extended Emergency Contact Information  Primary Emergency Contact: Hazel Nguyen  Address: [address] [place] [address] [place]  Home Phone: [phone]  Mobile Phone: [phone]  Relation: Child  Secondary Emergency Contact: George Nguyen  Home Phone: [phone]  Mobile Phone: [phone]  Relation: Grandchild    Past Surgical History:  Past Surgical History:   Procedure Laterality Date    APPENDECTOMY      17 y.o.    CATARACT REMOVAL WITH IMPLANT Bilateral dec 2017, 2018    COLONOSCOPY  10/17/2017    normal colon--opal    ENDOMETRIAL BIOPSY      TONSILLECTOMY      UPPER GASTROINTESTINAL ENDOSCOPY  10/17/2017    gastritis; hiatal hernia--opal    WRIST SURGERY Left        Immunization History:   Immunization History   Administered Date(s) Administered    COVID-19, PFIZER PURPLE top, DILUTE for use, (age 12 y+), 30mcg/0.3mL 2021, 2021    Influenza 10/10/2012, 10/16/2013    Influenza A ([id number]) Vaccine PF IM 2010    Influenza, FLUAD, (age 65 y+), Adjuvanted, 0.5mL 10/09/2021, 2022    Influenza, FLUARIX, FLULAVAL, FLUZONE (age 6 mo+) AND AFLURIA, (age 3 y+), PF, 0.5mL 2020, 2023    Influenza, High Dose (Fluzone 65 yrs and older) 09/10/2018, 10/25/2019    Pneumococcal, PCV-13, PREVNAR 13, (age 6w+), IM, 0.5mL 10/24/2018    Pneumococcal, PCV20, PREVNAR 20, (age 6w+), IM, 0.5mL 2022    Pneumococcal, PPSV23, PNEUMOVAX 23, (age 2y+), SC/IM, 0.5mL 05/15/2012, 2017       Active Problems:  Patient Active Problem List

## 2024-06-10 NOTE — CARE COORDINATION
Social Work/Case Management Transition of Care Planning (April Wadsworth W [phone]):  Per report and chart review, pulmonology is following for acute on chronic respiratory failure related to COPD.  Patient is on 2L NC which is his baseline.  She is currently on IV solu-medrol qd.  Palliative care is following.  PT/OT scores noted to be 10/14.  No ambulation or transfers were attempted.  Updated PT noted to be 15.  Patient ambulated 20'x2, 10' x2 with FWW min assist.Met with patient at bedside.  Discharge plan was to home with family providing needed care. However, patient is now thinking she needs some short term rehab before returning home. Discussed [facility] options in her desired location.  Patient choiced [facility].  Referral information provided to [facility]. Await response.  CM/SW will follow.  April Wadsworth, CHAYA  6/10/2024    Update:  Discharge order noted.  Per [facility], patient is accepted to [facility].  [facility] is unavailable for transport.  Spoke to patient's daughter, Hazel.  She stated the patient's son can transport.  He will bring a portable tank.  RAJESH/destination completed.  7000 completed.  Updated patient and floor nurse. April Wadsworth, CHAYA  6/10/2024

## 2024-06-10 NOTE — PLAN OF CARE
Problem: Discharge Planning  Goal: Discharge to home or other facility with appropriate resources  6/10/2024 1722 by Solis Barcenas RN  Outcome: Completed  6/10/2024 1459 by Solis Barcenas RN  Outcome: Progressing     Problem: Skin/Tissue Integrity  Goal: Absence of new skin breakdown  Description: 1.  Monitor for areas of redness and/or skin breakdown  2.  Assess vascular access sites hourly  3.  Every 4-6 hours minimum:  Change oxygen saturation probe site  4.  Every 4-6 hours:  If on nasal continuous positive airway pressure, respiratory therapy assess nares and determine need for appliance change or resting period.  6/10/2024 1722 by Solis Barcenas RN  Outcome: Completed  6/10/2024 1459 by Solis Barcenas RN  Outcome: Progressing     Problem: Safety - Adult  Goal: Free from fall injury  6/10/2024 1722 by Solis Barcenas RN  Outcome: Completed  6/10/2024 1459 by Solis Barcenas RN  Outcome: Progressing     Problem: ABCDS Injury Assessment  Goal: Absence of physical injury  6/10/2024 1722 by Solis Barcenas RN  Outcome: Completed  6/10/2024 1459 by Solis Barcenas RN  Outcome: Progressing     Problem: Neurosensory - Adult  Goal: Achieves stable or improved neurological status  6/10/2024 1722 by Solis Barcenas RN  Outcome: Completed  6/10/2024 1459 by Solis Barcenas RN  Outcome: Progressing  Goal: Achieves maximal functionality and self care  6/10/2024 1722 by Solis Barcenas RN  Outcome: Completed  6/10/2024 1459 by Solis Barcenas RN  Outcome: Progressing     Problem: Respiratory - Adult  Goal: Achieves optimal ventilation and oxygenation  6/10/2024 1722 by Solis Barcenas RN  Outcome: Completed  6/10/2024 1459 by Solis Barcenas RN  Outcome: Progressing     Problem: Cardiovascular - Adult  Goal: Maintains optimal cardiac output and hemodynamic stability  6/10/2024 1722 by Solis Barcenas RN  Outcome: Completed  6/10/2024 1459 by Solis Barcenas RN  Outcome: Progressing  Goal: Absence of cardiac dysrhythmias or at 
  Problem: Discharge Planning  Goal: Discharge to home or other facility with appropriate resources  6/7/2024 1006 by Jose Bhatia RN  Outcome: Progressing  6/6/2024 2350 by Roxy Chang RN  Outcome: Progressing  Flowsheets (Taken 6/6/2024 2000)  Discharge to home or other facility with appropriate resources:   Identify barriers to discharge with patient and caregiver   Identify discharge learning needs (meds, wound care, etc)     Problem: Skin/Tissue Integrity  Goal: Absence of new skin breakdown  Description: 1.  Monitor for areas of redness and/or skin breakdown  2.  Assess vascular access sites hourly  3.  Every 4-6 hours minimum:  Change oxygen saturation probe site  4.  Every 4-6 hours:  If on nasal continuous positive airway pressure, respiratory therapy assess nares and determine need for appliance change or resting period.  6/7/2024 1006 by Jose Bhatia RN  Outcome: Progressing  6/6/2024 2350 by Roxy Chang RN  Outcome: Progressing     Problem: Safety - Adult  Goal: Free from fall injury  6/7/2024 1006 by Jose Bhatia RN  Outcome: Progressing  6/6/2024 2350 by Roxy Chang RN  Outcome: Progressing     Problem: ABCDS Injury Assessment  Goal: Absence of physical injury  6/7/2024 1006 by Jose Bhatia RN  Outcome: Progressing  6/6/2024 2350 by Roxy Chang RN  Outcome: Progressing     Problem: Neurosensory - Adult  Goal: Achieves stable or improved neurological status  Outcome: Progressing  Goal: Achieves maximal functionality and self care  Outcome: Progressing     Problem: Respiratory - Adult  Goal: Achieves optimal ventilation and oxygenation  Outcome: Progressing     Problem: Cardiovascular - Adult  Goal: Maintains optimal cardiac output and hemodynamic stability  Outcome: Progressing  Goal: Absence of cardiac dysrhythmias or at baseline  Outcome: Progressing     Problem: Skin/Tissue Integrity - Adult  Goal: Skin integrity remains intact  Outcome: Progressing  Goal: 
  Problem: Discharge Planning  Goal: Discharge to home or other facility with appropriate resources  6/8/2024 0959 by Carrie Alejandro RN  Outcome: Progressing  Flowsheets (Taken 6/8/2024 0820)  Discharge to home or other facility with appropriate resources: Identify barriers to discharge with patient and caregiver  6/8/2024 0031 by Roxy Chang RN  Outcome: Progressing  Flowsheets (Taken 6/7/2024 2000)  Discharge to home or other facility with appropriate resources:   Identify barriers to discharge with patient and caregiver   Identify discharge learning needs (meds, wound care, etc)     Problem: Skin/Tissue Integrity  Goal: Absence of new skin breakdown  Description: 1.  Monitor for areas of redness and/or skin breakdown  2.  Assess vascular access sites hourly  3.  Every 4-6 hours minimum:  Change oxygen saturation probe site  4.  Every 4-6 hours:  If on nasal continuous positive airway pressure, respiratory therapy assess nares and determine need for appliance change or resting period.  6/8/2024 0959 by Carrie Alejandro RN  Outcome: Progressing  6/8/2024 0031 by Roxy Chang RN  Outcome: Progressing     Problem: Safety - Adult  Goal: Free from fall injury  6/8/2024 0959 by Carrie Alejandro RN  Outcome: Progressing  6/8/2024 0031 by Roxy Chang RN  Outcome: Progressing     Problem: ABCDS Injury Assessment  Goal: Absence of physical injury  6/8/2024 0959 by Carrie Alejandro RN  Outcome: Progressing  6/8/2024 0031 by Roxy Chang RN  Outcome: Progressing     Problem: Neurosensory - Adult  Goal: Achieves stable or improved neurological status  6/8/2024 0959 by Carrie Alejandro RN  Outcome: Progressing  Flowsheets (Taken 6/8/2024 0820)  Achieves stable or improved neurological status: Assess for and report changes in neurological status  6/8/2024 0031 by Roxy Chang RN  Outcome: Progressing  Goal: Achieves maximal functionality and self care  6/8/2024 0959 by Carrie Alejandro RN  Outcome: 
  Problem: Discharge Planning  Goal: Discharge to home or other facility with appropriate resources  6/9/2024 0004 by Evelia Nevarez RN  Outcome: Progressing     Problem: Skin/Tissue Integrity  Goal: Absence of new skin breakdown  Description: 1.  Monitor for areas of redness and/or skin breakdown  2.  Assess vascular access sites hourly  3.  Every 4-6 hours minimum:  Change oxygen saturation probe site  4.  Every 4-6 hours:  If on nasal continuous positive airway pressure, respiratory therapy assess nares and determine need for appliance change or resting period.  6/9/2024 0004 by Evelia Nevarez RN  Outcome: Progressing     Problem: Safety - Adult  Goal: Free from fall injury  6/9/2024 0004 by Evelia Nevarez RN  Outcome: Progressing     Problem: ABCDS Injury Assessment  Goal: Absence of physical injury  6/9/2024 0004 by Evelia Nevarez RN  Outcome: Progressing     Problem: Neurosensory - Adult  Goal: Achieves stable or improved neurological status  6/9/2024 0004 by Evleia Nevarez RN  Outcome: Progressing     Problem: Neurosensory - Adult  Goal: Achieves maximal functionality and self care  6/9/2024 0004 by Evelia Nevarez RN  Outcome: Progressing     Problem: Respiratory - Adult  Goal: Achieves optimal ventilation and oxygenation  6/9/2024 0004 by Evelia Nevarez RN  Outcome: Progressing     Problem: Cardiovascular - Adult  Goal: Maintains optimal cardiac output and hemodynamic stability  6/9/2024 0004 by Evelia Nevarez RN  Outcome: Progressing     Problem: Cardiovascular - Adult  Goal: Absence of cardiac dysrhythmias or at baseline  6/9/2024 0004 by Evelia Nevarez RN  Outcome: Progressing     Problem: Skin/Tissue Integrity - Adult  Goal: Skin integrity remains intact  6/9/2024 0004 by Evelia Nevarez RN  Outcome: Progressing     Problem: Musculoskeletal - Adult  Goal: Return mobility to safest level of function  6/9/2024 0004 by 
  Problem: Discharge Planning  Goal: Discharge to home or other facility with appropriate resources  6/9/2024 1235 by Solis Barcenas RN  Outcome: Progressing  6/9/2024 0004 by Evelia Nevarez RN  Outcome: Progressing     Problem: Skin/Tissue Integrity  Goal: Absence of new skin breakdown  Description: 1.  Monitor for areas of redness and/or skin breakdown  2.  Assess vascular access sites hourly  3.  Every 4-6 hours minimum:  Change oxygen saturation probe site  4.  Every 4-6 hours:  If on nasal continuous positive airway pressure, respiratory therapy assess nares and determine need for appliance change or resting period.  6/9/2024 1235 by Solis Barcenas RN  Outcome: Progressing  6/9/2024 0004 by Evelia Nevarez RN  Outcome: Progressing     Problem: Safety - Adult  Goal: Free from fall injury  6/9/2024 1235 by Solis Barcenas RN  Outcome: Progressing  6/9/2024 0004 by Evelia Nevarez RN  Outcome: Progressing     Problem: ABCDS Injury Assessment  Goal: Absence of physical injury  6/9/2024 1235 by Solis Barcenas RN  Outcome: Progressing  6/9/2024 0004 by Evelia Nevarez RN  Outcome: Progressing     Problem: Neurosensory - Adult  Goal: Achieves stable or improved neurological status  6/9/2024 1235 by Solis Barcenas RN  Outcome: Progressing  6/9/2024 0004 by Evelia Nevarez RN  Outcome: Progressing  Goal: Achieves maximal functionality and self care  6/9/2024 1235 by Solis Barcenas RN  Outcome: Progressing  6/9/2024 0004 by Evelia Nevarez RN  Outcome: Progressing     Problem: Respiratory - Adult  Goal: Achieves optimal ventilation and oxygenation  6/9/2024 1235 by Solis Barcenas RN  Outcome: Progressing  6/9/2024 0004 by Evelia Nevarez RN  Outcome: Progressing     Problem: Cardiovascular - Adult  Goal: Maintains optimal cardiac output and hemodynamic stability  6/9/2024 1235 by Solis Barcenas RN  Outcome: Progressing  6/9/2024 0004 by Evelia Nevarez 
  Problem: Discharge Planning  Goal: Discharge to home or other facility with appropriate resources  6/9/2024 2138 by Leodan Chaudhry RN  Outcome: Progressing  6/9/2024 1235 by Solis Barcenas RN  Outcome: Progressing     Problem: Skin/Tissue Integrity  Goal: Absence of new skin breakdown  Description: 1.  Monitor for areas of redness and/or skin breakdown  2.  Assess vascular access sites hourly  3.  Every 4-6 hours minimum:  Change oxygen saturation probe site  4.  Every 4-6 hours:  If on nasal continuous positive airway pressure, respiratory therapy assess nares and determine need for appliance change or resting period.  6/9/2024 2138 by Leodan Chaudhry RN  Outcome: Progressing  6/9/2024 1235 by Solis Barcenas RN  Outcome: Progressing     Problem: Safety - Adult  Goal: Free from fall injury  6/9/2024 2138 by Leodan Chaudhry RN  Outcome: Progressing  6/9/2024 1235 by Solis Barcenas RN  Outcome: Progressing     Problem: Respiratory - Adult  Goal: Achieves optimal ventilation and oxygenation  6/9/2024 2138 by Leodan Chaudhry RN  Outcome: Progressing  6/9/2024 1235 by Solis Barcenas RN  Outcome: Progressing     Problem: Skin/Tissue Integrity - Adult  Goal: Skin integrity remains intact  6/9/2024 1235 by Solis Barcenas RN  Outcome: Progressing  Goal: Incisions, wounds, or drain sites healing without S/S of infection  6/9/2024 1235 by Solis Barcenas RN  Outcome: Progressing     
  Problem: Discharge Planning  Goal: Discharge to home or other facility with appropriate resources  Recent Flowsheet Documentation  Taken 6/6/2024 0607 by Dali Carranza RN  Discharge to home or other facility with appropriate resources:   Identify barriers to discharge with patient and caregiver   Arrange for needed discharge resources and transportation as appropriate   Identify discharge learning needs (meds, wound care, etc)   Refer to discharge planning if patient needs post-hospital services based on physician order or complex needs related to functional status, cognitive ability or social support system     Problem: Skin/Tissue Integrity  Goal: Absence of new skin breakdown  Description: 1.  Monitor for areas of redness and/or skin breakdown  2.  Assess vascular access sites hourly  3.  Every 4-6 hours minimum:  Change oxygen saturation probe site  4.  Every 4-6 hours:  If on nasal continuous positive airway pressure, respiratory therapy assess nares and determine need for appliance change or resting period.  Outcome: Progressing     Problem: Safety - Adult  Goal: Free from fall injury  Outcome: Progressing     Problem: ABCDS Injury Assessment  Goal: Absence of physical injury  Outcome: Progressing     
  Problem: Safety - Adult  Goal: Free from fall injury  Outcome: Progressing     Problem: Skin/Tissue Integrity  Goal: Absence of new skin breakdown  Description: 1.  Monitor for areas of redness and/or skin breakdown  2.  Assess vascular access sites hourly  3.  Every 4-6 hours minimum:  Change oxygen saturation probe site  4.  Every 4-6 hours:  If on nasal continuous positive airway pressure, respiratory therapy assess nares and determine need for appliance change or resting period.  Outcome: Progressing     
  Problem: Skin/Tissue Integrity  Goal: Absence of new skin breakdown  Description: 1.  Monitor for areas of redness and/or skin breakdown  2.  Assess vascular access sites hourly  3.  Every 4-6 hours minimum:  Change oxygen saturation probe site  4.  Every 4-6 hours:  If on nasal continuous positive airway pressure, respiratory therapy assess nares and determine need for appliance change or resting period.  Outcome: Progressing     Problem: Discharge Planning  Goal: Discharge to home or other facility with appropriate resources  Outcome: Progressing  Flowsheets (Taken 6/7/2024 2000)  Discharge to home or other facility with appropriate resources:   Identify barriers to discharge with patient and caregiver   Identify discharge learning needs (meds, wound care, etc)     
Progress note:    This morning the patient was seen in ICU in no acute distress.  Family at bedside.  Patient mentions feeling better.  You will be.  Likely transferred out of ICU today.    Physical examination:  Alert and oriented x 4  Chest examination decreased breath sound on bilateral upper lung.  No wheezes noted.  No edema noted in bilateral lower extremity    Plan:  Appreciate ICU management  Palliative care consult for goals of care  Continue azithromycin 500 mg daily for 3 days  Follow sputum culture and Pro-Henry  
Received PS regarding insulin orders. They are now corrected.    Electronically signed by Bobbi Farmer MD on 6/7/2024 at 8:52 AM    
Progressing     
neurological status     Problem: Respiratory - Adult  Goal: Achieves optimal ventilation and oxygenation  6/8/2024 1448 by Solis Barcenas RN  Outcome: Progressing  6/8/2024 0959 by Carrie Alejandro RN  Outcome: Progressing  Flowsheets (Taken 6/8/2024 0820)  Achieves optimal ventilation and oxygenation: Assess for changes in respiratory status     Problem: Cardiovascular - Adult  Goal: Maintains optimal cardiac output and hemodynamic stability  6/8/2024 1448 by Solis Barcenas RN  Outcome: Progressing  6/8/2024 0959 by Carrie Alejandro RN  Outcome: Progressing  Flowsheets (Taken 6/8/2024 0820)  Maintains optimal cardiac output and hemodynamic stability: Monitor blood pressure and heart rate  Goal: Absence of cardiac dysrhythmias or at baseline  6/8/2024 1448 by Solis Barcenas RN  Outcome: Progressing  6/8/2024 0959 by Carrie Alejandro RN  Outcome: Progressing  Flowsheets (Taken 6/8/2024 0820)  Absence of cardiac dysrhythmias or at baseline: Monitor cardiac rate and rhythm     Problem: Skin/Tissue Integrity - Adult  Goal: Skin integrity remains intact  6/8/2024 1448 by Solis Barcenas RN  Outcome: Progressing  6/8/2024 0959 by Carrie Alejandro RN  Outcome: Progressing  Flowsheets (Taken 6/8/2024 0820)  Skin Integrity Remains Intact: Monitor for areas of redness and/or skin breakdown  Goal: Incisions, wounds, or drain sites healing without S/S of infection  6/8/2024 1448 by Solis Barcenas RN  Outcome: Progressing  6/8/2024 0959 by Carrie Alejandro RN  Outcome: Progressing  Flowsheets (Taken 6/8/2024 0820)  Incisions, Wounds, or Drain Sites Healing Without Sign and Symptoms of Infection: TWICE DAILY: Assess and document skin integrity     Problem: Musculoskeletal - Adult  Goal: Return mobility to safest level of function  6/8/2024 1448 by Solis Barcenas RN  Outcome: Progressing  6/8/2024 0959 by Carrie Alejandro RN  Outcome: Progressing  Flowsheets (Taken 6/8/2024 0820)  Return Mobility to Safest Level of Function:

## 2024-06-10 NOTE — DISCHARGE INSTRUCTIONS
Internal medicine    Follow ups  Please follow up with the internal medicine clinic at [facility].Your appointment has been scheduled for 06/20/2024 @9:00 am  Please keep all other follow up appointments:  With pulmonology    Changes in healthcare   Please take all medications as indicated  Diet: regular diet   Activity: activity as tolerated  Medications you should stop taking   Tylenol  Additional labs, testing or imaging needed after discharge   ***  Even if you are feeling better and not having symptoms do not stop taking antibiotic earlier then prescribed ***  Please contact us if you have any concerns, wish to change or make an appointment:  Internal medicine clinic   Phone: [phone]  Fax: [phone] [address] [address]  Or please call the nurse line at [phone].  Should you have further questions in regards to this visit, you can review your clinical note and after visit summary document on your [employer] account.  Other questions can be directed to our nurse line at [phone].     Other than any new prescriptions given to you today, the list of home medications on this After Visit Summary are based on information provided to us from you and your healthcare providers. This information, including the list, dose, and frequency of medications is only as accurate as the information you provided. If you have any questions or concerns about your home medications, please contact your Primary Care Physician for further clarification.

## 2024-06-11 NOTE — DISCHARGE SUMMARY
[facility]  Discharge Summary    PCP: Stacie Hansen MD    Admit Date:6/5/2024  Discharge Date: 6/10/2024    Admission Diagnosis:   Acute on chronic hypoxic respiratory failure 2/2 COPD exacerbation  COPD exacerbation 2/2 secondhand smoke exposure vs allergies vs medication noncompliance  Severe COPD, Gold Stage IV, on Trelegy, Duoneb, Albuterol outpatient, follows with [facility] Pulmonology  Lung nodules, stable, last CT screen 9/2023  Pulmonary cachexia due to COPD on Ensure supplement outpatient  Seasonal allergies  Macrocytic anemia  Vitamin D deficiency on supplementation     Discharge Diagnosis:  Acute on chronic hypoxic respiratory failure 2/2 COPD exacerbation - resolved   COPD exacerbation 2/2 secondhand smoke exposure vs allergies vs medication noncompliance  Severe COPD, Gold Stage IV, on Trelegy, Duoneb, Albuterol outpatient, follows with [facility] Pulmonology  Lung nodules, stable, last CT screen 9/2023  Pulmonary cachexia due to COPD on Ensure supplement outpatient  Seasonal allergies  Macrocytic anemia  Vitamin D deficiency on supplementation   D    Hospital Course:   Patient has past medical history of severe COPD with frequent exacerbation and vitamin D deficiency who presents to the emergency department with shortness of breath.  She has been having shortness of breath ongoing for several days prior to admission.  She states that it worsened today.  She also endorses subjective chills and fever.  On examination she is wearing 3-4 blankets.  She states that she has been using her inhalers as prescribed.  She endorses use of her nebulizer.  She denies continued smoking on her report but does state her daughter continues to smoke around her.  She has not acquired any new pets.  No one around her has been sick.  She does note seasonal allergies which worsen in the late spring and early summer.  She feels significantly short of breath and remains on BiPAP at

## 2024-08-26 ENCOUNTER — TELEPHONE (OUTPATIENT)
Dept: PULMONOLOGY | Age: 79
End: 2024-08-26

## 2024-08-26 DIAGNOSIS — J44.1 COPD EXACERBATION (HCC): ICD-10-CM

## 2024-08-26 DIAGNOSIS — J96.12 CHRONIC RESPIRATORY FAILURE WITH HYPOXIA AND HYPERCAPNIA (HCC): ICD-10-CM

## 2024-08-26 DIAGNOSIS — J96.11 CHRONIC RESPIRATORY FAILURE WITH HYPOXIA AND HYPERCAPNIA (HCC): ICD-10-CM

## 2024-08-26 NOTE — TELEPHONE ENCOUNTER
Refill requested for pt inhaler; script sent to [facility] on [address] per pt requests.

## 2024-08-28 ENCOUNTER — TELEPHONE (OUTPATIENT)
Dept: PULMONOLOGY | Age: 79
End: 2024-08-28

## 2024-08-28 DIAGNOSIS — J44.1 CHRONIC OBSTRUCTIVE PULMONARY DISEASE WITH ACUTE EXACERBATION (HCC): ICD-10-CM

## 2024-08-28 RX ORDER — ALBUTEROL SULFATE 90 UG/1
AEROSOL, METERED RESPIRATORY (INHALATION)
Qty: 1 EACH | Refills: 12 | Status: SHIPPED | OUTPATIENT
Start: 2024-08-28

## 2024-08-28 NOTE — TELEPHONE ENCOUNTER
Patient recently called in for her Trelegy and Albuterol Inhaler. She picked up the Trelegy but there was no rescue inhaler. Could you please refill and she specified the one with the , not the blue cap. Send to [facility] on [address] in [place].

## 2024-11-05 ENCOUNTER — HOSPITAL ENCOUNTER (EMERGENCY)
Age: 79
Discharge: HOME | End: 2024-11-06
Attending: EMERGENCY MEDICINE
Payer: MEDICARE

## 2024-11-05 ENCOUNTER — APPOINTMENT (OUTPATIENT)
Dept: GENERAL RADIOLOGY | Age: 79
End: 2024-11-05
Payer: MEDICARE

## 2024-11-05 DIAGNOSIS — R07.89 ATYPICAL CHEST PAIN: Primary | ICD-10-CM

## 2024-11-05 DIAGNOSIS — J44.1 COPD EXACERBATION (HCC): ICD-10-CM

## 2024-11-05 LAB
ALBUMIN SERPL-MCNC: 4.1 G/DL (ref 3.5–5.2)
ALP SERPL-CCNC: 76 U/L (ref 35–104)
ALT SERPL-CCNC: 7 U/L (ref 0–32)
ANION GAP SERPL CALCULATED.3IONS-SCNC: 5 MMOL/L (ref 7–16)
AST SERPL-CCNC: 16 U/L (ref 0–31)
B.E.: 2.9 MMOL/L (ref -3–3)
BASOPHILS # BLD: 0.07 K/UL (ref 0–0.2)
BASOPHILS NFR BLD: 1 % (ref 0–2)
BILIRUB SERPL-MCNC: 0.5 MG/DL (ref 0–1.2)
BNP SERPL-MCNC: 260 PG/ML (ref 0–450)
BUN SERPL-MCNC: 18 MG/DL (ref 6–23)
CALCIUM SERPL-MCNC: 9 MG/DL (ref 8.6–10.2)
CHLORIDE SERPL-SCNC: 99 MMOL/L (ref 98–107)
CO2 SERPL-SCNC: 34 MMOL/L (ref 22–29)
COHB: 2.3 % (ref 0–1.5)
CREAT SERPL-MCNC: 0.5 MG/DL (ref 0.5–1)
CRITICAL: ABNORMAL
DATE ANALYZED: ABNORMAL
DATE OF COLLECTION: ABNORMAL
EOSINOPHIL # BLD: 0.18 K/UL (ref 0.05–0.5)
EOSINOPHILS RELATIVE PERCENT: 2 % (ref 0–6)
ERYTHROCYTE [DISTWIDTH] IN BLOOD BY AUTOMATED COUNT: 13.8 % (ref 11.5–15)
GFR, ESTIMATED: >90 ML/MIN/1.73M2
GLUCOSE SERPL-MCNC: 97 MG/DL (ref 74–99)
HCO3: 28.2 MMOL/L (ref 22–26)
HCT VFR BLD AUTO: 36 % (ref 34–48)
HGB BLD-MCNC: 11.3 G/DL (ref 11.5–15.5)
HHB: 2.8 % (ref 0–5)
IMM GRANULOCYTES # BLD AUTO: 0.03 K/UL (ref 0–0.58)
IMM GRANULOCYTES NFR BLD: 0 % (ref 0–5)
INR PPP: 1.2
LAB: ABNORMAL
LYMPHOCYTES NFR BLD: 1.52 K/UL (ref 1.5–4)
LYMPHOCYTES RELATIVE PERCENT: 16 % (ref 20–42)
Lab: 2220
MAGNESIUM SERPL-MCNC: 2 MG/DL (ref 1.6–2.6)
MCH RBC QN AUTO: 29.6 PG (ref 26–35)
MCHC RBC AUTO-ENTMCNC: 31.4 G/DL (ref 32–34.5)
MCV RBC AUTO: 94.2 FL (ref 80–99.9)
METHB: 0.5 % (ref 0–1.5)
MODE: ABNORMAL
MONOCYTES NFR BLD: 1.46 K/UL (ref 0.1–0.95)
MONOCYTES NFR BLD: 16 % (ref 2–12)
NEUTROPHILS NFR BLD: 65 % (ref 43–80)
NEUTS SEG NFR BLD: 6.01 K/UL (ref 1.8–7.3)
O2 SATURATION: 97.1 % (ref 92–98.5)
O2HB: 94.4 % (ref 94–97)
OPERATOR ID: 421
PATIENT TEMP: 37 C
PCO2: 46.4 MMHG (ref 35–45)
PH BLOOD GAS: 7.4 (ref 7.35–7.45)
PLATELET # BLD AUTO: 270 K/UL (ref 130–450)
PMV BLD AUTO: 9.1 FL (ref 7–12)
PO2: 96.6 MMHG (ref 75–100)
POTASSIUM SERPL-SCNC: 5 MMOL/L (ref 3.5–5)
PROT SERPL-MCNC: 6.8 G/DL (ref 6.4–8.3)
PROTHROMBIN TIME: 12.7 SEC (ref 9.3–12.4)
RBC # BLD AUTO: 3.82 M/UL (ref 3.5–5.5)
SODIUM SERPL-SCNC: 138 MMOL/L (ref 132–146)
SOURCE, BLOOD GAS: ABNORMAL
THB: 12.4 G/DL (ref 11.5–16.5)
TIME ANALYZED: 2224
TROPONIN I SERPL HS-MCNC: 21 NG/L (ref 0–9)
TROPONIN I SERPL HS-MCNC: 22 NG/L (ref 0–9)
WBC OTHER # BLD: 9.3 K/UL (ref 4.5–11.5)

## 2024-11-05 PROCEDURE — 94640 AIRWAY INHALATION TREATMENT: CPT

## 2024-11-05 PROCEDURE — 83735 ASSAY OF MAGNESIUM: CPT

## 2024-11-05 PROCEDURE — 71045 X-RAY EXAM CHEST 1 VIEW: CPT

## 2024-11-05 PROCEDURE — 80053 COMPREHEN METABOLIC PANEL: CPT

## 2024-11-05 PROCEDURE — 84484 ASSAY OF TROPONIN QUANT: CPT

## 2024-11-05 PROCEDURE — 36600 WITHDRAWAL OF ARTERIAL BLOOD: CPT

## 2024-11-05 PROCEDURE — 82805 BLOOD GASES W/O2 SATURATION: CPT

## 2024-11-05 PROCEDURE — 6370000000 HC RX 637 (ALT 250 FOR IP): Performed by: EMERGENCY MEDICINE

## 2024-11-05 PROCEDURE — 85610 PROTHROMBIN TIME: CPT

## 2024-11-05 PROCEDURE — 99285 EMERGENCY DEPT VISIT HI MDM: CPT

## 2024-11-05 PROCEDURE — 83880 ASSAY OF NATRIURETIC PEPTIDE: CPT

## 2024-11-05 PROCEDURE — 93005 ELECTROCARDIOGRAM TRACING: CPT | Performed by: EMERGENCY MEDICINE

## 2024-11-05 PROCEDURE — 85025 COMPLETE CBC W/AUTO DIFF WBC: CPT

## 2024-11-05 RX ORDER — PREDNISONE 20 MG/1
60 TABLET ORAL ONCE
Status: COMPLETED | OUTPATIENT
Start: 2024-11-05 | End: 2024-11-05

## 2024-11-05 RX ORDER — IPRATROPIUM BROMIDE AND ALBUTEROL SULFATE 2.5; .5 MG/3ML; MG/3ML
1 SOLUTION RESPIRATORY (INHALATION) ONCE
Status: COMPLETED | OUTPATIENT
Start: 2024-11-05 | End: 2024-11-05

## 2024-11-05 RX ORDER — PREDNISONE 50 MG/1
50 TABLET ORAL DAILY
Qty: 5 TABLET | Refills: 0 | Status: SHIPPED | OUTPATIENT
Start: 2024-11-05 | End: 2024-11-10

## 2024-11-05 RX ORDER — ASPIRIN 81 MG/1
324 TABLET, CHEWABLE ORAL ONCE
Status: DISCONTINUED | OUTPATIENT
Start: 2024-11-05 | End: 2024-11-06 | Stop reason: HOSPADM

## 2024-11-05 RX ADMIN — PREDNISONE 60 MG: 20 TABLET ORAL at 20:30

## 2024-11-05 RX ADMIN — IPRATROPIUM BROMIDE AND ALBUTEROL SULFATE 1 DOSE: 2.5; .5 SOLUTION RESPIRATORY (INHALATION) at 20:21

## 2024-11-06 VITALS
RESPIRATION RATE: 20 BRPM | HEIGHT: 60 IN | DIASTOLIC BLOOD PRESSURE: 61 MMHG | SYSTOLIC BLOOD PRESSURE: 123 MMHG | HEART RATE: 83 BPM | BODY MASS INDEX: 13.74 KG/M2 | OXYGEN SATURATION: 98 % | TEMPERATURE: 98.9 F | WEIGHT: 70 LBS

## 2024-11-06 LAB
EKG ATRIAL RATE: 93 BPM
EKG P AXIS: 86 DEGREES
EKG P-R INTERVAL: 166 MS
EKG Q-T INTERVAL: 352 MS
EKG QRS DURATION: 82 MS
EKG QTC CALCULATION (BAZETT): 437 MS
EKG R AXIS: -58 DEGREES
EKG T AXIS: 59 DEGREES
EKG VENTRICULAR RATE: 93 BPM

## 2024-11-06 PROCEDURE — 93010 ELECTROCARDIOGRAM REPORT: CPT | Performed by: INTERNAL MEDICINE

## 2024-11-06 ASSESSMENT — PAIN - FUNCTIONAL ASSESSMENT: PAIN_FUNCTIONAL_ASSESSMENT: NONE - DENIES PAIN

## 2024-11-06 NOTE — ED PROVIDER NOTES
HPI:  11/5/24,   Time: 7:17 PM HOME Bhatt is a 79 y.o. female presenting to the ED for cp/sob/cough, beginning 12 hrs ago.  The complaint has been persistent, moderate in severity, and worsened by nothing.  Brought in by EMS.  Positive cough.  Positive chest pain.  Poss shortness of breath.  History of COPD.  On 2 L chronically.  No fevers chills or sweats.  No nausea vomiting diarrhea.    Review of Systems:   Pertinent positives and negatives are stated within HPI, all other systems reviewed and are negative.          --------------------------------------------- PAST HISTORY ---------------------------------------------  Past Medical History:  has a past medical history of Abnormal Pap smear, COPD (chronic obstructive pulmonary disease) (HCC), Emphysema of lung (HCC), Emphysema/COPD (HCC), GERD (gastroesophageal reflux disease), History of stomach ulcers, Hyperlipidemia, Osteoarthritis, and Renal calculi.    Past Surgical History:  has a past surgical history that includes Endometrial biopsy; Tonsillectomy; Upper gastrointestinal endoscopy (10/17/2017); Colonoscopy (10/17/2017); Appendectomy; Cataract removal with implant (Bilateral, dec 2017, jan 2018); and Wrist surgery (Left, 2008).    Social History:  reports that she has been smoking cigarettes. She has a 25.5 pack-year smoking history. She has never used smokeless tobacco. She reports that she does not drink alcohol and does not use drugs.    Family History: family history includes Arthritis in her maternal grandmother and mother; Asthma in her maternal grandmother and mother; Cancer in her brother and sister; Emphysema in her mother; Heart Disease in her father; High Blood Pressure in her mother.     The patient’s home medications have been reviewed.    Allergies: Patient has no known allergies.        ---------------------------------------------------PHYSICAL EXAM--------------------------------------    Constitutional/General: Alert and

## 2024-11-20 DIAGNOSIS — J96.11 CHRONIC RESPIRATORY FAILURE WITH HYPOXIA AND HYPERCAPNIA: ICD-10-CM

## 2024-11-20 DIAGNOSIS — J44.1 COPD EXACERBATION (HCC): ICD-10-CM

## 2024-11-20 DIAGNOSIS — J96.12 CHRONIC RESPIRATORY FAILURE WITH HYPOXIA AND HYPERCAPNIA: ICD-10-CM

## 2024-11-21 ENCOUNTER — TELEPHONE (OUTPATIENT)
Dept: PULMONOLOGY | Age: 79
End: 2024-11-21

## 2024-11-21 NOTE — TELEPHONE ENCOUNTER
Patient called in requesting an refill on her Trelegy 100 sent to [facility] on [address]

## 2025-07-03 ENCOUNTER — OFFICE VISIT (OUTPATIENT)
Age: 80
End: 2025-07-03
Payer: MEDICARE

## 2025-07-03 VITALS
DIASTOLIC BLOOD PRESSURE: 57 MMHG | SYSTOLIC BLOOD PRESSURE: 119 MMHG | RESPIRATION RATE: 20 BRPM | HEART RATE: 102 BPM | OXYGEN SATURATION: 95 % | TEMPERATURE: 97.8 F

## 2025-07-03 DIAGNOSIS — J44.9 STAGE 4 VERY SEVERE COPD BY GOLD CLASSIFICATION (HCC): Primary | ICD-10-CM

## 2025-07-03 DIAGNOSIS — R91.1 LUNG NODULE: ICD-10-CM

## 2025-07-03 PROCEDURE — 1090F PRES/ABSN URINE INCON ASSESS: CPT | Performed by: INTERNAL MEDICINE

## 2025-07-03 PROCEDURE — 99214 OFFICE O/P EST MOD 30 MIN: CPT | Performed by: INTERNAL MEDICINE

## 2025-07-03 PROCEDURE — G8427 DOCREV CUR MEDS BY ELIG CLIN: HCPCS | Performed by: INTERNAL MEDICINE

## 2025-07-03 PROCEDURE — 3023F SPIROM DOC REV: CPT | Performed by: INTERNAL MEDICINE

## 2025-07-03 PROCEDURE — 1159F MED LIST DOCD IN RCRD: CPT | Performed by: INTERNAL MEDICINE

## 2025-07-03 PROCEDURE — G8399 PT W/DXA RESULTS DOCUMENT: HCPCS | Performed by: INTERNAL MEDICINE

## 2025-07-03 PROCEDURE — 1123F ACP DISCUSS/DSCN MKR DOCD: CPT | Performed by: INTERNAL MEDICINE

## 2025-07-03 PROCEDURE — G8419 CALC BMI OUT NRM PARAM NOF/U: HCPCS | Performed by: INTERNAL MEDICINE

## 2025-07-03 PROCEDURE — 4004F PT TOBACCO SCREEN RCVD TLK: CPT | Performed by: INTERNAL MEDICINE

## 2025-07-03 PROCEDURE — 99213 OFFICE O/P EST LOW 20 MIN: CPT | Performed by: INTERNAL MEDICINE

## 2025-07-03 RX ORDER — AZITHROMYCIN 250 MG/1
TABLET, FILM COATED ORAL
Qty: 6 TABLET | Refills: 0 | Status: SHIPPED | OUTPATIENT
Start: 2025-07-03 | End: 2025-07-13

## 2025-07-03 RX ORDER — ALBUTEROL SULFATE 90 UG/1
2 INHALANT RESPIRATORY (INHALATION) EVERY 6 HOURS PRN
Qty: 18 G | Refills: 3 | Status: SHIPPED | OUTPATIENT
Start: 2025-07-03

## 2025-07-03 RX ORDER — BUPROPION HYDROCHLORIDE 150 MG/1
150 TABLET, EXTENDED RELEASE ORAL 2 TIMES DAILY
Qty: 60 TABLET | Refills: 3 | Status: SHIPPED | OUTPATIENT
Start: 2025-07-03

## 2025-07-03 RX ORDER — IPRATROPIUM BROMIDE AND ALBUTEROL SULFATE 2.5; .5 MG/3ML; MG/3ML
1 SOLUTION RESPIRATORY (INHALATION) EVERY 6 HOURS
Qty: 360 ML | Refills: 4 | Status: SHIPPED | OUTPATIENT
Start: 2025-07-03

## 2025-07-03 RX ORDER — PREDNISONE 20 MG/1
20 TABLET ORAL DAILY
Qty: 10 TABLET | Refills: 0 | Status: SHIPPED | OUTPATIENT
Start: 2025-07-03 | End: 2025-07-13

## 2025-07-03 NOTE — PROGRESS NOTES
[facility]  Department of Pulmonary, Critical Care and Sleep Medicine  Dr. Fox, Dr. Spivey, Dr. Jorge    Pulmonary & Critical Care Office Note - Follow up      Assessment/Plan       Problem List Items Addressed This Visit    None          Patient ID: Therese Bhatt is a 80 y.o. female    Chief Complaint: Shortness of breath    HPI: Therese Bhatt is a 80 y.o. female with a PMH of severe COPD, lung nodules, current tobacco use, chronic hypoxic respiratory failure on continuous supplemental O2 who presents to the office today for follow up and evaluation of COPD. Ms. Bhatt was recently hospitalized in October for AECOPD, she states today she has returned to her baseline breathing. She has not been taking any maintenance inhalers for treatment of her COPD. She reports she has only been using her albuterol inhaler and she uses it 4 times per day. We discussed the appropriate therapy for her COPD and she is willing to try Trelegy and we discussed the appropriate usage of the Pulmicort nebulizer twice daily. I wrote down her pulmonary medications she should be taking, how much and how often and provided that to her and her daughter. Unfortunately she continues to smoke, we discussed the importance of smoking cessation and the risks involved with continued smoking, such as worsening respiratory condition, cardiovascular events and stroke. She denies chest pain, syncope or hemoptysis.    SEPTEMBER 25, 2023: Therese returns to the office today for 6 month evaluation of severe COPD and tobacco use. Therese reports sinus congestion that improves with Flonase, she needs refill. Denies cough, fever or chills. Unfortunately Therese continues to smoke and we discussed that continued tobacco use increases risk for worsening respiratory failure, lung cancer, heart attack and stroke. We reviewed her most recent CT chest from earlier this month. She continues to use her oxygen with therapeutic

## 2025-07-03 NOTE — PROGRESS NOTES
Orders Placed This Encounter    CT CHEST WO CONTRAST     Standing Status:   Future     Expected Date:   7/3/2025     Expiration Date:   7/3/2026     Reason for exam::   follow up of lung nodule    buPROPion (WELLBUTRIN SR) 150 MG extended release tablet     Sig: Take 1 tablet by mouth 2 times daily     Dispense:  60 tablet     Refill:  3    ipratropium 0.5 mg-albuterol 2.5 mg (DUONEB) 0.5-2.5 (3) MG/3ML SOLN nebulizer solution     Sig: Inhale 3 mLs into the lungs every 6 hours     Dispense:  360 mL     Refill:  4    fluticasone-umeclidin-vilant (TRELEGY ELLIPTA) 200-62.5-25 MCG/ACT AEPB inhaler     Sig: Inhale 1 puff into the lungs daily     Dispense:  60 each     Refill:  4    albuterol sulfate HFA (PROVENTIL;VENTOLIN;PROAIR) 108 (90 Base) MCG/ACT inhaler     Sig: Inhale 2 puffs into the lungs every 6 hours as needed for Wheezing     Dispense:  18 g     Refill:  3    predniSONE (DELTASONE) 20 MG tablet     Sig: Take 1 tablet by mouth daily for 10 days     Dispense:  10 tablet     Refill:  0    azithromycin (ZITHROMAX) 250 MG tablet     Simg on day 1 followed by 250mg on days 2 - 5     Dispense:  6 tablet     Refill:  0    Patient weighed today 73.4 lbs 1 week telephone visit scheduled to see how she's doing on medication will schedule CT scan in near future educated patient on smoking cessation and increasing  her weight

## 2025-07-03 NOTE — PATIENT INSTRUCTIONS
Ivana Spivey    [facility]  [address] [place] [id number]  Office: [phone]      Your were seen in the office today for Severe COPD      We  did make changes to your medications today.   Start prednisone 20mg daily  Start azithromycin  Start Duonebs 4 times a day  Increase dose of Trelegy to 200mcg daily  Start Well butrin    Testing ordered today was CT scan of the chest      Vaccines recommended none      If your breathing symptoms worsen or fail to improve in the next 24-48 hours please go to the emergency room            Please do not hesitate to call the office with any questions.

## 2025-07-10 ENCOUNTER — SCHEDULED TELEPHONE ENCOUNTER (OUTPATIENT)
Age: 80
End: 2025-07-10

## 2025-07-10 DIAGNOSIS — J44.9 STAGE 4 VERY SEVERE COPD BY GOLD CLASSIFICATION (HCC): Primary | ICD-10-CM

## 2025-07-10 DIAGNOSIS — Z72.0 TOBACCO ABUSE: ICD-10-CM

## 2025-07-10 DIAGNOSIS — J44.1 COPD EXACERBATION (HCC): ICD-10-CM

## 2025-07-10 RX ORDER — OXYBUTYNIN CHLORIDE 5 MG/1
5 TABLET, EXTENDED RELEASE ORAL DAILY
Qty: 30 TABLET | Refills: 3 | Status: SHIPPED | OUTPATIENT
Start: 2025-07-10

## 2025-07-10 NOTE — PROGRESS NOTES
[employer] is denying coverage of her Nebulizer meds. Call to [facility] and they will process Duoneb nebulizer med under [employer] Medicare. [facility] faxed pt info and medical card.

## 2025-07-25 ENCOUNTER — TELEPHONE (OUTPATIENT)
Age: 80
End: 2025-07-25